# Patient Record
Sex: FEMALE | Race: WHITE | Employment: OTHER | URBAN - METROPOLITAN AREA
[De-identification: names, ages, dates, MRNs, and addresses within clinical notes are randomized per-mention and may not be internally consistent; named-entity substitution may affect disease eponyms.]

---

## 2020-08-02 ENCOUNTER — HOSPITAL ENCOUNTER (EMERGENCY)
Age: 65
Discharge: HOME | End: 2020-08-02
Attending: FAMILY MEDICINE
Payer: MEDICARE

## 2020-08-02 ENCOUNTER — APPOINTMENT (OUTPATIENT)
Dept: GENERAL RADIOLOGY | Age: 65
End: 2020-08-02
Payer: MEDICARE

## 2020-08-02 VITALS
RESPIRATION RATE: 16 BRPM | OXYGEN SATURATION: 95 % | BODY MASS INDEX: 30.32 KG/M2 | TEMPERATURE: 97.3 F | HEART RATE: 73 BPM | WEIGHT: 182 LBS | DIASTOLIC BLOOD PRESSURE: 92 MMHG | SYSTOLIC BLOOD PRESSURE: 138 MMHG | HEIGHT: 65 IN

## 2020-08-02 PROCEDURE — 6360000002 HC RX W HCPCS: Performed by: FAMILY MEDICINE

## 2020-08-02 PROCEDURE — 96372 THER/PROPH/DIAG INJ SC/IM: CPT

## 2020-08-02 PROCEDURE — 99283 EMERGENCY DEPT VISIT LOW MDM: CPT

## 2020-08-02 PROCEDURE — 73502 X-RAY EXAM HIP UNI 2-3 VIEWS: CPT

## 2020-08-02 PROCEDURE — 6370000000 HC RX 637 (ALT 250 FOR IP): Performed by: FAMILY MEDICINE

## 2020-08-02 RX ORDER — GABAPENTIN 400 MG/1
400 CAPSULE ORAL 3 TIMES DAILY
Status: ON HOLD | COMMUNITY
End: 2020-12-17 | Stop reason: HOSPADM

## 2020-08-02 RX ORDER — ESCITALOPRAM OXALATE 20 MG/1
20 TABLET ORAL DAILY
Status: ON HOLD | COMMUNITY
End: 2021-01-14 | Stop reason: SDUPTHER

## 2020-08-02 RX ORDER — KETOROLAC TROMETHAMINE 30 MG/ML
30 INJECTION, SOLUTION INTRAMUSCULAR; INTRAVENOUS ONCE
Status: COMPLETED | OUTPATIENT
Start: 2020-08-02 | End: 2020-08-02

## 2020-08-02 RX ORDER — ACETAMINOPHEN 500 MG
1000 TABLET ORAL ONCE
Status: COMPLETED | OUTPATIENT
Start: 2020-08-02 | End: 2020-08-02

## 2020-08-02 RX ORDER — ACETAMINOPHEN 500 MG
1000 TABLET ORAL EVERY 8 HOURS PRN
Qty: 50 TABLET | Refills: 0 | Status: SHIPPED | OUTPATIENT
Start: 2020-08-02 | End: 2020-11-21 | Stop reason: ALTCHOICE

## 2020-08-02 RX ORDER — QUETIAPINE 200 MG/1
200 TABLET, FILM COATED, EXTENDED RELEASE ORAL NIGHTLY
Status: ON HOLD | COMMUNITY
End: 2020-10-22 | Stop reason: SDUPTHER

## 2020-08-02 RX ORDER — IBUPROFEN 400 MG/1
400 TABLET ORAL EVERY 8 HOURS PRN
Qty: 12 TABLET | Refills: 0 | Status: ON HOLD | OUTPATIENT
Start: 2020-08-02 | End: 2020-08-29 | Stop reason: HOSPADM

## 2020-08-02 RX ADMIN — KETOROLAC TROMETHAMINE 30 MG: 30 INJECTION, SOLUTION INTRAMUSCULAR at 15:41

## 2020-08-02 RX ADMIN — ACETAMINOPHEN 1000 MG: 500 TABLET ORAL at 15:40

## 2020-08-02 ASSESSMENT — PAIN DESCRIPTION - LOCATION
LOCATION: HIP
LOCATION: HIP

## 2020-08-02 ASSESSMENT — PAIN DESCRIPTION - ORIENTATION
ORIENTATION: LEFT
ORIENTATION: LEFT

## 2020-08-02 ASSESSMENT — PAIN SCALES - GENERAL
PAINLEVEL_OUTOF10: 8
PAINLEVEL_OUTOF10: 5
PAINLEVEL_OUTOF10: 8
PAINLEVEL_OUTOF10: 8

## 2020-08-02 ASSESSMENT — PAIN DESCRIPTION - PAIN TYPE
TYPE: ACUTE PAIN
TYPE: ACUTE PAIN

## 2020-08-02 ASSESSMENT — PAIN DESCRIPTION - FREQUENCY: FREQUENCY: CONTINUOUS

## 2020-08-02 ASSESSMENT — PAIN DESCRIPTION - DESCRIPTORS: DESCRIPTORS: CONSTANT;THROBBING

## 2020-08-02 ASSESSMENT — PAIN DESCRIPTION - PROGRESSION: CLINICAL_PROGRESSION: GRADUALLY IMPROVING

## 2020-08-02 NOTE — ED PROVIDER NOTES
Department of Emergency Medicine   ED  Provider Note  Admit Date/RoomTime: 2020  3:13 PM  ED Room:    Chief Complaint   Fall (left side/hip pain- radiating down to left foot-  aggravated after fall (hs of degenerative bone disease))    History of Present Illness   Source of history provided by:  patient and relative(s). History/Exam Limitations: none. Damaso Vidal is a 77 y.o. old female who  has a past medical history of Degenerative disorder of bone, Hard of hearing, and Hypertension. , presents to the emergency department by private vehicle and ambulatory, for left hip pain which occured 2 day(s) prior to arrival.  Mechanism of injury/pain was result of a fall from standing. Since onset the symptoms have been moderate in degree. Her pain is aggraveated by movement, use and palpation, relieved by nothing and associated with needing assistance to walk. There has been no history of head injury, loss of consciousness, neck pain, chest pain, abdominal pain, numbness or weakness. ROS    Pertinent positives and negatives are stated within HPI, all other systems reviewed and are negative. Past Surgical History:   Procedure Laterality Date    BREAST LUMPECTOMY Left      SECTION      HEMORRHOID SURGERY      HYSTERECTOMY      JOINT REPLACEMENT      TONSILLECTOMY     Social History:  reports that she has been smoking. She started smoking about 47 years ago. She has never used smokeless tobacco. She reports previous alcohol use. She reports that she does not use drugs. Family History: family history is not on file.   Allergies: Tramadol    Physical Exam           ED Triage Vitals   BP Temp Temp Source Pulse Resp SpO2 Height Weight   20 1512 20 1512 20 1512 20 1512 20 1512 20 1512 20 1522 20 1522   (!) 138/92 97.3 °F (36.3 °C) Infrared 73 16 95 % 5' 5\" (1.651 m) 182 lb (82.6 kg)      Oxygen Saturation Interpretation: Normal.    Constitutional:  Alert. Development consistent with age. Head:  Atraumatic, without temporal or scalp tenderness. Eyes:  PERRL, EOMI. No periorbital ecchymoses. Conjunctiva: normal.  Ears:  External ears without lesions. Throat:  Pharynx without lesions. Airway patient. Neck:  Supple. Nontender. Chest:  Symmetrical without visible rash or tenderness. Respiratory:  Clear to auscultation and breath sounds equal.  CV:  Regular rate and rhythm, normal heart sounds, without pathological murmurs. GI:  Abdmen Soft, nontender. No firm or pulsatile mass. No abrasions, ecchymoses, or lacerations. Back:  No costovertebral, paravertebral, intervertebral, or vertebral tenderness or spasm. Musculoskeletal:  Pelvis:  Bilateral.        Tenderness: none. Stability:  stable to palpation. Hip(s):  Left: hip. Tenderness:  moderate. Swelling: None. Deformity: no.       ROM: diminished range with pain,      Skin: no erythema, rash or swelling noted. Neurovascular: Motor deficit: none. Sensory deficit: none. Pulse deficit: none. Capillary refill: normal.       Calf Tenderness:  No Bilateral.          Edema:  none Both lower extremity(s). Gait:  normal.  Integument:  No abrasions, ecchymoses, or lacerations unless noted elsewhere. Neurological:  Orientation age-appropriate. Motor functions intact. Lab / Imaging Results   (All laboratory and radiology results have been personally reviewed by myself)  Labs:  No results found for this visit on 08/02/20. Imaging: All Radiology results interpreted by Radiologist unless otherwise noted. XR HIP LEFT (2-3 VIEWS)   Final Result   No acute osseous abnormality seen.                  ED Course / Medical Decision Making     Medications   ketorolac (TORADOL) injection 30 mg (30 mg Intramuscular Given 8/2/20 1541)   acetaminophen (TYLENOL) tablet 1,000 mg (1,000 mg Oral Given 8/2/20 1540) Re-examination:  8/2/20       Time: 4:25   improved    Consult(s):   None    Procedure(s):   none    MDM:   X-rays negative no suspicion of fracture pelvis stable lumbar spine is unremarkable patient is able to ambulate. Counseling: The emergency provider has spoken with the patient and family member patient and son and discussed todays results, in addition to providing specific details for the plan of care and counseling regarding the diagnosis and prognosis. Questions are answered at this time and they are agreeable with the plan. Assessment      1. Sprain of left hip, initial encounter      Plan   Discharge to home  Patient condition is good    New Medications     New Prescriptions    ACETAMINOPHEN (TYLENOL) 500 MG TABLET    Take 2 tablets by mouth every 8 hours as needed for Pain    IBUPROFEN (IBU) 400 MG TABLET    Take 1 tablet by mouth every 8 hours as needed for Pain Take with full glass of water     Electronically signed by Tim Enriquez MD   DD: 8/2/20  **This report was transcribed using voice recognition software. Every effort was made to ensure accuracy; however, inadvertent computerized transcription errors may be present.   END OF ED PROVIDER NOTE        Tim Enriquez MD  08/02/20 [id number]

## 2020-08-22 ENCOUNTER — APPOINTMENT (OUTPATIENT)
Dept: GENERAL RADIOLOGY | Age: 65
DRG: 246 | End: 2020-08-22
Payer: MEDICARE

## 2020-08-22 ENCOUNTER — HOSPITAL ENCOUNTER (INPATIENT)
Age: 65
LOS: 3 days | Discharge: HOME | DRG: 246 | End: 2020-08-25
Attending: EMERGENCY MEDICINE | Admitting: GENERAL PRACTICE
Payer: MEDICARE

## 2020-08-22 PROBLEM — I21.3 STEMI (ST ELEVATION MYOCARDIAL INFARCTION) (HCC): Status: ACTIVE | Noted: 2020-08-22

## 2020-08-22 PROBLEM — R07.9 CHEST PAIN: Status: ACTIVE | Noted: 2020-08-22

## 2020-08-22 LAB
ANION GAP SERPL CALCULATED.3IONS-SCNC: 14 MMOL/L (ref 7–16)
APTT: 37.8 SEC (ref 24.5–35.1)
BASOPHILS ABSOLUTE: 0.04 E9/L (ref 0–0.2)
BASOPHILS RELATIVE PERCENT: 0.3 % (ref 0–2)
BUN BLDV-MCNC: 18 MG/DL (ref 8–23)
CALCIUM SERPL-MCNC: 9 MG/DL (ref 8.6–10.2)
CHLORIDE BLD-SCNC: 99 MMOL/L (ref 98–107)
CO2: 21 MMOL/L (ref 22–29)
CREAT SERPL-MCNC: 1.1 MG/DL (ref 0.5–1)
EOSINOPHILS ABSOLUTE: 0.43 E9/L (ref 0.05–0.5)
EOSINOPHILS RELATIVE PERCENT: 3.5 % (ref 0–6)
GFR AFRICAN AMERICAN: 60
GFR NON-AFRICAN AMERICAN: 49 ML/MIN/1.73
GLUCOSE BLD-MCNC: 119 MG/DL (ref 74–99)
HCT VFR BLD CALC: 38.9 % (ref 34–48)
HEMOGLOBIN: 13.2 G/DL (ref 11.5–15.5)
IMMATURE GRANULOCYTES #: 0.04 E9/L
IMMATURE GRANULOCYTES %: 0.3 % (ref 0–5)
INR BLD: 0.9
LYMPHOCYTES ABSOLUTE: 2.5 E9/L (ref 1.5–4)
LYMPHOCYTES RELATIVE PERCENT: 20.4 % (ref 20–42)
MCH RBC QN AUTO: 32.9 PG (ref 26–35)
MCHC RBC AUTO-ENTMCNC: 33.9 % (ref 32–34.5)
MCV RBC AUTO: 97 FL (ref 80–99.9)
MONOCYTES ABSOLUTE: 0.91 E9/L (ref 0.1–0.95)
MONOCYTES RELATIVE PERCENT: 7.4 % (ref 2–12)
NEUTROPHILS ABSOLUTE: 8.34 E9/L (ref 1.8–7.3)
NEUTROPHILS RELATIVE PERCENT: 68.1 % (ref 43–80)
PDW BLD-RTO: 13.2 FL (ref 11.5–15)
PLATELET # BLD: 281 E9/L (ref 130–450)
PMV BLD AUTO: 10.3 FL (ref 7–12)
POC ACT LR: 304 SECONDS
POTASSIUM SERPL-SCNC: 4.5 MMOL/L (ref 3.5–5)
PROTHROMBIN TIME: 10 SEC (ref 9.3–12.4)
RBC # BLD: 4.01 E12/L (ref 3.5–5.5)
SODIUM BLD-SCNC: 134 MMOL/L (ref 132–146)
TROPONIN: 0.03 NG/ML (ref 0–0.03)
WBC # BLD: 12.3 E9/L (ref 4.5–11.5)

## 2020-08-22 PROCEDURE — 71045 X-RAY EXAM CHEST 1 VIEW: CPT

## 2020-08-22 PROCEDURE — 6360000002 HC RX W HCPCS: Performed by: EMERGENCY MEDICINE

## 2020-08-22 PROCEDURE — 93458 L HRT ARTERY/VENTRICLE ANGIO: CPT | Performed by: INTERNAL MEDICINE

## 2020-08-22 PROCEDURE — 027034Z DILATION OF CORONARY ARTERY, ONE ARTERY WITH DRUG-ELUTING INTRALUMINAL DEVICE, PERCUTANEOUS APPROACH: ICD-10-PCS | Performed by: INTERNAL MEDICINE

## 2020-08-22 PROCEDURE — 36415 COLL VENOUS BLD VENIPUNCTURE: CPT

## 2020-08-22 PROCEDURE — 80048 BASIC METABOLIC PNL TOTAL CA: CPT

## 2020-08-22 PROCEDURE — 93005 ELECTROCARDIOGRAM TRACING: CPT | Performed by: EMERGENCY MEDICINE

## 2020-08-22 PROCEDURE — C1887 CATHETER, GUIDING: HCPCS

## 2020-08-22 PROCEDURE — 6370000000 HC RX 637 (ALT 250 FOR IP): Performed by: EMERGENCY MEDICINE

## 2020-08-22 PROCEDURE — C1874 STENT, COATED/COV W/DEL SYS: HCPCS

## 2020-08-22 PROCEDURE — 86901 BLOOD TYPING SEROLOGIC RH(D): CPT

## 2020-08-22 PROCEDURE — 84484 ASSAY OF TROPONIN QUANT: CPT

## 2020-08-22 PROCEDURE — C9606 PERC D-E COR REVASC W AMI S: HCPCS | Performed by: INTERNAL MEDICINE

## 2020-08-22 PROCEDURE — 6360000002 HC RX W HCPCS

## 2020-08-22 PROCEDURE — B2151ZZ FLUOROSCOPY OF LEFT HEART USING LOW OSMOLAR CONTRAST: ICD-10-PCS | Performed by: INTERNAL MEDICINE

## 2020-08-22 PROCEDURE — 85025 COMPLETE CBC W/AUTO DIFF WBC: CPT

## 2020-08-22 PROCEDURE — 2709999900 HC NON-CHARGEABLE SUPPLY

## 2020-08-22 PROCEDURE — 2500000003 HC RX 250 WO HCPCS

## 2020-08-22 PROCEDURE — C1769 GUIDE WIRE: HCPCS

## 2020-08-22 PROCEDURE — 86900 BLOOD TYPING SEROLOGIC ABO: CPT

## 2020-08-22 PROCEDURE — 99285 EMERGENCY DEPT VISIT HI MDM: CPT

## 2020-08-22 PROCEDURE — 99291 CRITICAL CARE FIRST HOUR: CPT | Performed by: INTERNAL MEDICINE

## 2020-08-22 PROCEDURE — 86850 RBC ANTIBODY SCREEN: CPT

## 2020-08-22 PROCEDURE — 92941 PRQ TRLML REVSC TOT OCCL AMI: CPT | Performed by: INTERNAL MEDICINE

## 2020-08-22 PROCEDURE — 85730 THROMBOPLASTIN TIME PARTIAL: CPT

## 2020-08-22 PROCEDURE — 96374 THER/PROPH/DIAG INJ IV PUSH: CPT

## 2020-08-22 PROCEDURE — 85347 COAGULATION TIME ACTIVATED: CPT

## 2020-08-22 PROCEDURE — 2140000000 HC CCU INTERMEDIATE R&B

## 2020-08-22 PROCEDURE — B2111ZZ FLUOROSCOPY OF MULTIPLE CORONARY ARTERIES USING LOW OSMOLAR CONTRAST: ICD-10-PCS | Performed by: INTERNAL MEDICINE

## 2020-08-22 PROCEDURE — 96376 TX/PRO/DX INJ SAME DRUG ADON: CPT

## 2020-08-22 PROCEDURE — 4A023N7 MEASUREMENT OF CARDIAC SAMPLING AND PRESSURE, LEFT HEART, PERCUTANEOUS APPROACH: ICD-10-PCS | Performed by: INTERNAL MEDICINE

## 2020-08-22 PROCEDURE — 85610 PROTHROMBIN TIME: CPT

## 2020-08-22 PROCEDURE — C1894 INTRO/SHEATH, NON-LASER: HCPCS

## 2020-08-22 PROCEDURE — 96375 TX/PRO/DX INJ NEW DRUG ADDON: CPT

## 2020-08-22 PROCEDURE — C1725 CATH, TRANSLUMIN NON-LASER: HCPCS

## 2020-08-22 RX ORDER — FENTANYL CITRATE 50 UG/ML
100 INJECTION, SOLUTION INTRAMUSCULAR; INTRAVENOUS ONCE
Status: COMPLETED | OUTPATIENT
Start: 2020-08-22 | End: 2020-08-22

## 2020-08-22 RX ORDER — HEPARIN SODIUM 1000 [USP'U]/ML
INJECTION, SOLUTION INTRAVENOUS; SUBCUTANEOUS
Status: COMPLETED
Start: 2020-08-22 | End: 2020-08-22

## 2020-08-22 RX ORDER — NITROGLYCERIN 20 MG/100ML
5 INJECTION INTRAVENOUS CONTINUOUS
Status: DISCONTINUED | OUTPATIENT
Start: 2020-08-22 | End: 2020-08-23

## 2020-08-22 RX ORDER — ASPIRIN 81 MG/1
324 TABLET, CHEWABLE ORAL ONCE
Status: COMPLETED | OUTPATIENT
Start: 2020-08-22 | End: 2020-08-22

## 2020-08-22 RX ORDER — HEPARIN SODIUM 10000 [USP'U]/100ML
11 INJECTION, SOLUTION INTRAVENOUS CONTINUOUS
Status: DISCONTINUED | OUTPATIENT
Start: 2020-08-22 | End: 2020-08-23

## 2020-08-22 RX ORDER — FENTANYL CITRATE 50 UG/ML
50 INJECTION, SOLUTION INTRAMUSCULAR; INTRAVENOUS ONCE
Status: COMPLETED | OUTPATIENT
Start: 2020-08-22 | End: 2020-08-22

## 2020-08-22 RX ORDER — HEPARIN SODIUM 10000 [USP'U]/100ML
INJECTION, SOLUTION INTRAVENOUS
Status: COMPLETED
Start: 2020-08-22 | End: 2020-08-22

## 2020-08-22 RX ORDER — ONDANSETRON 2 MG/ML
4 INJECTION INTRAMUSCULAR; INTRAVENOUS ONCE
Status: COMPLETED | OUTPATIENT
Start: 2020-08-22 | End: 2020-08-22

## 2020-08-22 RX ORDER — HEPARIN SODIUM 1000 [USP'U]/ML
5000 INJECTION, SOLUTION INTRAVENOUS; SUBCUTANEOUS ONCE
Status: COMPLETED | OUTPATIENT
Start: 2020-08-22 | End: 2020-08-22

## 2020-08-22 RX ADMIN — HEPARIN SODIUM 11 UNITS/KG/HR: 10000 INJECTION, SOLUTION INTRAVENOUS at 22:30

## 2020-08-22 RX ADMIN — ASPIRIN 324 MG: 81 TABLET, CHEWABLE ORAL at 21:16

## 2020-08-22 RX ADMIN — HEPARIN SODIUM 5000 UNITS: 1000 INJECTION, SOLUTION INTRAVENOUS; SUBCUTANEOUS at 22:25

## 2020-08-22 RX ADMIN — FENTANYL CITRATE 50 MCG: 50 INJECTION, SOLUTION INTRAMUSCULAR; INTRAVENOUS at 21:17

## 2020-08-22 RX ADMIN — TICAGRELOR 180 MG: 90 TABLET ORAL at 22:24

## 2020-08-22 RX ADMIN — FENTANYL CITRATE 100 MCG: 50 INJECTION, SOLUTION INTRAMUSCULAR; INTRAVENOUS at 21:57

## 2020-08-22 RX ADMIN — NITROGLYCERIN 0.5 INCH: 20 OINTMENT TOPICAL at 21:16

## 2020-08-22 RX ADMIN — ONDANSETRON 4 MG: 2 INJECTION INTRAMUSCULAR; INTRAVENOUS at 21:17

## 2020-08-22 ASSESSMENT — PAIN DESCRIPTION - LOCATION: LOCATION: CHEST

## 2020-08-22 ASSESSMENT — PAIN SCALES - GENERAL
PAINLEVEL_OUTOF10: 8
PAINLEVEL_OUTOF10: 6
PAINLEVEL_OUTOF10: 7
PAINLEVEL_OUTOF10: 7
PAINLEVEL_OUTOF10: 6

## 2020-08-22 ASSESSMENT — PAIN DESCRIPTION - PAIN TYPE: TYPE: ACUTE PAIN

## 2020-08-22 ASSESSMENT — PAIN DESCRIPTION - FREQUENCY: FREQUENCY: CONTINUOUS

## 2020-08-22 ASSESSMENT — PAIN DESCRIPTION - ONSET: ONSET: ON-GOING

## 2020-08-22 ASSESSMENT — PAIN DESCRIPTION - PROGRESSION: CLINICAL_PROGRESSION: GRADUALLY WORSENING

## 2020-08-22 ASSESSMENT — PAIN DESCRIPTION - DESCRIPTORS: DESCRIPTORS: PRESSURE

## 2020-08-22 ASSESSMENT — PAIN DESCRIPTION - ORIENTATION: ORIENTATION: MID

## 2020-08-23 LAB
ABO/RH: NORMAL
ALBUMIN SERPL-MCNC: 3.6 G/DL (ref 3.5–5.2)
ALP BLD-CCNC: 76 U/L (ref 35–104)
ALT SERPL-CCNC: 13 U/L (ref 0–32)
ANION GAP SERPL CALCULATED.3IONS-SCNC: 12 MMOL/L (ref 7–16)
ANTIBODY SCREEN: NORMAL
AST SERPL-CCNC: 17 U/L (ref 0–31)
BILIRUB SERPL-MCNC: <0.2 MG/DL (ref 0–1.2)
BUN BLDV-MCNC: 22 MG/DL (ref 8–23)
CALCIUM SERPL-MCNC: 8.7 MG/DL (ref 8.6–10.2)
CHLORIDE BLD-SCNC: 106 MMOL/L (ref 98–107)
CHOLESTEROL, TOTAL: 174 MG/DL (ref 0–199)
CO2: 21 MMOL/L (ref 22–29)
CREAT SERPL-MCNC: 1.3 MG/DL (ref 0.5–1)
EKG ATRIAL RATE: 58 BPM
EKG ATRIAL RATE: 61 BPM
EKG ATRIAL RATE: 65 BPM
EKG P AXIS: 35 DEGREES
EKG P AXIS: 48 DEGREES
EKG P AXIS: 73 DEGREES
EKG P-R INTERVAL: 170 MS
EKG P-R INTERVAL: 184 MS
EKG P-R INTERVAL: 204 MS
EKG Q-T INTERVAL: 420 MS
EKG Q-T INTERVAL: 458 MS
EKG Q-T INTERVAL: 462 MS
EKG QRS DURATION: 78 MS
EKG QRS DURATION: 82 MS
EKG QRS DURATION: 88 MS
EKG QTC CALCULATION (BAZETT): 436 MS
EKG QTC CALCULATION (BAZETT): 453 MS
EKG QTC CALCULATION (BAZETT): 461 MS
EKG R AXIS: -20 DEGREES
EKG R AXIS: 13 DEGREES
EKG R AXIS: 4 DEGREES
EKG T AXIS: 22 DEGREES
EKG T AXIS: 31 DEGREES
EKG T AXIS: 47 DEGREES
EKG VENTRICULAR RATE: 58 BPM
EKG VENTRICULAR RATE: 61 BPM
EKG VENTRICULAR RATE: 65 BPM
GFR AFRICAN AMERICAN: 49
GFR NON-AFRICAN AMERICAN: 41 ML/MIN/1.73
GLUCOSE BLD-MCNC: 104 MG/DL (ref 74–99)
HDLC SERPL-MCNC: 48 MG/DL
LDL CHOLESTEROL CALCULATED: 74 MG/DL (ref 0–99)
LV EF: 40 %
LVEF MODALITY: NORMAL
POTASSIUM SERPL-SCNC: 4.5 MMOL/L (ref 3.5–5)
SODIUM BLD-SCNC: 139 MMOL/L (ref 132–146)
TOTAL PROTEIN: 5.7 G/DL (ref 6.4–8.3)
TRIGL SERPL-MCNC: 262 MG/DL (ref 0–149)
VLDLC SERPL CALC-MCNC: 52 MG/DL

## 2020-08-23 PROCEDURE — 93306 TTE W/DOPPLER COMPLETE: CPT

## 2020-08-23 PROCEDURE — 6370000000 HC RX 637 (ALT 250 FOR IP): Performed by: INTERNAL MEDICINE

## 2020-08-23 PROCEDURE — 6370000000 HC RX 637 (ALT 250 FOR IP): Performed by: GENERAL PRACTICE

## 2020-08-23 PROCEDURE — 2580000003 HC RX 258: Performed by: INTERNAL MEDICINE

## 2020-08-23 PROCEDURE — 36415 COLL VENOUS BLD VENIPUNCTURE: CPT

## 2020-08-23 PROCEDURE — 93005 ELECTROCARDIOGRAM TRACING: CPT | Performed by: INTERNAL MEDICINE

## 2020-08-23 PROCEDURE — 99233 SBSQ HOSP IP/OBS HIGH 50: CPT | Performed by: INTERNAL MEDICINE

## 2020-08-23 PROCEDURE — 2140000000 HC CCU INTERMEDIATE R&B

## 2020-08-23 PROCEDURE — 80053 COMPREHEN METABOLIC PANEL: CPT

## 2020-08-23 PROCEDURE — 93010 ELECTROCARDIOGRAM REPORT: CPT | Performed by: INTERNAL MEDICINE

## 2020-08-23 PROCEDURE — 6360000002 HC RX W HCPCS: Performed by: INTERNAL MEDICINE

## 2020-08-23 PROCEDURE — 80061 LIPID PANEL: CPT

## 2020-08-23 RX ORDER — ACETAMINOPHEN 325 MG/1
650 TABLET ORAL EVERY 6 HOURS PRN
Status: DISCONTINUED | OUTPATIENT
Start: 2020-08-23 | End: 2020-08-25 | Stop reason: HOSPADM

## 2020-08-23 RX ORDER — DISULFIRAM 250 MG/1
250 TABLET ORAL DAILY
Status: ON HOLD | COMMUNITY
End: 2022-08-01 | Stop reason: ALTCHOICE

## 2020-08-23 RX ORDER — SODIUM CHLORIDE 0.9 % (FLUSH) 0.9 %
10 SYRINGE (ML) INJECTION PRN
Status: DISCONTINUED | OUTPATIENT
Start: 2020-08-23 | End: 2020-08-25 | Stop reason: HOSPADM

## 2020-08-23 RX ORDER — SODIUM CHLORIDE 0.9 % (FLUSH) 0.9 %
10 SYRINGE (ML) INJECTION EVERY 12 HOURS SCHEDULED
Status: DISCONTINUED | OUTPATIENT
Start: 2020-08-23 | End: 2020-08-25 | Stop reason: HOSPADM

## 2020-08-23 RX ORDER — NITROGLYCERIN 0.4 MG/1
0.4 TABLET SUBLINGUAL EVERY 5 MIN PRN
Status: DISCONTINUED | OUTPATIENT
Start: 2020-08-23 | End: 2020-08-25 | Stop reason: HOSPADM

## 2020-08-23 RX ORDER — METOPROLOL SUCCINATE 25 MG/1
25 TABLET, EXTENDED RELEASE ORAL DAILY
Status: DISCONTINUED | OUTPATIENT
Start: 2020-08-23 | End: 2020-08-24

## 2020-08-23 RX ORDER — DISULFIRAM 250 MG/1
250 TABLET ORAL DAILY
Status: DISCONTINUED | OUTPATIENT
Start: 2020-08-23 | End: 2020-08-25 | Stop reason: HOSPADM

## 2020-08-23 RX ORDER — SODIUM CHLORIDE 9 MG/ML
INJECTION, SOLUTION INTRAVENOUS CONTINUOUS
Status: DISCONTINUED | OUTPATIENT
Start: 2020-08-23 | End: 2020-08-25 | Stop reason: HOSPADM

## 2020-08-23 RX ORDER — ZOLPIDEM TARTRATE 5 MG/1
10 TABLET ORAL ONCE
Status: COMPLETED | OUTPATIENT
Start: 2020-08-23 | End: 2020-08-23

## 2020-08-23 RX ORDER — PREGABALIN 50 MG/1
50 CAPSULE ORAL DAILY
COMMUNITY
End: 2020-09-21 | Stop reason: ALTCHOICE

## 2020-08-23 RX ORDER — GABAPENTIN 400 MG/1
400 CAPSULE ORAL 3 TIMES DAILY
Status: DISCONTINUED | OUTPATIENT
Start: 2020-08-23 | End: 2020-08-25 | Stop reason: HOSPADM

## 2020-08-23 RX ORDER — ASPIRIN 81 MG/1
81 TABLET, CHEWABLE ORAL DAILY
Status: DISCONTINUED | OUTPATIENT
Start: 2020-08-23 | End: 2020-08-25 | Stop reason: HOSPADM

## 2020-08-23 RX ORDER — QUETIAPINE 200 MG/1
200 TABLET, FILM COATED, EXTENDED RELEASE ORAL NIGHTLY
Status: DISCONTINUED | OUTPATIENT
Start: 2020-08-23 | End: 2020-08-25 | Stop reason: HOSPADM

## 2020-08-23 RX ORDER — PREGABALIN 50 MG/1
50 CAPSULE ORAL DAILY
Status: DISCONTINUED | OUTPATIENT
Start: 2020-08-23 | End: 2020-08-25 | Stop reason: HOSPADM

## 2020-08-23 RX ORDER — ATORVASTATIN CALCIUM 80 MG/1
80 TABLET, FILM COATED ORAL NIGHTLY
Status: DISCONTINUED | OUTPATIENT
Start: 2020-08-23 | End: 2020-08-25 | Stop reason: HOSPADM

## 2020-08-23 RX ADMIN — ACETAMINOPHEN 650 MG: 325 TABLET, FILM COATED ORAL at 00:33

## 2020-08-23 RX ADMIN — ASPIRIN 81 MG CHEWABLE TABLET 81 MG: 81 TABLET CHEWABLE at 09:25

## 2020-08-23 RX ADMIN — NITROGLYCERIN 0.5 INCH: 20 OINTMENT TOPICAL at 23:05

## 2020-08-23 RX ADMIN — TICAGRELOR 90 MG: 90 TABLET ORAL at 23:05

## 2020-08-23 RX ADMIN — NITROGLYCERIN 0.5 INCH: 20 OINTMENT TOPICAL at 18:28

## 2020-08-23 RX ADMIN — METOPROLOL SUCCINATE 25 MG: 25 TABLET, EXTENDED RELEASE ORAL at 09:21

## 2020-08-23 RX ADMIN — NITROGLYCERIN 0.5 INCH: 20 OINTMENT TOPICAL at 13:03

## 2020-08-23 RX ADMIN — ACETAMINOPHEN 650 MG: 325 TABLET, FILM COATED ORAL at 16:33

## 2020-08-23 RX ADMIN — ZOLPIDEM TARTRATE 10 MG: 5 TABLET ORAL at 23:06

## 2020-08-23 RX ADMIN — TICAGRELOR 90 MG: 90 TABLET ORAL at 09:21

## 2020-08-23 RX ADMIN — PREGABALIN 50 MG: 50 CAPSULE ORAL at 23:05

## 2020-08-23 RX ADMIN — SODIUM CHLORIDE, PRESERVATIVE FREE 10 ML: 5 INJECTION INTRAVENOUS at 09:21

## 2020-08-23 RX ADMIN — ACETAMINOPHEN 650 MG: 325 TABLET, FILM COATED ORAL at 09:21

## 2020-08-23 RX ADMIN — GABAPENTIN 400 MG: 400 CAPSULE ORAL at 23:06

## 2020-08-23 RX ADMIN — SODIUM CHLORIDE: 9 INJECTION, SOLUTION INTRAVENOUS at 00:35

## 2020-08-23 RX ADMIN — ATORVASTATIN CALCIUM 80 MG: 80 TABLET, FILM COATED ORAL at 23:05

## 2020-08-23 RX ADMIN — ENOXAPARIN SODIUM 30 MG: 30 INJECTION SUBCUTANEOUS at 09:21

## 2020-08-23 ASSESSMENT — PAIN DESCRIPTION - LOCATION: LOCATION: CHEST

## 2020-08-23 ASSESSMENT — PAIN SCALES - GENERAL
PAINLEVEL_OUTOF10: 6
PAINLEVEL_OUTOF10: 4
PAINLEVEL_OUTOF10: 0
PAINLEVEL_OUTOF10: 4

## 2020-08-23 ASSESSMENT — PAIN DESCRIPTION - DESCRIPTORS: DESCRIPTORS: ACHING

## 2020-08-23 ASSESSMENT — PAIN DESCRIPTION - ONSET: ONSET: ON-GOING

## 2020-08-23 NOTE — PROCEDURES
narrowing before a small second  marginal branch and large third and fourth marginal branches and another  fifth small lateral branch. The AV groove branch and the posterior  descending artery branch and the posterolateral branch do not appear to  have any significant disease. RCA:  The right coronary artery is a moderate size codominant vessel,  which did not appear to have any significant angiographic disease. LEFT VENTRICULOGRAPHY:  The left ventricle is normal in size. There was  severe hypokinesis of the anterolateral wall. There was severe  hypokinesis to akinesis of the apical half to two-thirds of the  anterolateral wall, the apex and the apical half of the inferior wall. The global left ventricular systolic function also appeared moderately  reduced with an estimated ejection fraction of 35%-40%. There was post  PVC mitral regurgitation. INTERVENTIONAL PROCEDURE:    EQUIPMENT:  1.  6-Divehi JL-3.5 Skin Scan Launcher guide catheter with side holes. 2.  0.014/300 Sunbright scientific Choice Extra Support J-exchange wire. 3.  2.0 mm x 15 mm Stribe balloon. 4.  3.0 mm x 33 mm Abbott Xience Sara drug-eluting coronary stent. 5.  3.5 mm x 15 mm Sunbright Scientific Magnolia Monorail noncompliant balloon. TECHNIQUE:  The procedure was done through right radial approach. The  patient had received heparin at the [facility] emergency department  together with aspirin and Brilinta. An ACT after obtaining access in  the right radial artery was 232 for which she was given another 3000  units of intravenous heparin with repeat ACT during the procedure of  304. Intravenous Versed was given for sedation. The patient did  receive intraradial nitroglycerin and verapamil after obtaining access  in the right radial artery. The left coronary artery was selectively engaged with the 6-Divehi  JL-3.5 guide catheter with side holes.   The Extra Support exchange wire  was then successfully advanced estimated  ejection fraction of 35%-40%. 3.  Mildly elevated left end diastolic pressure. 4.  Successful balloon angioplasty with the deployment of a drug-eluting  coronary stent to the proximal LAD with post-stent deployment dilatation  with a larger noncompliant balloon to high pressure with very good  results.         Ana Ceballos MD    D: 08/23/2020 3:17:11       T: 08/23/2020 3:21:15     [email or web address]  Job#: [id number]     Doc#: [id number]    CC:

## 2020-08-23 NOTE — CONSULTS
[address]                  [address] [place],  [facility]                                  CONSULTATION    PATIENT NAME: Michael Kendall                   :        1955  MED REC NO:   [id number]                            ROOM:       7734  ACCOUNT NO:   [de-identified]                           ADMIT DATE: 2020  PROVIDER:     Jodie Mcelroy MD    CONSULT DATE:  2020    CRITICAL CARE CONSULTATION REPORT    REASON FOR CONSULTATION:  Acute ST-elevation anterior wall myocardial  infarction. HISTORY OF PRESENT ILLNESS:  The patient is a 69-year-old lady who had  an episode of chest pain yesterday that lasted for 3 hours. Today, at  around 01:00 p.m., she had recurrent chest pain that she rated at 9/10  in severity when it first occurred. It radiated to the jaw and both  arms. She presented to the [facility] Emergency Room 6 hours after the  onset of her chest pain. The initial EKG was within normal limits with  no acute ST-T wave changes. Her first troponin was negative. She was  in the process of being admitted to the [facility] when the nurse  noted that she had ST elevations on the monitor. The patient complained  of worsening chest pain. A repeat EKG showed ST elevation in the  anterior leads. I was called as I am covering Interventional Cardiology  because of the above findings. Upon my instructions, the patient was  given aspirin, 180 mg of Brilinta and was bolused with heparin and was  started on heparin drip. She was also given fentanyl. Her systolic  blood pressure in the ambulance was in the 60s to 70s and she was  bolused with fluids. On arrival to the cath lab, she reported that her  pain was about 5-6/10. REVIEW OF SYSTEMS:  CONSTITUTIONAL:  She denies fever, chills, night  sweats or fatigue. HEENT:  She denies any unusual headaches. She  denies recent changes in hearing or vision.   She denied dysphagia,  hoarseness, hemoptysis, hematemesis or epistaxis. ENDOCRINE:  She  denied polyphagia, polyuria or polydipsia. She denied heat or cold  intolerance. MUSCULOSKELETAL:  She denied any significant myalgias or  arthralgias. SKIN:  She denied rashes, ulcers or itching. HEME/LYMPH:   She denied any palpable lymph nodes. She denied bleeding or easy  bruisability. HEART:  As above. LUNGS:  She denied cough or sputum  production. GI:  She denied abdominal pain, nausea, vomiting, diarrhea,  constipation, rectal bleeding or tarry stools. :  She denied dysuria  or hematuria. PSYCH:  She has a history of depression. She denied  recent mood changes. NEUROLOGIC:  She denied memory loss, motor  weakness, numbness, tingling or tremors. PAST MEDICAL/SURGICAL HISTORY:  1. Hypertension. 2.  Depression. 3.  Degenerative disorder of bone with a history of joint replacement. 4.  Hard of hearing. 5.  History of . 6.  History of tonsillectomy. 7.  History of hysterectomy. 8.  History of hemorrhoid surgery. 9.  History of left breast lumpectomy. FAMILY HISTORY:  Father  at age 76 from heart disease. Mother   at age 72 from lung cancer. SOCIAL HISTORY:  The patient currently smokes half pack of cigarettes a  day. She started smoking at the age 23. She is a recovering alcoholic  and has not had any alcohol for 6 years and 2 months. She denied  illicit drug use. ALLERGIES:  TRAMADOL. MEDICATIONS:  Medications prior to admission included Lyrica,  gabapentin, amlodipine, Seroquel, Lexapro, ibuprofen and Tylenol. PHYSICAL EXAMINATION:  GENERAL APPEARANCE:  Well-developed, well-nourished lady in no acute  distress. VITAL SIGNS:  Systolic blood pressure in the upper 70s to low 80s, heart  rate 60, respiratory 16. HEENT:  Atraumatic, normocephalic head. Sclerae nonicteric. No  xanthelasmas. Mucous membranes moist.  NECK:  No jugular venous tension. No carotid bruits. Carotid upstrokes  normal bilaterally. No thyromegaly. CHEST:  Symmetrical and nontender. No deformities. LUNGS:  Clear bilaterally. HEART:  Normal S1, S2. No S3 or S4. No murmurs or rubs. ABDOMEN:  Soft, nontender without organomegaly or mass. Normal bowel  sounds. No bruits. EXTREMITIES:  No edema. Pulses palpable. SKIN:  Normal turgor. No rashes or ulcers noted. NEUROLOGIC:  Oriented x3. No motor or sensory deficit detected. REVIEW OF DIAGNOSTIC TESTS:  Initial EKG on presentation to the  [facility] ER showed sinus rhythm with leftward axis with poor R-wave  progression in V1-V2, but with no ST-segment elevations, ST-segment  depressions or T-wave inversions. Repeat EKG when she complained of  worsening chest pain showed sinus rhythm with ST elevations in V1-V5. White blood cell count 12.3, hemoglobin 13.2, hematic 38.9, platelet  count 148. Troponin 0.03, BUN 28, creatinine 1.1, GFR 49, potassium  4.5, sodium 134. ASSESSMENT:  1. Acute ST-elevation anterior wall myocardial infarction. 2.  Hypotension, secondary to anterior infarct/cardiogenic shock. 3.  History of hypertension. 4.  Renal insufficiency. 5.  Tobacco abuse. 6.  Depression. TREATMENT PLAN:  1. Emergent cardiac catheterization with possible percutaneous coronary  intervention. Risks of the procedure including but not limited to  death, stroke, vascular injury, bleeding, infection, allergic reaction,  renal failure, possible need for emergency bypass surgery, possible need  for vascular repair surgery, possible need for dialysis, possible need  for blood transfusion, etc., were all explained to the patient. Benefits and alternative therapies were discussed. She understood and  consented to proceed. 2.  Further recommendations to follow.         Blas Bird MD    D: 08/23/2020 0:19:19       T: 08/23/2020 0:23:50     [email or web address]  Job#: [id number]     Doc#: [id number]    CC:

## 2020-08-23 NOTE — OP NOTE
Operative Note      Patient: Edward Luciano  YOB: 1955  MRN: [id number]    Date of Procedure: 8/22/2020      Indication:    1. Acute anterior STEMI  2. Catheterization: AUC score 9 . Indication 2.  3. PCI: AUC score 9 Indication 1. Procedure: Left Heart Catheterization, coronary angiography, left ventriculography, percutaneous coronary intervention to LAD      Anesthesia: Versed  Time sedation was administered: 23:14. I was present in the room when sedation was administered. Procedure end time: 23:37  Time spent with face to face monitoring of moderate sedation: 56 min    Cardiac cath performed via right radial approach using 6F sheath. Findings:   Left main: 0% stenosis  LAD: 99 %   stenosis  Circumflex: 70%   stenosis. RCA: Co dominant. 0 %  stenosis. LV angio: 35%  ejection fraction      Hemodynamics: Ao: 62/41 ( 49 )  LV: 69  LVEDP: 14    Interventional procedure:   1. PCI vessel: LAD. Lesion type: C. SADI I flow pre PCI. Angioplasty performed with 2.0 balloon. Stenting performed with 3.0 mm GAURAV Post dilated with 3.5 mm non compliant balloon . Result: 0% residual stenosis and SADI III distal flow. Drugs: . Anticoagulation was maintained with IV Heparin . Brilinta 180 mg load given  in the outlying ED. Right radial sheath removed and TR band applied. There was good distal pulses in the RUE at the end of the procedure. Complication: None   Blood loss: 5 cc  Contrast used: 130 cc      Post op diagnosis:  Acute anterior STEMI  Successful PCI / GAURAV to LAD    Plan:  DAPT  Risk profile modification.    See orders          Electronically signed by Franklin Cabrera MD on 8/22/2020 at 11:47 PM

## 2020-08-23 NOTE — H&P
[address]                  [address] [facility],  [facility]                              HISTORY AND PHYSICAL    PATIENT NAME: Alex Rose                   :        1955  MED REC NO:   [id number]                            ROOM:       56  ACCOUNT NO:   [de-identified]                           ADMIT DATE: 2020  PROVIDER:     Danica Florentino DO    HISTORY OF PRESENT ILLNESS:  This is a 47-year-old white female  presented to the [facility] Emergency Department with a history and  chief complaint of chest pain. She states that the pain was radiating  up into her jaws and down into both upper arms and felt as though  somebody was pulling her arms. She was going to be admitted into the  chest pain unit initially over at [facility] and then I guess EKGs  demonstrated some significant ST changes, although troponin was  negative. She was brought [facility] for urgent catheterization,  Cardiology consult. PAST MEDICAL HISTORY:  Significant for hypertension, chronic myofascial  pain syndrome, chronic anxiety, depression. PAST SURGICAL HISTORY:  Significant for bilateral knee replacements,  hysterectomy, three  sections. She had been hospitalized in the  past for pneumonia and hepatitis. She had a breast lumpectomy. MEDICATIONS:  Prior to admission were; Tylenol, ibuprofen, Lexapro,  Seroquel, amlodipine, gabapentin. ALLERGIES:  She has allergies to TRAMADOL. SOCIAL HISTORY:  The patient is a smoker. She has been encouraged to  quit. Previous history of drug use and alcohol. FAMILY HISTORY:  Positive, that mom had lung cancer. Father had  coronary artery disease. REVIEW OF SYSTEMS:  HEENT:  Negative for cephalgia, vertigo, ringing in ears, hearing loss,  difficulty swallowing, hoarseness in her voice or bloody noses. CARDIOVASCULAR:  She has chest pain that relates to the history and  chief complaint.   PULMONARY:  There is no cough, wheeze, shortness of breath, hemoptysis. GASTROINTESTINAL:  No nausea, vomiting, diarrhea. No constipation or  blood in the stool. No recent change in weight. No urgency, frequency,  dysuria. ENDOCRINE:  Negative for diabetes or thyroid disorder. MUSCULOSKELETAL:  Positive for degenerative joint disease. PSYCHIATRIC:  Positive for anxiety and depression. NEUROLOGIC:  Negative for CVA, seizures, tremors, tics or TIAs. PHYSICAL EXAMINATION:  GENERAL:  Shows this to be a 56-year-old white female, in moderate  distress with the above complaints. HEAD, EYES, EARS, NOSE AND THROAT:  Examination shows the head to be  normocephalic and atraumatic. Pupils are equal and reactive to light  and accommodation. Extraocular eye muscles are intact. Tympanic  membranes are clear. Ear canals are patent. Oral mucosa pink and  moist.  NECK:  Veins are flat, nondistended. No carotid bruits. CHEST:  Symmetrical.  HEART:  Regular rate and rhythm without murmur, gallops, or friction  rub. LUNGS:  Clear to auscultation bilaterally without rhonchi, rales, or  wheezes. ABDOMEN:  Soft, nontender, nondistended. Bowel sounds are present in  all four quadrants. EXTERNAL GENITALIA:  Intact. EXTREMITIES:  Peripheral pulses intact. Legs without edema. SPINE:  Shows physiologic curve. INITIAL IMPRESSION:  At this time is ST-segment myocardial infarction. The patient will be urgently brought into the cath lab. Cardiology  consult will be asked for. Further orders will be written for as  clinical course dictates.         Nayeli Pineda DO    D: 08/23/2020 10:29:34       T: 08/23/2020 10:31:50     [email or web address]  Job#: [id number]     Doc#: [id number]    CC:

## 2020-08-23 NOTE — CONSULTS
Met with patient and discussed that their physician has ordered a referral to our outpatient Phase II Cardiac Rehabilitation program. Reviewed the benefits of cardiac rehabilitation based on their diagnosis and personal risk factors. Patient demonstrates strong interest in Cardiac Rehabilitation at this time. Cardiac Rehabilitation brochure provided to patient/family. The Cardiac Rehabilitation Program has been provided the patient's referral information and pertinent patient details and history. The patient may call [facility] at [phone] for additional information or questions. Contact information for [facility] and other choices close to the patient's residence have been provided in the discharge instructions so that the patient may call and schedule an appointment when cleared by their physician.  Thank you for the referral. [FreeTextEntry1] : 6 month old male with bronchiolitis. D/w mom to continue frequent nasal saline & suctioning, use saline nebulizer treatments 3-4 times per day.\par For acute gastro, In order to maintain hydration consume "oral rehydration solution," such as Pedialyte or low calorie sports drinks. If vomiting, try to give child a few teaspoons of fluid every few minutes. If tolerating solids, it’s best to consume lean meats, fruits, vegetables, and whole-grain breads and cereals.\par Return if symptoms worsen or persist\par RTO next week for 6 month old [facility], or sooner if patient develops fever or worsening symptoms\par All questions answered. Caretaker verbalizes understanding and agrees with plan of care.

## 2020-08-23 NOTE — PROGRESS NOTES
Spoke with Dr. Bela Banuelos on phone, aware of cath lab events and admission to [place] [address].

## 2020-08-23 NOTE — ED PROVIDER NOTES
HPI:  20,   Time: 9:10 PM EDT       Sigifredo Benoit is a 77 y.o. female presenting to the ED for cp, beginning 6 hrs ago. The complaint has been persistent, moderate in severity, and worsened by nothing. Pressure in chest, into left arm and jaw, hx same 3 days ago but not as severe. Nothing makes better. No hx cad. Pos sob, pos n w/o emesis. No diarrhea. No cough/congestin/sore throat    Review of Systems:   Pertinent positives and negatives are stated within HPI, all other systems reviewed and are negative.          --------------------------------------------- PAST HISTORY ---------------------------------------------  Past Medical History:  has a past medical history of Degenerative disorder of bone, Hard of hearing, Hypertension, and MI (myocardial infarction) ([facility] [address] Past Surgical History:  has a past surgical history that includes joint replacement;  section; Tonsillectomy; Hysterectomy; Hemorrhoid surgery; Breast lumpectomy (Left); and Coronary angioplasty with stent (2020). Social History:  reports that she has been smoking. She started smoking about 47 years ago. She has never used smokeless tobacco. She reports previous alcohol use. She reports that she does not use drugs. Family History: family history is not on file. The patients home medications have been reviewed.     Allergies: Tramadol        ---------------------------------------------------PHYSICAL EXAM--------------------------------------    Constitutional/General: Alert and oriented x3, well appearing, non toxic in NAD  Head: Normocephalic and atraumatic  Eyes: PERRL, EOMI, conjunctive normal, sclera non icteric  Mouth: Oropharynx clear, handling secretions, no trismus, no asymmetry of the posterior oropharynx or uvular edema  Neck: Supple, full ROM, non tender to palpation in the midline, no stridor, no crepitus, no meningeal signs  Respiratory: Lungs clear to auscultation bilaterally, no wheezes, rales, or rhonchi. Not in respiratory distress  Cardiovascular:  Regular rate. Regular rhythm. No murmurs, gallops, or rubs. 2+ distal pulses  Chest: No chest wall tenderness  GI:  Abdomen Soft, Non tender, Non distended. +BS. No organomegaly, no palpable masses,  No rebound, guarding, or rigidity. Musculoskeletal: Moves all extremities x 4. Warm and well perfused, no clubbing, cyanosis, or edema. Capillary refill <3 seconds  Integument: skin warm and dry. No rashes. Lymphatic: no lymphadenopathy noted  Neurologic: GCS 15, no focal deficits, symmetric strength 5/5 in the upper and lower extremities bilaterally  Psychiatric: Normal Affect    -------------------------------------------------- RESULTS -------------------------------------------------  I have personally reviewed all laboratory and imaging results for this patient. Results are listed below.      LABS:  Results for orders placed or performed during the hospital encounter of 08/22/20   CBC auto differential   Result Value Ref Range    WBC 12.3 (H) 4.5 - 11.5 E9/L    RBC 4.01 3.50 - 5.50 E12/L    Hemoglobin 13.2 11.5 - 15.5 g/dL    Hematocrit 38.9 34.0 - 48.0 %    MCV 97.0 80.0 - 99.9 fL    MCH 32.9 26.0 - 35.0 pg    MCHC 33.9 32.0 - 34.5 %    RDW 13.2 11.5 - 15.0 fL    Platelets 377 691 - 807 E9/L    MPV 10.3 7.0 - 12.0 fL    Neutrophils % 68.1 43.0 - 80.0 %    Immature Granulocytes % 0.3 0.0 - 5.0 %    Lymphocytes % 20.4 20.0 - 42.0 %    Monocytes % 7.4 2.0 - 12.0 %    Eosinophils % 3.5 0.0 - 6.0 %    Basophils % 0.3 0.0 - 2.0 %    Neutrophils Absolute 8.34 (H) 1.80 - 7.30 E9/L    Immature Granulocytes # 0.04 E9/L    Lymphocytes Absolute 2.50 1.50 - 4.00 E9/L    Monocytes Absolute 0.91 0.10 - 0.95 E9/L    Eosinophils Absolute 0.43 0.05 - 0.50 E9/L    Basophils Absolute 0.04 0.00 - 0.20 E6/O   Basic metabolic panel   Result Value Ref Range    Sodium 134 132 - 146 mmol/L    Potassium 4.5 3.5 - 5.0 mmol/L    Chloride 99 98 - 107 mmol/L    CO2 21 (L) 22 - 29 mmol/L    Anion Gap 14 7 - 16 mmol/L    Glucose 119 (H) 74 - 99 mg/dL    BUN 18 8 - 23 mg/dL    CREATININE 1.1 (H) 0.5 - 1.0 mg/dL    GFR Non-African American 49 >=60 mL/min/1.73    GFR African American 60     Calcium 9.0 8.6 - 10.2 mg/dL   Troponin   Result Value Ref Range    Troponin 0.03 0.00 - 0.03 ng/mL   Protime-INR   Result Value Ref Range    Protime 10.0 9.3 - 12.4 sec    INR 0.9    APTT   Result Value Ref Range    aPTT 37.8 (H) 24.5 - 35.1 sec   Lipid panel   Result Value Ref Range    Cholesterol, Total 174 0 - 199 mg/dL    Triglycerides 262 (H) 0 - 149 mg/dL    HDL 48 >40 mg/dL    LDL Calculated 74 0 - 99 mg/dL    VLDL Cholesterol Calculated 52 mg/dL   Comprehensive Metabolic Panel   Result Value Ref Range    Sodium 139 132 - 146 mmol/L    Potassium 4.5 3.5 - 5.0 mmol/L    Chloride 106 98 - 107 mmol/L    CO2 21 (L) 22 - 29 mmol/L    Anion Gap 12 7 - 16 mmol/L    Glucose 104 (H) 74 - 99 mg/dL    BUN 22 8 - 23 mg/dL    CREATININE 1.3 (H) 0.5 - 1.0 mg/dL    GFR Non-African American 41 >=60 mL/min/1.73    GFR African American 49     Calcium 8.7 8.6 - 10.2 mg/dL    Total Protein 5.7 (L) 6.4 - 8.3 g/dL    Alb 3.6 3.5 - 5.2 g/dL    Total Bilirubin <0.2 0.0 - 1.2 mg/dL    Alkaline Phosphatase 76 35 - 104 U/L    ALT 13 0 - 32 U/L    AST 17 0 - 31 U/L   POC ACT-Low Range   Result Value Ref Range    POC ACT  Not established seconds   EKG 12 Lead   Result Value Ref Range    Ventricular Rate 65 BPM    Atrial Rate 65 BPM    P-R Interval 170 ms    QRS Duration 82 ms    Q-T Interval 420 ms    QTc Calculation (Bazett) 436 ms    P Axis 35 degrees    R Axis -20 degrees    T Axis 31 degrees   EKG 12 lead   Result Value Ref Range    Ventricular Rate 58 BPM    Atrial Rate 58 BPM    P-R Interval 204 ms    QRS Duration 78 ms    Q-T Interval 462 ms    QTc Calculation (Bazett) 453 ms    P Axis 73 degrees    R Axis 13 degrees    T Axis 47 degrees   EKG 12 lead   Result Value Ref Range    Ventricular Rate 61 BPM    Atrial Rate 61 BPM    P-R Interval 184 ms    QRS Duration 88 ms    Q-T Interval 458 ms    QTc Calculation (Bazett) 461 ms    P Axis 48 degrees    R Axis 4 degrees    T Axis 22 degrees   TYPE AND SCREEN   Result Value Ref Range    ABO/Rh B NEG     Antibody Screen NEG        RADIOLOGY:  Interpreted by Radiologist.  XR CHEST PORTABLE   Final Result   No acute cardiopulmonary process. EKG: This EKG is signed and interpreted by the EP. Time: 2053  Rate: 80  Rhythm: Sinus  Interpretation: non-specific EKG  Comparison: None    Repeat ekg 2200  Sinus rhythm, rate 80  St elevation v2-v4  stemi    ------------------------- NURSING NOTES AND VITALS REVIEWED ---------------------------   The nursing notes within the ED encounter and vital signs as below have been reviewed by myself. /68   Pulse 61   Temp 97 °F (36.1 °C) (Temporal)   Resp 18   Ht 5' 5\" (1.651 m)   Wt 200 lb (90.7 kg)   SpO2 96%   BMI 33.28 kg/m²   Oxygen Saturation Interpretation: Normal    The patients available past medical records and past encounters were reviewed.         ------------------------------ ED COURSE/MEDICAL DECISION MAKING----------------------  Medications   acetaminophen (TYLENOL) tablet 650 mg (650 mg Oral Given 8/23/20 0921)   sodium chloride flush 0.9 % injection 10 mL (10 mLs Intravenous Given 8/23/20 0921)   sodium chloride flush 0.9 % injection 10 mL (has no administration in time range)   0.9 % sodium chloride infusion ( Intravenous New Bag 8/23/20 0035)   metoprolol succinate (TOPROL XL) extended release tablet 25 mg (25 mg Oral Given 8/23/20 0921)   atorvastatin (LIPITOR) tablet 80 mg ( Oral Canceled Entry 8/23/20 0100)   aspirin chewable tablet 81 mg (81 mg Oral Given 8/23/20 0925)   enoxaparin (LOVENOX) injection 30 mg (30 mg Subcutaneous Given 8/23/20 0921)   ticagrelor (BRILINTA) tablet 90 mg (90 mg Oral Given 8/23/20 0921)   nitroGLYCERIN (NITROSTAT) SL tablet 0.4 mg (has no administration in time range) perflutren lipid microspheres (DEFINITY) injection 1.65 mg (has no administration in time range)   nitroglycerin (NITRO-BID) 2 % ointment 0.5 inch (0.5 inches Topical Given 8/23/20 1303)   nitroglycerin (NITRO-BID) 2 % ointment 0.5 inch (0.5 inches Topical Given 8/22/20 2116)   aspirin chewable tablet 324 mg (324 mg Oral Given 8/22/20 2116)   fentaNYL (SUBLIMAZE) injection 50 mcg (50 mcg Intravenous Given 8/22/20 2117)   ondansetron (ZOFRAN) injection 4 mg (4 mg Intravenous Given 8/22/20 2117)   fentaNYL (SUBLIMAZE) injection 100 mcg (100 mcg Intravenous Given 8/22/20 2157)   heparin (porcine) injection 5,000 Units (5,000 Units Intravenous Given 8/22/20 2225)   ticagrelor (BRILINTA) tablet 180 mg (180 mg Oral Given 8/22/20 2224)         ED COURSE:       Medical Decision Making:    Pt cp with risk factor, relief with ntg paste, initial ekg not ischemic. Discussed pt risk and rec for obs, pt requested tracey, discussed with dr Rachael Martinez at 2140, accepted to [address] [place] [address]. At 2150, pain inc and monitor changes, repeat ekg met stemi criteria at approx 2200, discussed with dr Pascual Elias, agreed to take to cath lab at [facility], heparin and brilinta given, pt updated, agreealbe with poc. bp stable in ed throughout      This patient's ED course included: a personal history and physicial examination    This patient has remained hemodynamically stable during their ED course. Re-Evaluations:             Re-evaluation. Patients symptoms show no change    Re-examination  8/22/20   9:10 PM EDT          Vital Signs:   Vitals:    08/22/20 2232 08/23/20 0033 08/23/20 0815 08/23/20 1213   BP:  89/60 128/71 133/68   Pulse: 66 60 72 61   Resp:  16 18 18   Temp:  97 °F (36.1 °C) 98 °F (36.7 °C) 97 °F (36.1 °C)   TempSrc:   Temporal Temporal   SpO2:  96% 96% 96%   Weight:       Height:         Card/Pulm:  Rhythm: normal rate. Heart Sounds: no murmurs, gallops, or rubs. clear to auscultation, no wheezes or rales and unlabored breathing. Capillary Refill: normal.  Radial Pulse:  equal.  Skin:  Warm. Consultations:             Dr Samaria Robles: 40 min        Counseling: The emergency provider has spoken with the patient and family member patient and son and discussed todays results, in addition to providing specific details for the plan of care and counseling regarding the diagnosis and prognosis. Questions are answered at this time and they are agreeable with the plan.       --------------------------------- IMPRESSION AND DISPOSITION ---------------------------------    IMPRESSION  1. Chest pain, unspecified type    2. ST elevation myocardial infarction involving left anterior descending (LAD) coronary artery ([facility] [address]        DISPOSITION  Disposition: Transfer to [facility] to cath lab  Patient condition is serious    NOTE: This report was transcribed using voice recognition software.  Every effort was made to ensure accuracy; however, inadvertent computerized transcription errors may be present        Isabel Slater MD  08/23/20 [id number]

## 2020-08-23 NOTE — PROGRESS NOTES
Inpatient Cardiology Progress note     PATIENT IS BEING FOLLOWED FOR: Acute anterior STEMI    Natalio Donovan is a 77 y.o. female seen in initial consultation by me ( Dr. Denisse Champion ) 8/22/2020     SUBJECTIVE: continues to complain of mild anterior chest discomfort  OBJECTIVE: No apparent distress     ROS:  Consist: Denies fevers, chills or night sweats  Heart: Denies chest pain, palpitations, lightheadedness, dizziness or syncope  Lungs: Denies SOB, cough, wheezing, orthopnea or PND  GI: Denies abdominal pain, vomiting or diarrhea    PHYSICAL EXAM:   /71   Pulse 72   Temp 98 °F (36.7 °C) (Temporal)   Resp 18   Ht 5' 5\" (1.651 m)   Wt 200 lb (90.7 kg)   SpO2 96%   BMI 33.28 kg/m²    B/P Range last 24 hours: Systolic (25QWQ), KOF:080 , Min:80 , DOL:194    Diastolic (80MOC), EDU:79, Min:50, Max:82    CONST: Well developed, well nourished obese  female who appears of stated age. Awake, alert and cooperative. No apparent distress  HEENT:   Head- Normocephalic, atraumatic   Eyes- Conjunctivae pink, anicteric  Throat- Oral mucosa pink and moist  Neck-  No stridor, trachea midline, no jugular venous distention. No carotid bruit  CHEST: Chest symmetrical and non-tender to palpation. No accessory muscle use or intercostal retractions  RESPIRATORY:  Lung sounds - clear throughout fields   CARDIOVASCULAR:     Heart Inspection- shows no noted pulsations  Heart Palpation- no heaves or thrills; PMI is non-displaced   Heart Ausculation- Regular rate and rhythm, no murmur. No s3, s4 or rub   PV: No lower extremity edema. No varicosities. Pedal pulses palpable, no clubbing or cyanosis. Right wrist site of radial access without hematoma and with good pulse   ABDOMEN: Soft, non-tender to light palpation. Bowel sounds present. No palpable masses no organomegaly; no abdominal bruit  MS: Good muscle strength and tone. No atrophy or abnormal movements.    : Deferred  SKIN: Warm and dry no statis dermatitis or ulcers posterior descending artery branch. D.  RCA. Moderate size codominant vessel with no significant angiographic  disease. 2.  Normal left atrial size with a large area of anterolateral, apical  and apical inferior wall hypokinesis to akinesis with an estimated  ejection fraction of 35%-40%. 3. Low BP/cardiogenic shock  4. Mildly elevated left end diastolic pressure. 5.  Successful balloon angioplasty with the deployment of a drug-eluting  coronary stent to the proximal LAD with post-stent deployment dilatation  with a larger noncompliant balloon to high pressure with very good  Results. Telemetry: SR    12 lead EKG 8/23/2020: SR. Non specific T wave abnormality    Echo: pending    ASSESSMENT:   1.  CAD/ischemic cardiomyopathy s/p acute ST-elevation anterior wall myocardial infarction 8/22/2020 s/p LAD PCI with mild residual CP. No signs or symptoms of fluid overload. 2.  Hypotension, resolved. H/o HTN  3. Renal insufficiency Cr 1.1 ---> 1.3  5. Tobacco abuse. 6.  Depression. PLAN:  1. Add low dose topical nitrates  2. Rest of cardiac medications same  3. Would hold on ACE I therapy / spironolactone for now pending repeat BMP ( in am )  4. Will review Echo  5. Increase activity / ambulate  6. Tentative discharge tomorrow  7.  Will follow    Electronically signed by Jose L Cotto MD on 8/23/2020 at 11:31 AM

## 2020-08-23 NOTE — ED NOTES
Steve Persaud from [facility] called & is initiating Cath lab arrival. MICU called & en-route to  patient. Patient signed EMTALA & updated on plan of care.  Awaiting arrival.      Feliciano Oliver RN  08/22/20 8039

## 2020-08-24 LAB
ANION GAP SERPL CALCULATED.3IONS-SCNC: 15 MMOL/L (ref 7–16)
BUN BLDV-MCNC: 15 MG/DL (ref 8–23)
CALCIUM SERPL-MCNC: 9.7 MG/DL (ref 8.6–10.2)
CHLORIDE BLD-SCNC: 103 MMOL/L (ref 98–107)
CO2: 22 MMOL/L (ref 22–29)
CREAT SERPL-MCNC: 0.8 MG/DL (ref 0.5–1)
GFR AFRICAN AMERICAN: >60
GFR NON-AFRICAN AMERICAN: >60 ML/MIN/1.73
GLUCOSE BLD-MCNC: 129 MG/DL (ref 74–99)
POTASSIUM SERPL-SCNC: 4.2 MMOL/L (ref 3.5–5)
SODIUM BLD-SCNC: 140 MMOL/L (ref 132–146)

## 2020-08-24 PROCEDURE — 36415 COLL VENOUS BLD VENIPUNCTURE: CPT

## 2020-08-24 PROCEDURE — 99233 SBSQ HOSP IP/OBS HIGH 50: CPT | Performed by: INTERNAL MEDICINE

## 2020-08-24 PROCEDURE — 6360000002 HC RX W HCPCS: Performed by: INTERNAL MEDICINE

## 2020-08-24 PROCEDURE — 6370000000 HC RX 637 (ALT 250 FOR IP): Performed by: GENERAL PRACTICE

## 2020-08-24 PROCEDURE — 80048 BASIC METABOLIC PNL TOTAL CA: CPT

## 2020-08-24 PROCEDURE — 2140000000 HC CCU INTERMEDIATE R&B

## 2020-08-24 PROCEDURE — 6370000000 HC RX 637 (ALT 250 FOR IP): Performed by: INTERNAL MEDICINE

## 2020-08-24 PROCEDURE — 2580000003 HC RX 258: Performed by: INTERNAL MEDICINE

## 2020-08-24 PROCEDURE — APPSS30 APP SPLIT SHARED TIME 16-30 MINUTES: Performed by: NURSE PRACTITIONER

## 2020-08-24 RX ORDER — ISOSORBIDE MONONITRATE 30 MG/1
30 TABLET, EXTENDED RELEASE ORAL DAILY
Status: DISCONTINUED | OUTPATIENT
Start: 2020-08-25 | End: 2020-08-25

## 2020-08-24 RX ORDER — ONDANSETRON 4 MG/1
4 TABLET, ORALLY DISINTEGRATING ORAL ONCE
Status: COMPLETED | OUTPATIENT
Start: 2020-08-24 | End: 2020-08-24

## 2020-08-24 RX ORDER — METOPROLOL SUCCINATE 50 MG/1
50 TABLET, EXTENDED RELEASE ORAL DAILY
Status: DISCONTINUED | OUTPATIENT
Start: 2020-08-25 | End: 2020-08-25

## 2020-08-24 RX ORDER — LISINOPRIL 10 MG/1
10 TABLET ORAL DAILY
Status: DISCONTINUED | OUTPATIENT
Start: 2020-08-24 | End: 2020-08-25

## 2020-08-24 RX ADMIN — ACETAMINOPHEN 650 MG: 325 TABLET, FILM COATED ORAL at 21:01

## 2020-08-24 RX ADMIN — ACETAMINOPHEN 650 MG: 325 TABLET, FILM COATED ORAL at 07:32

## 2020-08-24 RX ADMIN — NITROGLYCERIN 0.5 INCH: 20 OINTMENT TOPICAL at 05:54

## 2020-08-24 RX ADMIN — PREGABALIN 50 MG: 50 CAPSULE ORAL at 21:03

## 2020-08-24 RX ADMIN — ASPIRIN 81 MG CHEWABLE TABLET 81 MG: 81 TABLET CHEWABLE at 09:35

## 2020-08-24 RX ADMIN — SODIUM CHLORIDE, PRESERVATIVE FREE 10 ML: 5 INJECTION INTRAVENOUS at 09:38

## 2020-08-24 RX ADMIN — GABAPENTIN 400 MG: 400 CAPSULE ORAL at 13:27

## 2020-08-24 RX ADMIN — TICAGRELOR 90 MG: 90 TABLET ORAL at 09:36

## 2020-08-24 RX ADMIN — METOPROLOL SUCCINATE 25 MG: 25 TABLET, EXTENDED RELEASE ORAL at 09:36

## 2020-08-24 RX ADMIN — QUETIAPINE FUMARATE 200 MG: 200 TABLET, EXTENDED RELEASE ORAL at 21:03

## 2020-08-24 RX ADMIN — ACETAMINOPHEN 650 MG: 325 TABLET, FILM COATED ORAL at 13:32

## 2020-08-24 RX ADMIN — SODIUM CHLORIDE, PRESERVATIVE FREE 10 ML: 5 INJECTION INTRAVENOUS at 21:03

## 2020-08-24 RX ADMIN — GABAPENTIN 400 MG: 400 CAPSULE ORAL at 09:36

## 2020-08-24 RX ADMIN — TICAGRELOR 90 MG: 90 TABLET ORAL at 21:02

## 2020-08-24 RX ADMIN — GABAPENTIN 400 MG: 400 CAPSULE ORAL at 21:02

## 2020-08-24 RX ADMIN — LISINOPRIL 10 MG: 10 TABLET ORAL at 21:02

## 2020-08-24 RX ADMIN — ONDANSETRON 4 MG: 4 TABLET, ORALLY DISINTEGRATING ORAL at 11:55

## 2020-08-24 RX ADMIN — ENOXAPARIN SODIUM 30 MG: 30 INJECTION SUBCUTANEOUS at 21:02

## 2020-08-24 RX ADMIN — ATORVASTATIN CALCIUM 80 MG: 80 TABLET, FILM COATED ORAL at 21:03

## 2020-08-24 RX ADMIN — ENOXAPARIN SODIUM 30 MG: 30 INJECTION SUBCUTANEOUS at 17:09

## 2020-08-24 ASSESSMENT — PAIN DESCRIPTION - LOCATION
LOCATION: HEAD
LOCATION: HEAD

## 2020-08-24 ASSESSMENT — PAIN SCALES - GENERAL
PAINLEVEL_OUTOF10: 0
PAINLEVEL_OUTOF10: 0
PAINLEVEL_OUTOF10: 7
PAINLEVEL_OUTOF10: 0
PAINLEVEL_OUTOF10: 6
PAINLEVEL_OUTOF10: 6

## 2020-08-24 ASSESSMENT — PAIN DESCRIPTION - PAIN TYPE
TYPE: CHRONIC PAIN
TYPE: CHRONIC PAIN

## 2020-08-24 NOTE — PROGRESS NOTES
Emani Mackenzie was ordered Antabuse which is a nonformulary medication. This medication is not stocked in pharmacy and must be brought from home. If the medication has not been administered by 1400 on the following day from the time the order was placed, a pharmacist will follow-up with the nurse of the patient to assess the capability of the patient to bring in the medication. If it is determined that the patient cannot supply the medication and it is not available to be dispensed from the pharmacy, a call will be placed to the ordering provider to discuss alternative options.       Moo Cheung RPh 8/23/2020 9:05 PM

## 2020-08-24 NOTE — CARE COORDINATION
Transition of care: GAURAV to LAD on 08/22/20. Met with pt in room. Pt lives with her son, Reza Kelley, in a 1 story home. Recently moved from [place] in May. Independent with ADLs and drives. DME- cane which pt does not use. Pt inquired about applying for medicaid. I told her she could do an online application at [place] [address] and [facility]. PCP is Dr. Timi Barron and pharmacy is [facility].  Sw/cm will follow

## 2020-08-24 NOTE — PROGRESS NOTES
CLINICAL PHARMACY NOTE: MEDS TO [address] Select Patient?: No  Total # of Prescriptions Filled: 1   The following medications were delivered to the patient:  · Brilinta 90  Total # of Interventions Completed: 2  Time Spent (min): 30    Additional Documentation:

## 2020-08-24 NOTE — PROGRESS NOTES
Inpatient Cardiology Progress note     PATIENT IS BEING FOLLOWED FOR: Acute anterior STEMI    Stas Gallegos is a 77 y.o. female seen in initial consultation by me (Dr. Mariana Singh). 8/22/2020. SUBJECTIVE: continues to complain of mild discomfort in the subcostal areas below both breasts. Denies SOB  OBJECTIVE: No apparent distress     ROS:  Consist: Denies fevers, chills or night sweats  Heart: Denies  palpitations, lightheadedness, dizziness or syncope  Lungs: Denies SOB, cough, wheezing, orthopnea or PND  GI: Denies abdominal pain, vomiting or diarrhea    PHYSICAL EXAM:   BP (!) 177/85   Pulse 64   Temp 97.6 °F (36.4 °C) (Temporal)   Resp 16   Ht 5' 5\" (1.651 m)   Wt 200 lb (90.7 kg)   SpO2 95%   BMI 33.28 kg/m²    BP Range since midnight: 144//85  CONST: Well developed, well nourished obese  female who appears of stated age. Awake, alert and cooperative. No apparent distress  HEENT:   Head- Normocephalic, atraumatic   Eyes- Conjunctivae pink, anicteric  Throat- Oral mucosa pink and moist  Neck-  No stridor, trachea midline, no jugular venous distention. No carotid bruit  CHEST: Chest symmetrical and non-tender to palpation. No accessory muscle use or intercostal retractions  RESPIRATORY:  Lung sounds - wheezing in the lower lung fields   CARDIOVASCULAR:     Heart Inspection- shows no noted pulsations  Heart Palpation- no heaves or thrills; PMI is non-displaced   Heart Ausculation- Regular rate and rhythm, no murmur. No s3, s4 or rub   PV: No lower extremity edema. No varicosities. Pedal pulses palpable, no clubbing or cyanosis. Right wrist site of radial access without hematoma and with good pulse   ABDOMEN: Soft, non-tender to light palpation. Bowel sounds present. No palpable masses no organomegaly; no abdominal bruit  MS: Good muscle strength and tone. No atrophy or abnormal movements.    : Deferred  SKIN: Warm and dry no statis dermatitis or ulcers   NEURO / PSYCH: Oriented to person, place and time. Speech clear and appropriate. Follows all commands. Pleasant affect     No intake or output data in the 24 hours ending 08/24/20 1217    Weight:   Wt Readings from Last 3 Encounters:   08/22/20 200 lb (90.7 kg)   08/02/20 182 lb (82.6 kg)     Current Inpatient Medications:   sodium chloride flush  10 mL Intravenous 2 times per day    metoprolol succinate  25 mg Oral Daily    atorvastatin  80 mg Oral Nightly    aspirin  81 mg Oral Daily    enoxaparin  30 mg Subcutaneous Q12H    ticagrelor  90 mg Oral BID    nitroglycerin  0.5 inch Topical 4 times per day    gabapentin  400 mg Oral TID    pregabalin  50 mg Oral Daily    QUEtiapine  200 mg Oral Nightly    disulfiram  250 mg Oral Daily       IV Infusions (if any):   sodium chloride 75 mL/hr at 08/23/20 0035       DIAGNOSTIC/ LABORATORY DATA:  Labs:   CBC:   Recent Labs     08/22/20 2106   WBC 12.3*   HGB 13.2   HCT 38.9        BMP:   Recent Labs     08/23/20  0509 08/24/20  0425    140   K 4.5 4.2   CO2 21* 22   BUN 22 15   CREATININE 1.3* 0.8   LABGLOM 41 >60   CALCIUM 8.7 9.7       PT/INR:   Recent Labs     08/22/20 2106   PROTIME 10.0   INR 0.9     APTT:  Recent Labs     08/22/20 2106   APTT 37.8*     CARDIAC ENZYMES:  Recent Labs     08/22/20 2106   TROPONINI 0.03     FASTING LIPID PANEL:  Lab Results   Component Value Date    CHOL 174 08/23/2020    HDL 48 08/23/2020    LDLCALC 74 08/23/2020    TRIG 262 08/23/2020     LIVER PROFILE:  Recent Labs     08/23/20  0509   AST 17   ALT 13   LABALBU 3.6       CXR 8/22/2020:   No acute cardiopulmonary process. Cath / PCI 08/22/2020 (Dr. Precious Wynn): CONCLUSIONS:  Coronary Artery disease. A.  Left main, no significant disease. B.  LAD, 95% thrombotic proximal vessel stenosis with SADI-1 to 2 flow  in the distal vessel.   C.  LCX, large codominant vessel with 70% ostial stenosis of the high  first marginal branch/intermediate ramus branch and 70% stenosis of the  mid left circumflex after the AV groove branch before any of the other  marginal branches. The AV groove branch provided a small posterolateral  branch and a small posterior descending artery branch. D.  RCA. Moderate size codominant vessel with no significant angiographic  disease. 2.  Normal left atrial size with a large area of anterolateral, apical  and apical inferior wall hypokinesis to akinesis with an estimated  ejection fraction of 35%-40%. 3. Low BP/cardiogenic shock  4. Mildly elevated left end diastolic pressure. 5.  Successful balloon angioplasty with the deployment of a drug-eluting  coronary stent to the proximal LAD with post-stent deployment dilatation  with a larger noncompliant balloon to high pressure with very good  Results. Echocardiogram 08/23/2020 (Dr. Karen Stratton):    Left ventricle is normal in size . Mild concentric left ventricular hypertrophy. There is severe hypokinesis to akinesis of the anteroseptal wall, the   apex, the distal septum and the distal inferior wall. Ejection fraction is visually estimated at 40+/-5%. There is doppler evidence of stage I diastolic dysfunction. Normal right ventricular size and function. Normal sized left atrium ( by volume index ). Mild mitral regurgitation. Mild to moderate tricuspid regurgitation. Telemetry: SR  12 lead EKG 8/23/2020: SR. Non specific T wave abnormality      ASSESSMENT:   1.  CAD/ischemic cardiomyopathy s/p acute ST-elevation anterior wall myocardial infarction 8/22/2020 s/p LAD PCI with mild residual CP. No signs or symptoms of fluid overload. 2.  HTN  3. Renal insufficiency, resolved:  Cr 1.1 ---> 1.3-->0.8  5. Tobacco abuse. 6.  Depression. PLAN:  1. Increase Toprol XL  2. Add Lisinopril  3. Changes Topical nitrates to Imdur  4. Rest of cardiac medications same  5. Increase Activity  6.  Tentative discharge from cardiology stand point in am      Electronically signed by Pete Bar MD on 8/24/2020 at 12:17 PM

## 2020-08-25 VITALS
DIASTOLIC BLOOD PRESSURE: 85 MMHG | HEIGHT: 65 IN | WEIGHT: 200 LBS | RESPIRATION RATE: 16 BRPM | BODY MASS INDEX: 33.32 KG/M2 | HEART RATE: 89 BPM | TEMPERATURE: 97.2 F | SYSTOLIC BLOOD PRESSURE: 162 MMHG | OXYGEN SATURATION: 96 %

## 2020-08-25 LAB
EKG ATRIAL RATE: 79 BPM
EKG P AXIS: 67 DEGREES
EKG P-R INTERVAL: 182 MS
EKG Q-T INTERVAL: 406 MS
EKG QRS DURATION: 78 MS
EKG QTC CALCULATION (BAZETT): 465 MS
EKG R AXIS: -25 DEGREES
EKG T AXIS: 43 DEGREES
EKG VENTRICULAR RATE: 79 BPM

## 2020-08-25 PROCEDURE — 99233 SBSQ HOSP IP/OBS HIGH 50: CPT | Performed by: INTERNAL MEDICINE

## 2020-08-25 PROCEDURE — 6370000000 HC RX 637 (ALT 250 FOR IP): Performed by: INTERNAL MEDICINE

## 2020-08-25 PROCEDURE — 6370000000 HC RX 637 (ALT 250 FOR IP): Performed by: GENERAL PRACTICE

## 2020-08-25 PROCEDURE — 93010 ELECTROCARDIOGRAM REPORT: CPT | Performed by: INTERNAL MEDICINE

## 2020-08-25 PROCEDURE — 2580000003 HC RX 258: Performed by: INTERNAL MEDICINE

## 2020-08-25 PROCEDURE — 6360000002 HC RX W HCPCS: Performed by: INTERNAL MEDICINE

## 2020-08-25 RX ORDER — ASPIRIN 81 MG/1
81 TABLET, CHEWABLE ORAL DAILY
Qty: 30 TABLET | Refills: 3 | Status: SHIPPED | OUTPATIENT
Start: 2020-08-26

## 2020-08-25 RX ORDER — METOPROLOL SUCCINATE 100 MG/1
100 TABLET, EXTENDED RELEASE ORAL DAILY
Qty: 30 TABLET | Refills: 3 | Status: ON HOLD | OUTPATIENT
Start: 2020-08-26 | End: 2020-10-22 | Stop reason: HOSPADM

## 2020-08-25 RX ORDER — NITROGLYCERIN 0.4 MG/1
TABLET SUBLINGUAL
Qty: 25 TABLET | Refills: 3 | Status: SHIPPED | OUTPATIENT
Start: 2020-08-25 | End: 2020-12-04

## 2020-08-25 RX ORDER — ATORVASTATIN CALCIUM 80 MG/1
80 TABLET, FILM COATED ORAL NIGHTLY
Qty: 30 TABLET | Refills: 3 | Status: SHIPPED | OUTPATIENT
Start: 2020-08-25 | End: 2021-05-22

## 2020-08-25 RX ORDER — LISINOPRIL 20 MG/1
20 TABLET ORAL DAILY
Status: DISCONTINUED | OUTPATIENT
Start: 2020-08-26 | End: 2020-08-25 | Stop reason: HOSPADM

## 2020-08-25 RX ORDER — LISINOPRIL 20 MG/1
20 TABLET ORAL DAILY
Qty: 30 TABLET | Refills: 3 | Status: ON HOLD | OUTPATIENT
Start: 2020-08-26 | End: 2020-08-29 | Stop reason: HOSPADM

## 2020-08-25 RX ORDER — METOPROLOL SUCCINATE 50 MG/1
100 TABLET, EXTENDED RELEASE ORAL DAILY
Status: DISCONTINUED | OUTPATIENT
Start: 2020-08-26 | End: 2020-08-25 | Stop reason: HOSPADM

## 2020-08-25 RX ORDER — AMLODIPINE BESYLATE 5 MG/1
5 TABLET ORAL DAILY
Status: DISCONTINUED | OUTPATIENT
Start: 2020-08-25 | End: 2020-08-25 | Stop reason: HOSPADM

## 2020-08-25 RX ORDER — ZOLPIDEM TARTRATE 5 MG/1
5 TABLET ORAL NIGHTLY PRN
Status: DISCONTINUED | OUTPATIENT
Start: 2020-08-25 | End: 2020-08-25 | Stop reason: HOSPADM

## 2020-08-25 RX ADMIN — ACETAMINOPHEN 650 MG: 325 TABLET, FILM COATED ORAL at 14:00

## 2020-08-25 RX ADMIN — GABAPENTIN 400 MG: 400 CAPSULE ORAL at 08:04

## 2020-08-25 RX ADMIN — SODIUM CHLORIDE, PRESERVATIVE FREE 10 ML: 5 INJECTION INTRAVENOUS at 08:05

## 2020-08-25 RX ADMIN — METOPROLOL SUCCINATE 50 MG: 50 TABLET, EXTENDED RELEASE ORAL at 08:04

## 2020-08-25 RX ADMIN — LISINOPRIL 10 MG: 10 TABLET ORAL at 08:04

## 2020-08-25 RX ADMIN — ASPIRIN 81 MG CHEWABLE TABLET 81 MG: 81 TABLET CHEWABLE at 08:03

## 2020-08-25 RX ADMIN — TICAGRELOR 90 MG: 90 TABLET ORAL at 08:03

## 2020-08-25 RX ADMIN — ACETAMINOPHEN 650 MG: 325 TABLET, FILM COATED ORAL at 08:03

## 2020-08-25 RX ADMIN — AMLODIPINE BESYLATE 5 MG: 5 TABLET ORAL at 14:04

## 2020-08-25 RX ADMIN — ENOXAPARIN SODIUM 30 MG: 30 INJECTION SUBCUTANEOUS at 08:05

## 2020-08-25 RX ADMIN — GABAPENTIN 400 MG: 400 CAPSULE ORAL at 14:04

## 2020-08-25 ASSESSMENT — PAIN DESCRIPTION - PROGRESSION
CLINICAL_PROGRESSION: GRADUALLY IMPROVING
CLINICAL_PROGRESSION: GRADUALLY IMPROVING

## 2020-08-25 ASSESSMENT — PAIN DESCRIPTION - PAIN TYPE
TYPE: ACUTE PAIN
TYPE: ACUTE PAIN

## 2020-08-25 ASSESSMENT — PAIN SCALES - GENERAL
PAINLEVEL_OUTOF10: 3
PAINLEVEL_OUTOF10: 0
PAINLEVEL_OUTOF10: 0
PAINLEVEL_OUTOF10: 7
PAINLEVEL_OUTOF10: 7
PAINLEVEL_OUTOF10: 4

## 2020-08-25 ASSESSMENT — PAIN DESCRIPTION - DESCRIPTORS
DESCRIPTORS: ACHING
DESCRIPTORS: ACHING

## 2020-08-25 ASSESSMENT — PAIN DESCRIPTION - LOCATION
LOCATION: HEAD
LOCATION: HEAD

## 2020-08-25 ASSESSMENT — PAIN DESCRIPTION - ORIENTATION
ORIENTATION: ANTERIOR
ORIENTATION: ANTERIOR

## 2020-08-25 ASSESSMENT — PAIN DESCRIPTION - ONSET
ONSET: GRADUAL
ONSET: GRADUAL

## 2020-08-25 ASSESSMENT — PAIN DESCRIPTION - FREQUENCY
FREQUENCY: CONTINUOUS
FREQUENCY: CONTINUOUS

## 2020-08-25 NOTE — PLAN OF CARE
Problem: Pain:  Goal: Pain level will decrease  Description: Pain level will decrease  Outcome: Not Met This Shift  Goal: Control of acute pain  Description: Control of acute pain  Outcome: Not Met This Shift  Goal: Control of chronic pain  Description: Control of chronic pain  Outcome: Not Met This Shift

## 2020-08-25 NOTE — PROGRESS NOTES
Inpatient Cardiology Progress note     PATIENT IS BEING FOLLOWED FOR: Acute anterior STEMI    Aliza Moore is a 77 y.o. female seen in initial consultation by me (Dr. Kelsie Deras). 8/22/2020. SUBJECTIVE: continues to complain of mild discomfort in the subcostal areas below both breasts and SOB. Also nitrates are giving her a headache  OBJECTIVE: No apparent distress     ROS:  Consist: Denies fevers, chills or night sweats  Heart: Denies  palpitations, lightheadedness, dizziness or syncope  Lungs: Denies SOB, cough, wheezing, orthopnea or PND  GI: Denies abdominal pain, vomiting or diarrhea    PHYSICAL EXAM:   BP (!) 162/85   Pulse 89   Temp 97.2 °F (36.2 °C) (Temporal)   Resp 16   Ht 5' 5\" (1.651 m)   Wt 200 lb (90.7 kg)   SpO2 96%   BMI 33.28 kg/m²    BP Range since midnight: 114//85  CONST: Well developed, well nourished obese  female who appears of stated age. Awake, alert and cooperative. No apparent distress  HEENT:   Head- Normocephalic, atraumatic   Eyes- Conjunctivae pink, anicteric  Throat- Oral mucosa pink and moist  Neck-  No stridor, trachea midline, no jugular venous distention. No carotid bruit  CHEST: Chest symmetrical and non-tender to palpation. No accessory muscle use or intercostal retractions  RESPIRATORY:  Lung sounds - Diminished both lung fields   CARDIOVASCULAR:     Heart Inspection- shows no noted pulsations  Heart Palpation- no heaves or thrills; PMI is non-displaced   Heart Ausculation- Regular rate and rhythm, no murmur. No s3, s4 or rub   PV: No lower extremity edema. No varicosities. Pedal pulses palpable, no clubbing or cyanosis. Right wrist site of radial access without hematoma and with good pulse   ABDOMEN: Soft, non-tender to light palpation. Bowel sounds present. No palpable masses no organomegaly; no abdominal bruit  MS: Good muscle strength and tone. No atrophy or abnormal movements.    : Deferred  SKIN: Warm and dry no statis dermatitis or ulcers amlodipine  5. Rest of cardiac medications same  6.  OK for discharge from cardiology stand point       Electronically signed by Haydee Forbes MD on 8/25/2020 at 10:58 AM

## 2020-08-26 NOTE — DISCHARGE SUMMARY
Her vitals have remained stable. She is pretty much chest pain free. She has been instructed not to  smoke and she agrees not to. She has been given some nitro just in case  she should have some chest pain as an outpatient. We are going to try  to get her into cardiac rehab. She is going to follow up with me and  Cardiology on an outpatient basis. At the time of discharge, her  condition was good. Her prognosis is guarded and again she will be  discharged to home.         Jenn Fang DO    D: 08/25/2020 14:48:06       T: 08/25/2020 14:53:13     [email or web address]  Job#: [id number]     Doc#: [id number]    CC:

## 2020-08-27 ENCOUNTER — HOSPITAL ENCOUNTER (INPATIENT)
Age: 65
LOS: 2 days | Discharge: HOME | DRG: 682 | End: 2020-08-29
Attending: FAMILY MEDICINE | Admitting: GENERAL PRACTICE
Payer: MEDICARE

## 2020-08-27 ENCOUNTER — APPOINTMENT (OUTPATIENT)
Dept: GENERAL RADIOLOGY | Age: 65
DRG: 682 | End: 2020-08-27
Payer: MEDICARE

## 2020-08-27 PROBLEM — N17.9 ACUTE KIDNEY INJURY (HCC): Status: ACTIVE | Noted: 2020-08-27

## 2020-08-27 LAB
ALBUMIN SERPL-MCNC: 3.9 G/DL (ref 3.5–5.2)
ALP BLD-CCNC: 98 U/L (ref 35–104)
ALT SERPL-CCNC: 14 U/L (ref 0–32)
ANION GAP SERPL CALCULATED.3IONS-SCNC: 14 MMOL/L (ref 7–16)
AST SERPL-CCNC: 14 U/L (ref 0–31)
BILIRUB SERPL-MCNC: 0.4 MG/DL (ref 0–1.2)
BUN BLDV-MCNC: 37 MG/DL (ref 8–23)
CALCIUM SERPL-MCNC: 9.3 MG/DL (ref 8.6–10.2)
CHLORIDE BLD-SCNC: 102 MMOL/L (ref 98–107)
CO2: 19 MMOL/L (ref 22–29)
CREAT SERPL-MCNC: 2.1 MG/DL (ref 0.5–1)
EKG ATRIAL RATE: 64 BPM
EKG P AXIS: 69 DEGREES
EKG P-R INTERVAL: 160 MS
EKG Q-T INTERVAL: 438 MS
EKG QRS DURATION: 88 MS
EKG QTC CALCULATION (BAZETT): 451 MS
EKG R AXIS: -17 DEGREES
EKG T AXIS: 42 DEGREES
EKG VENTRICULAR RATE: 64 BPM
GFR AFRICAN AMERICAN: 28
GFR NON-AFRICAN AMERICAN: 23 ML/MIN/1.73
GLUCOSE BLD-MCNC: 135 MG/DL (ref 74–99)
HCT VFR BLD CALC: 36.7 % (ref 34–48)
HEMOGLOBIN: 12.4 G/DL (ref 11.5–15.5)
MCH RBC QN AUTO: 32.3 PG (ref 26–35)
MCHC RBC AUTO-ENTMCNC: 33.8 % (ref 32–34.5)
MCV RBC AUTO: 95.6 FL (ref 80–99.9)
PDW BLD-RTO: 13.1 FL (ref 11.5–15)
PLATELET # BLD: 241 E9/L (ref 130–450)
PMV BLD AUTO: 11.1 FL (ref 7–12)
POTASSIUM SERPL-SCNC: 4.1 MMOL/L (ref 3.5–5)
PRO-BNP: 189 PG/ML (ref 0–125)
RBC # BLD: 3.84 E12/L (ref 3.5–5.5)
SODIUM BLD-SCNC: 135 MMOL/L (ref 132–146)
TOTAL PROTEIN: 6.5 G/DL (ref 6.4–8.3)
TROPONIN: 0.41 NG/ML (ref 0–0.03)
WBC # BLD: 12.5 E9/L (ref 4.5–11.5)

## 2020-08-27 PROCEDURE — 2580000003 HC RX 258: Performed by: FAMILY MEDICINE

## 2020-08-27 PROCEDURE — 85027 COMPLETE CBC AUTOMATED: CPT

## 2020-08-27 PROCEDURE — 83880 ASSAY OF NATRIURETIC PEPTIDE: CPT

## 2020-08-27 PROCEDURE — 93010 ELECTROCARDIOGRAM REPORT: CPT | Performed by: INTERNAL MEDICINE

## 2020-08-27 PROCEDURE — 36415 COLL VENOUS BLD VENIPUNCTURE: CPT

## 2020-08-27 PROCEDURE — 99285 EMERGENCY DEPT VISIT HI MDM: CPT

## 2020-08-27 PROCEDURE — 84484 ASSAY OF TROPONIN QUANT: CPT

## 2020-08-27 PROCEDURE — 93005 ELECTROCARDIOGRAM TRACING: CPT | Performed by: FAMILY MEDICINE

## 2020-08-27 PROCEDURE — 1200000000 HC SEMI PRIVATE

## 2020-08-27 PROCEDURE — 80053 COMPREHEN METABOLIC PANEL: CPT

## 2020-08-27 PROCEDURE — 99284 EMERGENCY DEPT VISIT MOD MDM: CPT

## 2020-08-27 PROCEDURE — 71045 X-RAY EXAM CHEST 1 VIEW: CPT

## 2020-08-27 PROCEDURE — 6370000000 HC RX 637 (ALT 250 FOR IP): Performed by: GENERAL PRACTICE

## 2020-08-27 RX ORDER — 0.9 % SODIUM CHLORIDE 0.9 %
1000 INTRAVENOUS SOLUTION INTRAVENOUS ONCE
Status: COMPLETED | OUTPATIENT
Start: 2020-08-27 | End: 2020-08-27

## 2020-08-27 RX ORDER — DISULFIRAM 250 MG/1
250 TABLET ORAL DAILY
Status: DISCONTINUED | OUTPATIENT
Start: 2020-08-28 | End: 2020-08-29 | Stop reason: HOSPADM

## 2020-08-27 RX ORDER — PREGABALIN 50 MG/1
50 CAPSULE ORAL DAILY
Status: DISCONTINUED | OUTPATIENT
Start: 2020-08-27 | End: 2020-08-29 | Stop reason: HOSPADM

## 2020-08-27 RX ORDER — ASPIRIN 81 MG/1
81 TABLET, CHEWABLE ORAL DAILY
Status: DISCONTINUED | OUTPATIENT
Start: 2020-08-28 | End: 2020-08-29 | Stop reason: HOSPADM

## 2020-08-27 RX ORDER — METOPROLOL SUCCINATE 100 MG/1
100 TABLET, EXTENDED RELEASE ORAL DAILY
Status: DISCONTINUED | OUTPATIENT
Start: 2020-08-28 | End: 2020-08-29 | Stop reason: HOSPADM

## 2020-08-27 RX ORDER — AMLODIPINE BESYLATE 10 MG/1
10 TABLET ORAL DAILY
Status: DISCONTINUED | OUTPATIENT
Start: 2020-08-27 | End: 2020-08-28

## 2020-08-27 RX ORDER — QUETIAPINE 200 MG/1
200 TABLET, FILM COATED, EXTENDED RELEASE ORAL NIGHTLY
Status: DISCONTINUED | OUTPATIENT
Start: 2020-08-27 | End: 2020-08-29 | Stop reason: HOSPADM

## 2020-08-27 RX ORDER — GABAPENTIN 400 MG/1
400 CAPSULE ORAL 3 TIMES DAILY
Status: DISCONTINUED | OUTPATIENT
Start: 2020-08-27 | End: 2020-08-29 | Stop reason: HOSPADM

## 2020-08-27 RX ORDER — ESCITALOPRAM OXALATE 10 MG/1
20 TABLET ORAL DAILY
Status: DISCONTINUED | OUTPATIENT
Start: 2020-08-28 | End: 2020-08-29 | Stop reason: HOSPADM

## 2020-08-27 RX ORDER — ATORVASTATIN CALCIUM 80 MG/1
80 TABLET, FILM COATED ORAL NIGHTLY
Status: DISCONTINUED | OUTPATIENT
Start: 2020-08-28 | End: 2020-08-29 | Stop reason: HOSPADM

## 2020-08-27 RX ORDER — SODIUM CHLORIDE 9 MG/ML
INJECTION, SOLUTION INTRAVENOUS CONTINUOUS
Status: DISCONTINUED | OUTPATIENT
Start: 2020-08-27 | End: 2020-08-27

## 2020-08-27 RX ORDER — SODIUM CHLORIDE 9 MG/ML
INJECTION, SOLUTION INTRAVENOUS CONTINUOUS
Status: DISCONTINUED | OUTPATIENT
Start: 2020-08-27 | End: 2020-08-29 | Stop reason: HOSPADM

## 2020-08-27 RX ORDER — ACETAMINOPHEN 500 MG
1000 TABLET ORAL EVERY 8 HOURS PRN
Status: DISCONTINUED | OUTPATIENT
Start: 2020-08-27 | End: 2020-08-29 | Stop reason: HOSPADM

## 2020-08-27 RX ADMIN — TICAGRELOR 90 MG: 90 TABLET ORAL at 20:00

## 2020-08-27 RX ADMIN — QUETIAPINE FUMARATE 200 MG: 200 TABLET, EXTENDED RELEASE ORAL at 20:00

## 2020-08-27 RX ADMIN — SODIUM CHLORIDE: 9 INJECTION, SOLUTION INTRAVENOUS at 14:15

## 2020-08-27 RX ADMIN — SODIUM CHLORIDE 1000 ML: 9 INJECTION, SOLUTION INTRAVENOUS at 12:48

## 2020-08-27 RX ADMIN — ACETAMINOPHEN 1000 MG: 500 TABLET ORAL at 20:08

## 2020-08-27 RX ADMIN — GABAPENTIN 400 MG: 400 CAPSULE ORAL at 19:59

## 2020-08-27 RX ADMIN — PREGABALIN 50 MG: 50 CAPSULE ORAL at 19:59

## 2020-08-27 ASSESSMENT — PAIN SCALES - GENERAL
PAINLEVEL_OUTOF10: 9
PAINLEVEL_OUTOF10: 8
PAINLEVEL_OUTOF10: 7

## 2020-08-27 ASSESSMENT — PAIN DESCRIPTION - FREQUENCY: FREQUENCY: CONTINUOUS

## 2020-08-27 ASSESSMENT — PAIN DESCRIPTION - LOCATION
LOCATION: HEAD
LOCATION: ARM;HEAD

## 2020-08-27 ASSESSMENT — PAIN DESCRIPTION - PAIN TYPE
TYPE: ACUTE PAIN
TYPE: ACUTE PAIN

## 2020-08-27 ASSESSMENT — PAIN DESCRIPTION - DESCRIPTORS: DESCRIPTORS: ACHING

## 2020-08-27 ASSESSMENT — PAIN DESCRIPTION - PROGRESSION: CLINICAL_PROGRESSION: GRADUALLY WORSENING

## 2020-08-27 ASSESSMENT — PAIN DESCRIPTION - ORIENTATION: ORIENTATION: ANTERIOR

## 2020-08-27 ASSESSMENT — PAIN DESCRIPTION - ONSET: ONSET: GRADUAL

## 2020-08-27 NOTE — ED PROVIDER NOTES
HPI:  20, Time: 12:46 PM EDT         Manuela Beavers is a 77 y.o. female presenting to the ED for left arm IV site redness and swelling , beginning a few days ago. The complaint has been persistent, moderate in severity patient was also complaining of dizziness no syncope or near syncope she was noted to be hypertensive here not tachycardic or tachypneic patient did have stent placement 5 days ago and was discharged home from hospital.  She denies any chest pain shortness of breath nausea vomiting headache blurred vision. ROS:   Pertinent positives and negatives are stated within HPI, all other systems reviewed and are negative.  --------------------------------------------- PAST HISTORY ---------------------------------------------  Past Medical History:  has a past medical history of Degenerative disorder of bone, Hard of hearing, Hypertension, and MI (myocardial infarction) ([facility] [address] Past Surgical History:  has a past surgical history that includes joint replacement;  section; Tonsillectomy; Hysterectomy; Hemorrhoid surgery; Breast lumpectomy (Left); and Coronary angioplasty with stent (2020). Social History:  reports that she has been smoking. She started smoking about 47 years ago. She has never used smokeless tobacco. She reports previous alcohol use. She reports that she does not use drugs. Family History: family history is not on file. The patients home medications have been reviewed.     Allergies: Tramadol    ---------------------------------------------------PHYSICAL EXAM--------------------------------------    Constitutional/General: Alert and oriented x3, well appearing, non toxic in NAD  Head: Normocephalic and atraumatic  Eyes: PERRL, EOMI  Mouth: Oropharynx clear, handling secretions, no trismus  Neck: Supple, full ROM, non tender to palpation in the midline, no stridor, no crepitus, no meningeal signs  Pulmonary: Lungs clear to auscultation bilaterally, no wheezes, rales, or rhonchi. Not in respiratory distress  Cardiovascular:  Regular rate. Regular rhythm. No murmurs, gallops, or rubs. 2+ distal pulses  Chest: no chest wall tenderness  Abdomen: Soft. Non tender. Non distended. +BS. No rebound, guarding, or rigidity. No pulsatile masses appreciated. Musculoskeletal: Moves all extremities x 4. Warm and well perfused, no clubbing, cyanosis, or edema. Capillary refill <3 seconds  Skin: warm and dry. No rashes. Neurologic: GCS 15, CN 2-12 grossly intact, no focal deficits, symmetric strength 5/5 in the upper and lower extremities bilaterally  Psych: Normal Affect    -------------------------------------------------- RESULTS -------------------------------------------------  I have personally reviewed all laboratory and imaging results for this patient. Results are listed below.      LABS:  Results for orders placed or performed during the hospital encounter of 08/27/20   Troponin   Result Value Ref Range    Troponin 0.41 (H) 0.00 - 0.03 ng/mL   Brain Natriuretic Peptide   Result Value Ref Range    Pro- (H) 0 - 125 pg/mL   CBC   Result Value Ref Range    WBC 12.5 (H) 4.5 - 11.5 E9/L    RBC 3.84 3.50 - 5.50 E12/L    Hemoglobin 12.4 11.5 - 15.5 g/dL    Hematocrit 36.7 34.0 - 48.0 %    MCV 95.6 80.0 - 99.9 fL    MCH 32.3 26.0 - 35.0 pg    MCHC 33.8 32.0 - 34.5 %    RDW 13.1 11.5 - 15.0 fL    Platelets 313 657 - 053 E9/L    MPV 11.1 7.0 - 12.0 fL   Comprehensive Metabolic Panel   Result Value Ref Range    Sodium 135 132 - 146 mmol/L    Potassium 4.1 3.5 - 5.0 mmol/L    Chloride 102 98 - 107 mmol/L    CO2 19 (L) 22 - 29 mmol/L    Anion Gap 14 7 - 16 mmol/L    Glucose 135 (H) 74 - 99 mg/dL    BUN 37 (H) 8 - 23 mg/dL    CREATININE 2.1 (H) 0.5 - 1.0 mg/dL    GFR Non-African American 23 >=60 mL/min/1.73    GFR African American 28     Calcium 9.3 8.6 - 10.2 mg/dL    Total Protein 6.5 6.4 - 8.3 g/dL    Alb 3.9 3.5 - 5.2 g/dL    Total Bilirubin 0.4 0.0 - 1.2 mg/dL    Alkaline

## 2020-08-28 LAB
ANION GAP SERPL CALCULATED.3IONS-SCNC: 15 MMOL/L (ref 7–16)
BACTERIA: ABNORMAL /HPF
BILIRUBIN URINE: NEGATIVE
BLOOD, URINE: NEGATIVE
BUN BLDV-MCNC: 21 MG/DL (ref 8–23)
CALCIUM SERPL-MCNC: 9.1 MG/DL (ref 8.6–10.2)
CHLORIDE BLD-SCNC: 111 MMOL/L (ref 98–107)
CLARITY: ABNORMAL
CO2: 18 MMOL/L (ref 22–29)
COLOR: YELLOW
CREAT SERPL-MCNC: 0.9 MG/DL (ref 0.5–1)
CREATININE URINE: 105 MG/DL (ref 29–226)
EPITHELIAL CELLS, UA: ABNORMAL /HPF
GFR AFRICAN AMERICAN: >60
GFR NON-AFRICAN AMERICAN: >60 ML/MIN/1.73
GLUCOSE BLD-MCNC: 149 MG/DL (ref 74–99)
GLUCOSE URINE: NEGATIVE MG/DL
KETONES, URINE: NEGATIVE MG/DL
LEUKOCYTE ESTERASE, URINE: ABNORMAL
NITRITE, URINE: NEGATIVE
PH UA: 6 (ref 5–9)
POTASSIUM SERPL-SCNC: 4.3 MMOL/L (ref 3.5–5)
PROTEIN UA: NEGATIVE MG/DL
RBC UA: ABNORMAL /HPF (ref 0–2)
REASON FOR REJECTION: NORMAL
REJECTED TEST: NORMAL
SODIUM BLD-SCNC: 144 MMOL/L (ref 132–146)
SODIUM URINE: 146 MMOL/L
SPECIFIC GRAVITY UA: 1.02 (ref 1–1.03)
UREA NITROGEN, UR: 914 MG/DL (ref 800–1666)
UROBILINOGEN, URINE: 4 E.U./DL
WBC UA: >20 /HPF (ref 0–5)

## 2020-08-28 PROCEDURE — 99223 1ST HOSP IP/OBS HIGH 75: CPT | Performed by: INTERNAL MEDICINE

## 2020-08-28 PROCEDURE — 80048 BASIC METABOLIC PNL TOTAL CA: CPT

## 2020-08-28 PROCEDURE — 81001 URINALYSIS AUTO W/SCOPE: CPT

## 2020-08-28 PROCEDURE — 36415 COLL VENOUS BLD VENIPUNCTURE: CPT

## 2020-08-28 PROCEDURE — 2580000003 HC RX 258: Performed by: GENERAL PRACTICE

## 2020-08-28 PROCEDURE — 87040 BLOOD CULTURE FOR BACTERIA: CPT

## 2020-08-28 PROCEDURE — 1200000000 HC SEMI PRIVATE

## 2020-08-28 PROCEDURE — 82570 ASSAY OF URINE CREATININE: CPT

## 2020-08-28 PROCEDURE — 6370000000 HC RX 637 (ALT 250 FOR IP): Performed by: GENERAL PRACTICE

## 2020-08-28 PROCEDURE — 84540 ASSAY OF URINE/UREA-N: CPT

## 2020-08-28 PROCEDURE — APPSS60 APP SPLIT SHARED TIME 46-60 MINUTES: Performed by: NURSE PRACTITIONER

## 2020-08-28 PROCEDURE — 84300 ASSAY OF URINE SODIUM: CPT

## 2020-08-28 PROCEDURE — 87205 SMEAR GRAM STAIN: CPT

## 2020-08-28 RX ORDER — DOXYCYCLINE HYCLATE 100 MG/1
100 CAPSULE ORAL EVERY 12 HOURS SCHEDULED
Status: DISCONTINUED | OUTPATIENT
Start: 2020-08-28 | End: 2020-08-29 | Stop reason: HOSPADM

## 2020-08-28 RX ADMIN — DOXYCYCLINE HYCLATE 100 MG: 100 CAPSULE ORAL at 20:11

## 2020-08-28 RX ADMIN — ASPIRIN 81 MG: 81 TABLET, CHEWABLE ORAL at 09:31

## 2020-08-28 RX ADMIN — METOPROLOL SUCCINATE 100 MG: 100 TABLET, EXTENDED RELEASE ORAL at 09:31

## 2020-08-28 RX ADMIN — DOXYCYCLINE HYCLATE 100 MG: 100 CAPSULE ORAL at 11:30

## 2020-08-28 RX ADMIN — ESCITALOPRAM 20 MG: 10 TABLET, FILM COATED ORAL at 09:32

## 2020-08-28 RX ADMIN — SODIUM CHLORIDE: 9 INJECTION, SOLUTION INTRAVENOUS at 10:05

## 2020-08-28 RX ADMIN — QUETIAPINE FUMARATE 200 MG: 200 TABLET, EXTENDED RELEASE ORAL at 20:11

## 2020-08-28 RX ADMIN — TICAGRELOR 90 MG: 90 TABLET ORAL at 20:12

## 2020-08-28 RX ADMIN — GABAPENTIN 400 MG: 400 CAPSULE ORAL at 13:57

## 2020-08-28 RX ADMIN — PREGABALIN 50 MG: 50 CAPSULE ORAL at 09:32

## 2020-08-28 RX ADMIN — AMLODIPINE BESYLATE 10 MG: 10 TABLET ORAL at 09:31

## 2020-08-28 RX ADMIN — ACETAMINOPHEN 1000 MG: 500 TABLET ORAL at 06:39

## 2020-08-28 RX ADMIN — ACETAMINOPHEN 1000 MG: 500 TABLET ORAL at 14:44

## 2020-08-28 RX ADMIN — TICAGRELOR 90 MG: 90 TABLET ORAL at 09:31

## 2020-08-28 RX ADMIN — GABAPENTIN 400 MG: 400 CAPSULE ORAL at 09:31

## 2020-08-28 RX ADMIN — GABAPENTIN 400 MG: 400 CAPSULE ORAL at 20:12

## 2020-08-28 RX ADMIN — ATORVASTATIN CALCIUM 80 MG: 80 TABLET, FILM COATED ORAL at 20:12

## 2020-08-28 ASSESSMENT — PAIN SCALES - GENERAL
PAINLEVEL_OUTOF10: 8
PAINLEVEL_OUTOF10: 5

## 2020-08-28 NOTE — CONSULTS
Patient seen and examined. Chart, labs, imaging studies, EKG and rhythm strips reviewed. Full consult to follow. We were asked to see the patient for known Hx of CAD. IMPRESSION:  1. Left arm (antecubital fossa area) cellulitis site of recent IV. 2. Hypotension. Hx of HTN  3. LUANNE likely secondary to hypotension + ACE I therapy  4. CAD/ischemic cardiomyopathy s/p acute ST-elevation anterior wall myocardial infarction 8/22/2020 s/p LAD PCI with mild residual CP. No signs or symptoms of fluid overload. ProBNP 189. Elevated Troponin secondary to recent MI   5. Hx of Tobacco abuse, has not smoked since her recent admission 08/22/2020.  6. Depression. PLAN:   1. Discontinue Lisinopril  2. Norvasc discontinued by Renal service  3. Monitor BP: Consider Hydralazine/Isordil combination + resuming Norvasc (? At lower dose) pending improvement in BP   4. Continue ASA, Brilinta, Toprol, and Lipitor  5. Agree with cautious hydration  6. Antibiotics as per Primary Service   7.  Will follow     Electronically signed by Luna Bailon MD on 8/28/2020 at 9:58 AM

## 2020-08-28 NOTE — CARE COORDINATION
I went in to see the patient and explained who I was and I talked to her about her transition of care. The patient just recently moved from [facility] to live with her son and was admitted to the Emergency due to having aacute MI and had a coronary stent placed. She now lives with her son in a 1 story home and 2 steps to get into the home. She has a cane and her pharmacy is [facility]. Her plan is to return home once she is medically stable and her son Norman Andrews will be the one to pick her up.  I will follow

## 2020-08-28 NOTE — PROGRESS NOTES
Department of Internal Medicine  Nephrology Attending Progress Note        SUBJECTIVE:  Mrs Lenore Barnes is a 69-year-old woman recently moved from [address] [place] [address] to live with her son, had recent admission for coronary disease requiring placement of a stent to a proximal LAD lesion. At the time of discharge her serum creatinine was 0.8, she presents to the emergency room because of pain in her left arm large erythematous area with low blood pressures, in addition she is complaining of lower back discomfort. She is not admitting to fever or chilling is not admitting to increase intake of ibuprofen which he takes on a chronic basis. PMH  Hypertension  Coronary disease status post stent to an LAD  Hyperlipidemia  Panic obstructive airway disease to stop smoking a week ago  Bilateral knee replacement  History of hearing disorder with bilateral hearing aid  History of diverticulosis   Total abdominal hysterectomy  Left breast lumpectomy  Patient is recovering alcoholic    Physical Exam:    Vitals:    08/28/20 0745   BP: 126/60   Pulse: 65   Resp: 18   Temp: 98.2 °F (36.8 °C)   SpO2: 99%       I/O last 24 hours:  Intake/Output none recorded  Weight: 185    General Appearance:  awake, alert, oriented, in mild  distress  Skin: Area of erythema noted on left elbow area  Neck:  neck- supple, no mass, non-tender and no bruits  Lungs: Decreased breath sounds no wheezing or rales  Heart: Regular rhythm no murmur rub     Abdominal: Abdomen soft, non-tender. BS normal. No masses,  No organomegaly  Extremities: Extremities warm to touch, pink, with no edema.   Peripheral Pulses:  +2    DATA:    CBC with Differential:    Lab Results   Component Value Date    WBC 12.5 08/27/2020    RBC 3.84 08/27/2020    HGB 12.4 08/27/2020    HCT 36.7 08/27/2020     08/27/2020    MCV 95.6 08/27/2020    MCH 32.3 08/27/2020    MCHC 33.8 08/27/2020    RDW 13.1 08/27/2020    LYMPHOPCT 20.4 08/22/2020    MONOPCT 7.4 08/22/2020    BASOPCT 0.3 08/22/2020    MONOSABS 0.91 08/22/2020    LYMPHSABS 2.50 08/22/2020    EOSABS 0.43 08/22/2020    BASOSABS 0.04 08/22/2020     CMP:    Lab Results   Component Value Date     08/27/2020    K 4.1 08/27/2020     08/27/2020    CO2 19 08/27/2020    BUN 37 08/27/2020    CREATININE 2.1 08/27/2020    GFRAA 28 08/27/2020    LABGLOM 23 08/27/2020    GLUCOSE 135 08/27/2020    PROT 6.5 08/27/2020    LABALBU 3.9 08/27/2020    CALCIUM 9.3 08/27/2020    BILITOT 0.4 08/27/2020    ALKPHOS 98 08/27/2020    AST 14 08/27/2020    ALT 14 08/27/2020          doxycycline hyclate  100 mg Oral 2 times per day    amLODIPine  10 mg Oral Daily    aspirin  81 mg Oral Daily    atorvastatin  80 mg Oral Nightly    disulfiram  250 mg Oral Daily    escitalopram  20 mg Oral Daily    gabapentin  400 mg Oral TID    metoprolol succinate  100 mg Oral Daily    pregabalin  50 mg Oral Daily    QUEtiapine  200 mg Oral Nightly    ticagrelor  90 mg Oral BID      sodium chloride 75 mL/hr at 08/28/20 1005     acetaminophen    IMPRESSION/RECOMMENDATIONS:      LUANNE related to hypotension? Early sepsis?   Agree with holding lisinopril, we will send off some urine chemistries as well as UA with microscopic  Coronary disease status post stent stent deployment to LAD  Hypertension but hypotensive on presentation would hold amlodipine at this time while continuing IV hydration  Cellulitis of left elbow we will send off some blood cultures already started on some antibiotic therapy    Thank you for allowing us to partake in her care    Dario Green MD  8/28/2020 10:59 AM

## 2020-08-28 NOTE — CONSULTS
Inpatient Cardiology Consultation      Reason for Consult:  Known Hx CAD    Consulting Physician: Dr. Bo Friedman    Requesting Physician:  Dr. Mushtaq Silver    Date of Consultation: 8/28/2020    HISTORY OF PRESENT ILLNESS:   Ms. Davon Amado is a 77year old female who was seen in initial consultation with Dr. Bo Friedman on 08/23/2020 in the setting of an anterior STEMI. At that time she underwent placement of GAURAV to the proximal LAD. Her medical history also includes HTN, obesity, recent tobacco cessation, depression, and DJD. Ms. Davon Amado presented to [facility] ED on 08/27/2020 with complaints of left arm pain. She states that prior to presentation, since 08/26/2020 she noticed erythema with increasing pain at left arm previous IV insertion site. She denies accompanying fever and chills. She states that she has also been having intermittent left sided chest pain \"underneath my left breast\" unchanged with exertion, lasting 45 minutes in duration \"since I left the hospital\". She states that she did not take SL NTG \"because it gave me terrible headaches in the hospital, and I just won't take it\". She states that she has been having DAVIS, orthopnea and PND with BLE edema over the past several days. She states that she has been drinking increased fluids without increased sodium. Upon arrival to the ED her VS were 98-70-18-82/40-98% on RA. EKG SR with T wave inversion in anterior leads. Troponin 0.41. ProBNP 189. WBC 12.5. H&H 12.4/36.7. BUN/SCr 37/2.1. CXR was unremarkable. She received one liter NS and was admitted to a telemetry monitored unit. Cardiology was consulted by Dr. Mushtaq Silver for management of known Hx CAD. Please note: past medical records were reviewed per electronic medical record (EMR) - see detailed reports under Past Medical/ Surgical History. Past Medical and Surgical History:   1. Echocardiogram 08/23/2020 (Dr. Bo Friedman): Left ventricle is normal in size. Mild concentric left ventricular hypertrophy.  There is severe hypokinesis to akinesis of the anteroseptal wall, the apex, the distal septum and the distal inferior wall. Ejection fraction is visually estimated at 40+/-5%. There is doppler evidence of stage I diastolic dysfunction. Normal right ventricular size and function. Normal sized left atrium ( by volume index ). Mild mitral regurgitation. Mild to moderate tricuspid regurgitation. 2. Cardiac catheterization (Dr. BEVERLY [facility]) 08/23/2020: Left main. The left main artery did not appear to have any significant angiographic disease. LAD. The left anterior descending artery had around 50% ostial narrowing. There was 90%-95% stenosis in the proximal LAD with a filling defect consistent with thrombus. There was SADI 1 to 2 flow in the distal vessel. Circ: The left circumflex is a large codominant vessel. It gives rise to a small to moderate size high first marginal branch (intermediate ramus branch) which itself had around 70% ostial narrowing. The left circumflex then gives rise after the AV groove branch, which provides a posterior descending artery branch and the posterolateral branch, the left circumflex had around 70% luminal narrowing before a small second marginal branch and large third and fourth marginal branches and another fifth small lateral branch. The AV groove branch and the posterior descending artery branch and the posterolateral branch do not appear to have any significant disease. RCA:  The right coronary artery is a moderate size codominant vessel, which did not appear to have any significant angiographic disease. LEFT VENTRICULOGRAPHY:  The left ventricle is normal in size. There was severe hypokinesis of the anterolateral wall. There was severe hypokinesis to akinesis of the apical half to two-thirds of the anterolateral wall, the apex and the apical half of the inferior wall. The global left ventricular systolic function also appeared moderately reduced with an estimated ejection fraction of 35%-40%. There was post PVC mitral regurgitation. Successful balloon angioplasty with the deployment of a drug-eluting coronary stent to the proximal LAD with post-stent deployment dilatation with a larger noncompliant balloon to high pressure with very good results. 3. Hypertension. 4. Obesity  5. Recent tobacco cessation (1.5 weeks ago)  6. Depression. 7. Degenerative disorder of bone with a history of joint replacement. 8. Hard of hearing. 9. History of . 10. History of tonsillectomy. 11. History of hysterectomy. 12. History of hemorrhoid surgery. 13. History of left breast lumpectomy. Medications Prior to admit:  Prior to Admission medications    Medication Sig Start Date End Date Taking? Authorizing Provider   aspirin 81 MG chewable tablet Take 1 tablet by mouth daily 20  Yes Casandra Jones DO   atorvastatin (LIPITOR) 80 MG tablet Take 1 tablet by mouth nightly 20  Yes Casandra Jones DO   lisinopril (PRINIVIL;ZESTRIL) 20 MG tablet Take 1 tablet by mouth daily 20  Yes Casandra Jones DO   metoprolol succinate (TOPROL XL) 100 MG extended release tablet Take 1 tablet by mouth daily 20  Yes Casandra Jones DO   ticagrelor (BRILINTA) 90 MG TABS tablet Take 1 tablet by mouth 2 times daily 20  Yes Juma Haider,    pregabalin (LYRICA) 50 MG capsule Take 50 mg by mouth daily. Yes Historical Provider, MD   disulfiram (ANTABUSE) 250 MG tablet Take 250 mg by mouth daily   Yes Historical Provider, MD   gabapentin (NEURONTIN) 400 MG capsule Take 400 mg by mouth 3 times daily.    Yes Historical Provider, MD   amLODIPine Besylate (NORVASC PO) Take 10 mg by mouth daily    Yes Historical Provider, MD   QUEtiapine (SEROQUEL XR) 200 MG extended release tablet Take 200 mg by mouth nightly   Yes Historical Provider, MD   escitalopram (LEXAPRO) 20 MG tablet Take 20 mg by mouth daily   Yes Historical Provider, MD   acetaminophen (TYLENOL) 500 MG tablet Take 2 tablets by mouth every 8 hours as needed for Pain 8/2/20  Yes Michelle Abrams MD   nitroGLYCERIN (NITROSTAT) 0.4 MG SL tablet up to max of 3 total doses. If no relief after 1 dose, call 911. 8/25/20   Kern Bitters, DO   ibuprofen (IBU) 400 MG tablet Take 1 tablet by mouth every 8 hours as needed for Pain Take with full glass of water 8/2/20   Michelle Abrams MD       Current Medications:    Current Facility-Administered Medications: doxycycline hyclate (VIBRAMYCIN) capsule 100 mg, 100 mg, Oral, 2 times per day  acetaminophen (TYLENOL) tablet 1,000 mg, 1,000 mg, Oral, Q8H PRN  amLODIPine (NORVASC) tablet 10 mg, 10 mg, Oral, Daily  aspirin chewable tablet 81 mg, 81 mg, Oral, Daily  atorvastatin (LIPITOR) tablet 80 mg, 80 mg, Oral, Nightly  disulfiram (ANTABUSE) tablet 250 mg, 250 mg, Oral, Daily  escitalopram (LEXAPRO) tablet 20 mg, 20 mg, Oral, Daily  gabapentin (NEURONTIN) capsule 400 mg, 400 mg, Oral, TID  metoprolol succinate (TOPROL XL) extended release tablet 100 mg, 100 mg, Oral, Daily  pregabalin (LYRICA) capsule 50 mg, 50 mg, Oral, Daily  QUEtiapine (SEROQUEL XR) extended release tablet 200 mg, 200 mg, Oral, Nightly  ticagrelor (BRILINTA) tablet 90 mg, 90 mg, Oral, BID  0.9 % sodium chloride infusion, , Intravenous, Continuous    Allergies:  Tramadol    Social History:    States that she quit smoking 1.5 weeks ago. Prior to that smoked <1ppd for 30 years. Denies alcohol and illicit drug use. Denies caffeine intake since MI. Family History:   Please note this information was not obtained at this time as it is limited in nature due to the patient's advanced age. REVIEW OF SYSTEMS:     · Constitutional: Denies fevers, chills, night sweats, and fatigue  · HEENT: Denies nose bleeds, and blurred vision,oral pain, abscess or lesion. Complains of headaches   · Musculoskeletal: Denies falls, pain to BLE with ambulation and complains of edema to BLE.   · Neurological: Complains of intermittent dizziness and lightheadedness without syncope or fall. Denies numbness and tingling  · Cardiovascular: Complains of chest pain. Denies palpitations, and feelings of heart racing. · Respiratory: Complains of DAVIS, orthopnea and PND  · Gastrointestinal: Denies heartburn, nausea/vomiting, and constipation, black/bloody, and tarry stools. Complains of diarrhea. · Genitourinary: Denies dysuria and hematuria  · Hematologic: Denies excessive bruising or bleeding  · Lymphatic: Denies lumps and bumps to neck, axilla, breast, and groin  · Endocrine: Denies excessive thirst. Denies intolerance to hot and cold  · GYN: Postmenopausal state; Denies vaginal bleeding. · Psychiatric: Complains of anxiety and depression. PHYSICAL EXAM:   /60   Pulse 65   Temp 98.2 °F (36.8 °C) (Temporal)   Resp 18   Ht 5' 5\" (1.651 m)   Wt 185 lb (83.9 kg)   SpO2 99%   BMI 30.79 kg/m²   CONST:  Well developed, obese, elderly female who appears of her stated age. Awake, alert, cooperative, no apparent distress  HEENT:   Head- Normocephalic, atraumatic   Eyes- Conjunctivae pink, anicteric  Throat- Oral mucosa pink and moist  Neck-  No stridor, trachea midline, no jugular venous distention. No adenopathy   CHEST: Chest symmetrical and non-tender to palpation. No accessory muscle use or intercostal retractions  RESPIRATORY: Lung sounds - diminished throughout fields   CARDIOVASCULAR:     No carotid bruit  Heart Inspection- shows no noted pulsations  Heart Palpation- no heaves or thrills; PMI is non-displaced   Heart Ausculation- Regular rate and rhythm, systolic murmur. No s3, s4 or rub   PV: No lower extremity edema. No varicosities. Pedal pulses palpable, no clubbing or cyanosis. Left antecub with erythema outlined with scab noted, no drainage. ABDOMEN: Soft, obese, non-tender to light palpation. Bowel sounds present. No palpable masses no organomegaly; no abdominal bruit  MS: Good muscle strength and tone. No atrophy or abnormal movements.    : Deferred  SKIN: Warm and dry no statis dermatitis or ulcers   NEURO / PSYCH: Oriented to person, place and time. Speech clear and appropriate. Follows all commands. Pleasant affect     DATA:    ECG: As above  Tele strips: Telemetry monitoring not on, ordered now. Diagnostic:  Labs:   CBC:   Recent Labs     08/27/20  1247   WBC 12.5*   HGB 12.4   HCT 36.7        BMP:   Recent Labs     08/27/20  1247      K 4.1   CO2 19*   BUN 37*   CREATININE 2.1*   LABGLOM 23   CALCIUM 9.3   CARDIAC ENZYMES:  Recent Labs     08/27/20  1247   TROPONINI 0.41*     FASTING LIPID PANEL:  Lab Results   Component Value Date    CHOL 174 08/23/2020    HDL 48 08/23/2020    LDLCALC 74 08/23/2020    TRIG 262 08/23/2020     LIVER PROFILE:  Recent Labs     08/27/20  1247   AST 14   ALT 14   LABALBU 3.9     08/27/2020 CXR:   No acute cardiopulmonary findings. IMPRESSION and PLAN to follow per Dr. Bobo Fitch    Electronically signed by YASIR Velez CNP on 8/28/2020 at 10:16 AM     I personally and independently saw and examined patient and reviewed all done pertinent laboratory data, imaging studies, ECGs and rhythm strips. I have reviewed and agree with the APN history and physical exam as documented in the above note. Electronically signed by Franklin Cabrera MD on 8/28/2020 at 4:44 PM     We were asked to see the patient for known Hx of CAD.      IMPRESSION:  1. Left arm (antecubital fossa area) cellulitis site of recent IV. 2. Hypotension. Hx of HTN  3. LUANNE likely secondary to hypotension + ACE I therapy  4. CAD/ischemic cardiomyopathy s/p acute ST-elevation anterior wall myocardial infarction 8/22/2020 s/p LAD PCI with mild residual CP. No signs or symptoms of fluid overload. ProBNP 189. Elevated Troponin secondary to recent MI   5. Hx of Tobacco abuse, has not smoked since her recent admission 08/22/2020.  6. Depression.                    PLAN:   1. Discontinue Lisinopril  2.  Norvasc discontinued by Renal service  3. Monitor BP: Consider Hydralazine/Isordil combination + resuming Norvasc (? At lower dose) pending improvement in BP   4. Continue ASA, Brilinta, Toprol, and Lipitor  5. Agree with cautious hydration  6. Antibiotics as per Primary Service   7.  Will follow      Electronically signed by Avril Ryan MD on 8/28/2020 at 9:58 AM

## 2020-08-28 NOTE — PLAN OF CARE
Problem: Falls - Risk of:  Goal: Will remain free from falls  Description: Will remain free from falls  8/28/2020 1501 by Evin Marroquin RN  Outcome: Met This Shift  8/28/2020 1025 by Jose Gipson RN  Outcome: Ongoing  Goal: Absence of physical injury  Description: Absence of physical injury  8/28/2020 1501 by Evin Marroquin RN  Outcome: Met This Shift  8/28/2020 1025 by Jose Gipson RN  Outcome: Ongoing     Problem: Pain:  Description: Pain management should include both nonpharmacologic and pharmacologic interventions.   Goal: Pain level will decrease  Description: Pain level will decrease  8/28/2020 1501 by Evin Marroquin RN  Outcome: Met This Shift  8/28/2020 1025 by Jose Gipson RN  Outcome: Ongoing  Goal: Control of acute pain  Description: Control of acute pain  8/28/2020 1501 by Evin Marroquin RN  Outcome: Met This Shift  8/28/2020 1025 by Jose Gipson RN  Outcome: Ongoing  Goal: Control of chronic pain  Description: Control of chronic pain  8/28/2020 1501 by Evin Marroquin RN  Outcome: Met This Shift  8/28/2020 1025 by Jose Gipson RN  Outcome: Ongoing

## 2020-08-29 VITALS
DIASTOLIC BLOOD PRESSURE: 67 MMHG | HEIGHT: 65 IN | WEIGHT: 185 LBS | SYSTOLIC BLOOD PRESSURE: 141 MMHG | HEART RATE: 72 BPM | OXYGEN SATURATION: 97 % | TEMPERATURE: 97.2 F | RESPIRATION RATE: 16 BRPM | BODY MASS INDEX: 30.82 KG/M2

## 2020-08-29 LAB
ANION GAP SERPL CALCULATED.3IONS-SCNC: 14 MMOL/L (ref 7–16)
BUN BLDV-MCNC: 13 MG/DL (ref 8–23)
CALCIUM SERPL-MCNC: 9.4 MG/DL (ref 8.6–10.2)
CHLORIDE BLD-SCNC: 110 MMOL/L (ref 98–107)
CO2: 18 MMOL/L (ref 22–29)
CREAT SERPL-MCNC: 0.7 MG/DL (ref 0.5–1)
EOSINOPHIL, URINE: 1 % (ref 0–1)
GFR AFRICAN AMERICAN: >60
GFR NON-AFRICAN AMERICAN: >60 ML/MIN/1.73
GLUCOSE BLD-MCNC: 100 MG/DL (ref 74–99)
POTASSIUM SERPL-SCNC: 4 MMOL/L (ref 3.5–5)
SODIUM BLD-SCNC: 142 MMOL/L (ref 132–146)

## 2020-08-29 PROCEDURE — 6370000000 HC RX 637 (ALT 250 FOR IP): Performed by: GENERAL PRACTICE

## 2020-08-29 PROCEDURE — 80048 BASIC METABOLIC PNL TOTAL CA: CPT

## 2020-08-29 PROCEDURE — 36415 COLL VENOUS BLD VENIPUNCTURE: CPT

## 2020-08-29 RX ORDER — DOXYCYCLINE HYCLATE 100 MG/1
100 CAPSULE ORAL EVERY 12 HOURS SCHEDULED
Qty: 20 CAPSULE | Refills: 0 | Status: SHIPPED | OUTPATIENT
Start: 2020-08-29 | End: 2020-09-08

## 2020-08-29 RX ORDER — CEPHALEXIN 250 MG/1
250 CAPSULE ORAL EVERY 6 HOURS SCHEDULED
Status: DISCONTINUED | OUTPATIENT
Start: 2020-08-29 | End: 2020-08-29 | Stop reason: HOSPADM

## 2020-08-29 RX ORDER — AMLODIPINE BESYLATE 2.5 MG/1
2.5 TABLET ORAL DAILY
Status: DISCONTINUED | OUTPATIENT
Start: 2020-08-29 | End: 2020-08-29 | Stop reason: HOSPADM

## 2020-08-29 RX ORDER — CEPHALEXIN 250 MG/1
250 CAPSULE ORAL 4 TIMES DAILY
Qty: 40 CAPSULE | Refills: 0 | Status: SHIPPED | OUTPATIENT
Start: 2020-08-29 | End: 2020-09-21 | Stop reason: ALTCHOICE

## 2020-08-29 RX ORDER — AMLODIPINE BESYLATE 2.5 MG/1
2.5 TABLET ORAL DAILY
Qty: 30 TABLET | Refills: 3 | Status: ON HOLD | OUTPATIENT
Start: 2020-08-29 | End: 2020-10-22 | Stop reason: HOSPADM

## 2020-08-29 RX ADMIN — GABAPENTIN 400 MG: 400 CAPSULE ORAL at 13:19

## 2020-08-29 RX ADMIN — GABAPENTIN 400 MG: 400 CAPSULE ORAL at 09:14

## 2020-08-29 RX ADMIN — ASPIRIN 81 MG: 81 TABLET, CHEWABLE ORAL at 09:14

## 2020-08-29 RX ADMIN — PREGABALIN 50 MG: 50 CAPSULE ORAL at 09:14

## 2020-08-29 RX ADMIN — DOXYCYCLINE HYCLATE 100 MG: 100 CAPSULE ORAL at 09:14

## 2020-08-29 RX ADMIN — ACETAMINOPHEN 1000 MG: 500 TABLET ORAL at 06:14

## 2020-08-29 RX ADMIN — ACETAMINOPHEN 1000 MG: 500 TABLET ORAL at 14:29

## 2020-08-29 RX ADMIN — TICAGRELOR 90 MG: 90 TABLET ORAL at 09:14

## 2020-08-29 RX ADMIN — ESCITALOPRAM 20 MG: 10 TABLET, FILM COATED ORAL at 09:14

## 2020-08-29 RX ADMIN — METOPROLOL SUCCINATE 100 MG: 100 TABLET, EXTENDED RELEASE ORAL at 09:15

## 2020-08-29 ASSESSMENT — PAIN SCALES - GENERAL
PAINLEVEL_OUTOF10: 0
PAINLEVEL_OUTOF10: 6
PAINLEVEL_OUTOF10: 6

## 2020-08-29 NOTE — PROGRESS NOTES
08/22/2020    MONOPCT 7.4 08/22/2020    BASOPCT 0.3 08/22/2020    MONOSABS 0.91 08/22/2020    LYMPHSABS 2.50 08/22/2020    EOSABS 0.43 08/22/2020    BASOSABS 0.04 08/22/2020     CMP:    Lab Results   Component Value Date     08/29/2020    K 4.0 08/29/2020     08/29/2020    CO2 18 08/29/2020    BUN 13 08/29/2020    CREATININE 0.7 08/29/2020    GFRAA >60 08/29/2020    LABGLOM >60 08/29/2020    GLUCOSE 100 08/29/2020    PROT 6.5 08/27/2020    LABALBU 3.9 08/27/2020    CALCIUM 9.4 08/29/2020    BILITOT 0.4 08/27/2020    ALKPHOS 98 08/27/2020    AST 14 08/27/2020    ALT 14 08/27/2020          cephALEXin  250 mg Oral 4 times per day    amLODIPine  2.5 mg Oral Daily    doxycycline hyclate  100 mg Oral 2 times per day    aspirin  81 mg Oral Daily    atorvastatin  80 mg Oral Nightly    disulfiram  250 mg Oral Daily    escitalopram  20 mg Oral Daily    gabapentin  400 mg Oral TID    metoprolol succinate  100 mg Oral Daily    pregabalin  50 mg Oral Daily    QUEtiapine  200 mg Oral Nightly    ticagrelor  90 mg Oral BID      sodium chloride 75 mL/hr at 08/28/20 1005     acetaminophen    IMPRESSION/RECOMMENDATIONS:      1. LUANNE related to hypotension? Early sepsis? Continue to hold lisinopril; LUANNE resolved  2. Coronary disease status post stent stent deployment to LAD  3. Hypertension but hypotensive on presentation; BP improved now  4.  Cellulitis of left elbow we will send off some blood cultures already started on some antibiotic therapy    OK for discharge from a Renal standpoint    D/w Dr Emma Lizama MD  8/29/2020 1:59 PM

## 2020-08-29 NOTE — H&P
[address]                  [address] [facility],  [facility]                              HISTORY AND PHYSICAL    PATIENT NAME: Michael Kendall                   :        1955  MED REC NO:   [id number]                            ROOM:       [id number]  ACCOUNT NO:   [de-identified]                           ADMIT DATE: 2020  PROVIDER:     Lulu Olivera DO    HISTORY OF PRESENT ILLNESS:  This is a 78-year-old white female,  presented to the hospital after recently being discharged for an  ST-segment myocardial infarction. She did well in her initial  hospitalization and was sent home on beta blockers, anticoagulation,  statins, ACE inhibitors, and antihypertensives; seemingly was doing  okay; however, became weak, nauseous, no appetite, lightheaded. Came  into the emergency department, was found to be rather hypotensive. Troponin was slightly elevated likely on the basis of her recent MI or  even acute kidney injury because her BUN and creatinine were  significantly elevated from her baseline. Initial impression was  hypotensive episode and acute kidney injury. We are going to bring her  in, start some gentle fluid replacements. We will have Cardiology and  Nephrology take a look at her for these new findings. RECENT CURRENT MEDICATIONS:  Included Nitrostat, Tylenol, Lexapro,  Seroquel, Neurontin, Antabuse, Lyrica, Brilinta, metoprolol,  atorvastatin, aspirin, lisinopril, and Norvasc. PAST MEDICAL HISTORY:  Significant for chest pain, ST-segment myocardial  infarction, chronic myofascial pain syndrome, previous history of drug  use, hypertension, tobacco abuse. PAST SURGICAL HISTORY:  Significant for tonsillectomy, joint  replacement, hysterectomy, hemorrhoid surgery,  section, breast  lumpectomy, coronary angioplasty with stent placement.     SOCIAL HISTORY:  The patient is a current everyday smoker and has been  given instructions to quit.  She denies use of alcohol or narcotic  beverages at this time. FAMILY MEDICAL HISTORY:  Noted and discussed. REVIEW OF SYSTEMS:  HEENT:  Negative for vertigo, cephalgia, ringing in the ears, hearing  loss, difficulty swallowing, hoarseness in her voice, bloody noses. CARDIOVASCULAR:  She currently has no chest pain, palpitations,  orthopnea, or paroxysmal nocturnal dyspnea although did suffer from  acute ST-segment myocardial infarction. PULMONARY:  She denies cough, wheeze, shortness of breath, hemoptysis,  or pleuritic pain. GI:  There is no nausea, vomiting, diarrhea, or constipation. No blood  in her stool. No recent change in weight. :  No urgency, frequency, dysuria, or hematuria. ENDOCRINE:  Negative for diabetes or thyroid disorder. MUSCULOSKELETAL:  Positive for myofascial pain syndrome and degenerative  joint disease. PSYCHIATRIC:  Negative for anxiety or depression. NEUROLOGIC:  Negative for CVA, seizures, tremors, tics, or TIAs. SKIN:  Shows the cellulitic changes of the left antecubital fossa from  recent IV site. PHYSICAL EXAMINATION:  GENERAL:  Shows this to be a 68-year-old white female in moderate  distress with above complaints. HEAD, EYES, EARS, NOSE, AND THROAT:  Examination shows the head to be  normocephalic and atraumatic. Pupils are equal and reactive to light  and accommodation. Extraocular eye muscles are intact. Tympanic  membranes are clear. Ear canals are patent. Oral mucosa:  Pink and  moist.  NECK:  Veins are flat, nondistended. No carotid bruits. CHEST:  Symmetrical.  HEART:  Regular rate and rhythm without murmur, gallops, or friction  rubs. LUNGS:  Clear to auscultation bilaterally without rhonchi, rales, or  wheezes. ABDOMEN:  Soft, nontender, nondistended. Bowel sounds are present in  all four quadrants. EXTERNAL GENITALIA:  Intact. EXTREMITIES:  Peripheral pulses intact. Legs without edema.   Left  antecubital fossa with lateral cellulitic changes, red, warm, indurated,  all superficial.    INITIAL IMPRESSION:  At this time is hypotension, most likely related to  new medications; acute kidney injury related to hypotensive event and  while on ACE inhibitors. PLAN:  ACE inhibitors will be stopped. Gentle fluid replacement. Alteration of her cardiac regimen will be carried out. We will have  Cardiology and Nephrology see her. Further orders will be written for  as the clinical course dictates. At the time of admission, her  condition is fair. Prognosis guarded.         Joby Johnson DO    D: 08/29/2020 13:16:13       T: 08/29/2020 13:27:10     [id number]  Job#: [id number]     Doc#: [id number]    CC:

## 2020-08-30 NOTE — DISCHARGE SUMMARY
[address]                  [address] [place],  [facility]                               DISCHARGE SUMMARY    PATIENT NAME: Paula Guerin                   :        1955  MED REC NO:   [id number]                            ROOM:       [id number]  ACCOUNT NO:   [de-identified]                           ADMIT DATE: 2020  PROVIDER:     Steve Ruiz DO                  DISCHARGE DATE:  2020    FINAL DIAGNOSES:  1. Recent ST-segment myocardial infarction. 2.  Hypotension. 3.  Acute kidney injury. 4.  Cellulitis of the left antecubital fossa secondary to IV  infiltration. HOSPITAL COURSE:  The patient is a 64-year-old white female who recently  presented with the history and chief complaint of chest pain, was found  to have an ST-segment elevation myocardial infarction. She underwent  urgent heart catheterization and had a stent placed in her distal LAD. There were other several areas of stenosis, which required medical  management. She was discharged home on Norvasc, beta blockers, statins,  ACE inhibitors. After being home for a day or so, she started to feel  nauseous, sick, weak, lightheaded, came into the emergency department,  was found to be hypotensive and also to have an elevated BUN and  creatinine from her baseline, consistent with some degree of acute  kidney injury. She also presented with painful swelling in the area of  the left antecubital fossa. The area was red, indurated, warm, very  localized. She was very hypotensive on admission. Her ACE inhibitors  were stopped and she was treated with gentle fluid replacement. We had  Cardiology take a look at her and Nephrology. It was felt that the  hypotensive episode may have been due to lowering of her blood pressure  and because of being hypotensive. She had poor perfusion to the kidneys  and while on ACE inhibitor, was exacerbated.   So, lisinopril was  stopped, fluid replacement was given. Blood pressure trended up,  systolics a little high. I am going to add Norvasc 2.5 instead of the  10 that she went home on. We will continue to hold the lisinopril. I  have given her doxycycline and Keflex for her antecubital cellulitis,  this seems to be improved as well. She is eating. Bowel and bladder  function are normal.  She has absolutely no chest pain. Vitals are good  with the exception of systolic blood pressure being slightly elevated. We will follow along as an outpatient. We are going to discharge her to  home. She is to see me in the office in 1 week for followup. At the  time of discharge, her condition is good. Her prognosis is guarded.         Eden Nixon,     D: 08/29/2020 13:11:14       T: 08/29/2020 13:22:25     [email or web address]  Job#: [id number]     Doc#: [id number]    CC:

## 2020-09-02 LAB — BLOOD CULTURE, ROUTINE: NORMAL

## 2020-09-21 ENCOUNTER — OFFICE VISIT (OUTPATIENT)
Dept: CARDIOLOGY CLINIC | Age: 65
End: 2020-09-21
Payer: MEDICARE

## 2020-09-21 ENCOUNTER — TELEPHONE (OUTPATIENT)
Dept: CARDIAC REHAB | Age: 65
End: 2020-09-21

## 2020-09-21 VITALS
BODY MASS INDEX: 32.29 KG/M2 | HEIGHT: 65 IN | RESPIRATION RATE: 20 BRPM | HEART RATE: 59 BPM | SYSTOLIC BLOOD PRESSURE: 102 MMHG | OXYGEN SATURATION: 99 % | WEIGHT: 193.8 LBS | DIASTOLIC BLOOD PRESSURE: 60 MMHG

## 2020-09-21 PROCEDURE — G8400 PT W/DXA NO RESULTS DOC: HCPCS | Performed by: INTERNAL MEDICINE

## 2020-09-21 PROCEDURE — 93000 ELECTROCARDIOGRAM COMPLETE: CPT | Performed by: INTERNAL MEDICINE

## 2020-09-21 PROCEDURE — G8427 DOCREV CUR MEDS BY ELIG CLIN: HCPCS | Performed by: INTERNAL MEDICINE

## 2020-09-21 PROCEDURE — 1090F PRES/ABSN URINE INCON ASSESS: CPT | Performed by: INTERNAL MEDICINE

## 2020-09-21 PROCEDURE — 4040F PNEUMOC VAC/ADMIN/RCVD: CPT | Performed by: INTERNAL MEDICINE

## 2020-09-21 PROCEDURE — 4004F PT TOBACCO SCREEN RCVD TLK: CPT | Performed by: INTERNAL MEDICINE

## 2020-09-21 PROCEDURE — 99214 OFFICE O/P EST MOD 30 MIN: CPT | Performed by: INTERNAL MEDICINE

## 2020-09-21 PROCEDURE — G8417 CALC BMI ABV UP PARAM F/U: HCPCS | Performed by: INTERNAL MEDICINE

## 2020-09-21 PROCEDURE — 3017F COLORECTAL CA SCREEN DOC REV: CPT | Performed by: INTERNAL MEDICINE

## 2020-09-21 PROCEDURE — 1111F DSCHRG MED/CURRENT MED MERGE: CPT | Performed by: INTERNAL MEDICINE

## 2020-09-21 PROCEDURE — 1123F ACP DISCUSS/DSCN MKR DOCD: CPT | Performed by: INTERNAL MEDICINE

## 2020-09-21 RX ORDER — LISINOPRIL 20 MG/1
20 TABLET ORAL DAILY
Status: ON HOLD | COMMUNITY
End: 2020-10-22 | Stop reason: HOSPADM

## 2020-09-21 RX ORDER — IBUPROFEN 600 MG/1
600 TABLET ORAL 2 TIMES DAILY
Status: ON HOLD | COMMUNITY
End: 2020-10-22 | Stop reason: HOSPADM

## 2020-09-21 RX ORDER — LOSARTAN POTASSIUM 100 MG/1
100 TABLET ORAL DAILY
Status: ON HOLD | COMMUNITY
Start: 2020-09-21 | End: 2020-10-22 | Stop reason: HOSPADM

## 2020-09-21 RX ORDER — LOSARTAN POTASSIUM 100 MG/1
100 TABLET ORAL DAILY
Qty: 30 TABLET | Status: SHIPPED | COMMUNITY
Start: 2020-09-21 | End: 2020-09-21 | Stop reason: DRUGHIGH

## 2020-09-21 RX ORDER — LOSARTAN POTASSIUM 100 MG/1
100 TABLET ORAL DAILY
COMMUNITY
End: 2020-09-21 | Stop reason: DRUGHIGH

## 2020-09-21 NOTE — PROGRESS NOTES
OFFICE VISIT        PRIMARY CARE PHYSICIAN:    Mckenzie Cedillo DO       ALLERGIES / SENSITIVITIES:    Allergies   Allergen Reactions    Nitroglycerin Other (See Comments)     Severe headache    Tramadol      seizures        REVIEWED MEDICATIONS:      Current Outpatient Medications:     lisinopril (PRINIVIL;ZESTRIL) 20 MG tablet, Take 20 mg by mouth daily, Disp: , Rfl:     ibuprofen (ADVIL;MOTRIN) 600 MG tablet, Take 600 mg by mouth 2 times daily, Disp: , Rfl:     losartan (COZAAR) 100 MG tablet, Take 1 tablet by mouth daily Stop taking, Disp: , Rfl:     amLODIPine (NORVASC) 2.5 MG tablet, Take 1 tablet by mouth daily, Disp: 30 tablet, Rfl: 3    aspirin 81 MG chewable tablet, Take 1 tablet by mouth daily, Disp: 30 tablet, Rfl: 3    atorvastatin (LIPITOR) 80 MG tablet, Take 1 tablet by mouth nightly, Disp: 30 tablet, Rfl: 3    metoprolol succinate (TOPROL XL) 100 MG extended release tablet, Take 1 tablet by mouth daily, Disp: 30 tablet, Rfl: 3    ticagrelor (BRILINTA) 90 MG TABS tablet, Take 1 tablet by mouth 2 times daily, Disp: 60 tablet, Rfl: 3    disulfiram (ANTABUSE) 250 MG tablet, Take 250 mg by mouth daily, Disp: , Rfl:     gabapentin (NEURONTIN) 400 MG capsule, Take 400 mg by mouth 3 times daily. , Disp: , Rfl:     QUEtiapine (SEROQUEL XR) 200 MG extended release tablet, Take 200 mg by mouth nightly, Disp: , Rfl:     escitalopram (LEXAPRO) 20 MG tablet, Take 20 mg by mouth daily, Disp: , Rfl:     acetaminophen (TYLENOL) 500 MG tablet, Take 2 tablets by mouth every 8 hours as needed for Pain, Disp: 50 tablet, Rfl: 0    nitroGLYCERIN (NITROSTAT) 0.4 MG SL tablet, up to max of 3 total doses. If no relief after 1 dose, call 911.  (Patient not taking: Reported on 9/21/2020), Disp: 25 tablet, Rfl: 3      S: REASON FOR VISIT:    Coronary artery disease/ischemic cardiomyopathy, S/P recent hospitalization for acute ST elevation, anterior wall myocardial infarction, S/P angioplasty with rehospitalization NEUROLOGIC:  Denies memory loss, motor weakness, numbness, tingling or tremors. PAST MEDICAL/SURGICAL HISTORY:    1. Hypertension. 2.  Obesity. 3.  Dyslipidemia. 4.  Depression. 5.  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 6.  Hard of hearing. 7.  History of C. Section. 8.  History of tonsillectomy. 9.  History of hemorrhoidectomy. 10. History of left breast lumpectomy. 11. Coronary artery disease/ischemic cardiomyopathy. 12.  History of acute ST elevation anterior wall myocardial infarction with cardiogenic shock, 8/22/2020. 13. Cardiac catheterization and angioplasty, 8/22/2020: Left main, no significant disease. LAD 95% thrombotic proximal vessel stenosis with SADI 1-2 flow in the distal vessel. LCX, large codominant vessel with 70% ostial stenosis of the high first marginal branch/intermediate ramus branch and 70% stenosis of the mid left circumflex after the AV groove branch before any of the other marginal branches. The AV groove branch provided a small posterolateral branch and a small posterior descending artery branch. RCA, moderate sized codominant vessel with no significant angiographic disease. Normal left ventricular size with large area of anterolateral, apical and apical-inferior wall hypokinesis to akinesis with an estimated ejection fraction of 35-40%. Mildly elevated left ventricular end diastolic pressure. Successful balloon angioplasty with the deployment of a 3.0 mm x 33 mm Xience Sara drug-eluting coronary stent to the proximal LAD with post stent deployment dilatation with a 3.5 mm x 15 mm noncompliant balloon inflated to 18 atmospheres with very good results.     14.  Echocardiogram done on 8/23/2020 showed normal left ventricular size with mild concentric left ventricular hypertrophy with severe hypokinesis to akinesis of the anteroseptal wall, the apex, the distal septum and the distal inferior wall with an ejection fraction of ASSESSMENT / DIAGNOSIS:   1. Coronary artery disease/ischemic cardiomyopathy, compensated with no angina or signs of congestive heart failure/fluid overload. 2.  Borderline low blood pressure: History of hypertension. 3.  Dyslipidemia/hypertriglyceridemia. 4.  Obesity. 5.  History of tobacco abuse with continued tobacco use. 6.  History of acute kidney injury. 7.  History of cellulitis. 8.  Depression. 9.  Degenerative disorder of the bone. 10. Hard of hearing. TREATMENT PLAN:  1. Reassure. 2.  Discontinue smoking. 3.  Stop Losartan. 4.  Rest of medications same. 5.  Check blood pressure in 1 week: Adjust blood pressure medication accordingly. 6.  Patient strongly advised aerobic exercise, watching diet and attempt to lose weight. 7.  Follow up with Cardiology in 6 months or on a prn basis. Regna Fry.  [place] [id number] [phone]

## 2020-10-01 ENCOUNTER — NURSE ONLY (OUTPATIENT)
Dept: CARDIOLOGY CLINIC | Age: 65
End: 2020-10-01

## 2020-10-01 VITALS
HEART RATE: 70 BPM | RESPIRATION RATE: 16 BRPM | WEIGHT: 193.8 LBS | DIASTOLIC BLOOD PRESSURE: 64 MMHG | SYSTOLIC BLOOD PRESSURE: 118 MMHG | HEIGHT: 65 IN | BODY MASS INDEX: 32.29 KG/M2

## 2020-10-01 NOTE — PROGRESS NOTES
On 9/21/20, BP was 102/60 and HR 59  Losartan 100 mg qd  was dc'd by [facility]. Pt here for BP recheck per [facility]  /64  And HR 70    C/o Headaches    Per Dr Littie Oppenheim further changes cont. same medications. Return in 6 months or sooner if needed.

## 2020-10-18 ENCOUNTER — APPOINTMENT (OUTPATIENT)
Dept: GENERAL RADIOLOGY | Age: 65
End: 2020-10-18
Payer: MEDICARE

## 2020-10-18 ENCOUNTER — HOSPITAL ENCOUNTER (OUTPATIENT)
Age: 65
Setting detail: OBSERVATION
Discharge: HOME | End: 2020-10-22
Attending: FAMILY MEDICINE | Admitting: GENERAL PRACTICE
Payer: MEDICARE

## 2020-10-18 LAB
ANION GAP SERPL CALCULATED.3IONS-SCNC: 11 MMOL/L (ref 7–16)
BUN BLDV-MCNC: 20 MG/DL (ref 8–23)
CALCIUM SERPL-MCNC: 9.3 MG/DL (ref 8.6–10.2)
CHLORIDE BLD-SCNC: 102 MMOL/L (ref 98–107)
CO2: 24 MMOL/L (ref 22–29)
CREAT SERPL-MCNC: 1.4 MG/DL (ref 0.5–1)
GFR AFRICAN AMERICAN: 45
GFR NON-AFRICAN AMERICAN: 37 ML/MIN/1.73
GLUCOSE BLD-MCNC: 139 MG/DL (ref 74–99)
HCT VFR BLD CALC: 34.5 % (ref 34–48)
HEMOGLOBIN: 11.6 G/DL (ref 11.5–15.5)
INR BLD: 1
LACTIC ACID: 1.2 MMOL/L (ref 0.5–2.2)
LIPASE: 16 U/L (ref 13–60)
MCH RBC QN AUTO: 32.7 PG (ref 26–35)
MCHC RBC AUTO-ENTMCNC: 33.6 % (ref 32–34.5)
MCV RBC AUTO: 97.2 FL (ref 80–99.9)
PDW BLD-RTO: 13.7 FL (ref 11.5–15)
PLATELET # BLD: 247 E9/L (ref 130–450)
PMV BLD AUTO: 10.5 FL (ref 7–12)
POTASSIUM SERPL-SCNC: 4.1 MMOL/L (ref 3.5–5)
PROTHROMBIN TIME: 11.1 SEC (ref 9.3–12.4)
RBC # BLD: 3.55 E12/L (ref 3.5–5.5)
SODIUM BLD-SCNC: 137 MMOL/L (ref 132–146)
TROPONIN: <0.01 NG/ML (ref 0–0.03)
WBC # BLD: 8.3 E9/L (ref 4.5–11.5)

## 2020-10-18 PROCEDURE — 85027 COMPLETE CBC AUTOMATED: CPT

## 2020-10-18 PROCEDURE — G0378 HOSPITAL OBSERVATION PER HR: HCPCS

## 2020-10-18 PROCEDURE — 2060000000 HC ICU INTERMEDIATE R&B

## 2020-10-18 PROCEDURE — 80048 BASIC METABOLIC PNL TOTAL CA: CPT

## 2020-10-18 PROCEDURE — 85610 PROTHROMBIN TIME: CPT

## 2020-10-18 PROCEDURE — 83605 ASSAY OF LACTIC ACID: CPT

## 2020-10-18 PROCEDURE — 6370000000 HC RX 637 (ALT 250 FOR IP): Performed by: GENERAL PRACTICE

## 2020-10-18 PROCEDURE — 93005 ELECTROCARDIOGRAM TRACING: CPT | Performed by: FAMILY MEDICINE

## 2020-10-18 PROCEDURE — 99285 EMERGENCY DEPT VISIT HI MDM: CPT

## 2020-10-18 PROCEDURE — 84484 ASSAY OF TROPONIN QUANT: CPT

## 2020-10-18 PROCEDURE — 36415 COLL VENOUS BLD VENIPUNCTURE: CPT

## 2020-10-18 PROCEDURE — 71045 X-RAY EXAM CHEST 1 VIEW: CPT

## 2020-10-18 PROCEDURE — 83690 ASSAY OF LIPASE: CPT

## 2020-10-18 PROCEDURE — 2580000003 HC RX 258: Performed by: FAMILY MEDICINE

## 2020-10-18 PROCEDURE — 96360 HYDRATION IV INFUSION INIT: CPT

## 2020-10-18 PROCEDURE — 6370000000 HC RX 637 (ALT 250 FOR IP): Performed by: FAMILY MEDICINE

## 2020-10-18 RX ORDER — NITROGLYCERIN 0.4 MG/1
0.4 TABLET SUBLINGUAL EVERY 5 MIN PRN
Status: DISCONTINUED | OUTPATIENT
Start: 2020-10-18 | End: 2020-10-22 | Stop reason: HOSPADM

## 2020-10-18 RX ORDER — 0.9 % SODIUM CHLORIDE 0.9 %
1000 INTRAVENOUS SOLUTION INTRAVENOUS ONCE
Status: COMPLETED | OUTPATIENT
Start: 2020-10-18 | End: 2020-10-18

## 2020-10-18 RX ORDER — ACETAMINOPHEN 500 MG
1000 TABLET ORAL EVERY 8 HOURS PRN
Status: DISCONTINUED | OUTPATIENT
Start: 2020-10-18 | End: 2020-10-22 | Stop reason: HOSPADM

## 2020-10-18 RX ORDER — QUETIAPINE FUMARATE 200 MG/1
200 TABLET, FILM COATED ORAL NIGHTLY
Status: DISCONTINUED | OUTPATIENT
Start: 2020-10-18 | End: 2020-10-22 | Stop reason: HOSPADM

## 2020-10-18 RX ORDER — LISINOPRIL 20 MG/1
20 TABLET ORAL DAILY
Status: DISCONTINUED | OUTPATIENT
Start: 2020-10-19 | End: 2020-10-19

## 2020-10-18 RX ORDER — METOPROLOL SUCCINATE 100 MG/1
100 TABLET, EXTENDED RELEASE ORAL DAILY
Status: DISCONTINUED | OUTPATIENT
Start: 2020-10-19 | End: 2020-10-19

## 2020-10-18 RX ORDER — ACETAMINOPHEN 500 MG
1000 TABLET ORAL ONCE
Status: COMPLETED | OUTPATIENT
Start: 2020-10-18 | End: 2020-10-18

## 2020-10-18 RX ORDER — GABAPENTIN 400 MG/1
400 CAPSULE ORAL 3 TIMES DAILY
Status: DISCONTINUED | OUTPATIENT
Start: 2020-10-18 | End: 2020-10-22 | Stop reason: HOSPADM

## 2020-10-18 RX ORDER — ATORVASTATIN CALCIUM 80 MG/1
80 TABLET, FILM COATED ORAL NIGHTLY
Status: DISCONTINUED | OUTPATIENT
Start: 2020-10-18 | End: 2020-10-22 | Stop reason: HOSPADM

## 2020-10-18 RX ORDER — ASPIRIN 81 MG/1
81 TABLET, CHEWABLE ORAL DAILY
Status: DISCONTINUED | OUTPATIENT
Start: 2020-10-19 | End: 2020-10-22 | Stop reason: HOSPADM

## 2020-10-18 RX ORDER — LOSARTAN POTASSIUM 50 MG/1
100 TABLET ORAL DAILY
Status: DISCONTINUED | OUTPATIENT
Start: 2020-10-19 | End: 2020-10-19

## 2020-10-18 RX ORDER — ASPIRIN 81 MG/1
324 TABLET, CHEWABLE ORAL ONCE
Status: COMPLETED | OUTPATIENT
Start: 2020-10-18 | End: 2020-10-18

## 2020-10-18 RX ORDER — ESCITALOPRAM OXALATE 10 MG/1
20 TABLET ORAL DAILY
Status: DISCONTINUED | OUTPATIENT
Start: 2020-10-19 | End: 2020-10-22 | Stop reason: HOSPADM

## 2020-10-18 RX ORDER — AMLODIPINE BESYLATE 2.5 MG/1
2.5 TABLET ORAL DAILY
Status: DISCONTINUED | OUTPATIENT
Start: 2020-10-19 | End: 2020-10-19

## 2020-10-18 RX ORDER — IBUPROFEN 200 MG
600 TABLET ORAL 2 TIMES DAILY
Status: DISCONTINUED | OUTPATIENT
Start: 2020-10-18 | End: 2020-10-19

## 2020-10-18 RX ADMIN — ASPIRIN 81 MG CHEWABLE TABLET 324 MG: 81 TABLET CHEWABLE at 19:32

## 2020-10-18 RX ADMIN — ACETAMINOPHEN 1000 MG: 500 TABLET ORAL at 19:32

## 2020-10-18 RX ADMIN — SODIUM CHLORIDE 1000 ML: 9 INJECTION, SOLUTION INTRAVENOUS at 19:32

## 2020-10-18 RX ADMIN — SODIUM CHLORIDE 1000 ML: 9 INJECTION, SOLUTION INTRAVENOUS at 18:35

## 2020-10-18 RX ADMIN — ATORVASTATIN CALCIUM 80 MG: 80 TABLET, FILM COATED ORAL at 23:57

## 2020-10-18 RX ADMIN — TICAGRELOR 90 MG: 90 TABLET ORAL at 23:57

## 2020-10-18 RX ADMIN — IBUPROFEN 600 MG: 200 TABLET, FILM COATED ORAL at 23:57

## 2020-10-18 RX ADMIN — GABAPENTIN 400 MG: 400 CAPSULE ORAL at 23:57

## 2020-10-18 RX ADMIN — QUETIAPINE FUMARATE 200 MG: 200 TABLET ORAL at 23:57

## 2020-10-18 ASSESSMENT — PAIN DESCRIPTION - PAIN TYPE
TYPE: ACUTE PAIN

## 2020-10-18 ASSESSMENT — PAIN SCALES - GENERAL
PAINLEVEL_OUTOF10: 5
PAINLEVEL_OUTOF10: 10
PAINLEVEL_OUTOF10: 8
PAINLEVEL_OUTOF10: 1
PAINLEVEL_OUTOF10: 0
PAINLEVEL_OUTOF10: 4

## 2020-10-18 ASSESSMENT — PAIN DESCRIPTION - FREQUENCY
FREQUENCY: INTERMITTENT
FREQUENCY: INTERMITTENT

## 2020-10-18 ASSESSMENT — PAIN DESCRIPTION - ORIENTATION
ORIENTATION: MID
ORIENTATION: MID

## 2020-10-18 ASSESSMENT — PAIN DESCRIPTION - ONSET
ONSET: ON-GOING
ONSET: ON-GOING

## 2020-10-18 ASSESSMENT — PAIN DESCRIPTION - PROGRESSION
CLINICAL_PROGRESSION: GRADUALLY IMPROVING
CLINICAL_PROGRESSION: GRADUALLY IMPROVING

## 2020-10-18 ASSESSMENT — PAIN DESCRIPTION - DESCRIPTORS
DESCRIPTORS: ACHING
DESCRIPTORS: ACHING
DESCRIPTORS: ACHING;SHARP

## 2020-10-18 ASSESSMENT — PAIN DESCRIPTION - LOCATION
LOCATION: CHEST

## 2020-10-18 NOTE — ED PROVIDER NOTES
Department of Emergency Medicine   ED  Provider Note  Admit Date/RoomTime: 10/18/2020  6:07 PM  ED Room: 15/15   Chief Complaint     Chest Pain (x a few days; hx of MI)    History of Present Illness   Source of history provided by:  patient and relative(s). History/Exam Limitations: none. Isidra Lomas is a 77 y.o. old female who has a past medical history of:   Past Medical History:   Diagnosis Date    CAD (coronary artery disease)     Degenerative disorder of bone     Hard of hearing     Hypertension     MI (myocardial infarction) ([facility] [address] 2020    presents to the emergency department by private vehicle and ambulatory, with complaints of gradual onset, still present substernal discomfort described as heaviness and pressure beginning a few day(s) prior to arrival.  The pain radiates to the left arm. Duration of symptoms: Started about 2 hours ago and to the present time. Symptom(s) at worst was 8 /10. Her symptoms are associated with dizziness especially when standing patient also has had nausea vomiting some diarrhea. The symptoms are worsened by walking and relieved by rest.  There has been No orthopnea, No edema, No palpitations and No syncope associated with complaint. Her cardiac risk factors are smoking  advanced age (older than 54 for men, 72 for women), dyslipidemia, sedentary lifestyle and Coronary artery disease and prior MI. 2020 Pt of Dr Jd Boland. Care prior to arrival consisted of none, with no relief. .  ROS    Pertinent positives and negatives are stated within HPI, all other systems reviewed and are negative.     Past Surgical History:   Procedure Laterality Date    BREAST LUMPECTOMY Left      SECTION      CORONARY ANGIOPLASTY WITH STENT PLACEMENT  2020    3.0 x 33mm Xience Sara to Prox LAD, EF35%, Dr. Jasmyn Burgess History:  reports that she quit smoking about 3 weeks ago. Her smoking use included cigarettes. She started smoking about 47 years ago. She has a 11.75 pack-year smoking history. She has never used smokeless tobacco. She reports previous alcohol use. She reports that she does not use drugs. Family History: family history includes Diabetes in her father; [place] in her mother. Allergies: Nitroglycerin and Tramadol    Physical Exam           ED Triage Vitals   BP Temp Temp src Pulse Resp SpO2 Height Weight   10/18/20 1823 10/18/20 1823 -- 10/18/20 1823 10/18/20 1823 10/18/20 1823 10/18/20 1805 10/18/20 1805   (!) 83/57 97.2 °F (36.2 °C)  61 20 95 % 5' 6\" (1.676 m) 180 lb (81.6 kg)      Oxygen Saturation Interpretation: Normal.    General Appearance/Constitutional:  Alert, development consistent with age, NAD. HEENT:  NC/NT. PERRLA. Airway patent. Neck:  Normal ROM. Supple. Respiratory:  Clear to auscultation and breath sounds equal.  CV:  Regular rate and rhythm, normal heart sounds, without pathological murmurs, ectopy, gallops, or rubs. Chest:  Symmetrical without visible rash or tenderness. GI:  Abdomen Soft, nontender, good bowel sounds. No firm or pulsatile mass. Back:  No costovertebral tenderness. Extremities: No tenderness or edema noted. Lymphatics: no lymphadenopathy noted  Integument:  Normal turgor. Warm, dry, without visible rash, unless noted elsewhere. Neurological:  Oriented. Motor functions intact.    Psychiatric:  Affect normal.    Lab / Imaging Results   (All laboratory and radiology results have been personally reviewed by myself)  Labs:  Results for orders placed or performed during the hospital encounter of 67/98/78   Basic metabolic panel   Result Value Ref Range    Sodium 137 132 - 146 mmol/L    Potassium 4.1 3.5 - 5.0 mmol/L    Chloride 102 98 - 107 mmol/L    CO2 24 22 - 29 mmol/L    Anion Gap 11 7 - 16 mmol/L    Glucose 139 (H) 74 - 99 mg/dL    BUN 20 8 - 23 mg/dL    CREATININE 1.4 (H) 0.5 - 1.0 mg/dL    GFR Non- 37 >=60 mL/min/1.73    GFR African American 45     Calcium 9.3 8.6 - 10.2 mg/dL   CBC   Result Value Ref Range    WBC 8.3 4.5 - 11.5 E9/L    RBC 3.55 3.50 - 5.50 E12/L    Hemoglobin 11.6 11.5 - 15.5 g/dL    Hematocrit 34.5 34.0 - 48.0 %    MCV 97.2 80.0 - 99.9 fL    MCH 32.7 26.0 - 35.0 pg    MCHC 33.6 32.0 - 34.5 %    RDW 13.7 11.5 - 15.0 fL    Platelets 552 319 - 360 E9/L    MPV 10.5 7.0 - 12.0 fL   Troponin   Result Value Ref Range    Troponin <0.01 0.00 - 0.03 ng/mL   Protime-INR   Result Value Ref Range    Protime 11.1 9.3 - 12.4 sec    INR 1.0    Lactic acid, plasma   Result Value Ref Range    Lactic Acid 1.2 0.5 - 2.2 mmol/L   Lipase   Result Value Ref Range    Lipase 16 13 - 60 U/L       Imaging: All Radiology results interpreted by Radiologist unless otherwise noted. XR CHEST PORTABLE   Final Result   No acute process. EKG #1:  Interpreted by emergency department physician unless otherwise noted. Time:  18:05    Rate: 62  Rhythm: Sinus. Interpretation: Normal sinus rhythm left atrial enlargement septal infarct age undetermined. Changes as compared to previous EKG    ED Course / Medical Decision Making     Medications   0.9 % sodium chloride bolus (0 mLs Intravenous Stopped 10/18/20 1959)   0.9 % sodium chloride bolus (0 mLs Intravenous Stopped 10/18/20 2048)   aspirin chewable tablet 324 mg (324 mg Oral Given 10/18/20 1932)   acetaminophen (TYLENOL) tablet 1,000 mg (1,000 mg Oral Given 10/18/20 1932)        Re-Evaluations:  10/18/20      Time: 7:18    Patients symptoms with pain about 7/10 however patient's heart rate is in the mid 50s to low 60s her blood pressure is 89/54 she does not have any dizziness will give her aspirin as well as Tylenol.     Consultations:             Juan Antonio Frisa    Procedures:   none    MDM: Patient did receive IV fluids improved her hypertension she is alert she is not tachypneic or tachycardic    Counseling:   I have spoken with the son and patient and discussed todays results, in addition to providing specific details for the plan of care and counseling regarding the diagnosis and prognosis and are agreeable with the plan. Assessment      1. Chest pain, unspecified type    2. Hypotension, unspecified hypotension type      This patient's ED course included: a personal history and physicial examination IV fluids cardiac monitoring multiple reevaluations  This patient has remained hemodynamically stable and improved during their ED course. Plan   Admit to telemetry. Patient condition is fair. New Medications     New Prescriptions    No medications on file     Electronically signed by Samara Marin MD   DD: 10/18/20  **This report was transcribed using voice recognition software. Every effort was made to ensure accuracy; however, inadvertent computerized transcription errors may be present. END OF PROVIDER NOTE        CRITICAL CARE:   30 MINUTES. Please note that the withdrawal or failure to initiate urgent interventions for this patient would likely result in a life threatening deterioration or permanent disability. Accordingly this patient received the above mentioned time, excluding separately billable procedures.        Samara Marin MD  10/18/20 4556       Samara Marin MD  10/18/20 9397

## 2020-10-18 NOTE — ED NOTES
Patient transport form signed at this time by patient, and placed into the chart.      Chidi West RN  10/18/20 2749

## 2020-10-19 LAB
ANION GAP SERPL CALCULATED.3IONS-SCNC: 14 MMOL/L (ref 7–16)
BUN BLDV-MCNC: 17 MG/DL (ref 8–23)
CALCIUM SERPL-MCNC: 8.9 MG/DL (ref 8.6–10.2)
CHLORIDE BLD-SCNC: 105 MMOL/L (ref 98–107)
CK MB: 1.2 NG/ML (ref 0–4.3)
CO2: 18 MMOL/L (ref 22–29)
CREAT SERPL-MCNC: 1.1 MG/DL (ref 0.5–1)
EKG ATRIAL RATE: 62 BPM
EKG P AXIS: 75 DEGREES
EKG P-R INTERVAL: 198 MS
EKG Q-T INTERVAL: 466 MS
EKG QRS DURATION: 88 MS
EKG QTC CALCULATION (BAZETT): 472 MS
EKG R AXIS: 7 DEGREES
EKG T AXIS: 34 DEGREES
EKG VENTRICULAR RATE: 62 BPM
GFR AFRICAN AMERICAN: 60
GFR NON-AFRICAN AMERICAN: 49 ML/MIN/1.73
GLUCOSE BLD-MCNC: 109 MG/DL (ref 74–99)
POTASSIUM REFLEX MAGNESIUM: 4 MMOL/L (ref 3.5–5)
SODIUM BLD-SCNC: 137 MMOL/L (ref 132–146)
TOTAL CK: 69 U/L (ref 20–180)
TROPONIN: <0.01 NG/ML (ref 0–0.03)

## 2020-10-19 PROCEDURE — 6370000000 HC RX 637 (ALT 250 FOR IP): Performed by: GENERAL PRACTICE

## 2020-10-19 PROCEDURE — 82553 CREATINE MB FRACTION: CPT

## 2020-10-19 PROCEDURE — 99222 1ST HOSP IP/OBS MODERATE 55: CPT | Performed by: INTERNAL MEDICINE

## 2020-10-19 PROCEDURE — 84484 ASSAY OF TROPONIN QUANT: CPT

## 2020-10-19 PROCEDURE — 36415 COLL VENOUS BLD VENIPUNCTURE: CPT

## 2020-10-19 PROCEDURE — 93010 ELECTROCARDIOGRAM REPORT: CPT | Performed by: INTERNAL MEDICINE

## 2020-10-19 PROCEDURE — 82550 ASSAY OF CK (CPK): CPT

## 2020-10-19 PROCEDURE — G0378 HOSPITAL OBSERVATION PER HR: HCPCS

## 2020-10-19 PROCEDURE — 80048 BASIC METABOLIC PNL TOTAL CA: CPT

## 2020-10-19 PROCEDURE — 2060000000 HC ICU INTERMEDIATE R&B

## 2020-10-19 PROCEDURE — APPSS180 APP SPLIT SHARED TIME > 60 MINUTES: Performed by: CLINICAL NURSE SPECIALIST

## 2020-10-19 PROCEDURE — 6370000000 HC RX 637 (ALT 250 FOR IP): Performed by: CLINICAL NURSE SPECIALIST

## 2020-10-19 RX ORDER — PANTOPRAZOLE SODIUM 40 MG/1
40 TABLET, DELAYED RELEASE ORAL
Status: DISCONTINUED | OUTPATIENT
Start: 2020-10-19 | End: 2020-10-22 | Stop reason: HOSPADM

## 2020-10-19 RX ORDER — METOPROLOL SUCCINATE 50 MG/1
50 TABLET, EXTENDED RELEASE ORAL DAILY
Status: DISCONTINUED | OUTPATIENT
Start: 2020-10-19 | End: 2020-10-22 | Stop reason: HOSPADM

## 2020-10-19 RX ADMIN — TICAGRELOR 90 MG: 90 TABLET ORAL at 19:56

## 2020-10-19 RX ADMIN — ATORVASTATIN CALCIUM 80 MG: 80 TABLET, FILM COATED ORAL at 19:56

## 2020-10-19 RX ADMIN — GABAPENTIN 400 MG: 400 CAPSULE ORAL at 09:21

## 2020-10-19 RX ADMIN — GABAPENTIN 400 MG: 400 CAPSULE ORAL at 19:56

## 2020-10-19 RX ADMIN — GABAPENTIN 400 MG: 400 CAPSULE ORAL at 13:34

## 2020-10-19 RX ADMIN — PANTOPRAZOLE SODIUM 40 MG: 40 TABLET, DELAYED RELEASE ORAL at 09:21

## 2020-10-19 RX ADMIN — METOPROLOL SUCCINATE 50 MG: 50 TABLET, EXTENDED RELEASE ORAL at 09:21

## 2020-10-19 RX ADMIN — ESCITALOPRAM 20 MG: 10 TABLET, FILM COATED ORAL at 09:21

## 2020-10-19 RX ADMIN — QUETIAPINE FUMARATE 200 MG: 200 TABLET ORAL at 19:56

## 2020-10-19 RX ADMIN — ASPIRIN 81 MG CHEWABLE TABLET 81 MG: 81 TABLET CHEWABLE at 09:21

## 2020-10-19 RX ADMIN — TICAGRELOR 90 MG: 90 TABLET ORAL at 09:21

## 2020-10-19 ASSESSMENT — PAIN DESCRIPTION - PAIN TYPE: TYPE: ACUTE PAIN

## 2020-10-19 ASSESSMENT — PAIN SCALES - GENERAL
PAINLEVEL_OUTOF10: 0
PAINLEVEL_OUTOF10: 5

## 2020-10-19 ASSESSMENT — PAIN DESCRIPTION - ORIENTATION: ORIENTATION: MID

## 2020-10-19 ASSESSMENT — PAIN DESCRIPTION - LOCATION: LOCATION: CHEST

## 2020-10-19 NOTE — CONSULTS
Inpatient Cardiology Consultation      Reason for Consult:  Chest pain     Consulting Physician: Dr. Adrianne Arana    Requesting Physician:  Dr. Franci Lobo    Date of Consultation: 10/19/2020    History of Present Illness:    Ms. Darshan Anderson is a 77year old lady with ischemic cardiomyopathy s/p recent acute anterior STEMI with stenting to the proximal LAD on August 22, 2020. She was readmitted the following week due to hypotension, LUANNE, and left arm cellulitis. She is followed by Dr. Guanako Gandhi and was seen in hospital follow up in our [facility] Cardiology office on September 21, 2020. Blood pressure at that visit was 102/60, and losartan was stopped; she returned October 1 to have her blood pressure rechecked, and it was 118/64 with a heart rate of 70. She reports mild exertional chest discomfort over a few days last week, however is vague in describing this. She became ill with vomiting and diarrhea late last week, but was able to tolerate fluids and denies missing any of her medications. Yesterday morning she started to have pain in the center of her chest, which she describes as feeling \"like heartburn\"; this pain was not radiating and different than what she had with her STEMI. She denies associated dyspnea and had nausea and dizziness but no further vomiting. She went to the ER last evening due to this, and on arrival was hypotensive (89/54) and bradycardic at times. She was given 324 mg of Aspirin and ~ two liters of normal saline after which her blood pressure improved. Presenting EKG showed SR and initial troponin was negative. Notably, her BUN was 20, creatinine was 1.4 (this was 0.7 on August 29) and GFR 37. She was transferred to [facility] and admitted to telemetry. This morning, she reports ongoing mild chest discomfort, although it has improved from yesterday; she estimates the pain was constant for about eight hours at its worse, and she can name no aggravating or relieving factors.  A second troponin level this morning has returned negative. Currently, she is resting in bed and in no apparent distress. She was initially sleeping at the time of assessment, and was snoring loudly and had difficulty waking. History is somewhat difficult to obtain as she is very hard of hearing and does not have her hearing aids with her. Past Medical History:    1. Hypertension. 2.  Obesity. 3.  Dyslipidemia. 4.  Depression. 5.  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 6.  Hard of hearing. 7.  History of C. Section. 8.  History of tonsillectomy. 9.  History of hemorrhoidectomy. 10. History of left breast lumpectomy. 11. Coronary artery disease/ischemic cardiomyopathy. 12.  History of acute ST elevation anterior wall myocardial infarction with cardiogenic shock, 8/22/2020. 13. Cardiac catheterization and angioplasty, 8/22/2020: Left main, no significant disease. LAD 95% thrombotic proximal vessel stenosis with SADI 1-2 flow in the distal vessel. LCX, large codominant vessel with 70% ostial stenosis of the high first marginal branch/intermediate ramus branch and 70% stenosis of the mid left circumflex after the AV groove branch before any of the other marginal branches. The AV groove branch provided a small posterolateral branch and a small posterior descending artery branch. RCA, moderate sized codominant vessel with no significant angiographic disease. Normal left ventricular size with large area of anterolateral, apical and apical-inferior wall hypokinesis to akinesis with an estimated ejection fraction of 35-40%. Mildly elevated left ventricular end diastolic pressure. Successful balloon angioplasty with the deployment of a 3.0 mm x 33 mm Xience Sara drug-eluting coronary stent to the proximal LAD with post stent deployment dilatation with a 3.5 mm x 15 mm noncompliant balloon inflated to 18 atmospheres with very good results.     14.  Echocardiogram done on 8/23/2020 showed normal left ventricular size with mild concentric left ventricular hypertrophy with severe hypokinesis to akinesis of the anteroseptal wall, the apex, the distal septum and the distal inferior wall with an ejection fraction of 40 +/- 5% with stage 1 left ventricular diastolic dysfunction. Normal right ventricular size and function. Normal size left atrium by volume index. Mild mitral regurgitation. Mild to moderate tricuspid regurgitation. 13.  Hospitalized 8/27/2020 shortly after her hospitalization for her acute anterior wall myocardial infarction with left arm antecubital fossa area cellulitis site of recent IV with hypotension and acute kidney injury (BUN, 37, Cr 2.1 on admission).       Family History:  Significant for lung cancer in her mother. Father has diabetes.      Social History:  Patient smoked 1/4 pack of cigarettes a day for 47 years and currently is smoking a couple of cigarettes a day. She denies alcohol or illicit drug use. Medications Prior to Admit:  Prior to Admission medications    Medication Sig Start Date End Date Taking?  Authorizing Provider   lisinopril (PRINIVIL;ZESTRIL) 20 MG tablet Take 20 mg by mouth daily   Yes Historical Provider, MD   losartan (COZAAR) 100 MG tablet Take 1 tablet by mouth daily Stop taking 9/21/20  Yes Daniel Rand MD   amLODIPine (NORVASC) 2.5 MG tablet Take 1 tablet by mouth daily 8/29/20  Yes Earl Shah DO   aspirin 81 MG chewable tablet Take 1 tablet by mouth daily 8/26/20  Yes Earl Shah DO   atorvastatin (LIPITOR) 80 MG tablet Take 1 tablet by mouth nightly 8/25/20  Yes Earl Shah DO   metoprolol succinate (TOPROL XL) 100 MG extended release tablet Take 1 tablet by mouth daily 8/26/20  Yes Earl Shah DO   ticagrelor (BRILINTA) 90 MG TABS tablet Take 1 tablet by mouth 2 times daily 8/25/20  Yes Juma Goldman, DO   disulfiram (ANTABUSE) 250 MG tablet Take 250 mg by mouth daily   Yes Historical Provider, MD   gabapentin (NEURONTIN) 400 MG capsule Take 400 mg by mouth 3 times daily. Yes Historical Provider, MD   escitalopram (LEXAPRO) 20 MG tablet Take 20 mg by mouth daily   Yes Historical Provider, MD   ibuprofen (ADVIL;MOTRIN) 600 MG tablet Take 600 mg by mouth 2 times daily    Historical Provider, MD   nitroGLYCERIN (NITROSTAT) 0.4 MG SL tablet up to max of 3 total doses. If no relief after 1 dose, call 911. 8/25/20   Juan C Munson,    QUEtiapine (SEROQUEL XR) 200 MG extended release tablet Take 200 mg by mouth nightly    Historical Provider, MD   acetaminophen (TYLENOL) 500 MG tablet Take 2 tablets by mouth every 8 hours as needed for Pain 8/2/20   Celso Olea MD       Current Medications:    Current Facility-Administered Medications: acetaminophen (TYLENOL) tablet 1,000 mg, 1,000 mg, Oral, Q8H PRN  amLODIPine (NORVASC) tablet 2.5 mg, 2.5 mg, Oral, Daily  aspirin chewable tablet 81 mg, 81 mg, Oral, Daily  atorvastatin (LIPITOR) tablet 80 mg, 80 mg, Oral, Nightly  escitalopram (LEXAPRO) tablet 20 mg, 20 mg, Oral, Daily  gabapentin (NEURONTIN) capsule 400 mg, 400 mg, Oral, TID  ibuprofen (ADVIL;MOTRIN) tablet 600 mg, 600 mg, Oral, BID  lisinopril (PRINIVIL;ZESTRIL) tablet 20 mg, 20 mg, Oral, Daily  losartan (COZAAR) tablet 100 mg, 100 mg, Oral, Daily  metoprolol succinate (TOPROL XL) extended release tablet 100 mg, 100 mg, Oral, Daily  QUEtiapine (SEROQUEL) tablet 200 mg, 200 mg, Oral, Nightly  ticagrelor (BRILINTA) tablet 90 mg, 90 mg, Oral, BID  nitroGLYCERIN (NITROSTAT) SL tablet 0.4 mg, 0.4 mg, Sublingual, Q5 Min PRN    Allergies:  Nitroglycerin and Tramadol    Review of Systems:     · Constitutional: Denies fevers, chills or night sweats. Positive for fatigue. · ENT: Denies headaches, bleeding gums, or epistaxis. Positive for hearing loss. · Cardiovascular: Denies palpitations or lower extremity swelling. Chest discomfort as above. · Respiratory: Denies  cough, orthopnea or PND.  She often becomes short of breath when walking up stairs. She has never been tested for sleep apnea. · Gastrointestinal: Nausea, vomiting, and diarrhea as above. Denies dark or bloody stools. · Genitourinary: Denies urgency, dysuria or hematuria. Denies changes in urinary output. · Musculoskeletal: Denies gait disturbance, myalgias or arthralgias. · Integumentary: Denies rash or ulcerations. · Neurological: Denies syncope, numbness or tingling. Positive for dizziness at times. · Psychiatric: Positive for depression. · Endocrine: Denies temperature intolerance or recent weight change. · Hematologic/Lymphatic: Denies abnormal bruising or bleeding. Physical Exam:   /62   Pulse 63   Temp 97.1 °F (36.2 °C) (Temporal)   Resp 16   Ht 5' 6\" (1.676 m)   Wt 180 lb (81.6 kg)   SpO2 98%   BMI 29.05 kg/m²   CONST:  Well developed, obese lady who appears of stated age. Awake, alert and cooperative. No apparent distress. HEENT:   Head- Normocephalic, atraumatic   Throat- Oral mucosa pink and moist  Neck-  No stridor, jugular venous distention or carotid bruit. CHEST: Chest symmetrical and non-tender to palpation. Respirations unlabored. RESPIRATORY:  Breath sounds clear throughout   CARDIOVASCULAR:     Heart Ausculation- Regular rate and rhythm, s3, s4 or rub   PV: No lower extremity edema. No varicosities. Pedal pulses palpable  ABDOMEN: Soft, non-tender to light palpation. Bowel sounds present. No palpable masses   MS: Good muscle strength and tone. No atrophy or abnormal movements. : Deferred  SKIN: Warm and dry  NEURO / PSYCH: Oriented to person, place and time. Speech clear and appropriate. Follows all commands.  Pleasant affect     Telemetry: SB/SR, 50s to 60s  ECG: Normal sinus rhythm, left atrial enlargement, septal MI age undetermined    No intake or output data in the 24 hours ending 10/19/20 0737    Labs:   CBC:   Recent Labs     10/18/20  1822   WBC 8.3   HGB 11.6   HCT 34.5        BMP:   Recent Labs     10/18/20  1822      K 4.1   CO2 24   BUN 20   CREATININE 1.4*   LABGLOM 37   CALCIUM 9.3     Mag: No results for input(s): MG in the last 72 hours. Phos: No results for input(s): PHOS in the last 72 hours. TSH: No results for input(s): TSH in the last 72 hours. HgA1c: No results found for: LABA1C  No results found for: EAG  proBNP: No results for input(s): PROBNP in the last 72 hours. PT/INR:   Recent Labs     10/18/20  1822   PROTIME 11.1   INR 1.0     APTT:No results for input(s): APTT in the last 72 hours. CARDIAC ENZYMES:  Recent Labs     10/18/20  1822 10/19/20  0032   CKTOTAL  --  69   CKMB  --  1.2   TROPONINI <0.01 <0.01     FASTING LIPID PANEL:  Lab Results   Component Value Date    CHOL 174 08/23/2020    HDL 48 08/23/2020    LDLCALC 74 08/23/2020    TRIG 262 08/23/2020     LIVER PROFILE:No results for input(s): AST, ALT, LABALBU in the last 72 hours. CXR 10/18/2020: Impression:  No acute process. Assessment and Recommendations to follow by Dr. Sherlyn Ugalde. Electronically signed by YASIR Sheppard on 10/19/2020 at 7:37 AM     I independently interviewed and examined the patient. I have reviewed the above documentation completed by the CARLIE. Please see my additional contributions to the HPI, physical exam, and assessment / medical decision making.     Reason for consult: Chest pain     She was previously known to Dr. Lori Wallace service at [facility]) cardiology.     Mrs. Uriel Cruz is a 49-year-old  female with history of recently diagnosed anterior wall STEMI on 8/22/2020, status post PCI/GAURAV to proximal LAD, 70% stenosis of the OM1 involving the ostium and also 70% lesion in the mid circumflex, hypertension, hyperlipidemia, depression, recent history of LUANNE, possible obstructive sleep apnea presented to the hospital with a 3-day history of nausea vomiting diarrhea. Patient is also complained of loss of appetite but she was able to keep her medications.   She also noticed exertional chest heaviness and tightness for the past 1 week without any rest pain. Yesterday, she also noticed burning midsternal sensation without any radiation. She presented to the emergency room on 6 PM and was noted to have a blood pressure of 80 she was given 2 L of IV fluids with subsequent improvement in her blood pressure. Currently, her blood pressure is in the 130 range. EKG showed normal sinus rhythm with septal MI age undetermined without any ST-T changes. Troponins x2 are negative her creatinine was 1.4. Currently, she is resting comfortably without any chest pain, dyspnea, nausea vomiting or abdominal pain. She denies any further episode of nausea vomiting or diarrhea.     Review of Systems:  Cardiac: As per HPI  General: No fever, chills  Pulmonary: As per HPI  GI: No nausea, vomiting  Musculoskeletal: HERRING x 4, no focal motor deficits  Skin: Intact, no rashes  Neuro/Psych: No headache or seizures     Physical Exam:  /62   Pulse 63   Temp 97.1 °F (36.2 °C) (Temporal)   Resp 16   Ht 5' 6\" (1.676 m)   Wt 180 lb (81.6 kg)   SpO2 98%   BMI 29.05 kg/m²   Appearance: Awake, alert, no acute respiratory distress  Skin: Intact, no rash  Head: Normocephalic, atraumatic  ENMT: MMM, no rhinorrhea  Neck: Supple, no carotid bruits  Lungs: Clear to auscultation bilaterally. No wheezes, rales, or rhonchi.   Cardiac: Regular rate and rhythm, +S1S2, no murmurs apparent  Abdomen: Soft, +bowel sounds  Extremities: Moves all extremities x 4, no lower extremity edema  Neurologic: No focal motor deficits apparent, normal mood and affect     Telemetry findings reviewed: SR at rate 50s and 60s, no new tachy/bradyarrhythmias overnight     EKG: Normal sinus rhythm, left atrial enlargement, septal MI age undetermined.     Vitals and labs were reviewed.     TTE-8/22/2020:  Summary   Left ventricle is normal in size .   Mild concentric left ventricular hypertrophy.  Lashell Bee is severe hypokinesis to akinesis of the anteroseptal wall, the   apex, the distal septum and the distal inferior wall.   Ejection fraction is visually estimated at 40+/-5%.   There is doppler evidence of stage I diastolic dysfunction.   Normal right ventricular size and function.   Normal sized left atrium ( by volume index ).  Mild mitral regurgitation.   Mild to moderate tricuspid regurgitation.     Cardiac catheterization-8/22/2020  CONCLUSIONS:  1.  Coronary artery disease. A.  Left main, no significant disease. B.  LAD, 95% thrombotic proximal vessel stenosis with SADI-1 to 2 flow  in the distal vessel. Ephriam Carls, large codominant vessel with 70% ostial stenosis of the high  first marginal branch/intermediate ramus branch and 70% stenosis of the  mid left circumflex after the AV groove branch before any of the other  marginal branches.  The AV groove branch provided a small posterolateral  branch and a small posterior descending artery branch.    D.  RCA. Moderate size codominant vessel with no significant angiographic  disease. 2.  Normal left atrial size with a large area of anterolateral, apical  and apical inferior wall hypokinesis to akinesis with an estimated  ejection fraction of 35%-40%. 3.  Mildly elevated left end diastolic pressure. 4.  Successful balloon angioplasty with the deployment of a drug-eluting  coronary stent to the proximal LAD with post-stent deployment dilatation  with a larger noncompliant balloon to high pressure with very good  results.        Assessment:  · Hypotension secondary nausea vomiting diarrhea, improved  · CAD, status post recent anterior wall STEMI-8/22/2020, status post PCI/GAURAV x1 to proximal LAD  · Ischemic cardiomyopathy with an EF of 40±5%  · Typical chest pain, will rule out ischemia after optimizing her medical therapy.   · Hypertension-stable  · Hyperlipidemia on high-dose statin  · Depression  · Mild obesity with possible obstructive sleep apnea  · History of recent cellulitis and LUANNE resolved creatinine 1.4>> 1.1 (baseline creatinine 0.7)           Plan:  · Hypotension: Secondary to nausea vomiting diarrhea improved. We will start her back on Toprol XL but will decrease the dose to 50 mg due to recent hypotension  · Will discontinue Norvasc for now. If blood pressure remains stable then we will add losartan 25 mg p.o. daily and also add Imdur. Will try Imdur first before adding Losartan. · Continue dual antiplatelet therapy with aspirin and Brilinta. · Will obtain limited echocardiogram to assess LV function and wall motion. · She had a 70% lesion seen in the circumflex and also OM 1 this may be contributing to her symptoms of exertional chest pain. If she continues to have a chest pain after optimizing medical therapy then she needs ischemic evaluation. · Management of diarrhea in the nausea vomiting as per primary service  · Continue high-dose statin with Lipitor. · Further recommendation will be based on echo results. · We will also add Protonix 40 mg p.o. daily for heartburn  · Avoid NSAIDS due to recent LUANNE, LV dysfunction and DAPT        Thank you for consulting us and will be following with you for any further recommendations.     Emma Croft MD, Omer Hui.   [facility]) Cardiology

## 2020-10-19 NOTE — CONSULTS
I independently interviewed and examined the patient. I have reviewed the above documentation completed by the CARLIE. Please see my additional contributions to the HPI, physical exam, and assessment / medical decision making. Reason for consult: Chest pain    She was previously known to Dr. Amanda Evangelista service at [facility]) cardiology. Mrs. Remigio Rod is a 69-year-old  female with history of recently diagnosed anterior wall STEMI on 8/22/2020, status post PCI/GAURAV to proximal LAD, 70% stenosis of the OM1 involving the ostium and also 70% lesion in the mid circumflex, hypertension, hyperlipidemia, depression, recent history of LUANNE, possible obstructive sleep apnea presented to the hospital with a 3-day history of nausea vomiting diarrhea. Patient is also complained of loss of appetite but she was able to keep her medications. She also noticed exertional chest heaviness and tightness for the past 1 week without any rest pain. Yesterday, she also noticed burning midsternal sensation without any radiation. She presented to the emergency room on 6 PM and was noted to have a blood pressure of 80 she was given 2 L of IV fluids with subsequent improvement in her blood pressure. Currently, her blood pressure is in the 130 range. EKG showed normal sinus rhythm with septal MI age undetermined without any ST-T changes. Troponins x2 are negative her creatinine was 1.4. Currently, she is resting comfortably without any chest pain, dyspnea, nausea vomiting or abdominal pain. She denies any further episode of nausea vomiting or diarrhea.     Review of Systems:  Cardiac: As per HPI  General: No fever, chills  Pulmonary: As per HPI  GI: No nausea, vomiting  Musculoskeletal: HERRING x 4, no focal motor deficits  Skin: Intact, no rashes  Neuro/Psych: No headache or seizures    Physical Exam:  /62   Pulse 63   Temp 97.1 °F (36.2 °C) (Temporal)   Resp 16   Ht 5' 6\" (1.676 m)   Wt 180 lb (81.6 kg)   SpO2 98%   BMI 29.05 kg/m²   Appearance: Awake, alert, no acute respiratory distress  Skin: Intact, no rash  Head: Normocephalic, atraumatic  ENMT: MMM, no rhinorrhea  Neck: Supple, no carotid bruits  Lungs: Clear to auscultation bilaterally. No wheezes, rales, or rhonchi. Cardiac: Regular rate and rhythm, +S1S2, no murmurs apparent  Abdomen: Soft, +bowel sounds  Extremities: Moves all extremities x 4, no lower extremity edema  Neurologic: No focal motor deficits apparent, normal mood and affect    Telemetry findings reviewed: SR at rate 50s and 60s, no new tachy/bradyarrhythmias overnight    EKG: Normal sinus rhythm, left atrial enlargement, septal MI age undetermined. Vitals and labs were reviewed. TTE-8/22/2020:  Summary   Left ventricle is normal in size . Mild concentric left ventricular hypertrophy. There is severe hypokinesis to akinesis of the anteroseptal wall, the   apex, the distal septum and the distal inferior wall. Ejection fraction is visually estimated at 40+/-5%. There is doppler evidence of stage I diastolic dysfunction. Normal right ventricular size and function. Normal sized left atrium ( by volume index ). Mild mitral regurgitation. Mild to moderate tricuspid regurgitation. Cardiac catheterization-8/22/2020  CONCLUSIONS:  1. Coronary artery disease. A.  Left main, no significant disease. B.  LAD, 95% thrombotic proximal vessel stenosis with SADI-1 to 2 flow  in the distal vessel. C.  LCX, large codominant vessel with 70% ostial stenosis of the high  first marginal branch/intermediate ramus branch and 70% stenosis of the  mid left circumflex after the AV groove branch before any of the other  marginal branches. The AV groove branch provided a small posterolateral  branch and a small posterior descending artery branch. D.  RCA. Moderate size codominant vessel with no significant angiographic  disease.   2.  Normal left atrial size with a large area of anterolateral, apical  and apical inferior wall hypokinesis to akinesis with an estimated  ejection fraction of 35%-40%. 3.  Mildly elevated left end diastolic pressure. 4.  Successful balloon angioplasty with the deployment of a drug-eluting  coronary stent to the proximal LAD with post-stent deployment dilatation  with a larger noncompliant balloon to high pressure with very good  results.       Assessment:  · Hypotension secondary nausea vomiting diarrhea, improved  · CAD, status post recent anterior wall STEMI-8/22/2020, status post PCI/GAURAV x1 to proximal LAD  · Ischemic cardiomyopathy with an EF of 40±5%  · Typical chest pain, will rule out ischemia after optimizing her medical therapy. · Hypertension-stable  · Hyperlipidemia on high-dose statin  · Depression  · Mild obesity with possible obstructive sleep apnea  · History of recent cellulitis and LUANNE resolved creatinine 1.4>> 1.1 (baseline creatinine 0.7)        Plan:  · Hypotension: Secondary to nausea vomiting diarrhea improved. We will start her back on Toprol XL but will decrease the dose to 50 mg due to recent hypotension  · Will discontinue Norvasc for now. If blood pressure remains stable then we will add losartan 25 mg p.o. daily and also add Imdur. Will try Imdur first before adding Losartan. · Continue dual antiplatelet therapy with aspirin and Brilinta. · Will obtain limited echocardiogram to assess LV function and wall motion. · She had a 70% lesion seen in the circumflex and also OM 1 this may be contributing to her symptoms of exertional chest pain. If she continues to have a chest pain after optimizing medical therapy then she needs ischemic evaluation. · Management of diarrhea in the nausea vomiting as per primary service  · Continue high-dose statin with Lipitor. · Further recommendation will be based on echo results.   · We will also add Protonix 40 mg p.o. daily for heartburn  · Avoid NSAIDS due to recent LUANNE, LV dysfunction and DAPT      Thank you for consulting us and will be following with you for any further recommendations. Paola Kaplan MD, Jaz Serrano.   [facility]) Cardiology

## 2020-10-19 NOTE — CARE COORDINATION
Met with pt and son at bedside to discuss discharge / transition of care plan. Pt reports from home, bilateral hearing aids, difficulty hearing even with them in, no DME or needs, independent of all ADL, verified pcp and pharmacy, son is her ride home at discharge. Discharge plan is home with no needs when medically stable.

## 2020-10-19 NOTE — PLAN OF CARE
Problem: Pain:  Goal: Control of acute pain  Description: Control of acute pain  Outcome: Met This Shift     Problem: Falls - Risk of:  Goal: Absence of physical injury  Description: Absence of physical injury  Outcome: Met This Shift

## 2020-10-19 NOTE — H&P
[address]                  [address] [facility],  [facility]                              HISTORY AND PHYSICAL    PATIENT NAME: Masha Downing                   :        1955  MED REC NO:   [id number]                            ROOM:       7412  ACCOUNT NO:   [de-identified]                           ADMIT DATE: 10/18/2020  PROVIDER:     Marlon Tate DO    HISTORY OF PRESENT ILLNESS:  This is a 67-year-old female, presented  over to the [facility] ER with a history and chief  complaint of left-sided chest pain, also some nausea and some vomiting  and loss of appetite and over the last several days, seems to be getting  worse. Recently, she was discharged from the hospital after suffering  ST-segment myocardial infarction. She states that she thinks that this  pain might be another heart attack. Cardiac enzymes were negative. EKG  did not show any significant acute changes, but we had transferred her  over to the transfer center in [facility] for cardiology consultation and  possibility of stress testing. Recent current medications included Tylenol, Seroquel, Nitrostat,  ibuprofen p.r.n., Lexapro 20 daily, Neurontin 400 three times a day,  Antabuse 250 daily, Brilinta 90 b.i.d., metoprolol , Lipitor 80  daily, aspirin 81 daily, amlodipine 2.5 daily, losartan 100 daily, and  lisinopril 20 daily. ALLERGIES:  She has allergies to NITROGLYCERIN and TRAMADOL. SURGICAL HISTORY:  Significant for coronary angioplasty with stent,  breast lumpectomy,  section, hemorrhoid surgery, hysterectomy,  joint replacement surgery, and tonsillectomy. SOCIAL HISTORY:  The patient is a previous smoker, recently quit. Denies use of alcohol at this point and she takes Antabuse for same and  denies use of narcotic drugs. FAMILY MEDICAL HISTORY:  Noted and discussed.     REVIEW OF SYSTEMS:  Negative for vertigo, cephalgia, ringing in ears,  hearing loss, difficulty swallowing, hoarseness in her voice, bloody  noses. Currently, she has some chest discomfort and pressure. Previous  history of ST-segment myocardial infarction. There is no palpitations,  orthopnea, paroxysmal nocturnal dyspnea, or arrhythmias. She has no  cough, wheeze, shortness of breath, hemoptysis or pleuritic pain. She  has nausea and vomiting. No diarrhea or constipation. No recent change  in weight. No blood in her stool or change in appetite. No urgency,  frequency, dysuria, hematuria. ENDOCRINE SYSTEM:  Negative for diabetes  or thyroid disorder. MUSCULOSKELETAL SYSTEM:  Positive for degenerative  joint, degenerative disc disease. SKIN:  Without rash, eruptions,  urticaria, pruritus. NEUROLOGIC SYSTEM:  Positive for depression. Negative for anxiety. Negative for CVA, seizures, tremors, tics, TIAs. PSYCHIATRIC SYSTEM:  As mentioned. PHYSICAL EXAMINATION:  GENERAL:  Shows this to be a 78-year-old white female, in moderate  distress with the above complaints. HEAD, EYES, EARS, NOSE and THROAT:  Examination shows the head is  normocephalic and atraumatic. Pupils are equal and reactive to light  and accommodation. Extraocular eye muscles are intact. Tympanic  membranes are clear. Ear canals are patent. Oral mucosa pink and  moist.  NECK:  Veins are flat, nondistended. No carotid bruits. CHEST:  Symmetrical.  HEART:  Regular rate and rhythm without murmur, gallops, or friction  rub. LUNGS:  Clear to auscultation bilaterally without rhonchi, rales, or  wheezes. ABDOMEN:  Soft, nontender, nondistended. Bowel sounds are present in  all four quadrants. GENITOURINARY:  External genitalia intact. EXTREMITIES:  Peripheral pulses intact. Legs without edema. SPINE:  Shows physiologic curve. INITIAL IMPRESSION:  At this time is chest pain and the patient with  known coronary artery disease.   We will admit the patient, cycle some  enzymes, get Cardiology involved, anticipate stress testing. Further  orders will be written for as the clinical course dictates.         Rimma Villatoro DO    D: 10/19/2020 12:53:04       T: 10/19/2020 12:57:29     [email or web address]  Job#: [id number]     Doc#: [id number]    CC:

## 2020-10-20 PROCEDURE — 99232 SBSQ HOSP IP/OBS MODERATE 35: CPT | Performed by: INTERNAL MEDICINE

## 2020-10-20 PROCEDURE — 93308 TTE F-UP OR LMTD: CPT

## 2020-10-20 PROCEDURE — 6370000000 HC RX 637 (ALT 250 FOR IP): Performed by: GENERAL PRACTICE

## 2020-10-20 PROCEDURE — G0378 HOSPITAL OBSERVATION PER HR: HCPCS

## 2020-10-20 PROCEDURE — 6370000000 HC RX 637 (ALT 250 FOR IP): Performed by: INTERNAL MEDICINE

## 2020-10-20 PROCEDURE — 6370000000 HC RX 637 (ALT 250 FOR IP): Performed by: CLINICAL NURSE SPECIALIST

## 2020-10-20 PROCEDURE — 2060000000 HC ICU INTERMEDIATE R&B

## 2020-10-20 RX ORDER — LOSARTAN POTASSIUM 25 MG/1
25 TABLET ORAL DAILY
Status: DISCONTINUED | OUTPATIENT
Start: 2020-10-20 | End: 2020-10-22 | Stop reason: HOSPADM

## 2020-10-20 RX ORDER — IBUPROFEN 200 MG
400 TABLET ORAL ONCE
Status: COMPLETED | OUTPATIENT
Start: 2020-10-20 | End: 2020-10-20

## 2020-10-20 RX ADMIN — GABAPENTIN 400 MG: 400 CAPSULE ORAL at 20:19

## 2020-10-20 RX ADMIN — TICAGRELOR 90 MG: 90 TABLET ORAL at 20:19

## 2020-10-20 RX ADMIN — GABAPENTIN 400 MG: 400 CAPSULE ORAL at 09:08

## 2020-10-20 RX ADMIN — TICAGRELOR 90 MG: 90 TABLET ORAL at 09:07

## 2020-10-20 RX ADMIN — ATORVASTATIN CALCIUM 80 MG: 80 TABLET, FILM COATED ORAL at 20:19

## 2020-10-20 RX ADMIN — GABAPENTIN 400 MG: 400 CAPSULE ORAL at 13:51

## 2020-10-20 RX ADMIN — LOSARTAN POTASSIUM 25 MG: 25 TABLET, FILM COATED ORAL at 12:00

## 2020-10-20 RX ADMIN — ASPIRIN 81 MG CHEWABLE TABLET 81 MG: 81 TABLET CHEWABLE at 09:07

## 2020-10-20 RX ADMIN — ESCITALOPRAM 20 MG: 10 TABLET, FILM COATED ORAL at 09:08

## 2020-10-20 RX ADMIN — QUETIAPINE FUMARATE 200 MG: 200 TABLET ORAL at 20:19

## 2020-10-20 RX ADMIN — METOPROLOL SUCCINATE 50 MG: 50 TABLET, EXTENDED RELEASE ORAL at 09:08

## 2020-10-20 RX ADMIN — ACETAMINOPHEN 1000 MG: 500 TABLET ORAL at 18:53

## 2020-10-20 RX ADMIN — PANTOPRAZOLE SODIUM 40 MG: 40 TABLET, DELAYED RELEASE ORAL at 05:54

## 2020-10-20 RX ADMIN — IBUPROFEN 400 MG: 200 TABLET, FILM COATED ORAL at 21:57

## 2020-10-20 ASSESSMENT — PAIN SCALES - GENERAL
PAINLEVEL_OUTOF10: 5
PAINLEVEL_OUTOF10: 6
PAINLEVEL_OUTOF10: 0
PAINLEVEL_OUTOF10: 0

## 2020-10-20 NOTE — PLAN OF CARE
Problem: Falls - Risk of:  Goal: Will remain free from falls  Description: Will remain free from falls  Outcome: Met This Shift     Problem: Pain:  Goal: Control of acute pain  Description: Control of acute pain  10/20/2020 0317 by Chio Gomez RN  Outcome: Met This Shift

## 2020-10-20 NOTE — CARE COORDINATION
Reviewed chart, cardio on board. Pt for a stress test today. Plan remains home and son to transport. Lazarus Mater LSW

## 2020-10-20 NOTE — PLAN OF CARE
Problem: Falls - Risk of:  Goal: Will remain free from falls  Description: Will remain free from falls  10/20/2020 0317 by Julieta Luther RN  Outcome: Met This Shift     Problem: Pain:  Goal: Control of acute pain  Description: Control of acute pain  10/20/2020 1623 by Syed Carrera RN  Outcome: Met This Shift

## 2020-10-20 NOTE — PROGRESS NOTES
10/20/20 0908    gabapentin (NEURONTIN) capsule 400 mg  400 mg Oral TID Iven Rubins, DO   400 mg at 10/20/20 0908    QUEtiapine (SEROQUEL) tablet 200 mg  200 mg Oral Nightly Iven Rubins, DO   200 mg at 10/19/20 1956    ticagrelor (BRILINTA) tablet 90 mg  90 mg Oral BID Iven Rubins, DO   90 mg at 10/20/20 9747    nitroGLYCERIN (NITROSTAT) SL tablet 0.4 mg  0.4 mg Sublingual Q5 Min PRN Iven Rubins, DO             Physical Exam:  /63   Pulse 65   Temp 97 °F (36.1 °C) (Temporal)   Resp 18   Ht 5' 6\" (1.676 m)   Wt 180 lb (81.6 kg)   SpO2 98%   BMI 29.05 kg/m²   Wt Readings from Last 3 Encounters:   10/18/20 180 lb (81.6 kg)   10/01/20 193 lb 12.8 oz (87.9 kg)   09/21/20 193 lb 12.8 oz (87.9 kg)     Appearance: Awake, alert, no acute respiratory distress  Skin: Intact, no rash  Head: Normocephalic, atraumatic  Eyes: EOMI, no conjunctival erythema  ENMT: No pharyngeal erythema, MMM, no rhinorrhea  Neck: Supple, no elevated JVP, no carotid bruits  Lungs: Clear to auscultation bilaterally. No wheezes, rales, or rhonchi. Cardiac: Regular rate and rhythm, +S1S2, no murmurs apparent  Abdomen: Soft, nontender, +bowel sounds  Extremities: Moves all extremities x 4, no lower extremity edema  Neurologic: No focal motor deficits apparent, normal mood and affect  Peripheral Pulses: Intact posterior tibial pulses bilaterally    Intake/Output:    Intake/Output Summary (Last 24 hours) at 10/20/2020 1035  Last data filed at 10/20/2020 1015  Gross per 24 hour   Intake 600 ml   Output --   Net 600 ml     I/O this shift:  In: 120 [P.O.:120]  Out: -     Laboratory Tests:  Recent Labs     10/18/20  1822 10/19/20  0032    137   K 4.1 4.0    105   CO2 24 18*   BUN 20 17   CREATININE 1.4* 1.1*   GLUCOSE 139* 109*   CALCIUM 9.3 8.9     No results found for: MG  No results for input(s): ALKPHOS, ALT, AST, PROT, BILITOT, BILIDIR, LABALBU in the last 72 hours.   Recent Labs     10/18/20  1822   WBC 8.3   RBC 3.55   HGB 11.6   HCT 34.5   MCV 97.2   MCH 32.7   MCHC 33.6   RDW 13.7      MPV 10.5     Lab Results   Component Value Date    CKTOTAL 69 10/19/2020    CKMB 1.2 10/19/2020    TROPONINI <0.01 10/19/2020    TROPONINI <0.01 10/18/2020    TROPONINI 0.41 (H) 08/27/2020     Lab Results   Component Value Date    INR 1.0 10/18/2020    INR 0.9 08/22/2020    PROTIME 11.1 10/18/2020    PROTIME 10.0 08/22/2020     No results found for: TSHFT4, TSH  No results found for: LABA1C  No results found for: EAG  Lab Results   Component Value Date    CHOL 174 08/23/2020     Lab Results   Component Value Date    TRIG 262 (H) 08/23/2020     Lab Results   Component Value Date    HDL 48 08/23/2020     Lab Results   Component Value Date    LDLCALC 74 08/23/2020     Lab Results   Component Value Date    LABVLDL 52 08/23/2020     No results found for: CHOLHDLRATIO    Cardiac Tests:  Telemetry findings reviewed: SR at rate 50s and 60s, no new tachy/bradyarrhythmias overnight     EKG: Normal sinus rhythm, left atrial enlargement, septal MI age undetermined.     Vitals and labs were reviewed.     TTE-8/22/2020:  Summary   Left ventricle is normal in size .   Mild concentric left ventricular hypertrophy.   There is severe hypokinesis to akinesis of the anteroseptal wall, the   apex, the distal septum and the distal inferior wall.   Ejection fraction is visually estimated at 40+/-5%.   There is doppler evidence of stage I diastolic dysfunction.   Normal right ventricular size and function.   Normal sized left atrium ( by volume index ).  Mild mitral regurgitation.   Mild to moderate tricuspid regurgitation.     Cardiac catheterization-8/22/2020  CONCLUSIONS:  1.  Coronary artery disease. A.  Left main, no significant disease. B.  LAD, 95% thrombotic proximal vessel stenosis with SADI-1 to 2 flow  in the distal vessel.   Azalee Lightning, large codominant vessel with 70% ostial stenosis of the high  first marginal branch/intermediate ramus branch and 70% stenosis of the  mid left circumflex after the AV groove branch before any of the other  marginal branches.  The AV groove branch provided a small posterolateral  branch and a small posterior descending artery branch.    D.  RCA. Moderate size codominant vessel with no significant angiographic  disease. 2.  Normal left atrial size with a large area of anterolateral, apical  and apical inferior wall hypokinesis to akinesis with an estimated  ejection fraction of 35%-40%. 3.  Mildly elevated left end diastolic pressure. 4.  Successful balloon angioplasty with the deployment of a drug-eluting  coronary stent to the proximal LAD with post-stent deployment dilatation  with a larger noncompliant balloon to high pressure with very good  results.        Assessment:  · Hypotension secondary nausea vomiting diarrhea, improved  · CAD, status post recent anterior wall STEMI-8/22/2020, status post PCI/GAURAV x1 to proximal LAD  · Ischemic cardiomyopathy with an EF of 40±5%  · Typical chest pain, will rule out ischemia after optimizing her medical therapy. · Hypertension-stable  · Hyperlipidemia on high-dose statin  · Depression  · Mild obesity with possible obstructive sleep apnea  · History of recent cellulitis and LUANNE resolved creatinine 1.4>> 1.1 (baseline creatinine 0.7)           Plan:  · Hypotension: Secondary to nausea vomiting diarrhea improved. Continue Toprol XL  50 mg due to recent hypotension  · Will add losartan 25 mg p.o. daily and also add Imdur. · Continue dual antiplatelet therapy with aspirin and Brilinta. · Will obtain limited echocardiogram to assess LV function and wall motion>> pending  · She had a 70% lesion seen in the circumflex and also OM 1 this may be contributing to her symptoms of exertional chest pain. If she continues to have a chest pain after optimizing medical therapy then she needs ischemic evaluation.   · Management of diarrhea in the nausea vomiting as per primary service  · Continue high-dose statin with Lipitor. · We will also add Protonix 40 mg p.o. daily for heartburn  · Avoid NSAIDS due to recent LUANNE, LV dysfunction and DAPT  · She already had coffee this morning and schedule for Shanda stress in AM. D/w Dr. Yumiko Angulo about hiscath due to prior 70% lesion in the Cx. Will go with stress if any ischemia of the lateral wall then cath. Cindy Parra MD., [facility]ia Campo.   [facility]) Cardiology

## 2020-10-21 ENCOUNTER — APPOINTMENT (OUTPATIENT)
Dept: NUCLEAR MEDICINE | Age: 65
End: 2020-10-21
Payer: MEDICARE

## 2020-10-21 LAB
ANION GAP SERPL CALCULATED.3IONS-SCNC: 16 MMOL/L (ref 7–16)
BUN BLDV-MCNC: 16 MG/DL (ref 8–23)
CALCIUM SERPL-MCNC: 9.7 MG/DL (ref 8.6–10.2)
CHLORIDE BLD-SCNC: 107 MMOL/L (ref 98–107)
CO2: 18 MMOL/L (ref 22–29)
CREAT SERPL-MCNC: 0.7 MG/DL (ref 0.5–1)
GFR AFRICAN AMERICAN: >60
GFR NON-AFRICAN AMERICAN: >60 ML/MIN/1.73
GLUCOSE BLD-MCNC: 104 MG/DL (ref 74–99)
LV EF: 68 %
LVEF MODALITY: NORMAL
POTASSIUM SERPL-SCNC: 3.9 MMOL/L (ref 3.5–5)
SODIUM BLD-SCNC: 141 MMOL/L (ref 132–146)
TROPONIN: <0.01 NG/ML (ref 0–0.03)

## 2020-10-21 PROCEDURE — 6370000000 HC RX 637 (ALT 250 FOR IP): Performed by: CLINICAL NURSE SPECIALIST

## 2020-10-21 PROCEDURE — 93018 CV STRESS TEST I&R ONLY: CPT | Performed by: INTERNAL MEDICINE

## 2020-10-21 PROCEDURE — 93017 CV STRESS TEST TRACING ONLY: CPT

## 2020-10-21 PROCEDURE — 6370000000 HC RX 637 (ALT 250 FOR IP): Performed by: INTERNAL MEDICINE

## 2020-10-21 PROCEDURE — 84484 ASSAY OF TROPONIN QUANT: CPT

## 2020-10-21 PROCEDURE — 93016 CV STRESS TEST SUPVJ ONLY: CPT | Performed by: INTERNAL MEDICINE

## 2020-10-21 PROCEDURE — 80048 BASIC METABOLIC PNL TOTAL CA: CPT

## 2020-10-21 PROCEDURE — 78452 HT MUSCLE IMAGE SPECT MULT: CPT

## 2020-10-21 PROCEDURE — G0378 HOSPITAL OBSERVATION PER HR: HCPCS

## 2020-10-21 PROCEDURE — 6370000000 HC RX 637 (ALT 250 FOR IP): Performed by: GENERAL PRACTICE

## 2020-10-21 PROCEDURE — A9500 TC99M SESTAMIBI: HCPCS | Performed by: RADIOLOGY

## 2020-10-21 PROCEDURE — 99232 SBSQ HOSP IP/OBS MODERATE 35: CPT | Performed by: INTERNAL MEDICINE

## 2020-10-21 PROCEDURE — 3430000000 HC RX DIAGNOSTIC RADIOPHARMACEUTICAL: Performed by: RADIOLOGY

## 2020-10-21 PROCEDURE — 36415 COLL VENOUS BLD VENIPUNCTURE: CPT

## 2020-10-21 PROCEDURE — 6360000002 HC RX W HCPCS: Performed by: INTERNAL MEDICINE

## 2020-10-21 RX ADMIN — METOPROLOL SUCCINATE 50 MG: 50 TABLET, EXTENDED RELEASE ORAL at 11:26

## 2020-10-21 RX ADMIN — GABAPENTIN 400 MG: 400 CAPSULE ORAL at 11:22

## 2020-10-21 RX ADMIN — PANTOPRAZOLE SODIUM 40 MG: 40 TABLET, DELAYED RELEASE ORAL at 06:16

## 2020-10-21 RX ADMIN — LOSARTAN POTASSIUM 25 MG: 25 TABLET, FILM COATED ORAL at 11:21

## 2020-10-21 RX ADMIN — REGADENOSON 0.4 MG: 0.08 INJECTION, SOLUTION INTRAVENOUS at 09:49

## 2020-10-21 RX ADMIN — Medication 35 MILLICURIE: at 11:01

## 2020-10-21 RX ADMIN — ESCITALOPRAM 20 MG: 10 TABLET, FILM COATED ORAL at 11:21

## 2020-10-21 RX ADMIN — QUETIAPINE FUMARATE 200 MG: 200 TABLET ORAL at 20:20

## 2020-10-21 RX ADMIN — ACETAMINOPHEN 1000 MG: 500 TABLET ORAL at 20:20

## 2020-10-21 RX ADMIN — ATORVASTATIN CALCIUM 80 MG: 80 TABLET, FILM COATED ORAL at 20:21

## 2020-10-21 RX ADMIN — TICAGRELOR 90 MG: 90 TABLET ORAL at 11:20

## 2020-10-21 RX ADMIN — GABAPENTIN 400 MG: 400 CAPSULE ORAL at 20:21

## 2020-10-21 RX ADMIN — GABAPENTIN 400 MG: 400 CAPSULE ORAL at 14:39

## 2020-10-21 RX ADMIN — ACETAMINOPHEN 1000 MG: 500 TABLET ORAL at 11:42

## 2020-10-21 RX ADMIN — ASPIRIN 81 MG CHEWABLE TABLET 81 MG: 81 TABLET CHEWABLE at 11:22

## 2020-10-21 RX ADMIN — TICAGRELOR 90 MG: 90 TABLET ORAL at 20:21

## 2020-10-21 RX ADMIN — Medication 11.7 MILLICURIE: at 08:46

## 2020-10-21 ASSESSMENT — PAIN SCALES - GENERAL
PAINLEVEL_OUTOF10: 0
PAINLEVEL_OUTOF10: 3
PAINLEVEL_OUTOF10: 6
PAINLEVEL_OUTOF10: 6
PAINLEVEL_OUTOF10: 3
PAINLEVEL_OUTOF10: 0

## 2020-10-21 NOTE — PROGRESS NOTES
INPATIENT CARDIOLOGY FOLLOW-UP    Name: Emanuel Bernstein    Age: 77 y.o. Date of Admission: 10/18/2020  6:07 PM    Date of Service: 10/21/2020    Chief Complaint: Follow-up for Chest pain    Interim History:  She is complaining of DAVIS and no further episodes of chest pain. Currently with no complaints of CP, palpitations, dizziness, or lightheadedness. SR on telemetry. Review of Systems:   Cardiac: As per HPI  General: No fever, chills  Pulmonary: As per HPI  HEENT: No visual disturbances, difficult swallowing  GI: No nausea, vomiting  Endocrine: No thyroid disease or DM  Musculoskeletal: HERRING x 4, no focal motor deficits  Skin: Intact, no rashes  Neuro/Psych: No headache or seizures    Problem List:  Patient Active Problem List   Diagnosis    Chest pain    [facility]) [facility] [address]    Acute kidney injury ([facility] [address]    Hypotension       Allergies:   Allergies   Allergen Reactions    Nitroglycerin Other (See Comments)     Severe headache    Tramadol      seizures       Current Medications:  Current Facility-Administered Medications   Medication Dose Route Frequency Provider Last Rate Last Dose    regadenoson (LEXISCAN) injection 0.4 mg  0.4 mg Intravenous ONCE PRN Katherine Morales MD        losartan (COZAAR) tablet 25 mg  25 mg Oral Daily Katherine Morales MD   25 mg at 10/20/20 1200    metoprolol succinate (TOPROL XL) extended release tablet 50 mg  50 mg Oral Daily Sharlyne Door, APRN - CNS   50 mg at 10/20/20 0908    pantoprazole (PROTONIX) tablet 40 mg  40 mg Oral QAM AC Sharlyne Door, APRN - CNS   40 mg at 10/21/20 4583    perflutren lipid microspheres (DEFINITY) injection 1.65 mg  1.5 mL Intravenous ONCE PRN Sharlyne Door, APRN - CNS        acetaminophen (TYLENOL) tablet 1,000 mg  1,000 mg Oral Q8H PRN Saba Petitagensen, DO   1,000 mg at 10/20/20 1853    aspirin chewable tablet 81 mg  81 mg Oral Daily Saba Petitagensen, DO   81 mg at 10/20/20 0907    atorvastatin (LIPITOR) tablet 80 mg  80 mg Oral Nightly Brandy Dice, DO   80 mg at 10/20/20 2019    escitalopram (LEXAPRO) tablet 20 mg  20 mg Oral Daily Brandy Dice, DO   20 mg at 10/20/20 1652    gabapentin (NEURONTIN) capsule 400 mg  400 mg Oral TID Brandy Dice, DO   400 mg at 10/20/20 2019    QUEtiapine (SEROQUEL) tablet 200 mg  200 mg Oral Nightly Brandy Dice, DO   200 mg at 10/20/20 2019    ticagrelor (BRILINTA) tablet 90 mg  90 mg Oral BID Brandy Dice, DO   90 mg at 10/20/20 2019    nitroGLYCERIN (NITROSTAT) SL tablet 0.4 mg  0.4 mg Sublingual Q5 Min PRN Brandy Dice, DO             Physical Exam:  BP (!) 145/80   Pulse 60   Temp 97.4 °F (36.3 °C) (Temporal)   Resp 16   Ht 5' 6\" (1.676 m)   Wt 180 lb (81.6 kg)   SpO2 96%   BMI 29.05 kg/m²   Wt Readings from Last 3 Encounters:   10/18/20 180 lb (81.6 kg)   10/01/20 193 lb 12.8 oz (87.9 kg)   09/21/20 193 lb 12.8 oz (87.9 kg)     Appearance: Awake, alert, no acute respiratory distress  Skin: Intact, no rash  Head: Normocephalic, atraumatic  Eyes: EOMI, no conjunctival erythema  ENMT: No pharyngeal erythema, MMM, no rhinorrhea  Neck: Supple, no elevated JVP, no carotid bruits  Lungs: Clear to auscultation bilaterally. No wheezes, rales, or rhonchi. Cardiac: Regular rate and rhythm, +S1S2, no murmurs apparent  Abdomen: Soft, nontender, +bowel sounds  Extremities: Moves all extremities x 4, no lower extremity edema  Neurologic: No focal motor deficits apparent, normal mood and affect  Peripheral Pulses: Intact posterior tibial pulses bilaterally    Intake/Output:    Intake/Output Summary (Last 24 hours) at 10/21/2020 0826  Last data filed at 10/20/2020 1441  Gross per 24 hour   Intake 360 ml   Output --   Net 360 ml     No intake/output data recorded.     Laboratory Tests:  Recent Labs     10/18/20  1822 10/19/20  0032    137   K 4.1 4.0    105   CO2 24 18*   BUN 20 17   CREATININE 1.4* 1.1*   GLUCOSE 139* 109*   CALCIUM 9.3 8.9     No results found for: MG  No results for input(s): ALKPHOS, ALT, AST, PROT, BILITOT, BILIDIR, LABALBU in the last 72 hours. Recent Labs     10/18/20  1822   WBC 8.3   RBC 3.55   HGB 11.6   HCT 34.5   MCV 97.2   MCH 32.7   MCHC 33.6   RDW 13.7      MPV 10.5     Lab Results   Component Value Date    CKTOTAL 69 10/19/2020    CKMB 1.2 10/19/2020    TROPONINI <0.01 10/19/2020    TROPONINI <0.01 10/18/2020    TROPONINI 0.41 (H) 08/27/2020     Lab Results   Component Value Date    INR 1.0 10/18/2020    INR 0.9 08/22/2020    PROTIME 11.1 10/18/2020    PROTIME 10.0 08/22/2020     No results found for: TSHFT4, TSH  No results found for: LABA1C  No results found for: EAG  Lab Results   Component Value Date    CHOL 174 08/23/2020     Lab Results   Component Value Date    TRIG 262 (H) 08/23/2020     Lab Results   Component Value Date    HDL 48 08/23/2020     Lab Results   Component Value Date    LDLCALC 74 08/23/2020     Lab Results   Component Value Date    LABVLDL 52 08/23/2020     No results found for: CHOLHDLRATIO    Cardiac Tests:  Telemetry findings reviewed: SR at rate 50s and 60s, no new tachy/bradyarrhythmias overnight     EKG: Normal sinus rhythm, left atrial enlargement, septal MI age undetermined.     Vitals and labs were reviewed. 145/80 with heart rate of 60, no labs for today     TTE-8/22/2020:  Summary   Left ventricle is normal in size .   Mild concentric left ventricular hypertrophy.  Juris Lay is severe hypokinesis to akinesis of the anteroseptal wall, the   apex, the distal septum and the distal inferior wall.   Ejection fraction is visually estimated at 40+/-5%.   There is doppler evidence of stage I diastolic dysfunction.   Normal right ventricular size and function.   Normal sized left atrium ( by volume index ).  Mild mitral regurgitation.   Mild to moderate tricuspid regurgitation. TTE limited-10/20/2020:  Summary   Normal left ventricle size and systolic function.    Ejection fraction is visually estimated at 60-65%. No regional wall motion abnormalities seen. Mild concentric left ventricular hypertrophy. Indeterminate diastolic function. The left atrium is mildly dilated. Normal right ventricular size and function. Physiologic and/or trace mitral regurgitation is present. No hemodynamically significant aortic stenosis is present. Physiologic and/or trace tricuspid regurgitation. RVSP is 29 mmHg. Normal estimated PA systolic pressure. No evidence for hemodynamically significant pericardial effusion. Compared to prior echo of 8/23/2020, changes noted. LVEF has improved from   40-45% to 60-65%.     Cardiac catheterization-8/22/2020  CONCLUSIONS:  1.  Coronary artery disease. A.  Left main, no significant disease. B.  LAD, 95% thrombotic proximal vessel stenosis with SADI-1 to 2 flow  in the distal vessel. Nevelyn See, large codominant vessel with 70% ostial stenosis of the high  first marginal branch/intermediate ramus branch and 70% stenosis of the  mid left circumflex after the AV groove branch before any of the other  marginal branches.  The AV groove branch provided a small posterolateral  branch and a small posterior descending artery branch.    D.  RCA. Moderate size codominant vessel with no significant angiographic  disease. 2.  Normal left atrial size with a large area of anterolateral, apical  and apical inferior wall hypokinesis to akinesis with an estimated  ejection fraction of 35%-40%. 3.  Mildly elevated left end diastolic pressure.   4.  Successful balloon angioplasty with the deployment of a drug-eluting  coronary stent to the proximal LAD with post-stent deployment dilatation  with a larger noncompliant balloon to high pressure with very good  results.        Assessment:  · Hypotension secondary nausea vomiting diarrhea, improved  · CAD, status post recent anterior wall STEMI-8/22/2020, status post PCI/GAURAV x1 to proximal LAD  · Ischemic cardiomyopathy with an EF of 40±5%>> 60 to

## 2020-10-21 NOTE — PLAN OF CARE
Problem: Falls - Risk of:  Goal: Will remain free from falls  Description: Will remain free from falls  Outcome: Met This Shift     Problem: Falls - Risk of:  Goal: Absence of physical injury  Description: Absence of physical injury  10/21/2020 0333 by Michael Roman RN  Outcome: Met This Shift     Problem: Pain:  Goal: Control of acute pain  Description: Control of acute pain  10/21/2020 0333 by Jer Guo RN  Outcome: Met This Shift

## 2020-10-21 NOTE — PROCEDURES
Kayden Bedolla Nuclear Stress Test:    Cardiologist: Dr. Peter Olivas: NSR, normal EKG    Indications for study: Chest pain    1. No chest pain  2. No new arrhythmias  3. No EKG changes suggestive of stress induced ischemia  4. Nuclear images pending    Emma Croft MD., [facility].    [facility]) Cardiology

## 2020-10-22 VITALS
WEIGHT: 180 LBS | SYSTOLIC BLOOD PRESSURE: 137 MMHG | HEIGHT: 66 IN | OXYGEN SATURATION: 95 % | RESPIRATION RATE: 16 BRPM | BODY MASS INDEX: 28.93 KG/M2 | DIASTOLIC BLOOD PRESSURE: 79 MMHG | HEART RATE: 73 BPM | TEMPERATURE: 97.2 F

## 2020-10-22 PROCEDURE — 6370000000 HC RX 637 (ALT 250 FOR IP): Performed by: INTERNAL MEDICINE

## 2020-10-22 PROCEDURE — 6370000000 HC RX 637 (ALT 250 FOR IP): Performed by: CLINICAL NURSE SPECIALIST

## 2020-10-22 PROCEDURE — 6370000000 HC RX 637 (ALT 250 FOR IP): Performed by: GENERAL PRACTICE

## 2020-10-22 PROCEDURE — 99232 SBSQ HOSP IP/OBS MODERATE 35: CPT | Performed by: INTERNAL MEDICINE

## 2020-10-22 PROCEDURE — G0378 HOSPITAL OBSERVATION PER HR: HCPCS

## 2020-10-22 RX ORDER — PANTOPRAZOLE SODIUM 40 MG/1
40 TABLET, DELAYED RELEASE ORAL
Qty: 30 TABLET | Refills: 3 | Status: SHIPPED | OUTPATIENT
Start: 2020-10-23

## 2020-10-22 RX ORDER — LOSARTAN POTASSIUM 25 MG/1
25 TABLET ORAL DAILY
Qty: 30 TABLET | Refills: 3 | Status: SHIPPED | OUTPATIENT
Start: 2020-10-23 | End: 2020-12-04

## 2020-10-22 RX ORDER — QUETIAPINE FUMARATE 200 MG/1
200 TABLET, FILM COATED ORAL NIGHTLY
Qty: 60 TABLET | Refills: 3 | Status: SHIPPED | OUTPATIENT
Start: 2020-10-22 | End: 2020-10-22 | Stop reason: HOSPADM

## 2020-10-22 RX ORDER — METOPROLOL SUCCINATE 50 MG/1
50 TABLET, EXTENDED RELEASE ORAL DAILY
Qty: 30 TABLET | Refills: 3 | Status: SHIPPED | OUTPATIENT
Start: 2020-10-22 | End: 2020-12-04

## 2020-10-22 RX ORDER — QUETIAPINE 200 MG/1
200 TABLET, FILM COATED, EXTENDED RELEASE ORAL NIGHTLY
Qty: 30 TABLET | Refills: 3 | Status: ON HOLD | OUTPATIENT
Start: 2020-10-22 | End: 2021-01-14 | Stop reason: HOSPADM

## 2020-10-22 RX ADMIN — LOSARTAN POTASSIUM 25 MG: 25 TABLET, FILM COATED ORAL at 09:03

## 2020-10-22 RX ADMIN — ESCITALOPRAM 20 MG: 10 TABLET, FILM COATED ORAL at 09:03

## 2020-10-22 RX ADMIN — ASPIRIN 81 MG CHEWABLE TABLET 81 MG: 81 TABLET CHEWABLE at 09:03

## 2020-10-22 RX ADMIN — PANTOPRAZOLE SODIUM 40 MG: 40 TABLET, DELAYED RELEASE ORAL at 05:20

## 2020-10-22 RX ADMIN — GABAPENTIN 400 MG: 400 CAPSULE ORAL at 09:02

## 2020-10-22 RX ADMIN — TICAGRELOR 90 MG: 90 TABLET ORAL at 09:02

## 2020-10-22 RX ADMIN — METOPROLOL SUCCINATE 50 MG: 50 TABLET, EXTENDED RELEASE ORAL at 09:03

## 2020-10-22 ASSESSMENT — PAIN SCALES - GENERAL
PAINLEVEL_OUTOF10: 0
PAINLEVEL_OUTOF10: 0

## 2020-10-22 NOTE — DISCHARGE INSTR - COC
Continuity of Care Form    Patient Name: Tien Lyon   :  1955  MRN:  [id number]    Admit date:  10/18/2020  Discharge date:  ***    Code Status Order: Prior   Advance Directives:   [facility] Documentation     Date/Time Healthcare Directive Type of Healthcare Directive Copy in [facility] [address] Agent's Name Healthcare Agent's Phone Number    10/18/20 2248  No, patient does not have an advance directive for healthcare treatment -- -- -- -- --          Admitting Physician:  Timoteo Lara DO  PCP: Timoteo Lara DO    Discharging Nurse: [facility] Unit/Room#: 9308/3331-J  Discharging Unit Phone Number: ***    Emergency Contact:   Extended Emergency Contact Information  Primary Emergency Contact: merari briscoe  Home Phone: [phone]  Mobile Phone: [phone]  Relation: Child   needed? No    Past Surgical History:  Past Surgical History:   Procedure Laterality Date    BREAST LUMPECTOMY Left      SECTION      CORONARY ANGIOPLASTY WITH STENT PLACEMENT  2020    3.0 x 33mm Xience Sara to Prox LAD, EF35%, Dr. Kiley Jimenez         Immunization History: There is no immunization history on file for this patient.     Active Problems:  Patient Active Problem List   Diagnosis Code    Chest pain R07.9    STEMI (ST elevation myocardial infarction) ([facility] [address] I21.3    Acute kidney injury ([facility] [address] N17.9    Hypotension I95.9       Isolation/Infection:   Isolation          No Isolation        Patient Infection Status     None to display          Nurse Assessment:  Last Vital Signs: /79   Pulse 73   Temp 97.2 °F (36.2 °C) (Temporal)   Resp 16   Ht 5' 6\" (1.676 m)   Wt 180 lb (81.6 kg)   SpO2 95%   BMI 29.05 kg/m²     Last documented pain score (0-10 scale): Pain Level: 0  Last Weight:   Wt Readings from Last 1 Encounters:   10/18/20 180 lb (81.6 kg) Mental Status:  {IP PT MENTAL STATUS:}    IV Access:  { JESSIKA IV ACCESS:721556770}    Nursing Mobility/ADLs:  Walking   {CHP DME XRAJ:987817137}  Transfer  {CHP DME GACE:170137499}  Bathing  {CHP DME PALM:531905082}  Dressing  {CHP DME KXWS:131603008}  Toileting  {CHP DME NWLT:592921616}  Feeding  {[facility] DME JZGN:171140604}  Med Admin  {P DME IODD:767429696}  Med Delivery   { JESSIKA MED Delivery:181474829}    Wound Care Documentation and Therapy:        Elimination:  Continence:   · Bowel: {YES / WO:44331}  · Bladder: {YES / FR:43187}  Urinary Catheter: {Urinary Catheter:709335891}   Colostomy/Ileostomy/Ileal Conduit: {YES / HX:99967}       Date of Last BM: ***    Intake/Output Summary (Last 24 hours) at 10/22/2020 0959  Last data filed at 10/21/2020 1911  Gross per 24 hour   Intake 480 ml   Output --   Net 480 ml     I/O last 3 completed shifts:   In: 18 [P.O.:480]  Out: -     Safety Concerns:     [address] [employer] Safety Concerns:824942868}    Impairments/Disabilities:      [employer] Impairments/Disabilities:476815289}    Nutrition Therapy:  Current Nutrition Therapy:   [employer] Diet List:736955373}    Routes of Feeding: {[facility] DME Other Feedings:281883231}  Liquids: {Slp liquid thickness:20429}  Daily Fluid Restriction: {CHP DME Yes amt example:505491003}  Last Modified Barium Swallow with Video (Video Swallowing Test): {Done Not Done STWJ:369451531}    Treatments at the Time of Hospital Discharge:   Respiratory Treatments: ***  Oxygen Therapy:  {Therapy; copd oxygen:29406}  Ventilator:    { CC Vent EXQP:253413698}    Rehab Therapies: {THERAPEUTIC INTERVENTION:2239194868}  Weight Bearing Status/Restrictions: [address] [employer] Weight Bearin}  Other Medical Equipment (for information only, NOT a DME order):  {EQUIPMENT:209853901}  Other Treatments: ***    Patient's personal belongings (please select all that are sent with patient):  {MARQUIS DME Belongings:883704949}    RN SIGNATURE:  {Esignature:444945026}    CASE MANAGEMENT/SOCIAL WORK SECTION    Inpatient Status Date: ***    Readmission Risk Assessment Score:  Readmission Risk              Risk of Unplanned Readmission:        16           Discharging to Facility/ Agency   · Name:   · Address:  · Phone:  · Fax:    Dialysis Facility (if applicable)   · Name:  · Address:  · Dialysis Schedule:  · Phone:  · Fax:    / signature: {Esignature:302338514}    PHYSICIAN SECTION    Prognosis: {Prognosis:7848011894}    Condition at Discharge: [facility] Patient Condition:358411079}    Rehab Potential (if transferring to Rehab): {Prognosis:0680586238}    Recommended Labs or Other Treatments After Discharge: ***    Physician Certification: I certify the above information and transfer of [facility]  is necessary for the continuing treatment of the diagnosis listed and that she requires {Admit to Appropriate Level of Care:46514} for {GREATER/LESS:515776006} 30 days.      Update Admission H&P: {CHP DME Changes in UZIUA:481220678}    PHYSICIAN SIGNATURE:  {Esignature:505491432}

## 2020-10-22 NOTE — CARE COORDINATION
Pt discharging home today, met with pt who states no issues with prescriptions, no concerns/needs addressed for discharge. Son to transport home. Gt RODRIGUEZ

## 2020-10-22 NOTE — PROGRESS NOTES
INPATIENT CARDIOLOGY FOLLOW-UP    Name: Abiel Julian    Age: 77 y.o. Date of Admission: 10/18/2020  6:07 PM    Date of Service: 10/22/2020    Chief Complaint: Follow-up for Chest pain    Interim History:  She is complaining of DAVIS and no further episodes of chest pain. Currently with no complaints of CP, palpitations, dizziness, or lightheadedness. SR on telemetry. Review of Systems:   Cardiac: As per HPI  General: No fever, chills  Pulmonary: As per HPI  HEENT: No visual disturbances, difficult swallowing  GI: No nausea, vomiting  Endocrine: No thyroid disease or DM  Musculoskeletal: HERRING x 4, no focal motor deficits  Skin: Intact, no rashes  Neuro/Psych: No headache or seizures    Problem List:  Patient Active Problem List   Diagnosis    Chest pain    [facility]) [facility]    Acute kidney injury ([facility]    Hypotension       Allergies:   Allergies   Allergen Reactions    Nitroglycerin Other (See Comments)     Severe headache    Tramadol      seizures       Current Medications:  Current Facility-Administered Medications   Medication Dose Route Frequency Provider Last Rate Last Dose    losartan (COZAAR) tablet 25 mg  25 mg Oral Daily Juan Manuel Medel MD   25 mg at 10/21/20 1121    metoprolol succinate (TOPROL XL) extended release tablet 50 mg  50 mg Oral Daily YASIR Lakhani - CNS   50 mg at 10/21/20 1126    pantoprazole (PROTONIX) tablet 40 mg  40 mg Oral QAM AC Aleena Maloney APRN - CNS   40 mg at 10/22/20 1174    perflutren lipid microspheres (DEFINITY) injection 1.65 mg  1.5 mL Intravenous ONCE PRN YASIR Lakhani - CNS        acetaminophen (TYLENOL) tablet 1,000 mg  1,000 mg Oral Q8H PRN Amber Zavaleta, DO   1,000 mg at 10/21/20 2020    aspirin chewable tablet 81 mg  81 mg Oral Daily Amber Zavaleta DO   81 mg at 10/21/20 1122    atorvastatin (LIPITOR) tablet 80 mg  80 mg Oral Nightly Amber Zavaleta DO   80 mg at 10/21/20 2021    escitalopram (LEXAPRO) tablet 20 mg  20 mg Oral Daily Wannetta Ping, DO   20 mg at 10/21/20 1121    gabapentin (NEURONTIN) capsule 400 mg  400 mg Oral TID Wannetta Ping, DO   400 mg at 10/21/20 2021    QUEtiapine (SEROQUEL) tablet 200 mg  200 mg Oral Nightly Wannetta Ping, DO   200 mg at 10/21/20 2020    ticagrelor (BRILINTA) tablet 90 mg  90 mg Oral BID Wannetta Ping, DO   90 mg at 10/21/20 2021    nitroGLYCERIN (NITROSTAT) SL tablet 0.4 mg  0.4 mg Sublingual Q5 Min PRN Wannetta Ping, DO             Physical Exam:  BP (!) 140/70 Comment: manual  Pulse 55   Temp 97 °F (36.1 °C) (Temporal)   Resp 16   Ht 5' 6\" (1.676 m)   Wt 180 lb (81.6 kg)   SpO2 96%   BMI 29.05 kg/m²   Wt Readings from Last 3 Encounters:   10/18/20 180 lb (81.6 kg)   10/01/20 193 lb 12.8 oz (87.9 kg)   09/21/20 193 lb 12.8 oz (87.9 kg)     Appearance: Awake, alert, no acute respiratory distress  Skin: Intact, no rash  Head: Normocephalic, atraumatic  Eyes: EOMI, no conjunctival erythema  ENMT: No pharyngeal erythema, MMM, no rhinorrhea  Neck: Supple, no elevated JVP, no carotid bruits  Lungs: Clear to auscultation bilaterally. No wheezes, rales, or rhonchi. Cardiac: Regular rate and rhythm, +S1S2, no murmurs apparent  Abdomen: Soft, nontender, +bowel sounds  Extremities: Moves all extremities x 4, no lower extremity edema  Neurologic: No focal motor deficits apparent, normal mood and affect  Peripheral Pulses: Intact posterior tibial pulses bilaterally    Intake/Output:    Intake/Output Summary (Last 24 hours) at 10/22/2020 0754  Last data filed at 10/21/2020 1911  Gross per 24 hour   Intake 480 ml   Output --   Net 480 ml     No intake/output data recorded. Laboratory Tests:  Recent Labs     10/21/20  1131      K 3.9      CO2 18*   BUN 16   CREATININE 0.7   GLUCOSE 104*   CALCIUM 9.7     No results found for: MG  No results for input(s): ALKPHOS, ALT, AST, PROT, BILITOT, BILIDIR, LABALBU in the last 72 hours.   No results for input(s): WBC, RBC, HGB, HCT, MCV, MCH, MCHC, RDW, PLT, MPV in the last 72 hours. Lab Results   Component Value Date    CKTOTAL 69 10/19/2020    CKMB 1.2 10/19/2020    TROPONINI <0.01 10/21/2020    TROPONINI <0.01 10/19/2020    TROPONINI <0.01 10/18/2020     Lab Results   Component Value Date    INR 1.0 10/18/2020    INR 0.9 08/22/2020    PROTIME 11.1 10/18/2020    PROTIME 10.0 08/22/2020     No results found for: TSHFT4, TSH  No results found for: LABA1C  No results found for: EAG  Lab Results   Component Value Date    CHOL 174 08/23/2020     Lab Results   Component Value Date    TRIG 262 (H) 08/23/2020     Lab Results   Component Value Date    HDL 48 08/23/2020     Lab Results   Component Value Date    LDLCALC 74 08/23/2020     Lab Results   Component Value Date    LABVLDL 52 08/23/2020     No results found for: CHOLHDLRATIO    Cardiac Tests:  Telemetry findings reviewed: SR at rate  60s no new tachy/bradyarrhythmias overnight     EKG: Normal sinus rhythm, left atrial enlargement, septal MI age undetermined.     Vitals and labs were reviewed. 140/70 with heart rate of 60, no labs for today    Labs reviewed and stable     TTE-8/22/2020:  Summary   Left ventricle is normal in size .   Mild concentric left ventricular hypertrophy.  Abdoul Fail is severe hypokinesis to akinesis of the anteroseptal wall, the   apex, the distal septum and the distal inferior wall.   Ejection fraction is visually estimated at 40+/-5%.   There is doppler evidence of stage I diastolic dysfunction.   Normal right ventricular size and function.   Normal sized left atrium ( by volume index ).  Mild mitral regurgitation.   Mild to moderate tricuspid regurgitation. TTE limited-10/20/2020:  Summary   Normal left ventricle size and systolic function. Ejection fraction is visually estimated at 60-65%. No regional wall motion abnormalities seen. Mild concentric left ventricular hypertrophy. Indeterminate diastolic function.    The left atrium is mildly dilated. Normal right ventricular size and function. Physiologic and/or trace mitral regurgitation is present. No hemodynamically significant aortic stenosis is present. Physiologic and/or trace tricuspid regurgitation. RVSP is 29 mmHg. Normal estimated PA systolic pressure. No evidence for hemodynamically significant pericardial effusion. Compared to prior echo of 8/23/2020, changes noted. LVEF has improved from   40-45% to 60-65%. Michaela Chao nuclear stress test-10/21/2020: Normal myocardial perfusion without ischemia or infarct, normal LV systolic function with an EF of 68% with normal wall motion. Low risk oncological stress test.     Cardiac catheterization-8/22/2020  CONCLUSIONS:  1.  Coronary artery disease. A.  Left main, no significant disease. B.  LAD, 95% thrombotic proximal vessel stenosis with SADI-1 to 2 flow  in the distal vessel. Caprice Peralta, large codominant vessel with 70% ostial stenosis of the high  first marginal branch/intermediate ramus branch and 70% stenosis of the  mid left circumflex after the AV groove branch before any of the other  marginal branches.  The AV groove branch provided a small posterolateral  branch and a small posterior descending artery branch.    D.  RCA. Moderate size codominant vessel with no significant angiographic  disease. 2.  Normal left atrial size with a large area of anterolateral, apical  and apical inferior wall hypokinesis to akinesis with an estimated  ejection fraction of 35%-40%. 3.  Mildly elevated left end diastolic pressure.   4.  Successful balloon angioplasty with the deployment of a drug-eluting  coronary stent to the proximal LAD with post-stent deployment dilatation  with a larger noncompliant balloon to high pressure with very good  results.        Assessment:  · Hypotension secondary nausea vomiting diarrhea, improved  · CAD, status post recent anterior wall STEMI-8/22/2020, status post PCI/GAURAV x1 to proximal

## 2020-10-22 NOTE — PROGRESS NOTES
[facility]   Department of Pharmacy   Pharmacist Transition of Care Services         Patient Demographics  Name:  Mallorie Howell   Medical Record Number:  [id number]  Gender:  female   Age:  77 y.o. YOB: 1955    Primary Care Physician: Juan C Munson DO  Primary Care Physician phone number:  [phone]  Readmission Risk (% from [facility] Patient List): 14%       Pharmacist Review and Summary of Medications     Date of last reviewed/update: 10/22/20     Category Comments   New Medication Started   1. Change in Outpatient Medication  (Dosage Form, Route,   Dose, or Frequency) 1. Cozaar 100 mg to 25 mg daily  2. toprol 100 mg to 50 mg daily  3. Discontinued Outpatient Medication   (or on Hold During Admission) 1. norvasc  2. prinivil  3. Other              Pharmacist Patient Education:    Date  Person Educated Content of Education                 Documentation of Pharmacist Interventions and Follow-up Plan:     The following Pharmacist Transition of Care Services were completed:   [x]  Reviewed and summarized medication changes  []  Entire home medication list was reviewed for accuracy (sources: **)  []  Home medication list was updated or corrected     [x]  Reviewed discharge medication reconciliation  [x]  Discharge medication list was updated or corrected    []  Added information to AVS   []  Patient education was provided on new medications  []  Patient education was provided on medication changes  []  Reviewed the After Visit Summary (AVS) with patient    Additional Interventions:  [x]  Inpatient prescriber was contacted and the following pharmacy recommendations        were accepted: dc duplicate seroquel order on discharge, losartan change from 100 mg to 25 mg daily    [] Other interventions: **        Pharmacist: Yoav Delgado PharmD, [facility]  Date:  10/22/2020 9:35 AM

## 2020-11-10 NOTE — DISCHARGE SUMMARY
[address]                  [address] [facility]  [facility]                               DISCHARGE SUMMARY    PATIENT NAME: Moo Oro                   :        1955  MED REC NO:   [id number]                            ROOM:       7863  ACCOUNT NO:   [de-identified]                           ADMIT DATE: 10/18/2020  PROVIDER:     Chuy Black DO                  DISCHARGE DATE:  10/22/2020      FINAL DIAGNOSES:  Chest pain in a patient with known coronary artery  disease, status post recent anterior wall STEMI with drug-eluting stent  placement, ischemic cardiomyopathy, hypertension, hyperlipidemia,  depression, mild obesity, history of acute kidney injury, resolved. HISTORY:  The patient is a 53-year-old female, recently had drug-eluting  stent placed for coronary artery disease. She had an LAD of 95%,  circumflex of 70. Ejection fraction estimated between 35 and 40. Balloon angioplasty was successfully carried out with the proximal LAD  and she was discharged. Over the course of the several days prior to  admission, she had some nausea, vomiting, diarrhea. She started to  develop some chest pain, came into the emergency department, was found  to be hypotensive. Cardiology had seen her. Just because of her  history, she had to have stress testing performed. IV fluids were  started because of hypotension. Her beta-blockers were decreased. She  was taken off amlodipine, placed on Cozaar. Kidney function was  followed and it was quite good. Ejection fraction improved. Stress  testing failed to demonstrate any ischemic changes. So most likely her  hypotension was secondary to nausea and vomiting. This could have been  causing some atypical chest pain as the stress test was negative. Creatinine is down at 1.1, and she seems to be doing well. Overall, she  is much improved without any cardiovascular symptoms.     DISCHARGE MEDICATIONS:  Little different. Tylenol she will take. Nitroglycerin tablet one every 5 minutes x3. Lexapro 20 daily,  gabapentin 400 t.i.d., disulfiram 250 daily, Brilinta 90 b.i.d.,  atorvastatin 80 daily, aspirin 81 daily, Seroquel 200 at h.s., losartan  25. Toprol-XL 50 daily, Protonix 40 daily. She seems to understand her symptomatology. She feels much better. She  has been trying to enter the cardiac rehab as an outpatient. Overall,  her condition at the time of discharge was good, and her prognosis is  good too. She will go home. I will see her in the office in a week or so.         Damian Darling DO    D: 11/09/2020 14:31:26       T: 11/09/2020 14:35:15     [email or web address]  Job#: [id number]     Doc#: [id number]

## 2020-11-21 ENCOUNTER — APPOINTMENT (OUTPATIENT)
Dept: GENERAL RADIOLOGY | Age: 65
End: 2020-11-21
Payer: MEDICARE

## 2020-11-21 ENCOUNTER — HOSPITAL ENCOUNTER (EMERGENCY)
Age: 65
Discharge: HOME | End: 2020-11-21
Payer: MEDICARE

## 2020-11-21 VITALS
TEMPERATURE: 97.5 F | BODY MASS INDEX: 32.14 KG/M2 | SYSTOLIC BLOOD PRESSURE: 152 MMHG | DIASTOLIC BLOOD PRESSURE: 82 MMHG | HEIGHT: 66 IN | HEART RATE: 80 BPM | OXYGEN SATURATION: 97 % | WEIGHT: 200 LBS | RESPIRATION RATE: 16 BRPM

## 2020-11-21 PROCEDURE — 6370000000 HC RX 637 (ALT 250 FOR IP): Performed by: PHYSICIAN ASSISTANT

## 2020-11-21 PROCEDURE — 72100 X-RAY EXAM L-S SPINE 2/3 VWS: CPT

## 2020-11-21 PROCEDURE — 99284 EMERGENCY DEPT VISIT MOD MDM: CPT

## 2020-11-21 PROCEDURE — 72220 X-RAY EXAM SACRUM TAILBONE: CPT

## 2020-11-21 PROCEDURE — 6360000002 HC RX W HCPCS: Performed by: PHYSICIAN ASSISTANT

## 2020-11-21 RX ORDER — PREDNISONE 20 MG/1
TABLET ORAL
Qty: 18 TABLET | Refills: 0 | Status: SHIPPED | OUTPATIENT
Start: 2020-11-21 | End: 2020-12-01

## 2020-11-21 RX ORDER — ACETAMINOPHEN 500 MG
1000 TABLET ORAL 3 TIMES DAILY
Qty: 42 TABLET | Refills: 0 | Status: SHIPPED | OUTPATIENT
Start: 2020-11-21 | End: 2020-12-04

## 2020-11-21 RX ORDER — DEXAMETHASONE SODIUM PHOSPHATE 10 MG/ML
10 INJECTION, SOLUTION INTRAMUSCULAR; INTRAVENOUS ONCE
Status: COMPLETED | OUTPATIENT
Start: 2020-11-21 | End: 2020-11-21

## 2020-11-21 RX ORDER — CYCLOBENZAPRINE HCL 10 MG
10 TABLET ORAL 3 TIMES DAILY PRN
Qty: 21 TABLET | Refills: 0 | Status: SHIPPED | OUTPATIENT
Start: 2020-11-21 | End: 2020-12-01

## 2020-11-21 RX ORDER — HYDROCODONE BITARTRATE AND ACETAMINOPHEN 5; 325 MG/1; MG/1
1 TABLET ORAL ONCE
Status: COMPLETED | OUTPATIENT
Start: 2020-11-21 | End: 2020-11-21

## 2020-11-21 RX ORDER — IBUPROFEN 600 MG/1
600 TABLET ORAL ONCE
Status: COMPLETED | OUTPATIENT
Start: 2020-11-21 | End: 2020-11-21

## 2020-11-21 RX ORDER — IBUPROFEN 600 MG/1
600 TABLET ORAL 3 TIMES DAILY PRN
Qty: 21 TABLET | Refills: 0 | Status: SHIPPED | OUTPATIENT
Start: 2020-11-21 | End: 2020-12-04

## 2020-11-21 RX ADMIN — IBUPROFEN 600 MG: 600 TABLET, FILM COATED ORAL at 17:00

## 2020-11-21 RX ADMIN — DEXAMETHASONE SODIUM PHOSPHATE 10 MG: 10 INJECTION, SOLUTION INTRAMUSCULAR; INTRAVENOUS at 17:00

## 2020-11-21 RX ADMIN — HYDROCODONE BITARTRATE AND ACETAMINOPHEN 1 TABLET: 5; 325 TABLET ORAL at 17:00

## 2020-11-21 ASSESSMENT — PAIN SCALES - GENERAL
PAINLEVEL_OUTOF10: 8
PAINLEVEL_OUTOF10: 5
PAINLEVEL_OUTOF10: 8

## 2020-11-21 ASSESSMENT — PAIN DESCRIPTION - ONSET: ONSET: SUDDEN

## 2020-11-21 ASSESSMENT — PAIN DESCRIPTION - ORIENTATION: ORIENTATION: LOWER

## 2020-11-21 ASSESSMENT — PAIN DESCRIPTION - DESCRIPTORS: DESCRIPTORS: PATIENT UNABLE TO DESCRIBE

## 2020-11-21 ASSESSMENT — PAIN DESCRIPTION - PAIN TYPE: TYPE: ACUTE PAIN

## 2020-11-21 ASSESSMENT — PAIN DESCRIPTION - LOCATION: LOCATION: BACK

## 2020-11-21 ASSESSMENT — PAIN DESCRIPTION - FREQUENCY: FREQUENCY: CONTINUOUS

## 2020-11-21 ASSESSMENT — PAIN - FUNCTIONAL ASSESSMENT: PAIN_FUNCTIONAL_ASSESSMENT: PREVENTS OR INTERFERES SOME ACTIVE ACTIVITIES AND ADLS

## 2020-11-21 NOTE — ED PROVIDER NOTES
[facility]     Department of Emergency Medicine   ED  Provider Note  Admit Date/RoomTime: 2020  4:08 PM  ED Room:   Chief Complaint:   Fall (mechanical, - head injury, + thinners brilinta, - loc) and Back Pain (lower back)    History of Present Illness   Source of history provided by:  patient. History/Exam Limitations: none. Alisa Kim is a 77 y.o. old female who has a past medical history of:   Past Medical History:   Diagnosis Date    CAD (coronary artery disease)     Degenerative disorder of bone     Hard of hearing     Hypertension     MI (myocardial infarction) ([facility] 2020      presents to the emergency department by private vehicle, for acute, aching and throbbing midline  \"tailbone\" pain without radiation, for 1 day(s) prior to arrival.  The pain was caused by tripping over her \"big slippers\". She has a history of recurrent self limited episodes of low back pain in the past.   Since onset the symptoms have been constant and gradually worsening and moderate to severe in severity. There has been no bowel or bladder incontinence associated with the pain. There have been associated symptoms of nothing additional and denies any weakness, numbness, incontinence, dysuria, hematuria, abdominal pain, fever, abdominal swelling, chest pain, pelvic pain or perianal numbness. The the pain is aggraveated by any movement and relieved by nothing. ROS   Pertinent positives and negatives are stated within HPI, all other systems reviewed and are negative. Past Surgical History:   Procedure Laterality Date    BREAST LUMPECTOMY Left      SECTION      CORONARY ANGIOPLASTY WITH STENT PLACEMENT  2020    3.0 x 33mm Xience Sara to Prox LAD, EF35%, Dr. Ottoniel Simon History:  reports that she quit smoking about 2 months ago. Her smoking use included cigarettes.  She started good    New Medications     Discharge Medication List as of 11/21/2020  5:30 PM      START taking these medications    Details   cyclobenzaprine (FLEXERIL) 10 MG tablet Take 1 tablet by mouth 3 times daily as needed for Muscle spasms, Disp-21 tablet,R-0Print      acetaminophen (TYLENOL) 500 MG tablet Take 2 tablets by mouth 3 times daily for 7 days, Disp-42 tablet,R-0Print      ibuprofen (ADVIL;MOTRIN) 600 MG tablet Take 1 tablet by mouth 3 times daily as needed for Pain or Fever, Disp-21 tablet,R-0Print      predniSONE (DELTASONE) 20 MG tablet Sig.: Take 60mg  Po qd x 3 days, then 40mg po qd x3 days, then 20mg po qd x 3 days. QS x 9 days, Disp-18 tablet,R-0Print           Electronically signed by Linda Caballero PA-C   DD: 11/21/20  **This report was transcribed using voice recognition software. Every effort was made to ensure accuracy; however, inadvertent computerized transcription errors may be present.   END OF ED PROVIDER NOTE        Linda Caballero PA-C  11/21/20 0565

## 2020-12-03 ENCOUNTER — APPOINTMENT (OUTPATIENT)
Dept: CT IMAGING | Age: 65
DRG: 207 | End: 2020-12-03
Payer: MEDICARE

## 2020-12-03 ENCOUNTER — APPOINTMENT (OUTPATIENT)
Dept: GENERAL RADIOLOGY | Age: 65
DRG: 207 | End: 2020-12-03
Payer: MEDICARE

## 2020-12-03 ENCOUNTER — HOSPITAL ENCOUNTER (INPATIENT)
Age: 65
LOS: 14 days | Discharge: HOME | DRG: 207 | End: 2020-12-17
Attending: FAMILY MEDICINE | Admitting: GENERAL PRACTICE
Payer: MEDICARE

## 2020-12-03 PROBLEM — R41.0 ACUTE DELIRIUM: Status: ACTIVE | Noted: 2020-12-03

## 2020-12-03 LAB
AADO2: 169.9 MMHG
AADO2: 83 MMHG
ACETAMINOPHEN LEVEL: 5.1 MCG/ML (ref 10–30)
ADENOVIRUS BY PCR: NOT DETECTED
ALBUMIN SERPL-MCNC: 4.6 G/DL (ref 3.5–5.2)
ALP BLD-CCNC: 94 U/L (ref 35–104)
ALT SERPL-CCNC: 15 U/L (ref 0–32)
AMMONIA: 12 UMOL/L (ref 11–51)
AMPHETAMINE SCREEN, URINE: NOT DETECTED
AMPHETAMINE SCREEN, URINE: NOT DETECTED
ANION GAP SERPL CALCULATED.3IONS-SCNC: 13 MMOL/L (ref 7–16)
APTT: 32.2 SEC (ref 24.5–35.1)
AST SERPL-CCNC: 9 U/L (ref 0–31)
B.E.: -4 MMOL/L (ref -3–3)
B.E.: -5.3 MMOL/L (ref -3–3)
BARBITURATE SCREEN URINE: NOT DETECTED
BARBITURATE SCREEN URINE: NOT DETECTED
BASOPHILS ABSOLUTE: 0.05 E9/L (ref 0–0.2)
BASOPHILS RELATIVE PERCENT: 0.4 % (ref 0–2)
BENZODIAZEPINE SCREEN, URINE: POSITIVE
BENZODIAZEPINE SCREEN, URINE: POSITIVE
BILIRUB SERPL-MCNC: 0.5 MG/DL (ref 0–1.2)
BORDETELLA PARAPERTUSSIS BY PCR: NOT DETECTED
BORDETELLA PERTUSSIS BY PCR: NOT DETECTED
BUN BLDV-MCNC: 17 MG/DL (ref 8–23)
CALCIUM SERPL-MCNC: 10.1 MG/DL (ref 8.6–10.2)
CANNABINOID SCREEN URINE: NOT DETECTED
CANNABINOID SCREEN URINE: NOT DETECTED
CHLAMYDOPHILIA PNEUMONIAE BY PCR: NOT DETECTED
CHLORIDE BLD-SCNC: 104 MMOL/L (ref 98–107)
CO2: 23 MMOL/L (ref 22–29)
COCAINE METABOLITE SCREEN URINE: NOT DETECTED
COCAINE METABOLITE SCREEN URINE: NOT DETECTED
COHB: 1.3 % (ref 0–1.5)
COHB: 1.5 % (ref 0–1.5)
CORONAVIRUS 229E BY PCR: NOT DETECTED
CORONAVIRUS HKU1 BY PCR: NOT DETECTED
CORONAVIRUS NL63 BY PCR: NOT DETECTED
CORONAVIRUS OC43 BY PCR: NOT DETECTED
CREAT SERPL-MCNC: 0.9 MG/DL (ref 0.5–1)
CRITICAL: ABNORMAL
CRITICAL: ABNORMAL
DATE ANALYZED: ABNORMAL
DATE ANALYZED: ABNORMAL
DATE OF COLLECTION: ABNORMAL
DATE OF COLLECTION: ABNORMAL
EOSINOPHILS ABSOLUTE: 0.19 E9/L (ref 0.05–0.5)
EOSINOPHILS RELATIVE PERCENT: 1.4 % (ref 0–6)
ETHANOL: <10 MG/DL (ref 0–0.08)
FENTANYL SCREEN, URINE: NOT DETECTED
FENTANYL SCREEN, URINE: POSITIVE
FIO2: 100 %
FIO2: 40 %
GFR AFRICAN AMERICAN: >60
GFR NON-AFRICAN AMERICAN: >60 ML/MIN/1.73
GLUCOSE BLD-MCNC: 129 MG/DL (ref 74–99)
HCO3: 20.1 MMOL/L (ref 22–26)
HCO3: 22 MMOL/L (ref 22–26)
HCT VFR BLD CALC: 38.1 % (ref 34–48)
HEMOGLOBIN: 12.5 G/DL (ref 11.5–15.5)
HHB: 0.7 % (ref 0–5)
HHB: 1.8 % (ref 0–5)
HUMAN METAPNEUMOVIRUS BY PCR: NOT DETECTED
HUMAN RHINOVIRUS/ENTEROVIRUS BY PCR: NOT DETECTED
IMMATURE GRANULOCYTES #: 0.09 E9/L
IMMATURE GRANULOCYTES %: 0.7 % (ref 0–5)
INFLUENZA A BY PCR: NOT DETECTED
INFLUENZA B BY PCR: NOT DETECTED
INR BLD: 0.9
LAB: ABNORMAL
LAB: ABNORMAL
LACTIC ACID: 1.3 MMOL/L (ref 0.5–2.2)
LYMPHOCYTES ABSOLUTE: 2.25 E9/L (ref 1.5–4)
LYMPHOCYTES RELATIVE PERCENT: 16.9 % (ref 20–42)
Lab: ABNORMAL
MAGNESIUM: 1.9 MG/DL (ref 1.6–2.6)
MCH RBC QN AUTO: 32.2 PG (ref 26–35)
MCHC RBC AUTO-ENTMCNC: 32.8 % (ref 32–34.5)
MCV RBC AUTO: 98.2 FL (ref 80–99.9)
METER GLUCOSE: 128 MG/DL (ref 74–99)
METHADONE SCREEN, URINE: NOT DETECTED
METHADONE SCREEN, URINE: NOT DETECTED
METHB: 0 % (ref 0–1.5)
METHB: 0.1 % (ref 0–1.5)
MODE: AC
MODE: AC
MONOCYTES ABSOLUTE: 0.89 E9/L (ref 0.1–0.95)
MONOCYTES RELATIVE PERCENT: 6.7 % (ref 2–12)
MYCOPLASMA PNEUMONIAE BY PCR: NOT DETECTED
NEUTROPHILS ABSOLUTE: 9.86 E9/L (ref 1.8–7.3)
NEUTROPHILS RELATIVE PERCENT: 73.9 % (ref 43–80)
O2 CONTENT: 17.3 ML/DL
O2 CONTENT: 18.8 ML/DL
O2 SATURATION: 98.2 % (ref 92–98.5)
O2 SATURATION: 99.3 % (ref 92–98.5)
O2HB: 96.9 % (ref 94–97)
O2HB: 97.7 % (ref 94–97)
OPERATOR ID: 7278
OPERATOR ID: ABNORMAL
OPIATE SCREEN URINE: NOT DETECTED
OPIATE SCREEN URINE: NOT DETECTED
OXYCODONE URINE: NOT DETECTED
OXYCODONE URINE: NOT DETECTED
PARAINFLUENZA VIRUS 1 BY PCR: NOT DETECTED
PARAINFLUENZA VIRUS 2 BY PCR: NOT DETECTED
PARAINFLUENZA VIRUS 3 BY PCR: NOT DETECTED
PARAINFLUENZA VIRUS 4 BY PCR: NOT DETECTED
PATIENT TEMP: 37 C
PATIENT TEMP: 37 C
PCO2: 38.5 MMHG (ref 35–45)
PCO2: 43.4 MMHG (ref 35–45)
PDW BLD-RTO: 13.9 FL (ref 11.5–15)
PEEP/CPAP: 5 CMH2O
PEEP/CPAP: 5 CMH2O
PFO2: 3.56 MMHG/%
PFO2: 4.8 MMHG/%
PH BLOOD GAS: 7.32 (ref 7.35–7.45)
PH BLOOD GAS: 7.33 (ref 7.35–7.45)
PHENCYCLIDINE SCREEN URINE: NOT DETECTED
PHENCYCLIDINE SCREEN URINE: NOT DETECTED
PLATELET # BLD: 305 E9/L (ref 130–450)
PMV BLD AUTO: 9.9 FL (ref 7–12)
PO2: 142.3 MMHG (ref 75–100)
PO2: 479.6 MMHG (ref 75–100)
POTASSIUM SERPL-SCNC: 3.49 MMOL/L (ref 3.5–5)
POTASSIUM SERPL-SCNC: 4.3 MMOL/L (ref 3.5–5)
PROTHROMBIN TIME: 10.1 SEC (ref 9.3–12.4)
RBC # BLD: 3.88 E12/L (ref 3.5–5.5)
RESPIRATORY SYNCYTIAL VIRUS BY PCR: NOT DETECTED
RI(T): 35 %
RI(T): 58 %
RR MECHANICAL: 16 B/MIN
RR MECHANICAL: 16 B/MIN
SALICYLATE, SERUM: <0.3 MG/DL (ref 0–30)
SARS-COV-2, PCR: NOT DETECTED
SODIUM BLD-SCNC: 140 MMOL/L (ref 132–146)
SOURCE, BLOOD GAS: ABNORMAL
SOURCE, BLOOD GAS: ABNORMAL
THB: 12.5 G/DL (ref 11.5–16.5)
THB: 12.7 G/DL (ref 11.5–16.5)
TIME ANALYZED: 1354
TIME ANALYZED: 1840
TOTAL PROTEIN: 7.4 G/DL (ref 6.4–8.3)
TRICYCLIC ANTIDEPRESSANTS SCREEN SERUM: NEGATIVE NG/ML
TROPONIN: <0.01 NG/ML (ref 0–0.03)
TSH SERPL DL<=0.05 MIU/L-ACNC: 1.32 UIU/ML (ref 0.27–4.2)
VT MECHANICAL: 450 ML
VT MECHANICAL: 450 ML
WBC # BLD: 13.3 E9/L (ref 4.5–11.5)

## 2020-12-03 PROCEDURE — 84132 ASSAY OF SERUM POTASSIUM: CPT

## 2020-12-03 PROCEDURE — 99284 EMERGENCY DEPT VISIT MOD MDM: CPT

## 2020-12-03 PROCEDURE — 80307 DRUG TEST PRSMV CHEM ANLYZR: CPT

## 2020-12-03 PROCEDURE — 6360000002 HC RX W HCPCS: Performed by: FAMILY MEDICINE

## 2020-12-03 PROCEDURE — G0480 DRUG TEST DEF 1-7 CLASSES: HCPCS

## 2020-12-03 PROCEDURE — 74018 RADEX ABDOMEN 1 VIEW: CPT

## 2020-12-03 PROCEDURE — 85610 PROTHROMBIN TIME: CPT

## 2020-12-03 PROCEDURE — 83605 ASSAY OF LACTIC ACID: CPT

## 2020-12-03 PROCEDURE — 0202U NFCT DS 22 TRGT SARS-COV-2: CPT

## 2020-12-03 PROCEDURE — 96375 TX/PRO/DX INJ NEW DRUG ADDON: CPT

## 2020-12-03 PROCEDURE — 71045 X-RAY EXAM CHEST 1 VIEW: CPT

## 2020-12-03 PROCEDURE — 83735 ASSAY OF MAGNESIUM: CPT

## 2020-12-03 PROCEDURE — 2500000003 HC RX 250 WO HCPCS: Performed by: STUDENT IN AN ORGANIZED HEALTH CARE EDUCATION/TRAINING PROGRAM

## 2020-12-03 PROCEDURE — 71275 CT ANGIOGRAPHY CHEST: CPT

## 2020-12-03 PROCEDURE — 85025 COMPLETE CBC W/AUTO DIFF WBC: CPT

## 2020-12-03 PROCEDURE — 82962 GLUCOSE BLOOD TEST: CPT

## 2020-12-03 PROCEDURE — 36415 COLL VENOUS BLD VENIPUNCTURE: CPT

## 2020-12-03 PROCEDURE — 82140 ASSAY OF AMMONIA: CPT

## 2020-12-03 PROCEDURE — 80053 COMPREHEN METABOLIC PANEL: CPT

## 2020-12-03 PROCEDURE — 74177 CT ABD & PELVIS W/CONTRAST: CPT

## 2020-12-03 PROCEDURE — 2000000000 HC ICU R&B

## 2020-12-03 PROCEDURE — 5A1955Z RESPIRATORY VENTILATION, GREATER THAN 96 CONSECUTIVE HOURS: ICD-10-PCS | Performed by: INTERNAL MEDICINE

## 2020-12-03 PROCEDURE — 0BH18EZ INSERTION OF ENDOTRACHEAL AIRWAY INTO TRACHEA, VIA NATURAL OR ARTIFICIAL OPENING ENDOSCOPIC: ICD-10-PCS | Performed by: FAMILY MEDICINE

## 2020-12-03 PROCEDURE — 93005 ELECTROCARDIOGRAM TRACING: CPT | Performed by: FAMILY MEDICINE

## 2020-12-03 PROCEDURE — 6360000002 HC RX W HCPCS

## 2020-12-03 PROCEDURE — 82805 BLOOD GASES W/O2 SATURATION: CPT

## 2020-12-03 PROCEDURE — 85730 THROMBOPLASTIN TIME PARTIAL: CPT

## 2020-12-03 PROCEDURE — 84443 ASSAY THYROID STIM HORMONE: CPT

## 2020-12-03 PROCEDURE — 70450 CT HEAD/BRAIN W/O DYE: CPT

## 2020-12-03 PROCEDURE — 96374 THER/PROPH/DIAG INJ IV PUSH: CPT

## 2020-12-03 PROCEDURE — 84484 ASSAY OF TROPONIN QUANT: CPT

## 2020-12-03 PROCEDURE — 6360000004 HC RX CONTRAST MEDICATION: Performed by: RADIOLOGY

## 2020-12-03 RX ORDER — ONDANSETRON 2 MG/ML
INJECTION INTRAMUSCULAR; INTRAVENOUS
Status: COMPLETED
Start: 2020-12-03 | End: 2020-12-03

## 2020-12-03 RX ORDER — MIDAZOLAM HYDROCHLORIDE 2 MG/2ML
4 INJECTION, SOLUTION INTRAMUSCULAR; INTRAVENOUS ONCE
Status: COMPLETED | OUTPATIENT
Start: 2020-12-03 | End: 2020-12-03

## 2020-12-03 RX ORDER — MIDAZOLAM HYDROCHLORIDE 2 MG/2ML
2 INJECTION, SOLUTION INTRAMUSCULAR; INTRAVENOUS ONCE
Status: COMPLETED | OUTPATIENT
Start: 2020-12-03 | End: 2020-12-03

## 2020-12-03 RX ORDER — MIDAZOLAM HYDROCHLORIDE 1 MG/ML
INJECTION INTRAMUSCULAR; INTRAVENOUS
Status: COMPLETED
Start: 2020-12-03 | End: 2020-12-03

## 2020-12-03 RX ORDER — PROCHLORPERAZINE EDISYLATE 5 MG/ML
10 INJECTION INTRAMUSCULAR; INTRAVENOUS ONCE
Status: COMPLETED | OUTPATIENT
Start: 2020-12-03 | End: 2020-12-03

## 2020-12-03 RX ORDER — MIDAZOLAM HYDROCHLORIDE 2 MG/2ML
2 INJECTION, SOLUTION INTRAMUSCULAR; INTRAVENOUS ONCE
Status: COMPLETED | OUTPATIENT
Start: 2020-12-03 | End: 2020-12-04

## 2020-12-03 RX ORDER — PROCHLORPERAZINE EDISYLATE 5 MG/ML
INJECTION INTRAMUSCULAR; INTRAVENOUS
Status: COMPLETED
Start: 2020-12-03 | End: 2020-12-03

## 2020-12-03 RX ORDER — FENTANYL CITRATE 50 UG/ML
INJECTION, SOLUTION INTRAMUSCULAR; INTRAVENOUS
Status: COMPLETED
Start: 2020-12-03 | End: 2020-12-03

## 2020-12-03 RX ORDER — PROPOFOL 10 MG/ML
INJECTION, EMULSION INTRAVENOUS
Status: COMPLETED
Start: 2020-12-03 | End: 2020-12-03

## 2020-12-03 RX ORDER — FENTANYL CITRATE 50 UG/ML
100 INJECTION, SOLUTION INTRAMUSCULAR; INTRAVENOUS ONCE
Status: COMPLETED | OUTPATIENT
Start: 2020-12-03 | End: 2020-12-03

## 2020-12-03 RX ORDER — PROPOFOL 10 MG/ML
10 INJECTION, EMULSION INTRAVENOUS ONCE
Status: DISCONTINUED | OUTPATIENT
Start: 2020-12-03 | End: 2020-12-03

## 2020-12-03 RX ORDER — LORAZEPAM 2 MG/ML
1 INJECTION INTRAMUSCULAR ONCE
Status: DISCONTINUED | OUTPATIENT
Start: 2020-12-03 | End: 2020-12-03

## 2020-12-03 RX ORDER — SODIUM CHLORIDE 0.9 % (FLUSH) 0.9 %
10 SYRINGE (ML) INJECTION PRN
Status: DISCONTINUED | OUTPATIENT
Start: 2020-12-03 | End: 2020-12-04

## 2020-12-03 RX ORDER — HYDRALAZINE HYDROCHLORIDE 20 MG/ML
20 INJECTION INTRAMUSCULAR; INTRAVENOUS ONCE
Status: COMPLETED | OUTPATIENT
Start: 2020-12-03 | End: 2020-12-03

## 2020-12-03 RX ORDER — ONDANSETRON 2 MG/ML
4 INJECTION INTRAMUSCULAR; INTRAVENOUS ONCE
Status: COMPLETED | OUTPATIENT
Start: 2020-12-03 | End: 2020-12-03

## 2020-12-03 RX ORDER — LORAZEPAM 2 MG/ML
INJECTION INTRAMUSCULAR
Status: DISPENSED
Start: 2020-12-03 | End: 2020-12-03

## 2020-12-03 RX ORDER — PROPOFOL 10 MG/ML
10 INJECTION, EMULSION INTRAVENOUS CONTINUOUS
Status: DISCONTINUED | OUTPATIENT
Start: 2020-12-03 | End: 2020-12-04

## 2020-12-03 RX ORDER — SUCCINYLCHOLINE CHLORIDE 20 MG/ML
INJECTION INTRAMUSCULAR; INTRAVENOUS
Status: COMPLETED
Start: 2020-12-03 | End: 2020-12-03

## 2020-12-03 RX ORDER — SUCCINYLCHOLINE CHLORIDE 20 MG/ML
100 INJECTION INTRAMUSCULAR; INTRAVENOUS ONCE
Status: COMPLETED | OUTPATIENT
Start: 2020-12-03 | End: 2020-12-03

## 2020-12-03 RX ORDER — LORAZEPAM 2 MG/ML
2 INJECTION INTRAMUSCULAR ONCE
Status: COMPLETED | OUTPATIENT
Start: 2020-12-03 | End: 2020-12-03

## 2020-12-03 RX ADMIN — FENTANYL CITRATE 100 MCG: 50 INJECTION, SOLUTION INTRAMUSCULAR; INTRAVENOUS at 12:40

## 2020-12-03 RX ADMIN — PROCHLORPERAZINE EDISYLATE 10 MG: 5 INJECTION INTRAMUSCULAR; INTRAVENOUS at 12:08

## 2020-12-03 RX ADMIN — PROPOFOL INJECTABLE EMULSION 50 MCG/KG/MIN: 10 INJECTION, EMULSION INTRAVENOUS at 21:31

## 2020-12-03 RX ADMIN — MIDAZOLAM 2 MG: 1 INJECTION INTRAMUSCULAR; INTRAVENOUS at 12:26

## 2020-12-03 RX ADMIN — PROPOFOL INJECTABLE EMULSION 10 MCG/KG/MIN: 10 INJECTION, EMULSION INTRAVENOUS at 13:46

## 2020-12-03 RX ADMIN — MIDAZOLAM 2 MG: 1 INJECTION INTRAMUSCULAR; INTRAVENOUS at 12:00

## 2020-12-03 RX ADMIN — MIDAZOLAM 2 MG: 1 INJECTION INTRAMUSCULAR; INTRAVENOUS at 12:37

## 2020-12-03 RX ADMIN — ONDANSETRON 4 MG: 2 INJECTION INTRAMUSCULAR; INTRAVENOUS at 12:05

## 2020-12-03 RX ADMIN — Medication 25 MCG/HR: at 21:40

## 2020-12-03 RX ADMIN — FENTANYL CITRATE 100 MCG: 50 INJECTION, SOLUTION INTRAMUSCULAR; INTRAVENOUS at 16:44

## 2020-12-03 RX ADMIN — LORAZEPAM 2 MG: 2 INJECTION INTRAMUSCULAR; INTRAVENOUS at 12:44

## 2020-12-03 RX ADMIN — MIDAZOLAM 4 MG: 1 INJECTION INTRAMUSCULAR; INTRAVENOUS at 12:58

## 2020-12-03 RX ADMIN — MIDAZOLAM 2 MG: 1 INJECTION INTRAMUSCULAR; INTRAVENOUS at 12:28

## 2020-12-03 RX ADMIN — MIDAZOLAM 2 MG: 1 INJECTION INTRAMUSCULAR; INTRAVENOUS at 12:07

## 2020-12-03 RX ADMIN — SUCCINYLCHOLINE CHLORIDE 100 MG: 20 INJECTION, SOLUTION INTRAMUSCULAR; INTRAVENOUS at 12:20

## 2020-12-03 RX ADMIN — SUCCINYLCHOLINE CHLORIDE 100 MG: 20 INJECTION, SOLUTION INTRAMUSCULAR; INTRAVENOUS at 12:18

## 2020-12-03 RX ADMIN — PROPOFOL 50 MCG/KG/MIN: 10 INJECTION, EMULSION INTRAVENOUS at 21:31

## 2020-12-03 RX ADMIN — PROPOFOL 10 MCG/KG/MIN: 10 INJECTION, EMULSION INTRAVENOUS at 13:46

## 2020-12-03 RX ADMIN — SUCCINYLCHOLINE CHLORIDE 100 MG: 20 INJECTION INTRAMUSCULAR; INTRAVENOUS at 12:18

## 2020-12-03 RX ADMIN — IOPAMIDOL 90 ML: 755 INJECTION, SOLUTION INTRAVENOUS at 18:19

## 2020-12-03 RX ADMIN — HYDRALAZINE HYDROCHLORIDE 20 MG: 20 INJECTION, SOLUTION INTRAMUSCULAR; INTRAVENOUS at 12:32

## 2020-12-03 RX ADMIN — MIDAZOLAM 2 MG: 1 INJECTION INTRAMUSCULAR; INTRAVENOUS at 12:17

## 2020-12-03 ASSESSMENT — PULMONARY FUNCTION TESTS
PIF_VALUE: 22
PIF_VALUE: 13
PIF_VALUE: 1

## 2020-12-03 ASSESSMENT — PAIN SCALES - GENERAL
PAINLEVEL_OUTOF10: 0
PAINLEVEL_OUTOF10: 7

## 2020-12-03 NOTE — ED NOTES
Unable to obtain CT scan, patient writhing on table, then vomiting. Returned to room. Dr. Lukasz Kelley notified.       Avinash Arroyo RN  12/03/20 6326

## 2020-12-03 NOTE — ED NOTES
Patient continues to vomit. Dr. Jorge Beams at bedside. Received order for Sang.      Ishan Noriega RN  12/03/20 1796

## 2020-12-03 NOTE — ED PROVIDER NOTES
[address] [facility]  Department of Emergency Medicine   ED  Encounter Note  Admit Date/RoomTime: 12/3/2020 11:27 AM  ED Room: 10/10    NAME: Mamta Brunson  : 1955  MRN: [id number]     Chief Complaint:  Altered Mental Status (pt not acting herself. Was normal when she went to bed last night around 11pm.  Son said she was screaming in her sleep around midnight and he woke her but she was fine. Woke up confused at 9am with hard time breathing, leg pain. She did not know where she was or who her son was. )    History of Present Illness     LAST KNOWN WELL TIME & DATE: 9:00 PM 2020 yesterday. Mamta Brunson is a 77 y.o. old female who presents to the emergency department by private vehicle for evaluation of unknown onset  confusion which began last night time prior to arrival.   Since recognized her symptoms have been gradually worsening. She has no history of similar episodes in the past .  The episode occurred at home, and there have been associated symptoms of vomiting. There has been NO history of fever . There has been no history of recent trauma. Code Status on file: Prior. ROS   Pertinent positives and negatives are stated within HPI, all other systems reviewed and are negative. Past Medical History:  has a past medical history of CAD (coronary artery disease), Degenerative disorder of bone, Hard of hearing, Hypertension, and MI (myocardial infarction) ([address] Surgical History:  has a past surgical history that includes joint replacement;  section; Tonsillectomy; Hysterectomy; Hemorrhoid surgery; Breast lumpectomy (Left); and Coronary angioplasty with stent (2020). Social History:  reports that she quit smoking about 2 months ago. Her smoking use included cigarettes. She started smoking about 47 years ago. She has a 11.75 pack-year smoking history. She has never used smokeless tobacco. She reports previous alcohol use.  She reports that she does not use drugs. Family History: family history includes Diabetes in her father; Diego Members in her mother. Allergies: Nitroglycerin and Tramadol    Physical Exam   Oxygen Saturation Interpretation: Normal.        ED Triage Vitals   BP Temp Temp Source Pulse Resp SpO2 Height Weight   -- 12/03/20 1127 12/03/20 1127 12/03/20 1127 12/03/20 1127 12/03/20 1127 12/03/20 1139 12/03/20 1139    98.3 °F (36.8 °C) Infrared 100 20 99 % 5' 5.5\" (1.664 m) 200 lb (90.7 kg)         Constitutional:   Level of Consciousness: awake thrashing about. ETOH: No.         Distress: moderate. Cooperativeness: combative. Eyes:  PERRL, EOMI, no discharge or conjunctival injection. Ears:  External ears without lesions. Throat:  Pharynx without injection, exudate, or tonsillar hypertrophy. Airway patient. Neck:  Normal ROM. Supple. Respiratory:  Clear to auscultation and breath sounds equal.  CV:  Regular rate and rhythm, normal heart sounds, without pathological murmurs, ectopy, gallops, or rubs. GI:  Abdomen Soft, nontender, good bowel sounds. No firm or pulsatile mass. Back:  No costovertebral tenderness. Integument:  Normal turgor. Warm, dry, without visible rash, unless noted elsewhere. Lymphatics: No lymphangitis or adenopathy noted. Neurological:       Orientation: could not be assessed at the current time. Memory:             short term impairment: Unable to assess . Long term impairment: Unable to assess . CN II-XII: III: Pupils:  equal, round, reactive to light. Motor function:            Arm(s): Bilateral moves both extremities. Leg(s): Bilateral moves both. Cerebellar function:            Tremor: Unable to assess . Past-pointing: Unable to assess . Limb Ataxia: Unable to assess . Reflexes: Bilateral knee,ankle,biceps hyporeflexic. Sensory function:            Unable to assess       Gait:  Unable to assess  .     Lab / Imaging Results   (All laboratory and radiology results have been personally reviewed by myself)  Labs:  Results for orders placed or performed during the hospital encounter of 12/03/20   Respiratory Panel, Molecular, with COVID-19 (Restricted: peds pts or suitable admitted adults)    Specimen: Nasopharyngeal   Result Value Ref Range    Adenovirus by PCR Not Detected Not Detected    Bordetella parapertussis by PCR Not Detected Not Detected    Bordetella pertussis by PCR Not Detected Not Detected    Chlamydophilia pneumoniae by PCR Not Detected Not Detected    Coronavirus 229E by PCR Not Detected Not Detected    Coronavirus HKU1 by PCR Not Detected Not Detected    Coronavirus NL63 by PCR Not Detected Not Detected    Coronavirus OC43 by PCR Not Detected Not Detected    SARS-CoV-2, PCR Not Detected Not Detected    Human Metapneumovirus by PCR Not Detected Not Detected    Human Rhinovirus/Enterovirus by PCR Not Detected Not Detected    Influenza A by PCR Not Detected Not Detected    Influenza B by PCR Not Detected Not Detected    Mycoplasma pneumoniae by PCR Not Detected Not Detected    Parainfluenza Virus 1 by PCR Not Detected Not Detected    Parainfluenza Virus 2 by PCR Not Detected Not Detected    Parainfluenza Virus 3 by PCR Not Detected Not Detected    Parainfluenza Virus 4 by PCR Not Detected Not Detected    Respiratory Syncytial Virus by PCR Not Detected Not Detected   CBC Auto Differential   Result Value Ref Range    WBC 13.3 (H) 4.5 - 11.5 E9/L    RBC 3.88 3.50 - 5.50 E12/L    Hemoglobin 12.5 11.5 - 15.5 g/dL    Hematocrit 38.1 34.0 - 48.0 %    MCV 98.2 80.0 - 99.9 fL    MCH 32.2 26.0 - 35.0 pg    MCHC 32.8 32.0 - 34.5 %    RDW 13.9 11.5 - 15.0 fL    Platelets 227 155 - 421 E9/L    MPV 9.9 7.0 - 12.0 fL    Neutrophils % 73.9 43.0 - 80.0 %    Immature Granulocytes % 0.7 0.0 - 5.0 %    Lymphocytes % 16.9 (L) 20.0 - 42.0 %    Monocytes % 6.7 2.0 - 12.0 %    Eosinophils % 1.4 0.0 - 6.0 %    Basophils % 0.4 0.0 - 2.0 %    Neutrophils Absolute 9.86 (H) 1.80 - 7.30 E9/L    Immature Granulocytes # 0.09 E9/L    Lymphocytes Absolute 2.25 1.50 - 4.00 E9/L    Monocytes Absolute 0.89 0.10 - 0.95 E9/L    Eosinophils Absolute 0.19 0.05 - 0.50 E9/L    Basophils Absolute 0.05 0.00 - 0.20 E9/L   Comprehensive Metabolic Panel   Result Value Ref Range    Sodium 140 132 - 146 mmol/L    Potassium 4.3 3.5 - 5.0 mmol/L    Chloride 104 98 - 107 mmol/L    CO2 23 22 - 29 mmol/L    Anion Gap 13 7 - 16 mmol/L    Glucose 129 (H) 74 - 99 mg/dL    BUN 17 8 - 23 mg/dL    CREATININE 0.9 0.5 - 1.0 mg/dL    GFR Non-African American >60 >=60 mL/min/1.73    GFR African American >60     Calcium 10.1 8.6 - 10.2 mg/dL    Total Protein 7.4 6.4 - 8.3 g/dL    Alb 4.6 3.5 - 5.2 g/dL    Total Bilirubin 0.5 0.0 - 1.2 mg/dL    Alkaline Phosphatase 94 35 - 104 U/L    ALT 15 0 - 32 U/L    AST 9 0 - 31 U/L   Troponin   Result Value Ref Range    Troponin <0.01 0.00 - 0.03 ng/mL   Protime-INR   Result Value Ref Range    Protime 10.1 9.3 - 12.4 sec    INR 0.9    APTT   Result Value Ref Range    aPTT 32.2 24.5 - 35.1 sec   Lactic Acid, Plasma   Result Value Ref Range    Lactic Acid 1.3 0.5 - 2.2 mmol/L   Serum Drug Screen   Result Value Ref Range    Ethanol Lvl <10 mg/dL    Acetaminophen Level 5.1 (L) 10.0 - 26.7 mcg/mL    Salicylate, Serum <7.4 0.0 - 30.0 mg/dL    TCA Scrn NEGATIVE Cutoff:300 ng/mL   Urine Drug Screen   Result Value Ref Range    Drug Screen Comment: see below    Ammonia   Result Value Ref Range    Ammonia 12.0 11.0 - 51.0 umol/L   Blood Gas, Arterial   Result Value Ref Range    Date Analyzed 20201203     Time Analyzed 7133     Source: Blood Arterial     pH, Blood Gas 7.335 (L) 7.350 - 7.450    PCO2 38.5 35.0 - 45.0 mmHg    PO2 479.6 (H) 75.0 - 100.0 mmHg    HCO3 20.1 (L) 22.0 - 26.0 mmol/L    B.E. -5.3 (L) -3.0 - 3.0 mmol/L    O2 Sat 99.3 (H) 92.0 - 98.5 %    PO2/FIO2 4.80 mmHg/%    AaDO2 169.9 mmHg    RI(T) 35 %    O2Hb 97.7 (H) 94.0 - 97.0 %    COHb 1.5 0.0 - 1.5 %    MetHb 0.1 0.0 - 1.5 %    O2 Content 18.8 mL/dL    HHb 0.7 0.0 - 5.0 %    tHb (est) 12.7 11.5 - 16.5 g/dL    Potassium 3.49 (L) 3.50 - 5.00 mmol/L    Mode AC     FIO2 100.0 %    Rr Mechanical 16.0 b/min    Vt Mechanical 450.0 mL    Peep/Cpap 5.0 cmH2O    Date Of Collection      Time Collected      Pt Temp 37.0 C     ID [id number]     Lab [id number]     Critical(s) Notified Called to    POCT Glucose   Result Value Ref Range    Meter Glucose 128 (H) 74 - 99 mg/dL   EKG 12 Lead   Result Value Ref Range    Ventricular Rate 84 BPM    Atrial Rate 84 BPM    P-R Interval 168 ms    QRS Duration 94 ms    Q-T Interval 432 ms    QTc Calculation (Bazett) 510 ms    P Axis 84 degrees    R Axis 44 degrees    T Axis 69 degrees     Imaging: All Radiology results interpreted by Radiologist unless otherwise noted. CT HEAD WO CONTRAST   Final Result   1. There is no acute intracranial abnormality. Specifically, there is no   intracranial hemorrhage. 2. Atrophy and periventricular leukomalacia,      XR ABDOMEN FOR NG/OG/NE TUBE PLACEMENT   Final Result   Satisfactory position of nasogastric tube. XR CHEST PORTABLE   Final Result   The tip of the endotracheal tube is seen within the right mainstem bronchus   and should be pulled back by approximately 3 cm.             ED Course / Medical Decision Making     Medications   propofol injection (15 mcg/kg/min × 90.7 kg Intravenous Rate/Dose Change 12/3/20 1640)   midazolam PF (VERSED) injection 4 mg (2 mg Intravenous Given 12/3/20 1207)   hydrALAZINE (APRESOLINE) injection 20 mg (20 mg Intravenous Given 12/3/20 1232)   fentaNYL (SUBLIMAZE) injection 100 mcg (100 mcg Intravenous Given 12/3/20 1240)   midazolam PF (VERSED) injection 2 mg (2 mg Intravenous Given 12/3/20 1237)   midazolam PF (VERSED) injection 2 mg (2 mg Intravenous Given 12/3/20 1228)   midazolam PF (VERSED) injection 2 mg (2 mg Intravenous Given 12/3/20 1217)   midazolam PF (VERSED) injection 4 mg (4 mg Intravenous Given 12/3/20 1258)   succinylcholine (ANECTINE) injection 100 mg (100 mg Intravenous Given 12/3/20 1218)   succinylcholine (ANECTINE) injection 100 mg (100 mg Intravenous Given 12/3/20 1220)   LORazepam (ATIVAN) injection 2 mg (2 mg Intravenous Given 12/3/20 1244)   prochlorperazine (COMPAZINE) injection 10 mg (10 mg Intravenous Given 12/3/20 1208)   ondansetron (ZOFRAN) injection 4 mg (4 mg Intravenous Given 12/3/20 1205)   fentaNYL (SUBLIMAZE) injection 100 mcg (100 mcg Intravenous Given 12/3/20 1644)     ED Course as of Dec 03 2120   Thu Dec 03, 2020   1357 Consulted with the patient's son who states that the patient's last known well was last night at 10 PM.  He states that today she was confused and not acting like herself. He states that he took her for a ride and she complained that she was having trouble breathing in the car. [KG]   56 Consulted with Dr. Fracnisco Quinonez who accepted the patient for admission. [KG]   5 Consulted with Dr. Dyan Child, ICU, who accepted the patient for admission. Discussed the possibility of possible lumbar puncture as we do not have an etiology of her altered mental status from earlier today. However, intensive the states that the patient can just be admitted to the ICU and they will do further testing and evaluation upon her arrival.    [KG]   1844 CT did not show evidence of infection. Endotracheal tube can be retracted approximately 3 cm. Respiratory was called to move the endotracheal tube back. [KG]      ED Course User Index  [KG] Catina Encarnacion DO     Re-Evaluations:  12/3/20      Time: 12:50    Patient intubated and sedated responding to treatment BP improved. Consultations:             Discussion with Dr. Linden MUSE' suni  Procedures:     PROCEDURE  12/3/20       Time:     INTUBATION  Risks, benefits and alternatives if able (for applicable procedures below) described.    Performed By: Reyes Awad MD.    Indication:  impending airway compromise and uncontrolled patient in need of sedation. Informed consent:  Unable to be obtained due to patient's condition. .  Procedure: Following Preoxygenation the patient was pretreated with midazolam and fentanyl followed by succinylcholine. Intubation was performed after single attempt(s) by direct laryngoscopy using a laryngoscope and 7.0mm cuffed endotracheal tube was inserted . Initial post procedure placement:  confirmed by bilateral breath sounds, absence of sounds over the stomach, tube fogging, adequate chest rise, adequate pulse oximetry reading and improved skin color. Tube Secured @ initially 26 then recorrected at 23 cm at the Lip. Post procedure chest x-ray: reposition as above. Procedural Complications: None. Anesthesia Consult:  No.           MDM: Patient may have had cerebral bleed however unable to obtain CTA here discussion with Dr. Muniz at [facility] ED    Counseling:   I have spoken with the son and discussed todays results, in addition to providing specific details for the plan of care and counseling regarding the diagnosis and prognosis and are agreeable with the plan. Assessment      1. Acute delirium    2. Leukocytosis, unspecified type      This patient's ED course included: a personal history and physicial examination, re-evaluation prior to disposition, multiple bedside re-evaluations, IV medications, cardiac monitoring, continuous pulse oximetry and complex medical decision making and emergency management  This patient has improved and been closely monitored during their ED course. Plan   Transfer to [facility] ED. Patient condition is critical.    New Medications     New Prescriptions    No medications on file     CRITICAL CARE:   60 MINUTES. Please note that the withdrawal or failure to initiate urgent interventions for this patient would likely result in a life threatening deterioration or permanent disability. Accordingly this patient received the above mentioned time, excluding separately billable procedures. Electronically signed by Clementine Islas MD   DD: 12/3/20  **This report was transcribed using voice recognition software. Every effort was made to ensure accuracy; however, inadvertent computerized transcription errors may be present.   END OF PROVIDER NOTE      Clementine Islas MD  12/03/20 Gaurang Peace MD  12/03/20 [id number]       Clementine Islas MD  12/03/20 7903

## 2020-12-03 NOTE — ED NOTES
Oral suctioning completed. ETT pulled back to 23 lip line per Dr. Loan Cheney verbal order.       Natalee Knox RN  12/03/20 6325

## 2020-12-03 NOTE — ED NOTES
Patient placed in soft restraint to upper extremities to protect from pulling out lines and tubes     Alexandra Simons RN  12/03/20 9558

## 2020-12-03 NOTE — ED NOTES
Versed given for transport sedation. MICU to Main ED at this time.      David Bridges RN  12/03/20 3008

## 2020-12-03 NOTE — ED NOTES
ETT placed. #7,  26 at lip line. Breath sounds heard in all lung hawley.      Adán Knox RN  12/03/20 3585

## 2020-12-03 NOTE — ED NOTES
Portable xray at bedside. Patient becoming restless. Dr. Leda Zhu notified.       Alicia Min RN  12/03/20 7408

## 2020-12-03 NOTE — ED PROVIDER NOTES
The patient is a 40-year-old female with history of CAD, alcohol abuse, hypertension who presents to the emergency department via EMS from [facility] intubated for altered mental status. The patient was brought to [place] because she was confused and not acting like herself this morning. History is provided by the patient's son who I called to obtain the history. The patient was intubated on arrival.  The patient son states the patient's last known well was yesterday evening at 10 PM.  He states that she was acting herself and normal when she went to bed. However, he states that when she woke up this morning she was confused and not acting right around 9 AM.  He states that she kept telling him she was fine but she just was not acting like herself. Patient has a history of alcohol abuse, but she has not had a drink in over 18 months per her son. He states there is no alcohol in the house. He states that they went for a drive this morning just before he took her to [place], and she was complaining of some shortness of breath and grabbing her legs complaining of pain in her bilateral legs. Patient does have a history of chronic back pain and is on gabapentin. Apparently, at [facility] she was nauseous and vomiting and was thrashing around. They are unable to obtain a CT scan, and the patient was intubated for airway protection. The history is provided by the patient. Review of Systems   Unable to perform ROS: Intubated        Physical Exam  Vitals signs and nursing note reviewed. Constitutional:       General: She is not in acute distress. Appearance: She is well-developed. She is ill-appearing. She is not toxic-appearing or diaphoretic. Interventions: She is intubated. HENT:      Head: Normocephalic and atraumatic. Nose: Nose normal.      Mouth/Throat:      Lips: Pink. No lesions.       Mouth: Mucous membranes are moist.   Eyes:      General: Lids are normal.      Pupils: Pupils are equal, round, and reactive to light. Neck:      Musculoskeletal: Normal range of motion and neck supple. Cardiovascular:      Rate and Rhythm: Normal rate and regular rhythm. Pulses:           Radial pulses are 2+ on the right side and 2+ on the left side. Heart sounds: Normal heart sounds, S1 normal and S2 normal. No murmur. Pulmonary:      Effort: Pulmonary effort is normal. No respiratory distress. She is intubated. Breath sounds: Normal breath sounds. No decreased breath sounds, wheezing, rhonchi or rales. Abdominal:      General: Bowel sounds are normal.      Palpations: Abdomen is soft. Tenderness: There is no abdominal tenderness. There is no guarding or rebound. Skin:     General: Skin is warm and dry. Neurological:      Mental Status: She is lethargic. GCS: GCS eye subscore is 2. GCS verbal subscore is 1. GCS motor subscore is 4. Motor: No tremor, abnormal muscle tone or seizure activity. Procedures     MDM  Number of Diagnoses or Management Options  Acute delirium:   Leukocytosis, unspecified type:   Prolonged Q-T interval on ECG:   Diagnosis management comments: The patient is a 40-year-old female who presents to the emergency department for altered mental status. She was intubated prior to arrival.  She is opening her eyes to speech and withdraws from pain. She is hemodynamically stable. She was given 4 mg of Versed by [facility] prior to arrival.  Did start propofol for sedation upon her arrival.  OG was also placed on arrival.  CT head pending. ABG obtained and vent settings were adjusted accordingly, currently on assist control mode with a rate of 16, tidal volume of 450, O2 40%, and PEEP of 6. Chest x-ray was performed and [facility] which did show right mainstem intubation and that the ET tube to be pulled back approximately 3 cm.   This was withdrawn 3 cm prior to transport she was confused and not acting like herself. He states that he took her for a ride and she complained that she was having trouble breathing in the car. [KG]      ED Course User Index  [KG] Catina Encarnacion DO          EKG: This EKG is signed and interpreted by me. Rate: 84  Rhythm: Sinus  Interpretation: Normal sinus rhythm, normal axis, no acute ST elevation or depressions, prolonged QT, QTC is 510  Comparison: stable as compared to patient's most recent EKG     ED Course as of Dec 03 2333   Thu Dec 03, 2020   1357 Consulted with the patient's son who states that the patient's last known well was last night at 10 PM.  He states that today she was confused and not acting like herself. He states that he took her for a ride and she complained that she was having trouble breathing in the car. [KG]   56 Consulted with Dr. Francisco Quinonez who accepted the patient for admission. [KG]   5 Consulted with Dr. Dyan Child, ICU, who accepted the patient for admission. Discussed the possibility of possible lumbar puncture as we do not have an etiology of her altered mental status from earlier today. However, intensive the states that the patient can just be admitted to the ICU and they will do further testing and evaluation upon her arrival.    [KG]   184 CT did not show evidence of infection. Endotracheal tube can be retracted approximately 3 cm. Respiratory was called to move the endotracheal tube back. [KG]      ED Course User Index  [KG] Catina Encarnacion DO       --------------------------------------------- PAST HISTORY ---------------------------------------------  Past Medical History:  has a past medical history of CAD (coronary artery disease), Degenerative disorder of bone, Hard of hearing, Hypertension, and [facility] Past Surgical History:  has a past surgical history that includes joint replacement;  section; Tonsillectomy; Hysterectomy;  Hemorrhoid surgery; Breast lumpectomy (Left); and Coronary angioplasty with stent (08/22/2020). Social History:  reports that she quit smoking about 2 months ago. Her smoking use included cigarettes. She started smoking about 47 years ago. She has a 11.75 pack-year smoking history. She has never used smokeless tobacco. She reports previous alcohol use. She reports that she does not use drugs. Family History: family history includes Diabetes in her father; Narciso Joseph in her mother. The patients home medications have been reviewed.     Allergies: Nitroglycerin and Tramadol    -------------------------------------------------- RESULTS -------------------------------------------------    LABS:  Results for orders placed or performed during the hospital encounter of 12/03/20   Respiratory Panel, Molecular, with COVID-19 (Restricted: peds pts or suitable admitted adults)    Specimen: Nasopharyngeal   Result Value Ref Range    Adenovirus by PCR Not Detected Not Detected    Bordetella parapertussis by PCR Not Detected Not Detected    Bordetella pertussis by PCR Not Detected Not Detected    Chlamydophilia pneumoniae by PCR Not Detected Not Detected    Coronavirus 229E by PCR Not Detected Not Detected    Coronavirus HKU1 by PCR Not Detected Not Detected    Coronavirus NL63 by PCR Not Detected Not Detected    Coronavirus OC43 by PCR Not Detected Not Detected    SARS-CoV-2, PCR Not Detected Not Detected    Human Metapneumovirus by PCR Not Detected Not Detected    Human Rhinovirus/Enterovirus by PCR Not Detected Not Detected    Influenza A by PCR Not Detected Not Detected    Influenza B by PCR Not Detected Not Detected    Mycoplasma pneumoniae by PCR Not Detected Not Detected    Parainfluenza Virus 1 by PCR Not Detected Not Detected    Parainfluenza Virus 2 by PCR Not Detected Not Detected    Parainfluenza Virus 3 by PCR Not Detected Not Detected    Parainfluenza Virus 4 by PCR Not Detected Not Detected    Respiratory Syncytial mcg/mL    Salicylate, Serum <1.6 0.0 - 30.0 mg/dL    TCA Scrn NEGATIVE Cutoff:300 ng/mL   Urine Drug Screen   Result Value Ref Range    Amphetamine Screen, Urine NOT DETECTED Negative <1000 ng/mL    Barbiturate Screen, Ur NOT DETECTED Negative < 200 ng/mL    Benzodiazepine Screen, Urine POSITIVE (A) Negative < 200 ng/mL    Cannabinoid Scrn, Ur NOT DETECTED Negative < 50ng/mL    Cocaine Metabolite Screen, Urine NOT DETECTED Negative < 300 ng/mL    Opiate Scrn, Ur NOT DETECTED Negative < 300ng/mL    PCP Screen, Urine NOT DETECTED Negative < 25 ng/mL    Methadone Screen, Urine NOT DETECTED Negative <300 ng/mL    Oxycodone Urine NOT DETECTED Negative <100 ng/mL    FENTANYL SCREEN, URINE NOT DETECTED Negative <1 ng/mL    Drug Screen Comment: see below    Ammonia   Result Value Ref Range    Ammonia 12.0 11.0 - 51.0 umol/L   Blood Gas, Arterial   Result Value Ref Range    Date Analyzed [id number]     Time Analyzed 1354     Source: Blood Arterial     pH, Blood Gas 7.335 (L) 7.350 - 7.450    PCO2 38.5 35.0 - 45.0 mmHg    PO2 479.6 (H) 75.0 - 100.0 mmHg    HCO3 20.1 (L) 22.0 - 26.0 mmol/L    B.E. -5.3 (L) -3.0 - 3.0 mmol/L    O2 Sat 99.3 (H) 92.0 - 98.5 %    PO2/FIO2 4.80 mmHg/%    AaDO2 169.9 mmHg    RI(T) 35 %    O2Hb 97.7 (H) 94.0 - 97.0 %    COHb 1.5 0.0 - 1.5 %    MetHb 0.1 0.0 - 1.5 %    O2 Content 18.8 mL/dL    HHb 0.7 0.0 - 5.0 %    tHb (est) 12.7 11.5 - 16.5 g/dL    Potassium 3.49 (L) 3.50 - 5.00 mmol/L    Mode AC     FIO2 100.0 %    Rr Mechanical 16.0 b/min    Vt Mechanical 450.0 mL    Peep/Cpap 5.0 cmH2O    Date Of Collection      Time Collected      Pt Temp 37.0 C     ID [id number]     Lab [id number]     Critical(s) Notified Called to    URINE DRUG SCREEN   Result Value Ref Range    Amphetamine Screen, Urine NOT DETECTED Negative <1000 ng/mL    Barbiturate Screen, Ur NOT DETECTED Negative < 200 ng/mL    Benzodiazepine Screen, Urine POSITIVE (A) Negative < 200 ng/mL    Cannabinoid Scrn, Ur NOT DETECTED Negative < (!) 171/95 -- -- 80 20 99 % -- --   12/03/20 1945 (!) 170/100 -- -- 79 18 98 % -- --   12/03/20 1930 (!) 170/100 -- -- 82 21 98 % -- --   12/03/20 1920 -- -- -- 87 20 98 % -- --   12/03/20 1915 (!) 183/131 -- -- 88 15 97 % -- --   12/03/20 1900 (!) 173/100 -- -- 81 13 99 % -- --   12/03/20 1845 (!) 176/101 -- -- 84 16 99 % -- --   12/03/20 1830 (!) 181/100 -- -- 88 16 98 % -- --   12/03/20 1810 -- -- -- 90 17 97 % -- --   12/03/20 1800 (!) 186/102 -- -- 95 15 97 % -- --   12/03/20 1745 (!) 164/86 -- -- 94 15 98 % -- --   12/03/20 1730 (!) 166/86 -- -- 96 18 98 % -- --   12/03/20 1715 (!) 165/76 -- -- 98 17 99 % -- --   12/03/20 1700 (!) 144/75 -- -- 100 14 98 % -- --   12/03/20 1645 (!) 149/65 -- -- 98 15 98 % -- --   12/03/20 1630 (!) 181/102 -- -- 85 24 100 % -- --   12/03/20 1615 (!) 164/92 -- -- 86 19 100 % -- --   12/03/20 1600 (!) 168/93 -- -- 84 26 100 % -- --   12/03/20 1530 -- -- -- 82 18 98 % -- --   12/03/20 1515 (!) 174/86 -- -- 79 16 98 % -- --   12/03/20 1500 (!) 156/82 -- -- 82 25 98 % -- --   12/03/20 1445 (!) 145/81 -- -- 79 19 98 % -- --   12/03/20 1430 131/73 -- -- 77 21 99 % -- --   12/03/20 1415 (!) 159/79 -- -- 82 16 98 % -- --   12/03/20 1400 (!) 141/77 -- -- 93 18 100 % -- --   12/03/20 1345 (!) 144/85 -- -- 84 17 100 % -- --   12/03/20 1332 (!) 117/91 97.3 °F (36.3 °C) Oral 87 18 100 % -- --   12/03/20 1330 -- -- -- 91 16 100 % -- --   12/03/20 1300 138/84 -- -- -- -- -- -- --   12/03/20 1257 (!) 133/59 -- -- 88 12 100 % -- --   12/03/20 1245 (!) 165/87 -- -- -- -- 99 % -- --   12/03/20 1244 138/84 -- -- 101 12 99 % -- --   12/03/20 1240 (!) 165/87 -- -- 98 14 99 % -- --   12/03/20 1236 -- -- -- 90 12 94 % -- --   12/03/20 1232 (!) 201/104 -- -- -- -- -- -- --   12/03/20 1230 (!) 201/104 -- -- -- -- 99 % -- --   12/03/20 1226 (!) 201/104 -- -- 82 16 94 % -- --   12/03/20 1220 (!) 254/116 -- -- 78 16 94 % -- --   12/03/20 1218 (!) 260/112 -- -- 84 -- -- -- --   12/03/20 1215 (!) 180/96 -- --

## 2020-12-03 NOTE — ED NOTES
Dr Lorraine Bryant attempting intubation. Oral suctioning of small amount red foamy sputum. Versed given.      Babs Anand, RN  12/03/20 1165

## 2020-12-04 LAB
AADO2: 145.5 MMHG
B.E.: 0.4 MMOL/L (ref -3–3)
COHB: 1.1 % (ref 0–1.5)
CRITICAL: ABNORMAL
DATE ANALYZED: ABNORMAL
DATE OF COLLECTION: ABNORMAL
EKG ATRIAL RATE: 84 BPM
EKG P AXIS: 84 DEGREES
EKG P-R INTERVAL: 168 MS
EKG Q-T INTERVAL: 432 MS
EKG QRS DURATION: 94 MS
EKG QTC CALCULATION (BAZETT): 510 MS
EKG R AXIS: 44 DEGREES
EKG T AXIS: 69 DEGREES
EKG VENTRICULAR RATE: 84 BPM
FIO2: 40 %
HCO3: 23 MMOL/L (ref 22–26)
HHB: 2.8 % (ref 0–5)
LAB: ABNORMAL
LACTIC ACID: 2.5 MMOL/L (ref 0.5–2.2)
Lab: ABNORMAL
METHB: 0.1 % (ref 0–1.5)
MODE: AC
O2 CONTENT: 15.9 ML/DL
O2 SATURATION: 97.2 % (ref 92–98.5)
O2HB: 96 % (ref 94–97)
OPERATOR ID: 359
PATIENT TEMP: 37 C
PCO2: 30.6 MMHG (ref 35–45)
PEEP/CPAP: 6 CMH2O
PFO2: 2.36 MMHG/%
PH BLOOD GAS: 7.49 (ref 7.35–7.45)
PO2: 94.5 MMHG (ref 75–100)
RI(T): 1.54
RR MECHANICAL: 16 B/MIN
SOURCE, BLOOD GAS: ABNORMAL
THB: 11.7 G/DL (ref 11.5–16.5)
TIME ANALYZED: 2247
VT MECHANICAL: 450 ML

## 2020-12-04 PROCEDURE — 2000000000 HC ICU R&B

## 2020-12-04 PROCEDURE — 2580000003 HC RX 258: Performed by: EMERGENCY MEDICINE

## 2020-12-04 PROCEDURE — 6360000002 HC RX W HCPCS

## 2020-12-04 PROCEDURE — 6360000002 HC RX W HCPCS: Performed by: EMERGENCY MEDICINE

## 2020-12-04 PROCEDURE — 94002 VENT MGMT INPAT INIT DAY: CPT

## 2020-12-04 PROCEDURE — 6370000000 HC RX 637 (ALT 250 FOR IP): Performed by: INTERNAL MEDICINE

## 2020-12-04 PROCEDURE — 2500000003 HC RX 250 WO HCPCS: Performed by: STUDENT IN AN ORGANIZED HEALTH CARE EDUCATION/TRAINING PROGRAM

## 2020-12-04 PROCEDURE — 83605 ASSAY OF LACTIC ACID: CPT

## 2020-12-04 PROCEDURE — 2580000003 HC RX 258: Performed by: INTERNAL MEDICINE

## 2020-12-04 PROCEDURE — 80053 COMPREHEN METABOLIC PANEL: CPT

## 2020-12-04 PROCEDURE — 2500000003 HC RX 250 WO HCPCS: Performed by: INTERNAL MEDICINE

## 2020-12-04 PROCEDURE — 83880 ASSAY OF NATRIURETIC PEPTIDE: CPT

## 2020-12-04 PROCEDURE — 93010 ELECTROCARDIOGRAM REPORT: CPT | Performed by: INTERNAL MEDICINE

## 2020-12-04 PROCEDURE — 6360000002 HC RX W HCPCS: Performed by: STUDENT IN AN ORGANIZED HEALTH CARE EDUCATION/TRAINING PROGRAM

## 2020-12-04 PROCEDURE — 82805 BLOOD GASES W/O2 SATURATION: CPT

## 2020-12-04 RX ORDER — OLANZAPINE 10 MG/1
10 INJECTION, POWDER, LYOPHILIZED, FOR SOLUTION INTRAMUSCULAR 2 TIMES DAILY PRN
Status: DISCONTINUED | OUTPATIENT
Start: 2020-12-04 | End: 2020-12-07

## 2020-12-04 RX ORDER — METOPROLOL SUCCINATE 100 MG/1
100 TABLET, EXTENDED RELEASE ORAL DAILY
Status: ON HOLD | COMMUNITY
End: 2021-05-25 | Stop reason: HOSPADM

## 2020-12-04 RX ORDER — LABETALOL HYDROCHLORIDE 5 MG/ML
10 INJECTION, SOLUTION INTRAVENOUS EVERY 6 HOURS PRN
Status: DISCONTINUED | OUTPATIENT
Start: 2020-12-04 | End: 2020-12-15

## 2020-12-04 RX ORDER — MIDAZOLAM HYDROCHLORIDE 1 MG/ML
INJECTION INTRAMUSCULAR; INTRAVENOUS
Status: COMPLETED
Start: 2020-12-04 | End: 2020-12-04

## 2020-12-04 RX ORDER — NITROGLYCERIN 0.4 MG/1
0.4 TABLET SUBLINGUAL EVERY 5 MIN PRN
COMMUNITY
End: 2022-05-18

## 2020-12-04 RX ORDER — PROPOFOL 10 MG/ML
INJECTION, EMULSION INTRAVENOUS
Status: DISCONTINUED
Start: 2020-12-04 | End: 2020-12-04

## 2020-12-04 RX ORDER — PROPOFOL 10 MG/ML
10 INJECTION, EMULSION INTRAVENOUS
Status: DISCONTINUED | OUTPATIENT
Start: 2020-12-04 | End: 2020-12-09

## 2020-12-04 RX ORDER — PROPOFOL 10 MG/ML
INJECTION, EMULSION INTRAVENOUS
Status: COMPLETED
Start: 2020-12-04 | End: 2020-12-04

## 2020-12-04 RX ORDER — METOPROLOL SUCCINATE 50 MG/1
50 TABLET, EXTENDED RELEASE ORAL DAILY
Status: DISCONTINUED | OUTPATIENT
Start: 2020-12-04 | End: 2020-12-14

## 2020-12-04 RX ORDER — PROMETHAZINE HYDROCHLORIDE 25 MG/1
12.5 TABLET ORAL EVERY 6 HOURS PRN
Status: DISCONTINUED | OUTPATIENT
Start: 2020-12-04 | End: 2020-12-05

## 2020-12-04 RX ORDER — ASPIRIN 81 MG/1
81 TABLET, CHEWABLE ORAL DAILY
Status: DISCONTINUED | OUTPATIENT
Start: 2020-12-05 | End: 2020-12-17 | Stop reason: HOSPADM

## 2020-12-04 RX ORDER — FENTANYL CITRATE 50 UG/ML
INJECTION, SOLUTION INTRAMUSCULAR; INTRAVENOUS
Status: COMPLETED
Start: 2020-12-04 | End: 2020-12-04

## 2020-12-04 RX ORDER — FENTANYL CITRATE 50 UG/ML
INJECTION, SOLUTION INTRAMUSCULAR; INTRAVENOUS
Status: DISCONTINUED
Start: 2020-12-04 | End: 2020-12-04

## 2020-12-04 RX ORDER — ACETAMINOPHEN 325 MG/1
650 TABLET ORAL EVERY 6 HOURS PRN
Status: DISCONTINUED | OUTPATIENT
Start: 2020-12-04 | End: 2020-12-17 | Stop reason: HOSPADM

## 2020-12-04 RX ORDER — MIDAZOLAM HYDROCHLORIDE 1 MG/ML
INJECTION INTRAMUSCULAR; INTRAVENOUS
Status: DISCONTINUED
Start: 2020-12-04 | End: 2020-12-04

## 2020-12-04 RX ORDER — ACETAMINOPHEN 650 MG/1
650 SUPPOSITORY RECTAL EVERY 6 HOURS PRN
Status: DISCONTINUED | OUTPATIENT
Start: 2020-12-04 | End: 2020-12-17 | Stop reason: HOSPADM

## 2020-12-04 RX ORDER — LISINOPRIL 20 MG/1
20 TABLET ORAL DAILY
Status: ON HOLD | COMMUNITY
End: 2021-05-25 | Stop reason: HOSPADM

## 2020-12-04 RX ORDER — SODIUM CHLORIDE 0.9 % (FLUSH) 0.9 %
10 SYRINGE (ML) INJECTION PRN
Status: DISCONTINUED | OUTPATIENT
Start: 2020-12-04 | End: 2020-12-17 | Stop reason: HOSPADM

## 2020-12-04 RX ORDER — FENTANYL CITRATE 50 UG/ML
50 INJECTION, SOLUTION INTRAMUSCULAR; INTRAVENOUS ONCE
Status: COMPLETED | OUTPATIENT
Start: 2020-12-04 | End: 2020-12-04

## 2020-12-04 RX ORDER — HYDRALAZINE HYDROCHLORIDE 20 MG/ML
10 INJECTION INTRAMUSCULAR; INTRAVENOUS
Status: DISCONTINUED | OUTPATIENT
Start: 2020-12-04 | End: 2020-12-17 | Stop reason: HOSPADM

## 2020-12-04 RX ORDER — FOLIC ACID 5 MG/ML
1 INJECTION, SOLUTION INTRAMUSCULAR; INTRAVENOUS; SUBCUTANEOUS DAILY
Status: DISCONTINUED | OUTPATIENT
Start: 2020-12-05 | End: 2020-12-15

## 2020-12-04 RX ORDER — SODIUM CHLORIDE 9 MG/ML
INJECTION, SOLUTION INTRAVENOUS CONTINUOUS
Status: ACTIVE | OUTPATIENT
Start: 2020-12-05 | End: 2020-12-05

## 2020-12-04 RX ORDER — THIAMINE HYDROCHLORIDE 100 MG/ML
100 INJECTION, SOLUTION INTRAMUSCULAR; INTRAVENOUS DAILY
Status: DISCONTINUED | OUTPATIENT
Start: 2020-12-05 | End: 2020-12-04 | Stop reason: SDUPTHER

## 2020-12-04 RX ORDER — ONDANSETRON 2 MG/ML
4 INJECTION INTRAMUSCULAR; INTRAVENOUS EVERY 6 HOURS PRN
Status: DISCONTINUED | OUTPATIENT
Start: 2020-12-04 | End: 2020-12-05

## 2020-12-04 RX ORDER — LOSARTAN POTASSIUM 100 MG/1
100 TABLET ORAL DAILY
Status: ON HOLD | COMMUNITY
End: 2020-12-17 | Stop reason: HOSPADM

## 2020-12-04 RX ORDER — BACLOFEN 10 MG/1
5 TABLET ORAL 2 TIMES DAILY
Status: ON HOLD | COMMUNITY
End: 2020-12-17 | Stop reason: HOSPADM

## 2020-12-04 RX ORDER — FENTANYL CITRATE 50 UG/ML
100 INJECTION, SOLUTION INTRAMUSCULAR; INTRAVENOUS ONCE
Status: DISCONTINUED | OUTPATIENT
Start: 2020-12-04 | End: 2020-12-04

## 2020-12-04 RX ORDER — MIDAZOLAM HYDROCHLORIDE 2 MG/2ML
2 INJECTION, SOLUTION INTRAMUSCULAR; INTRAVENOUS ONCE
Status: COMPLETED | OUTPATIENT
Start: 2020-12-04 | End: 2020-12-04

## 2020-12-04 RX ORDER — SODIUM CHLORIDE 0.9 % (FLUSH) 0.9 %
10 SYRINGE (ML) INJECTION EVERY 12 HOURS SCHEDULED
Status: DISCONTINUED | OUTPATIENT
Start: 2020-12-04 | End: 2020-12-17 | Stop reason: HOSPADM

## 2020-12-04 RX ORDER — ATORVASTATIN CALCIUM 80 MG/1
80 TABLET, FILM COATED ORAL NIGHTLY
Status: DISCONTINUED | OUTPATIENT
Start: 2020-12-04 | End: 2020-12-17 | Stop reason: HOSPADM

## 2020-12-04 RX ORDER — POLYETHYLENE GLYCOL 3350 17 G/17G
17 POWDER, FOR SOLUTION ORAL DAILY PRN
Status: DISCONTINUED | OUTPATIENT
Start: 2020-12-04 | End: 2020-12-17 | Stop reason: HOSPADM

## 2020-12-04 RX ORDER — LOSARTAN POTASSIUM 25 MG/1
25 TABLET ORAL DAILY
Status: ON HOLD | COMMUNITY
End: 2020-12-17 | Stop reason: HOSPADM

## 2020-12-04 RX ORDER — CYCLOBENZAPRINE HCL 10 MG
10 TABLET ORAL 3 TIMES DAILY PRN
Status: ON HOLD | COMMUNITY
End: 2020-12-17 | Stop reason: HOSPADM

## 2020-12-04 RX ORDER — PANTOPRAZOLE SODIUM 40 MG/10ML
40 INJECTION, POWDER, LYOPHILIZED, FOR SOLUTION INTRAVENOUS DAILY
Status: DISCONTINUED | OUTPATIENT
Start: 2020-12-05 | End: 2020-12-15

## 2020-12-04 RX ADMIN — PROPOFOL INJECTABLE EMULSION 50 MCG/KG/MIN: 10 INJECTION, EMULSION INTRAVENOUS at 08:00

## 2020-12-04 RX ADMIN — MIDAZOLAM: 1 INJECTION INTRAMUSCULAR; INTRAVENOUS at 13:58

## 2020-12-04 RX ADMIN — ATORVASTATIN CALCIUM 80 MG: 80 TABLET, FILM COATED ORAL at 22:38

## 2020-12-04 RX ADMIN — MIDAZOLAM 2 MG: 1 INJECTION INTRAMUSCULAR; INTRAVENOUS at 08:25

## 2020-12-04 RX ADMIN — PROPOFOL 50 MCG/KG/MIN: 10 INJECTION, EMULSION INTRAVENOUS at 22:52

## 2020-12-04 RX ADMIN — Medication 10 ML: at 22:38

## 2020-12-04 RX ADMIN — FENTANYL CITRATE 50 MCG: 50 INJECTION, SOLUTION INTRAMUSCULAR; INTRAVENOUS at 09:55

## 2020-12-04 RX ADMIN — MIDAZOLAM HYDROCHLORIDE 2 MG: 2 INJECTION, SOLUTION INTRAMUSCULAR; INTRAVENOUS at 08:25

## 2020-12-04 RX ADMIN — SODIUM CHLORIDE: 9 INJECTION, SOLUTION INTRAVENOUS at 23:40

## 2020-12-04 RX ADMIN — TICAGRELOR 90 MG: 90 TABLET ORAL at 23:39

## 2020-12-04 RX ADMIN — Medication 150 MCG/HR: at 10:36

## 2020-12-04 RX ADMIN — PROPOFOL INJECTABLE EMULSION 50 MCG/KG/MIN: 10 INJECTION, EMULSION INTRAVENOUS at 04:12

## 2020-12-04 RX ADMIN — MIDAZOLAM 2 MG: 1 INJECTION INTRAMUSCULAR; INTRAVENOUS at 16:43

## 2020-12-04 RX ADMIN — FENTANYL CITRATE 50 MCG: 50 INJECTION, SOLUTION INTRAMUSCULAR; INTRAVENOUS at 08:25

## 2020-12-04 RX ADMIN — Medication 200 MCG/HR: at 17:54

## 2020-12-04 RX ADMIN — PROPOFOL INJECTABLE EMULSION 50 MCG/KG/MIN: 10 INJECTION, EMULSION INTRAVENOUS at 16:37

## 2020-12-04 RX ADMIN — MIDAZOLAM HYDROCHLORIDE 1 MG/HR: 5 INJECTION, SOLUTION INTRAMUSCULAR; INTRAVENOUS at 17:33

## 2020-12-04 RX ADMIN — MIDAZOLAM HYDROCHLORIDE: 1 INJECTION, SOLUTION INTRAMUSCULAR; INTRAVENOUS at 19:08

## 2020-12-04 RX ADMIN — MIDAZOLAM HYDROCHLORIDE: 1 INJECTION, SOLUTION INTRAMUSCULAR; INTRAVENOUS at 13:58

## 2020-12-04 RX ADMIN — SODIUM CHLORIDE 0.2 MCG/KG/HR: 9 INJECTION, SOLUTION INTRAVENOUS at 22:38

## 2020-12-04 RX ADMIN — FENTANYL CITRATE 50 MCG: 50 INJECTION, SOLUTION INTRAMUSCULAR; INTRAVENOUS at 17:45

## 2020-12-04 ASSESSMENT — PULMONARY FUNCTION TESTS
PIF_VALUE: 21
PIF_VALUE: 24
PIF_VALUE: 19
PIF_VALUE: 21
PIF_VALUE: 23
PIF_VALUE: 25
PIF_VALUE: 26

## 2020-12-04 ASSESSMENT — PAIN SCALES - GENERAL
PAINLEVEL_OUTOF10: 0
PAINLEVEL_OUTOF10: 10
PAINLEVEL_OUTOF10: 0

## 2020-12-04 NOTE — PROGRESS NOTES
Pt sxd orally and oet . As ordered oet retracted 3 cm from 24 top lpline to 21 top lipline.  bialteral equal breath sounds spo2 97

## 2020-12-04 NOTE — ED NOTES
Pt came out of sedation and was thrashing around in danger of self extubating, 100 mcg of fentanyl and 2 mg of versed given  Per verbal order of physician      Yamilet Sotelo RN  12/04/20 1200

## 2020-12-04 NOTE — ED PROVIDER NOTES
I was called into the patient's room, she was hypoxic with a pulse ox in the 30s and blue. I took her off the vent and began bagging her. She quickly recovered to a pulse oxygenation of 100%. Her coloring improved. Respiratory therapist came in and noticed that the vent tubing was hooked up inappropriately. The ventilator was fixed and patient was given a bolus of propofol for sedation. She had equal breath sounds bilaterally with no sounds in the stomach on auscultation. Will continue to monitor.      Syl Danielson DO  12/04/20 3266

## 2020-12-05 ENCOUNTER — APPOINTMENT (OUTPATIENT)
Dept: GENERAL RADIOLOGY | Age: 65
DRG: 207 | End: 2020-12-05
Payer: MEDICARE

## 2020-12-05 LAB
AADO2: 160.3 MMHG
ALBUMIN SERPL-MCNC: 3.5 G/DL (ref 3.5–5.2)
ALBUMIN SERPL-MCNC: 3.7 G/DL (ref 3.5–5.2)
ALP BLD-CCNC: 69 U/L (ref 35–104)
ALP BLD-CCNC: 81 U/L (ref 35–104)
ALT SERPL-CCNC: 10 U/L (ref 0–32)
ALT SERPL-CCNC: 10 U/L (ref 0–32)
ANION GAP SERPL CALCULATED.3IONS-SCNC: 12 MMOL/L (ref 7–16)
ANION GAP SERPL CALCULATED.3IONS-SCNC: 4 MMOL/L (ref 7–16)
AST SERPL-CCNC: 12 U/L (ref 0–31)
AST SERPL-CCNC: 13 U/L (ref 0–31)
B.E.: 0.6 MMOL/L (ref -3–3)
BACTERIA: ABNORMAL /HPF
BASOPHILS ABSOLUTE: 0.02 E9/L (ref 0–0.2)
BASOPHILS RELATIVE PERCENT: 0.1 % (ref 0–2)
BILIRUB SERPL-MCNC: 0.3 MG/DL (ref 0–1.2)
BILIRUB SERPL-MCNC: 0.4 MG/DL (ref 0–1.2)
BILIRUBIN URINE: NEGATIVE
BLOOD, URINE: ABNORMAL
BUN BLDV-MCNC: 12 MG/DL (ref 8–23)
BUN BLDV-MCNC: 13 MG/DL (ref 8–23)
C-REACTIVE PROTEIN: 1.9 MG/DL (ref 0–0.4)
CALCIUM SERPL-MCNC: 8.3 MG/DL (ref 8.6–10.2)
CALCIUM SERPL-MCNC: 8.7 MG/DL (ref 8.6–10.2)
CHLORIDE BLD-SCNC: 106 MMOL/L (ref 98–107)
CHLORIDE BLD-SCNC: 106 MMOL/L (ref 98–107)
CLARITY: CLEAR
CO2: 23 MMOL/L (ref 22–29)
CO2: 28 MMOL/L (ref 22–29)
COHB: 0.4 % (ref 0–1.5)
COLOR: YELLOW
CREAT SERPL-MCNC: 0.6 MG/DL (ref 0.5–1)
CREAT SERPL-MCNC: 0.6 MG/DL (ref 0.5–1)
CRITICAL: ABNORMAL
DATE ANALYZED: ABNORMAL
DATE OF COLLECTION: ABNORMAL
EOSINOPHILS ABSOLUTE: 0.03 E9/L (ref 0.05–0.5)
EOSINOPHILS RELATIVE PERCENT: 0.2 % (ref 0–6)
FIO2: 40 %
GFR AFRICAN AMERICAN: >60
GFR AFRICAN AMERICAN: >60
GFR NON-AFRICAN AMERICAN: >60 ML/MIN/1.73
GFR NON-AFRICAN AMERICAN: >60 ML/MIN/1.73
GLUCOSE BLD-MCNC: 110 MG/DL (ref 74–99)
GLUCOSE BLD-MCNC: 137 MG/DL (ref 74–99)
GLUCOSE URINE: NEGATIVE MG/DL
HCO3: 23.2 MMOL/L (ref 22–26)
HCT VFR BLD CALC: 29.7 % (ref 34–48)
HCT VFR BLD CALC: 31.7 % (ref 34–48)
HEMOGLOBIN: 9.7 G/DL (ref 11.5–15.5)
HEMOGLOBIN: 9.8 G/DL (ref 11.5–15.5)
HHB: 4.1 % (ref 0–5)
IMMATURE GRANULOCYTES #: 0.06 E9/L
IMMATURE GRANULOCYTES %: 0.4 % (ref 0–5)
KETONES, URINE: NEGATIVE MG/DL
LAB: ABNORMAL
LACTIC ACID: 0.9 MMOL/L (ref 0.5–2.2)
LACTIC ACID: 1.7 MMOL/L (ref 0.5–2.2)
LACTIC ACID: 3.7 MMOL/L (ref 0.5–2.2)
LEUKOCYTE ESTERASE, URINE: ABNORMAL
LYMPHOCYTES ABSOLUTE: 2.13 E9/L (ref 1.5–4)
LYMPHOCYTES RELATIVE PERCENT: 15.9 % (ref 20–42)
Lab: ABNORMAL
MAGNESIUM: 2 MG/DL (ref 1.6–2.6)
MCH RBC QN AUTO: 31.8 PG (ref 26–35)
MCHC RBC AUTO-ENTMCNC: 32.7 % (ref 32–34.5)
MCV RBC AUTO: 97.4 FL (ref 80–99.9)
METHB: 0.3 % (ref 0–1.5)
MODE: ABNORMAL
MONOCYTES ABSOLUTE: 0.99 E9/L (ref 0.1–0.95)
MONOCYTES RELATIVE PERCENT: 7.4 % (ref 2–12)
NEUTROPHILS ABSOLUTE: 10.2 E9/L (ref 1.8–7.3)
NEUTROPHILS RELATIVE PERCENT: 76 % (ref 43–80)
NITRITE, URINE: POSITIVE
O2 CONTENT: 14.4 ML/DL
O2 SATURATION: 95.9 % (ref 92–98.5)
O2HB: 95.2 % (ref 94–97)
OPERATOR ID: 459
PATIENT TEMP: 37 C
PCO2: 30.3 MMHG (ref 35–45)
PDW BLD-RTO: 14.3 FL (ref 11.5–15)
PEEP/CPAP: 6 CMH2O
PFO2: 2 MMHG/%
PH BLOOD GAS: 7.5 (ref 7.35–7.45)
PH UA: 7 (ref 5–9)
PHOSPHORUS: 1.8 MG/DL (ref 2.5–4.5)
PLATELET # BLD: 200 E9/L (ref 130–450)
PMV BLD AUTO: 10.4 FL (ref 7–12)
PO2: 80.1 MMHG (ref 75–100)
POTASSIUM SERPL-SCNC: 3.4 MMOL/L (ref 3.5–5)
POTASSIUM SERPL-SCNC: 3.8 MMOL/L (ref 3.5–5)
PRO-BNP: 448 PG/ML (ref 0–125)
PROCALCITONIN: 0.07 NG/ML (ref 0–0.08)
PROTEIN UA: NEGATIVE MG/DL
RBC # BLD: 3.05 E12/L (ref 3.5–5.5)
RBC UA: ABNORMAL /HPF (ref 0–2)
REASON FOR REJECTION: NORMAL
REASON FOR REJECTION: NORMAL
REJECTED TEST: NORMAL
REJECTED TEST: NORMAL
RI(T): 2
RR MECHANICAL: 16 B/MIN
SEDIMENTATION RATE, ERYTHROCYTE: 6 MM/HR (ref 0–20)
SODIUM BLD-SCNC: 138 MMOL/L (ref 132–146)
SODIUM BLD-SCNC: 141 MMOL/L (ref 132–146)
SOURCE, BLOOD GAS: ABNORMAL
SPECIFIC GRAVITY UA: 1.02 (ref 1–1.03)
THB: 10.7 G/DL (ref 11.5–16.5)
TIME ANALYZED: 513
TOTAL CK: 32 U/L (ref 20–180)
TOTAL PROTEIN: 5.5 G/DL (ref 6.4–8.3)
TOTAL PROTEIN: 5.9 G/DL (ref 6.4–8.3)
UROBILINOGEN, URINE: 0.2 E.U./DL
VT MECHANICAL: 450 ML
WBC # BLD: 13.4 E9/L (ref 4.5–11.5)
WBC UA: ABNORMAL /HPF (ref 0–5)

## 2020-12-05 PROCEDURE — 2500000003 HC RX 250 WO HCPCS: Performed by: INTERNAL MEDICINE

## 2020-12-05 PROCEDURE — 82607 VITAMIN B-12: CPT

## 2020-12-05 PROCEDURE — 2580000003 HC RX 258: Performed by: INTERNAL MEDICINE

## 2020-12-05 PROCEDURE — C9113 INJ PANTOPRAZOLE SODIUM, VIA: HCPCS | Performed by: INTERNAL MEDICINE

## 2020-12-05 PROCEDURE — 85025 COMPLETE CBC W/AUTO DIFF WBC: CPT

## 2020-12-05 PROCEDURE — 2000000000 HC ICU R&B

## 2020-12-05 PROCEDURE — 6360000002 HC RX W HCPCS: Performed by: INTERNAL MEDICINE

## 2020-12-05 PROCEDURE — 85014 HEMATOCRIT: CPT

## 2020-12-05 PROCEDURE — 71045 X-RAY EXAM CHEST 1 VIEW: CPT

## 2020-12-05 PROCEDURE — 83605 ASSAY OF LACTIC ACID: CPT

## 2020-12-05 PROCEDURE — 2500000003 HC RX 250 WO HCPCS

## 2020-12-05 PROCEDURE — 83735 ASSAY OF MAGNESIUM: CPT

## 2020-12-05 PROCEDURE — 84100 ASSAY OF PHOSPHORUS: CPT

## 2020-12-05 PROCEDURE — 82805 BLOOD GASES W/O2 SATURATION: CPT

## 2020-12-05 PROCEDURE — 87070 CULTURE OTHR SPECIMN AEROBIC: CPT

## 2020-12-05 PROCEDURE — 87206 SMEAR FLUORESCENT/ACID STAI: CPT

## 2020-12-05 PROCEDURE — 82550 ASSAY OF CK (CPK): CPT

## 2020-12-05 PROCEDURE — 6370000000 HC RX 637 (ALT 250 FOR IP): Performed by: INTERNAL MEDICINE

## 2020-12-05 PROCEDURE — 87186 SC STD MICRODIL/AGAR DIL: CPT

## 2020-12-05 PROCEDURE — 85018 HEMOGLOBIN: CPT

## 2020-12-05 PROCEDURE — 81001 URINALYSIS AUTO W/SCOPE: CPT

## 2020-12-05 PROCEDURE — 80053 COMPREHEN METABOLIC PANEL: CPT

## 2020-12-05 PROCEDURE — 02HV33Z INSERTION OF INFUSION DEVICE INTO SUPERIOR VENA CAVA, PERCUTANEOUS APPROACH: ICD-10-PCS | Performed by: INTERNAL MEDICINE

## 2020-12-05 PROCEDURE — 99221 1ST HOSP IP/OBS SF/LOW 40: CPT | Performed by: PSYCHIATRY & NEUROLOGY

## 2020-12-05 PROCEDURE — 36556 INSERT NON-TUNNEL CV CATH: CPT

## 2020-12-05 PROCEDURE — 85651 RBC SED RATE NONAUTOMATED: CPT

## 2020-12-05 PROCEDURE — 94664 DEMO&/EVAL PT USE INHALER: CPT

## 2020-12-05 PROCEDURE — 94640 AIRWAY INHALATION TREATMENT: CPT

## 2020-12-05 PROCEDURE — 86140 C-REACTIVE PROTEIN: CPT

## 2020-12-05 PROCEDURE — 94003 VENT MGMT INPAT SUBQ DAY: CPT

## 2020-12-05 PROCEDURE — 87040 BLOOD CULTURE FOR BACTERIA: CPT

## 2020-12-05 PROCEDURE — 84145 PROCALCITONIN (PCT): CPT

## 2020-12-05 PROCEDURE — 87088 URINE BACTERIA CULTURE: CPT

## 2020-12-05 PROCEDURE — 36415 COLL VENOUS BLD VENIPUNCTURE: CPT

## 2020-12-05 PROCEDURE — 87077 CULTURE AEROBIC IDENTIFY: CPT

## 2020-12-05 PROCEDURE — 99221 1ST HOSP IP/OBS SF/LOW 40: CPT | Performed by: STUDENT IN AN ORGANIZED HEALTH CARE EDUCATION/TRAINING PROGRAM

## 2020-12-05 PROCEDURE — 82746 ASSAY OF FOLIC ACID SERUM: CPT

## 2020-12-05 RX ORDER — ARFORMOTEROL TARTRATE 15 UG/2ML
15 SOLUTION RESPIRATORY (INHALATION) 2 TIMES DAILY
Status: DISCONTINUED | OUTPATIENT
Start: 2020-12-05 | End: 2020-12-17 | Stop reason: HOSPADM

## 2020-12-05 RX ORDER — IPRATROPIUM BROMIDE AND ALBUTEROL SULFATE 2.5; .5 MG/3ML; MG/3ML
1 SOLUTION RESPIRATORY (INHALATION)
Status: DISCONTINUED | OUTPATIENT
Start: 2020-12-05 | End: 2020-12-15

## 2020-12-05 RX ORDER — POTASSIUM CHLORIDE 7.45 MG/ML
10 INJECTION INTRAVENOUS
Status: DISCONTINUED | OUTPATIENT
Start: 2020-12-05 | End: 2020-12-05

## 2020-12-05 RX ORDER — POTASSIUM CHLORIDE 20 MEQ/1
40 TABLET, EXTENDED RELEASE ORAL ONCE
Status: DISCONTINUED | OUTPATIENT
Start: 2020-12-05 | End: 2020-12-05

## 2020-12-05 RX ORDER — LIDOCAINE HYDROCHLORIDE 10 MG/ML
INJECTION, SOLUTION INFILTRATION; PERINEURAL
Status: COMPLETED
Start: 2020-12-05 | End: 2020-12-05

## 2020-12-05 RX ORDER — SODIUM CHLORIDE 9 MG/ML
INJECTION, SOLUTION INTRAVENOUS CONTINUOUS
Status: DISCONTINUED | OUTPATIENT
Start: 2020-12-05 | End: 2020-12-09

## 2020-12-05 RX ADMIN — METOPROLOL SUCCINATE 50 MG: 50 TABLET, EXTENDED RELEASE ORAL at 08:24

## 2020-12-05 RX ADMIN — ENOXAPARIN SODIUM 40 MG: 40 INJECTION SUBCUTANEOUS at 08:30

## 2020-12-05 RX ADMIN — ATORVASTATIN CALCIUM 80 MG: 80 TABLET, FILM COATED ORAL at 20:13

## 2020-12-05 RX ADMIN — PROPOFOL 30 MCG/KG/MIN: 10 INJECTION, EMULSION INTRAVENOUS at 21:34

## 2020-12-05 RX ADMIN — SODIUM CHLORIDE 3 G: 900 INJECTION INTRAVENOUS at 10:56

## 2020-12-05 RX ADMIN — Medication 200 MCG/HR: at 21:40

## 2020-12-05 RX ADMIN — ARFORMOTEROL TARTRATE 15 MCG: 15 SOLUTION RESPIRATORY (INHALATION) at 21:36

## 2020-12-05 RX ADMIN — MIDAZOLAM HYDROCHLORIDE 5 MG/HR: 5 INJECTION, SOLUTION INTRAMUSCULAR; INTRAVENOUS at 05:03

## 2020-12-05 RX ADMIN — TICAGRELOR 90 MG: 90 TABLET ORAL at 08:25

## 2020-12-05 RX ADMIN — PROPOFOL 50 MCG/KG/MIN: 10 INJECTION, EMULSION INTRAVENOUS at 17:17

## 2020-12-05 RX ADMIN — Medication 10 ML: at 08:24

## 2020-12-05 RX ADMIN — POTASSIUM BICARBONATE 40 MEQ: 782 TABLET, EFFERVESCENT ORAL at 08:25

## 2020-12-05 RX ADMIN — SODIUM CHLORIDE 3 G: 900 INJECTION INTRAVENOUS at 16:19

## 2020-12-05 RX ADMIN — Medication 200 MCG/HR: at 14:37

## 2020-12-05 RX ADMIN — PROPOFOL 50 MCG/KG/MIN: 10 INJECTION, EMULSION INTRAVENOUS at 06:30

## 2020-12-05 RX ADMIN — SODIUM CHLORIDE: 9 INJECTION, SOLUTION INTRAVENOUS at 23:16

## 2020-12-05 RX ADMIN — PROPOFOL 50 MCG/KG/MIN: 10 INJECTION, EMULSION INTRAVENOUS at 02:16

## 2020-12-05 RX ADMIN — SODIUM CHLORIDE 0.2 MCG/KG/HR: 9 INJECTION, SOLUTION INTRAVENOUS at 14:35

## 2020-12-05 RX ADMIN — SODIUM CHLORIDE 0.4 MCG/HR: 9 INJECTION, SOLUTION INTRAVENOUS at 23:12

## 2020-12-05 RX ADMIN — PROPOFOL 50 MCG/KG/MIN: 10 INJECTION, EMULSION INTRAVENOUS at 13:55

## 2020-12-05 RX ADMIN — IPRATROPIUM BROMIDE AND ALBUTEROL SULFATE 1 AMPULE: 2.5; .5 SOLUTION RESPIRATORY (INHALATION) at 21:35

## 2020-12-05 RX ADMIN — IPRATROPIUM BROMIDE AND ALBUTEROL SULFATE 1 AMPULE: 2.5; .5 SOLUTION RESPIRATORY (INHALATION) at 13:36

## 2020-12-05 RX ADMIN — TICAGRELOR 90 MG: 90 TABLET ORAL at 20:13

## 2020-12-05 RX ADMIN — PANTOPRAZOLE SODIUM 40 MG: 40 INJECTION, POWDER, FOR SOLUTION INTRAVENOUS at 08:24

## 2020-12-05 RX ADMIN — LIDOCAINE HYDROCHLORIDE 200 MG: 10 INJECTION, SOLUTION INFILTRATION; PERINEURAL at 23:16

## 2020-12-05 RX ADMIN — SODIUM CHLORIDE 3 G: 900 INJECTION INTRAVENOUS at 21:50

## 2020-12-05 RX ADMIN — FOLIC ACID 1 MG: 5 INJECTION, SOLUTION INTRAMUSCULAR; INTRAVENOUS; SUBCUTANEOUS at 08:39

## 2020-12-05 RX ADMIN — Medication 200 MCG/HR: at 00:34

## 2020-12-05 RX ADMIN — THIAMINE HYDROCHLORIDE 100 MG: 100 INJECTION, SOLUTION INTRAMUSCULAR; INTRAVENOUS at 08:40

## 2020-12-05 RX ADMIN — MIDAZOLAM HYDROCHLORIDE 4 MG/HR: 5 INJECTION, SOLUTION INTRAMUSCULAR; INTRAVENOUS at 21:40

## 2020-12-05 RX ADMIN — PROPOFOL 50 MCG/KG/MIN: 10 INJECTION, EMULSION INTRAVENOUS at 10:16

## 2020-12-05 RX ADMIN — IPRATROPIUM BROMIDE AND ALBUTEROL SULFATE 1 AMPULE: 2.5; .5 SOLUTION RESPIRATORY (INHALATION) at 17:01

## 2020-12-05 RX ADMIN — POTASSIUM PHOSPHATE, MONOBASIC AND POTASSIUM PHOSPHATE, DIBASIC 20 MMOL: 224; 236 INJECTION, SOLUTION, CONCENTRATE INTRAVENOUS at 08:24

## 2020-12-05 RX ADMIN — ASPIRIN 81 MG CHEWABLE TABLET 81 MG: 81 TABLET CHEWABLE at 08:25

## 2020-12-05 RX ADMIN — Medication 200 MCG/HR: at 07:03

## 2020-12-05 RX ADMIN — ARFORMOTEROL TARTRATE 15 MCG: 15 SOLUTION RESPIRATORY (INHALATION) at 13:36

## 2020-12-05 RX ADMIN — Medication 10 ML: at 20:13

## 2020-12-05 ASSESSMENT — PULMONARY FUNCTION TESTS
PIF_VALUE: 25
PIF_VALUE: 24
PIF_VALUE: 28
PIF_VALUE: 22
PIF_VALUE: 22
PIF_VALUE: 23
PIF_VALUE: 23
PIF_VALUE: 21
PIF_VALUE: 26
PIF_VALUE: 26
PIF_VALUE: 23
PIF_VALUE: 28
PIF_VALUE: 23
PIF_VALUE: 25
PIF_VALUE: 23
PIF_VALUE: 21
PIF_VALUE: 25
PIF_VALUE: 20
PIF_VALUE: 22
PIF_VALUE: 23
PIF_VALUE: 24
PIF_VALUE: 32
PIF_VALUE: 22
PIF_VALUE: 27
PIF_VALUE: 27
PIF_VALUE: 22

## 2020-12-05 ASSESSMENT — PAIN SCALES - GENERAL
PAINLEVEL_OUTOF10: 0

## 2020-12-05 NOTE — ED NOTES
Pt became extremely agitated and aroused tried pulling at IVs removing one in the right wrist. New IV placed, soft restraints placed for patient safety      Ingris Monteiro RN  12/04/20 1941

## 2020-12-05 NOTE — PLAN OF CARE
Problem: Restraint Use - Nonviolent/Non-Self-Destructive Behavior:  Goal: Absence of restraint indications  12/5/2020 0711 by Samuel Nguyen RN  Outcome: Not Met This Shift     Problem: Restraint Use - Nonviolent/Non-Self-Destructive Behavior:  Goal: Absence of restraint-related injury  12/5/2020 7021 by Samuel Nguyen, ROBBY  Outcome: Met This Shift

## 2020-12-05 NOTE — CONSULTS
[facility]  Internal Medicine Residency Program  History and Physical  MICU    Patient:  Mallorie Howell 77 y.o. female MRN: [id number]     Date of Service: 12/5/2020    Hospital Day: 3      Chief complaint: Altered Mental Status   History of Present Illness   The patient is a 77 y.o. female with a past medical history of CAD, MI (8/22 2020), hypertension, degenerative bone disease who was brought into the ED for altered mental status. History was taken per her son and EMR due to patient being sedated upon arrival to the unit. She is coming in from home where she lives with her son and daughter-in-law. Her last well-known time was at 10 PM last night. Son noted that she knew awoke very confused and disoriented. Prior to this she was alert awake and oriented x3 and had no memory lapses and was independent with her ADLs. She was only complaining of back pain to her son but otherwise had no fever, headache, cough, chest pain, dysuria that she was complaining of. Per her son she is a recovering alcoholic her last drink was more than a year ago. She also had a history of illicit drug use however that was more than 10 years ago. He had checked her medications and all of her bottles have been intact. He brought her to [place] urgent care where she had some nausea, vomiting and was noted to be scratching. She was given Versed and propofol and was intubated for airway protection. She was subsequently transferred to [facility] for further management. Her work-up at the ED showed normal electrolytes, creatinine normal.  Her CBC showed leukocytosis of 13.3. Lactic acid was 1.3. Hepatic function test was normal, ammonia was 12. TSH was also normal.  Viral panel was negative. Her serum drug screen was negative. Her urine drug screen was positive for benzos and fentanyl (was given both at the ED). Blood gases showed 7.3 pH/43.4/142.3/22.0.   CT of the head did not show any acute processes or stroke but did show a atrophy, periventricular leukomalacia. CTA of the chest was negative. EKG showed QTC prolongation 510. Patient was then transferred to the MICU for further workup and management    Past Medical History:      Diagnosis Date    CAD (coronary artery disease)     Degenerative disorder of bone     Hard of hearing     Hypertension     MI (myocardial infarction) ([facility] [address] 2020       Past Surgical History:        Procedure Laterality Date    BREAST LUMPECTOMY Left      SECTION      CORONARY ANGIOPLASTY WITH STENT PLACEMENT  2020    3.0 x 33mm Domenico Ruiz to Prox LAD, EF35%, Dr. Collette Burt         Medications Prior to Admission:    Prior to Admission medications    Medication Sig Start Date End Date Taking?  Authorizing Provider   nitroGLYCERIN (NITROSTAT) 0.4 MG SL tablet Place 0.4 mg under the tongue every 5 minutes as needed for Chest pain   Yes Historical Provider, MD   baclofen (LIORESAL) 10 MG tablet Take 5 mg by mouth 2 times daily   Yes Historical Provider, MD   lisinopril (PRINIVIL;ZESTRIL) 20 MG tablet Take 20 mg by mouth daily   Yes Historical Provider, MD   metoprolol succinate (TOPROL XL) 100 MG extended release tablet Take 100 mg by mouth daily   Yes Historical Provider, MD   cyclobenzaprine (FLEXERIL) 10 MG tablet Take 10 mg by mouth 3 times daily as needed for Muscle spasms   Yes Historical Provider, MD   losartan (COZAAR) 100 MG tablet Take 100 mg by mouth daily   Yes Historical Provider, MD   losartan (COZAAR) 25 MG tablet Take 25 mg by mouth daily   Yes Historical Provider, MD   pantoprazole (PROTONIX) 40 MG tablet Take 1 tablet by mouth every morning (before breakfast) 10/23/20  Yes Evy Ruiz DO   QUEtiapine (SEROQUEL XR) 200 MG extended release tablet Take 1 tablet by mouth nightly 10/22/20  Yes Juma Haider,    atorvastatin (LIPITOR) 80 MG tablet Take 1 tablet by mouth nightly 8/25/20  Yes Hermilo Cobian DO   ticagrelor (BRILINTA) 90 MG TABS tablet Take 1 tablet by mouth 2 times daily 8/25/20  Yes Juma Franks DO   disulfiram (ANTABUSE) 250 MG tablet Take 250 mg by mouth daily   Yes Historical Provider, MD   gabapentin (NEURONTIN) 400 MG capsule Take 400 mg by mouth 3 times daily. Yes Historical Provider, MD   escitalopram (LEXAPRO) 20 MG tablet Take 20 mg by mouth daily   Yes Historical Provider, MD   aspirin 81 MG chewable tablet Take 1 tablet by mouth daily 8/26/20   Hermilo Cobian DO       Allergies:  Nitroglycerin and Tramadol    Social History:   TOBACCO:   reports that she quit smoking about 2 months ago. Her smoking use included cigarettes. She started smoking about 47 years ago. She has a 11.75 pack-year smoking history. She has never used smokeless tobacco.  ETOH:   reports previous alcohol use. Family History:       Problem Relation Age of Onset    Lung Cancer Mother     Diabetes Father        REVIEW OF SYSTEMS: (Taken from son as patient was altered and sedated upon arrival to the unit)     · Constitutional: No fever, no chills, no change in weight; good appetite  · HEENT: No blurred vision, no ear problems, no sore throat, no rhinorrhea. · Respiratory: No cough, no sputum production, no pleuritic chest pain, no shortness of breath  · Cardiology: No angina, no dyspnea on exertion, no paroxysmal nocturnal dyspnea, no orthopnea, no palpitation, no leg swelling. · Gastroenterology: No dysphagia, no reflux; no abdominal pain, no nausea or vomiting; no constipation or diarrhea.  No hematochezia   · Genitourinary: No dysuria, no frequency, hesitancy; no hematuria  · Musculoskeletal: (+) back pain   · Neurology: no focal weakness in extremities, no slurred speech, no double vision, no tingling or numbness sensation  · Endocrinology: no temperature intolerance, no polyphagia, polydipsia or polyuria  · Hematology: no increased bleeding, no bruising, no lymphadenopathy  · Skin: no skin changes noticed by patient  · Psychology: no depressed mood, no suicidal ideation    Physical Exam   · Vitals: BP (!) 157/84   Pulse 87   Temp 99.9 °F (37.7 °C) (Esophageal)   Resp (!) 34   Ht 5' 5\" (1.651 m)   Wt 200 lb (90.7 kg)   SpO2 99%   BMI 33.28 kg/m²   ·    ·      Physical Exam  Constitutional:       Interventions: She is sedated, intubated and restrained. HENT:      Head: Normocephalic and atraumatic. Cardiovascular:      Rate and Rhythm: Regular rhythm. Tachycardia present. Pulmonary:      Effort: She is intubated. Abdominal:      General: Abdomen is flat. Bowel sounds are normal.      Palpations: Abdomen is soft. Musculoskeletal:         General: No swelling. Skin:     General: Skin is warm and dry. Lines     site day    Art line   None    TLC None    PICC None    Hemoaccess None      Mechanical Ventilation:   Mode: A/C    TV:450 ml RR: 16         PEEP 6 cmH2O FiO2 40%     ABG:     Lab Results   Component Value Date    PH 7.494 12/04/2020    PCO2 30.6 12/04/2020    PO2 94.5 12/04/2020    HCO3 23.0 12/04/2020    BE 0.4 12/04/2020    THB 11.7 12/04/2020    O2SAT 97.2 12/04/2020     Labs and Imaging Studies   Basic Labs  CBC:   Lab Results   Component Value Date    WBC 13.3 12/03/2020    RBC 3.88 12/03/2020    HGB 12.5 12/03/2020    HCT 38.1 12/03/2020    MCV 98.2 12/03/2020    RDW 13.9 12/03/2020     12/03/2020     CMP:  Lab Results   Component Value Date     12/04/2020    K 3.8 12/04/2020    K 4.0 10/19/2020     12/04/2020    CO2 23 12/04/2020    BUN 13 12/04/2020    PROT 5.9 12/04/2020       Imaging Studies:     Xr Lumbar Spine (2-3 Views)    Result Date: 11/21/2020  EXAMINATION: THREE XRAY VIEWS OF THE LUMBAR SPINE 11/21/2020 5:16 pm COMPARISON: None.  HISTORY: ORDERING SYSTEM PROVIDED HISTORY: fall, pain TECHNOLOGIST PROVIDED HISTORY: Reason for exam:->fall, pain FINDINGS: There are no acute compression deformities of the vertebral bodies. Severe degenerative changes with disc space narrowing, grade 2 anterior listhesis and subchondral sclerosis noted at L4-L5. Facet arthrosis noted in the lower lumbar spine. 1.  No acute compression deformities. 2.  Severe degenerative changes with disc space narrowing and grade 2 anterolisthesis at L4-L5. Xr Sacrum Coccyx (min 2 Views)    Result Date: 11/21/2020  EXAMINATION: THREE XRAY VIEWS OF THE SACRUM/COCCYX 11/21/2020 5:16 pm COMPARISON: None. HISTORY: ORDERING SYSTEM PROVIDED HISTORY: trauma TECHNOLOGIST PROVIDED HISTORY: Reason for exam:->trauma FINDINGS: No fracture is seen in the sacrum or coccyx. No bony destructive lesion is seen. The sacroiliac joints are grossly unremarkable. Prominent degenerative changes are seen at L4-5, with severe loss of joint space, endplate sclerosis and grade 2 anterolisthesis by approximately 13 mm. 1.  No fracture or joint dislocation is seen. 2. Prominent degenerative changes at L4-5. Ct Head Wo Contrast    Result Date: 12/3/2020  EXAMINATION: CT OF THE HEAD WITHOUT CONTRAST  12/3/2020 3:37 pm TECHNIQUE: CT of the head was performed without the administration of intravenous contrast. Dose modulation, iterative reconstruction, and/or weight based adjustment of the mA/kV was utilized to reduce the radiation dose to as low as reasonably achievable. COMPARISON: None. HISTORY: ORDERING SYSTEM PROVIDED HISTORY: altered TECHNOLOGIST PROVIDED HISTORY: Has a \"code stroke\" or \"stroke alert\" been called? ->No Reason for exam:->altered What reading provider will be dictating this exam?->CRC FINDINGS: BRAIN/VENTRICLES: There is no acute intracranial hemorrhage, mass effect or midline shift. No abnormal extra-axial fluid collection. The gray-white differentiation is maintained without evidence of an acute infarct. There is no evidence of hydrocephalus. The ventricles, cisterns and sulci are prominent consistent with atrophy.   There is decreased attenuation within the periventricular white matter consistent with periventricular leukomalacia. ORBITS: The visualized portion of the orbits demonstrate no acute abnormality. SINUSES: The visualized paranasal sinuses and mastoid air cells demonstrate no acute abnormality. SOFT TISSUES/SKULL:  No acute abnormality of the visualized skull or soft tissues. 1. There is no acute intracranial abnormality. Specifically, there is no intracranial hemorrhage. 2. Atrophy and periventricular leukomalacia,    Ct Abdomen Pelvis W Iv Contrast Additional Contrast? None    Result Date: 12/3/2020  EXAMINATION: CT OF THE ABDOMEN AND PELVIS WITH CONTRAST 12/3/2020 6:28 pm TECHNIQUE: CT of the abdomen and pelvis was performed with the administration of intravenous contrast. Multiplanar reformatted images are provided for review. Dose modulation, iterative reconstruction, and/or weight based adjustment of the mA/kV was utilized to reduce the radiation dose to as low as reasonably achievable. COMPARISON: None. HISTORY: ORDERING SYSTEM PROVIDED HISTORY: vomiting earlier today, altered TECHNOLOGIST PROVIDED HISTORY: Prior hysterectomy. Additional Contrast?->None Reason for exam:->vomiting earlier today, altered What reading provider will be dictating this exam?->CRC FINDINGS: Minimal pericardial effusion without gross cardiomegaly. Suggestion of coronary artery calcification. Gastric tube distal tip in proximal stomach. Slight atelectasis in the posterior costophrenic recess bilaterally may be physiologic. No lung base infiltrate. Degenerative change in the regional skeleton. No acute fracture or vertebral compression. Grade 1 anterolisthesis at L4-5 without evidence of spondylolysis. Severe degenerative endplate changes at L4 and L5 adjacent to the severely narrowed L4-5 disc. Normal-appearing liver, gallbladder, adrenals, pancreas, and spleen with adjacent small splenule.  Intact normal caliber abdominal aorta with moderate calcified atherosclerotic plaque. Normal caliber IVC. Normal-appearing kidneys and ureters. A non-specific, smoothly marginated, low density, simple appearing left renal lesion is statistically most likely a cyst. Intact anterior abdominal wall without hernia. No mesenteric mass or adenopathy. Normal-appearing stomach and duodenum. No small bowel distention in the abdomen and pelvis to suggest intestinal obstruction. No pelvic cul-de-sac free fluid. Normal-appearing urinary bladder containing Martin catheter. Status post hysterectomy. No adnexal abnormality. Normal-appearing rectum. Diverticulosis is slight in the descending colon and moderate in the sigmoid colon without definite CT evidence of diverticulitis. Slight diverticulosis distal transverse colon. No gross ascites, free air, or colonic distention. Normal-appearing cecum, terminal ileum, and retrocecal appendix. Diverticulosis without definite CT evidence of diverticulitis Minimal pericardial effusion without gross cardiomegaly. Suggestion of coronary artery calcification Bilateral lung base slight atelectasis may be physiologic Grade 1 anterolisthesis and degenerative disc disease at L4-5 without evidence of spondylolysis    Xr Chest Portable    Result Date: 12/3/2020  EXAMINATION: ONE XRAY VIEW OF THE CHEST 12/3/2020 6:04 pm COMPARISON: None. HISTORY: ORDERING SYSTEM PROVIDED HISTORY: ett placement TECHNOLOGIST PROVIDED HISTORY: Reason for exam:->ett placement What reading provider will be dictating this exam?->CRC FINDINGS: Endotracheal tube is approximately 8 mm above the sofy. This tube could be retracted approximately 2 or 3 centimetres. There is prominence of pulmonary vasculature. No focal airspace opacity or pleural effusion. The heart is normal size. No pneumothorax. Satisfactory placement of NG tube. 1.  Endotracheal tube is just above level of the sofy. This tube could be retracted approximately 3 centimetres.  2. Stable mild pulmonary vascular congestion. No focal airspace opacity or pleural effusion. Xr Chest Portable    Result Date: 12/3/2020  EXAMINATION: ONE XRAY VIEW OF THE CHEST 12/3/2020 12:27 pm COMPARISON: 10/18/2020 HISTORY: ORDERING SYSTEM PROVIDED HISTORY: Possible Stroke TECHNOLOGIST PROVIDED HISTORY: Reason for exam:->Possible Stroke FINDINGS: The tibia endotracheal tube is position within the right mainstem bronchus. Please pull the endotracheal tube back by approximately 3 cm. The left lung is underinflated due to positioning of the endotracheal tube. Gross infiltrates are not appreciated. The heart is at upper limits of normal in size. The tip of the endotracheal tube is seen within the right mainstem bronchus and should be pulled back by approximately 3 cm. Cta Chest W Contrast    Result Date: 12/3/2020  EXAMINATION: CTA OF THE CHEST 12/3/2020 6:28 pm TECHNIQUE: CTA of the chest was performed after the administration of intravenous contrast.  Multiplanar reformatted images are provided for review. MIP images are provided for review. Dose modulation, iterative reconstruction, and/or weight based adjustment of the mA/kV was utilized to reduce the radiation dose to as low as reasonably achievable. COMPARISON: None. HISTORY: ORDERING SYSTEM PROVIDED HISTORY: altered, sob TECHNOLOGIST PROVIDED HISTORY: Reason for exam:->altered, sob What reading provider will be dictating this exam?->CRC FINDINGS: No filling defect in pulmonary arteries to suggest pulmonary arterial embolism. The heart is normal in size. There is trace pericardial effusion. Atherosclerotic calcifications are associated with the coronary arteries. Endotracheal tube is present with distal tip just above level of the sofy by approximately 5 mm. There is prominence of pulmonary vasculature. No airspace opacity or pleural effusion. No pneumothorax. Chronic compression fracture of T6, T7 and T10.    1. No pulmonary embolism.  2.  No

## 2020-12-05 NOTE — ED NOTES
Pt again awoke moving in an erratic manner. Multiple RNs needed to re-situate and calm patient.       Farheen Escobar RN  12/04/20 1942

## 2020-12-05 NOTE — CONSULTS
abnormalities. She is being taken care of in the ICU. Spoke to him about advanced directives and CODE STATUS, at this time he wants to keep her mother a full code. He would like resuscitation and intubation, as this happened so suddenly. His goals are to bring her back home, and continue care for her. Prognosis: Guarded    OBJECTIVE:     /69   Pulse 75   Temp 98.8 °F (37.1 °C) (Esophageal)   Resp 16   Ht 5' 5\" (1.651 m)   Wt 200 lb (90.7 kg)   SpO2 98%   BMI 33.28 kg/m²     Physical Examination:  Gen:  intubated  HEENT: normocephalic, atraumatic,  Lungs: respirations as per vent   Heart: regular rate   Abdomen:  soft  Skin: warm  Neuro: Intubated    Objective data reviewed: labs, images, records, medication use, vitals and chart    Time/Communication  Greater than 50% of time spent, total 35 minutes in counseling and coordination of care at the bedside regarding goals of care and symptom management. Thank you for allowing Palliative Medicine to participate in the care of [facility]. Note: This report was completed using [employer] voiced recognition software. Every effort has been made to ensure accuracy; however, inadvertent computerized transcription errors may be present.

## 2020-12-05 NOTE — PLAN OF CARE
Problem: Restraint Use - Nonviolent/Non-Self-Destructive Behavior:  Goal: Absence of restraint-related injury  Description: Absence of restraint-related injury  12/5/2020 0052 by Magdaline Cogan, RN  Outcome: Met This Shift     Problem: Restraint Use - Nonviolent/Non-Self-Destructive Behavior:  Goal: Absence of restraint indications  Description: Absence of restraint indications  12/5/2020 0052 by Magdaline Cogan, RN  Outcome: Not Met This Shift

## 2020-12-05 NOTE — ED NOTES
Soft wrist restraints checked. Cap refill in both hands less than 3 seconds. Bilateral +2 radial pulses. Good circulation and color.       Yuki Gonzalez RN  12/04/20 1939 No

## 2020-12-05 NOTE — CONSULTS
Pee Morales is a 77 y.o. female       Chief Complaint   Patient presents with    Altered Mental Status     pt not acting herself. Was normal when she went to bed last night around 11pm.  Son said she was screaming in her sleep around midnight and he woke her but she was fine. Woke up confused at 9am with hard time breathing, leg pain. She did not know where she was or who her son was. HPI:  55-year-old woman with history of alcoholism with over 1 year of sobriety presenting with acute mental status change. History obtained from chart review and from discussing with patient's son and daughter-in-law with whom she lives with. Morning of the event she was normal and picked up the [employer] from the front door. About 2 hours later she started to become increasingly confused with some repetitive speech and then walked outside as if she was going to just walk into the road. At that time her son suggested they go for a drive and it was during the drive that day he noticed her becoming more confused. In the ER she was noted to be markedly confused and was not cooperating with any testing and ultimately required sedation and intubation. CT scan of the head has been negative. Does have evidence of a UTI and a mild leukocytosis with no fever. She was transferred to our hospital for further management. She is requiring some fair amount of sedation to stay safe while intubated and attempts to lower her met with moving all extremities trying to get out of bed and self extubated. Prior to Visit Medications    Medication Sig Taking?  Authorizing Provider   nitroGLYCERIN (NITROSTAT) 0.4 MG SL tablet Place 0.4 mg under the tongue every 5 minutes as needed for Chest pain Yes Historical Provider, MD   baclofen (LIORESAL) 10 MG tablet Take 5 mg by mouth 2 times daily Yes Historical Provider, MD   lisinopril (PRINIVIL;ZESTRIL) 20 MG tablet Take 20 mg by mouth daily Yes Historical Provider, MD metoprolol succinate (TOPROL XL) 100 MG extended release tablet Take 100 mg by mouth daily Yes Historical Provider, MD   cyclobenzaprine (FLEXERIL) 10 MG tablet Take 10 mg by mouth 3 times daily as needed for Muscle spasms Yes Historical Provider, MD   losartan (COZAAR) 100 MG tablet Take 100 mg by mouth daily Yes Historical Provider, MD   losartan (COZAAR) 25 MG tablet Take 25 mg by mouth daily Yes Historical Provider, MD   pantoprazole (PROTONIX) 40 MG tablet Take 1 tablet by mouth every morning (before breakfast) Yes Jaqueline Medeiros DO   QUEtiapine (SEROQUEL XR) 200 MG extended release tablet Take 1 tablet by mouth nightly Yes Jaqueline Medeiros DO   atorvastatin (LIPITOR) 80 MG tablet Take 1 tablet by mouth nightly Yes Jaqueline Medeiros DO   ticagrelor (BRILINTA) 90 MG TABS tablet Take 1 tablet by mouth 2 times daily Yes Jaqueline Medeiros DO   disulfiram (ANTABUSE) 250 MG tablet Take 250 mg by mouth daily Yes Historical Provider, MD   gabapentin (NEURONTIN) 400 MG capsule Take 400 mg by mouth 3 times daily.  Yes Historical Provider, MD   escitalopram (LEXAPRO) 20 MG tablet Take 20 mg by mouth daily Yes Historical Provider, MD   aspirin 81 MG chewable tablet Take 1 tablet by mouth daily  Jaqueline Medeiros DO     Social History     Tobacco Use    Smoking status: Former Smoker     Packs/day: 0.25     Years: 47.00     Pack years: 11.75     Types: Cigarettes     Start date: 1973     Last attempt to quit: 2020     Years since quittin.2    Smokeless tobacco: Never Used   Substance Use Topics    Alcohol use: Not Currently    Drug use: Never     Family History   Problem Relation Age of Onset    Lung Cancer Mother     Diabetes Father      Past Surgical History:   Procedure Laterality Date    BREAST LUMPECTOMY Left      SECTION      CORONARY ANGIOPLASTY WITH STENT PLACEMENT  2020    3.0 x 33mm Xience Sara to Prox LAD, EF35%, Dr. Rupal Melchor  TONSILLECTOMY       Past Medical History:   Diagnosis Date    CAD (coronary artery disease)     Degenerative disorder of bone     Hard of hearing     Hypertension     MI (myocardial infarction) ([facility] [address] 08/22/2020     Review of Systems    Unable to obtain secondary to intubation and altered mental status        Objective:   /62   Pulse 74   Temp 99 °F (37.2 °C) (Esophageal)   Resp (!) 37   Ht 5' 5\" (1.651 m)   Wt 200 lb (90.7 kg)   SpO2 97%   BMI 33.28 kg/m²     Physical Exam  Constitutional:       Appearance: She is not ill-appearing, toxic-appearing or diaphoretic. HENT:      Head: Normocephalic and atraumatic. Eyes:      Conjunctiva/sclera: Conjunctivae normal.   Neck:      Musculoskeletal: Neck supple. Cardiovascular:      Rate and Rhythm: Normal rate and regular rhythm. Musculoskeletal: Normal range of motion. Right lower leg: No edema. Left lower leg: No edema. Neurological Exam  Mental Status    Opens eyes spontaneously. Conjugate gaze Blink to threat bilaterally. Not following any commands verbal or gestured. Motor    Minimal spontaneous movement. Sensory  Reaction to stem bilaterally. Reflexes                                           Right                      Left  Biceps                                 Tr                         Tr  Triceps                                Tr                         Tr  Patellar                                Tr                         Tr  Achilles                                Tr                         Tr  Plantar                           Mute                Mute  Face grossly symmetric.   Pupils equal and reactive to light not following    Laboratory/Radiology:     CBC with Differential:    Lab Results   Component Value Date    WBC 13.4 12/05/2020    RBC 3.05 12/05/2020    HGB 9.8 12/05/2020    HCT 31.7 12/05/2020     12/05/2020    MCV 97.4 12/05/2020    MCH 31.8 12/05/2020    MCHC 32.7 12/05/2020    RDW 14.3 12/05/2020    LYMPHOPCT 15.9 12/05/2020    MONOPCT 7.4 12/05/2020    BASOPCT 0.1 12/05/2020    MONOSABS 0.99 12/05/2020    LYMPHSABS 2.13 12/05/2020    EOSABS 0.03 12/05/2020    BASOSABS 0.02 12/05/2020     CMP:    Lab Results   Component Value Date     12/05/2020    K 3.4 12/05/2020    K 4.0 10/19/2020     12/05/2020    CO2 28 12/05/2020    BUN 12 12/05/2020    CREATININE 0.6 12/05/2020    GFRAA >60 12/05/2020    LABGLOM >60 12/05/2020    GLUCOSE 110 12/05/2020    PROT 5.5 12/05/2020    LABALBU 3.5 12/05/2020    CALCIUM 8.3 12/05/2020    BILITOT 0.4 12/05/2020    ALKPHOS 69 12/05/2020    AST 13 12/05/2020    ALT 10 12/05/2020     HgBA1c:  No results found for: LABA1C    CT head:  negative  I independently reviewed the labs and imaging studies at today's appointment. Assessment:       Acute confusional state with speech disturbances. Differential includes acute stroke with aphasia versus postictal state from unwitnessed seizure. Medication intoxication or primary at primary psychiatric event are relatively less likely. Plan:     Follow-up MRI of the brain. If unable to be done in relatively short order would recommend repeat head CT to evaluate for any interval change. If MRI is negative would recommend EEG when sedation can be more safely lowered.   We will follow    Yuli Padilla  5:32 PM  12/5/2020

## 2020-12-05 NOTE — FLOWSHEET NOTE
Patient thrashing in bed unable to redirect, has pulled two iv sites out and attempting to pull out ETT, kicking legs around and could hurt someone or herself. Four point soft restraints applied and resident at bedside to see patient.

## 2020-12-06 ENCOUNTER — APPOINTMENT (OUTPATIENT)
Dept: GENERAL RADIOLOGY | Age: 65
DRG: 207 | End: 2020-12-06
Payer: MEDICARE

## 2020-12-06 LAB
AADO2: 159.1 MMHG
ALBUMIN SERPL-MCNC: 3 G/DL (ref 3.5–5.2)
ALP BLD-CCNC: 69 U/L (ref 35–104)
ALT SERPL-CCNC: 8 U/L (ref 0–32)
ANION GAP SERPL CALCULATED.3IONS-SCNC: 8 MMOL/L (ref 7–16)
AST SERPL-CCNC: 9 U/L (ref 0–31)
B.E.: -2.2 MMOL/L (ref -3–3)
BASOPHILS ABSOLUTE: 0.02 E9/L (ref 0–0.2)
BASOPHILS RELATIVE PERCENT: 0.2 % (ref 0–2)
BILIRUB SERPL-MCNC: 0.6 MG/DL (ref 0–1.2)
BUN BLDV-MCNC: 9 MG/DL (ref 8–23)
CALCIUM SERPL-MCNC: 8 MG/DL (ref 8.6–10.2)
CHLORIDE BLD-SCNC: 106 MMOL/L (ref 98–107)
CHOLESTEROL, TOTAL: 126 MG/DL (ref 0–199)
CO2: 24 MMOL/L (ref 22–29)
COHB: 0.7 % (ref 0–1.5)
CREAT SERPL-MCNC: 0.7 MG/DL (ref 0.5–1)
CRITICAL: ABNORMAL
DATE ANALYZED: ABNORMAL
DATE OF COLLECTION: ABNORMAL
EOSINOPHILS ABSOLUTE: 0.14 E9/L (ref 0.05–0.5)
EOSINOPHILS RELATIVE PERCENT: 1.4 % (ref 0–6)
FIO2: 40 %
GFR AFRICAN AMERICAN: >60
GFR NON-AFRICAN AMERICAN: >60 ML/MIN/1.73
GLUCOSE BLD-MCNC: 123 MG/DL (ref 74–99)
HBA1C MFR BLD: 5.7 % (ref 4–5.6)
HCO3: 20.9 MMOL/L (ref 22–26)
HCT VFR BLD CALC: 25.6 % (ref 34–48)
HCT VFR BLD CALC: 25.7 % (ref 34–48)
HDLC SERPL-MCNC: 45 MG/DL
HEMOGLOBIN: 8.3 G/DL (ref 11.5–15.5)
HEMOGLOBIN: 8.4 G/DL (ref 11.5–15.5)
HHB: 4.4 % (ref 0–5)
IMMATURE GRANULOCYTES #: 0.05 E9/L
IMMATURE GRANULOCYTES %: 0.5 % (ref 0–5)
LAB: ABNORMAL
LDL CHOLESTEROL CALCULATED: 44 MG/DL (ref 0–99)
LYMPHOCYTES ABSOLUTE: 1.29 E9/L (ref 1.5–4)
LYMPHOCYTES RELATIVE PERCENT: 12.5 % (ref 20–42)
Lab: ABNORMAL
MAGNESIUM: 1.9 MG/DL (ref 1.6–2.6)
MCH RBC QN AUTO: 31.9 PG (ref 26–35)
MCHC RBC AUTO-ENTMCNC: 32.7 % (ref 32–34.5)
MCV RBC AUTO: 97.7 FL (ref 80–99.9)
METHB: 0.1 % (ref 0–1.5)
MODE: AC
MONOCYTES ABSOLUTE: 0.87 E9/L (ref 0.1–0.95)
MONOCYTES RELATIVE PERCENT: 8.5 % (ref 2–12)
NEUTROPHILS ABSOLUTE: 7.91 E9/L (ref 1.8–7.3)
NEUTROPHILS RELATIVE PERCENT: 76.9 % (ref 43–80)
O2 CONTENT: 12.5 ML/DL
O2 SATURATION: 95.6 % (ref 92–98.5)
O2HB: 94.8 % (ref 94–97)
OPERATOR ID: ABNORMAL
PATIENT TEMP: 37 C
PCO2: 29.9 MMHG (ref 35–45)
PDW BLD-RTO: 13.7 FL (ref 11.5–15)
PEEP/CPAP: 6 CMH2O
PFO2: 2.04 MMHG/%
PH BLOOD GAS: 7.46 (ref 7.35–7.45)
PHOSPHORUS: 3.7 MG/DL (ref 2.5–4.5)
PLATELET # BLD: 176 E9/L (ref 130–450)
PMV BLD AUTO: 10.4 FL (ref 7–12)
PO2: 81.7 MMHG (ref 75–100)
POTASSIUM SERPL-SCNC: 3.3 MMOL/L (ref 3.5–5)
RBC # BLD: 2.63 E12/L (ref 3.5–5.5)
RI(T): 1.95
RR MECHANICAL: 16 B/MIN
SODIUM BLD-SCNC: 138 MMOL/L (ref 132–146)
SOURCE, BLOOD GAS: ABNORMAL
THB: 9.3 G/DL (ref 11.5–16.5)
TIME ANALYZED: 411
TOTAL PROTEIN: 5 G/DL (ref 6.4–8.3)
TRIGL SERPL-MCNC: 185 MG/DL (ref 0–149)
VLDLC SERPL CALC-MCNC: 37 MG/DL
VT MECHANICAL: 450 ML
WBC # BLD: 10.3 E9/L (ref 4.5–11.5)

## 2020-12-06 PROCEDURE — 6360000002 HC RX W HCPCS: Performed by: INTERNAL MEDICINE

## 2020-12-06 PROCEDURE — 2580000003 HC RX 258: Performed by: INTERNAL MEDICINE

## 2020-12-06 PROCEDURE — 82805 BLOOD GASES W/O2 SATURATION: CPT

## 2020-12-06 PROCEDURE — 2000000000 HC ICU R&B

## 2020-12-06 PROCEDURE — C9113 INJ PANTOPRAZOLE SODIUM, VIA: HCPCS | Performed by: INTERNAL MEDICINE

## 2020-12-06 PROCEDURE — 71045 X-RAY EXAM CHEST 1 VIEW: CPT

## 2020-12-06 PROCEDURE — 94003 VENT MGMT INPAT SUBQ DAY: CPT

## 2020-12-06 PROCEDURE — 99231 SBSQ HOSP IP/OBS SF/LOW 25: CPT | Performed by: PSYCHIATRY & NEUROLOGY

## 2020-12-06 PROCEDURE — 85025 COMPLETE CBC W/AUTO DIFF WBC: CPT

## 2020-12-06 PROCEDURE — 84100 ASSAY OF PHOSPHORUS: CPT

## 2020-12-06 PROCEDURE — 2500000003 HC RX 250 WO HCPCS: Performed by: INTERNAL MEDICINE

## 2020-12-06 PROCEDURE — 6370000000 HC RX 637 (ALT 250 FOR IP): Performed by: INTERNAL MEDICINE

## 2020-12-06 PROCEDURE — 80053 COMPREHEN METABOLIC PANEL: CPT

## 2020-12-06 PROCEDURE — 83036 HEMOGLOBIN GLYCOSYLATED A1C: CPT

## 2020-12-06 PROCEDURE — 85014 HEMATOCRIT: CPT

## 2020-12-06 PROCEDURE — 94640 AIRWAY INHALATION TREATMENT: CPT

## 2020-12-06 PROCEDURE — 80061 LIPID PANEL: CPT

## 2020-12-06 PROCEDURE — 83735 ASSAY OF MAGNESIUM: CPT

## 2020-12-06 PROCEDURE — 36415 COLL VENOUS BLD VENIPUNCTURE: CPT

## 2020-12-06 PROCEDURE — 85018 HEMOGLOBIN: CPT

## 2020-12-06 RX ORDER — POTASSIUM CHLORIDE 29.8 MG/ML
40 INJECTION INTRAVENOUS
Status: COMPLETED | OUTPATIENT
Start: 2020-12-06 | End: 2020-12-06

## 2020-12-06 RX ORDER — HEPARIN SODIUM (PORCINE) LOCK FLUSH IV SOLN 100 UNIT/ML 100 UNIT/ML
3 SOLUTION INTRAVENOUS EVERY 12 HOURS SCHEDULED
Status: DISCONTINUED | OUTPATIENT
Start: 2020-12-06 | End: 2020-12-17 | Stop reason: HOSPADM

## 2020-12-06 RX ORDER — SODIUM CHLORIDE 0.9 % (FLUSH) 0.9 %
10 SYRINGE (ML) INJECTION PRN
Status: DISCONTINUED | OUTPATIENT
Start: 2020-12-06 | End: 2020-12-17 | Stop reason: HOSPADM

## 2020-12-06 RX ORDER — HEPARIN SODIUM (PORCINE) LOCK FLUSH IV SOLN 100 UNIT/ML 100 UNIT/ML
3 SOLUTION INTRAVENOUS PRN
Status: DISCONTINUED | OUTPATIENT
Start: 2020-12-06 | End: 2020-12-17 | Stop reason: HOSPADM

## 2020-12-06 RX ORDER — LIDOCAINE HYDROCHLORIDE 10 MG/ML
5 INJECTION, SOLUTION EPIDURAL; INFILTRATION; INTRACAUDAL; PERINEURAL ONCE
Status: DISCONTINUED | OUTPATIENT
Start: 2020-12-06 | End: 2020-12-15

## 2020-12-06 RX ADMIN — TICAGRELOR 90 MG: 90 TABLET ORAL at 08:47

## 2020-12-06 RX ADMIN — SODIUM CHLORIDE: 9 INJECTION, SOLUTION INTRAVENOUS at 20:10

## 2020-12-06 RX ADMIN — MIDAZOLAM HYDROCHLORIDE 5 MG/HR: 5 INJECTION, SOLUTION INTRAMUSCULAR; INTRAVENOUS at 09:58

## 2020-12-06 RX ADMIN — MIDAZOLAM HYDROCHLORIDE 9 MG/HR: 5 INJECTION, SOLUTION INTRAMUSCULAR; INTRAVENOUS at 21:55

## 2020-12-06 RX ADMIN — ARFORMOTEROL TARTRATE 15 MCG: 15 SOLUTION RESPIRATORY (INHALATION) at 09:20

## 2020-12-06 RX ADMIN — Medication 200 MCG/HR: at 16:37

## 2020-12-06 RX ADMIN — PROPOFOL 5 MCG/KG/MIN: 10 INJECTION, EMULSION INTRAVENOUS at 13:22

## 2020-12-06 RX ADMIN — ENOXAPARIN SODIUM 40 MG: 40 INJECTION SUBCUTANEOUS at 08:47

## 2020-12-06 RX ADMIN — SODIUM CHLORIDE 3 G: 900 INJECTION INTRAVENOUS at 03:58

## 2020-12-06 RX ADMIN — SODIUM CHLORIDE 1 MCG/KG/HR: 9 INJECTION, SOLUTION INTRAVENOUS at 08:46

## 2020-12-06 RX ADMIN — POTASSIUM CHLORIDE 40 MEQ: 400 INJECTION, SOLUTION INTRAVENOUS at 10:12

## 2020-12-06 RX ADMIN — SODIUM CHLORIDE 1 MCG/KG/HR: 9 INJECTION, SOLUTION INTRAVENOUS at 03:59

## 2020-12-06 RX ADMIN — SODIUM CHLORIDE 3 G: 900 INJECTION INTRAVENOUS at 10:06

## 2020-12-06 RX ADMIN — SODIUM CHLORIDE 3 G: 900 INJECTION INTRAVENOUS at 16:28

## 2020-12-06 RX ADMIN — THIAMINE HYDROCHLORIDE 100 MG: 100 INJECTION, SOLUTION INTRAMUSCULAR; INTRAVENOUS at 08:46

## 2020-12-06 RX ADMIN — SODIUM CHLORIDE 3 G: 900 INJECTION INTRAVENOUS at 21:32

## 2020-12-06 RX ADMIN — IPRATROPIUM BROMIDE AND ALBUTEROL SULFATE 1 AMPULE: 2.5; .5 SOLUTION RESPIRATORY (INHALATION) at 16:49

## 2020-12-06 RX ADMIN — IPRATROPIUM BROMIDE AND ALBUTEROL SULFATE 1 AMPULE: 2.5; .5 SOLUTION RESPIRATORY (INHALATION) at 20:35

## 2020-12-06 RX ADMIN — TICAGRELOR 90 MG: 90 TABLET ORAL at 20:04

## 2020-12-06 RX ADMIN — ARFORMOTEROL TARTRATE 15 MCG: 15 SOLUTION RESPIRATORY (INHALATION) at 20:40

## 2020-12-06 RX ADMIN — FOLIC ACID 1 MG: 5 INJECTION, SOLUTION INTRAMUSCULAR; INTRAVENOUS; SUBCUTANEOUS at 09:10

## 2020-12-06 RX ADMIN — POTASSIUM CHLORIDE 40 MEQ: 400 INJECTION, SOLUTION INTRAVENOUS at 06:33

## 2020-12-06 RX ADMIN — METOPROLOL SUCCINATE 50 MG: 50 TABLET, EXTENDED RELEASE ORAL at 08:47

## 2020-12-06 RX ADMIN — IPRATROPIUM BROMIDE AND ALBUTEROL SULFATE 1 AMPULE: 2.5; .5 SOLUTION RESPIRATORY (INHALATION) at 09:20

## 2020-12-06 RX ADMIN — PANTOPRAZOLE SODIUM 40 MG: 40 INJECTION, POWDER, FOR SOLUTION INTRAVENOUS at 08:47

## 2020-12-06 RX ADMIN — Medication 200 MCG/HR: at 03:27

## 2020-12-06 RX ADMIN — Medication 10 ML: at 08:47

## 2020-12-06 RX ADMIN — SODIUM CHLORIDE 1.2 MCG/KG/HR: 9 INJECTION, SOLUTION INTRAVENOUS at 20:09

## 2020-12-06 RX ADMIN — IPRATROPIUM BROMIDE AND ALBUTEROL SULFATE 1 AMPULE: 2.5; .5 SOLUTION RESPIRATORY (INHALATION) at 14:19

## 2020-12-06 RX ADMIN — ATORVASTATIN CALCIUM 80 MG: 80 TABLET, FILM COATED ORAL at 20:04

## 2020-12-06 RX ADMIN — Medication 200 MCG/HR: at 09:58

## 2020-12-06 RX ADMIN — ASPIRIN 81 MG CHEWABLE TABLET 81 MG: 81 TABLET CHEWABLE at 08:47

## 2020-12-06 ASSESSMENT — PULMONARY FUNCTION TESTS
PIF_VALUE: 27
PIF_VALUE: 32
PIF_VALUE: 26
PIF_VALUE: 25
PIF_VALUE: 33
PIF_VALUE: 17
PIF_VALUE: 25
PIF_VALUE: 27
PIF_VALUE: 26
PIF_VALUE: 38
PIF_VALUE: 32
PIF_VALUE: 32
PIF_VALUE: 26
PIF_VALUE: 32
PIF_VALUE: 30
PIF_VALUE: 29
PIF_VALUE: 30
PIF_VALUE: 42
PIF_VALUE: 25
PIF_VALUE: 33
PIF_VALUE: 32
PIF_VALUE: 24
PIF_VALUE: 31
PIF_VALUE: 25
PIF_VALUE: 38
PIF_VALUE: 25
PIF_VALUE: 49

## 2020-12-06 ASSESSMENT — PAIN SCALES - GENERAL
PAINLEVEL_OUTOF10: 0

## 2020-12-06 NOTE — PROGRESS NOTES
[address] [facility]  Department of Internal Medicine   Internal Medicine Residency   MICU Progress Note    Patient:  Emanuel Martinez 77 y.o. female  MRN: [id number]     Date of Service: 12/6/2020    Allergy: Nitroglycerin and Tramadol    Subjective     Patient was seen and examined this morning. She is intubated. Blood pressure is improved today. She is sedated on Precedex, Versed and Fentanyl. Weaned off she is still very agitated. Workup for infectious etiology of her acute delirium negative at present time. For MRI of head today. Objective     VS: /67   Pulse 59   Temp 99.3 °F (37.4 °C) (Esophageal)   Resp 16   Ht 5' 5\" (1.651 m)   Wt 197 lb 6.4 oz (89.5 kg)   SpO2 98%   BMI 32.85 kg/m²           I & O - 24hr:     Intake/Output Summary (Last 24 hours) at 12/6/2020 0615  Last data filed at 12/6/2020 0547  Gross per 24 hour   Intake 3921 ml   Output 1045 ml   Net 2876 ml       Physical Exam:  Physical Exam  Constitutional:       Interventions: She is sedated, intubated and restrained. HENT:      Head: Normocephalic and atraumatic. Cardiovascular:      Rate and Rhythm: Regular rhythm. Tachycardia present. Pulmonary:      Effort: She is intubated. Abdominal:      General: Abdomen is flat. Bowel sounds are normal.      Palpations: Abdomen is soft. Musculoskeletal:         General: No swelling. Skin:     General: Skin is warm and dry.      Lines     site day    Art line   None    TLC R IJ <1   PICC None    Hemoaccess None        Mechanical Ventilation:   Mode: AC/VC   TV: 16  ml RR: 16 PEEP 6 cmH2O FiO2 40     ABG:     Lab Results   Component Value Date    PH 7.463 12/06/2020    PCO2 29.9 12/06/2020    PO2 81.7 12/06/2020    HCO3 20.9 12/06/2020    BE -2.2 12/06/2020    THB 9.3 12/06/2020    O2SAT 95.6 12/06/2020        Medications     Infusions: (Fluid, Sedation, Vasopressors)  IVF:    None   Vasopressors   None   Sedation   Fentanyl, Propofol, Midazolam     Nutrition:   NPO (8/22/2020)  -Maintained on aspirin, Brilinta, lisinopril, losartan, nitroglycerin  -Troponin less than 0.01, EKG stable  ? Continue Toprol 50 mg, aspirin, Brilinta     Hypertension, improving   - Maintained on lisinopril, losartan, metoprolol  - BP still uncontrolled and 1 68-1 70.  ? Toprol 50 mg, hold other antihypertensives for now and slowly reintroduce  ? Labetalol 10 mg q6 as needed      Nephrology   Lactic acidosis, Resolved   -Lactic acid initially 1.3, now 0.9   ? Stop Trending  lactic acid  ? S/p Normal saline 75 cc an hour for 12 hours     ID  Leukocytosis, resolved   -WBC 13.3  Now 10.3   -Blood cultures pending  ? Follow procalcitonin, urinalysis, urine culture  ? Follow CBC daily        PT/OT evaluation: Not warranted at present time   DVT prophylaxis/ GI prophylaxis: Lovenox/Protonix  Disposition: Admit to ICU     Nahid Myles MD, PGY-1   Attending physician: Dr. Emelia Coats     I personally saw, examined and provided care for the patient. Radiographs, labs and medication list were reviewed by me independently. I spoke with bedside nursing, therapists and consultants. Critical care services and times documented are independent of procedures and multidisciplinary rounds with Residents. Additionally comprehensive, multidisciplinary rounds were conducted with the MICU team. The case was discussed in detail and plans for care were established. Review of Residents documentation was conducted and revisions were made as appropriate. I agree with the above documented exam, problem list and plan of care.   Emelia Coats D.O.  CCT 37 min

## 2020-12-06 NOTE — PLAN OF CARE
Problem: Restraint Use - Nonviolent/Non-Self-Destructive Behavior:  Goal: Absence of restraint indications  12/6/2020 1624 by Mehnaz Nguyen RN  Outcome: Not Met This Shift     Problem: Restraint Use - Nonviolent/Non-Self-Destructive Behavior:  Goal: Absence of restraint-related injury  12/6/2020 1624 by Genevieve Wood RN  Outcome: Met This Shift     Problem: Falls - Risk of:  Goal: Will remain free from falls  Outcome: Met This Shift     Problem: Falls - Risk of:  Goal: Absence of physical injury  Outcome: Met This Shift     Problem: Injury - Risk of, Physical Injury:  Goal: Will remain free from falls  Outcome: Met This Shift     Problem: Injury - Risk of, Physical Injury:  Goal: Absence of physical injury  Outcome: Met This Shift

## 2020-12-06 NOTE — H&P
[address]                  [address] [facility],  [facility]                              HISTORY AND PHYSICAL    PATIENT NAME: Mango Castro                   :        1955  MED REC NO:   [id number]                            ROOM:       56  ACCOUNT NO:   [de-identified]                           ADMIT DATE: 2020  PROVIDER:     Shaista Burroughs DO    HISTORY OF PRESENT ILLNESS:  The patient is a 27-year-old female with  past medical history of coronary artery disease, hypertension, previous  MI; came over to the emergency department with history and chief  complaint of acute aching, throbbing in her tailbone with no radiation. She thought that the pain was caused by tripping over slippers at home,  pretty much oriented and alert. Also gives further history of having  previous back pain. Her vitals were good at that time; temperature  97.5, heart rate 81, respiratory rate 16, O2 saturation 96%. She did  have some restrictive range of motion and had some tenderness in her  back. X-rays of the lumbar spine were obtained, severe degenerative  changes. X-ray of coccyx, old fracture but no acute fracture or  dislocations. She was given some steroids, some ibuprofen and some  hydrocodone, and then apparently had altered mental status, not acting  like herself. She is completely confused, was vomiting, had to be  intubated to protect her airway and she was subsequently brought over  here and admitted into the intensive care unit for evaluation of acute  altered mental status. In the emergency department, she had a slight  leukocytosis at 30.3, lactic acid 1.3, normal liver function tests,  ammonia level 12. Thyroid normal.  Initial drug screen negative. CT of  the head showed no acute intracranial pathology. Chest x-ray, no acute  cardiopulmonary pathology. CT of the chest, no PE, only mild vascular  congestion.   She is going to be brought in for the evaluation of  etiology of altered mental status. MEDICATIONS:  Prior to admission included aspirin 81, Lexapro 20 daily,  gabapentin 400 t.i.d., Antabuse 250 daily, Brilinta 90 b.i.d., Lipitor  80 daily, Seroquel  at h.s., Protonix 40 daily, Cozaar 25 daily,  Flexeril as needed, metoprolol  daily, Nitrostat as needed. PAST MEDICAL HISTORY:  Significant for acute kidney injury, chest pain,  hypotension, degenerative disk disease. She had an ST-segment  myocardial infarction. History of alcohol abuse and drug use. She  claims she has allergies to NITRO and TRAMADOL. PAST SURGICAL HISTORY:  Positive for coronary angioplasty with stent,  breast lumpectomy,  section, hemorrhoid surgery, hysterectomy,  joint replacement, tonsillectomy. FAMILY MEDICAL HISTORY:  Noted and discussed. SOCIAL HISTORY:  Recently just quit smoking but smoked most of her life. Denies use of narcotics at this time or alcohol at this time, although  she has had some history of that problem. REVIEW OF SYSTEMS:  Basically taken from old records. HEENT:  Negative for vertigo, cephalgia, ringing in ears, hearing loss,  difficulty swallowing, hoarseness in her voice, bloody noses, or  epistaxis. CARDIOVASCULAR:  Currently, she had some chest pain recently, consistent  with myocardial infarction. No arrhythmias. RESPIRATORY:  There is some cough, some shortness of breath. No  hemoptysis. GASTROINTESTINAL:  There is no nausea, vomiting, diarrhea, or  constipation. No change in weight. No blood in her stool. GENITOURINARY:  No urgency, frequency, dysuria, or hematuria. ENDOCRINE:  Negative for diabetes or thyroid disorder. MUSCULOSKELETAL:  Positive for degenerative disk disease. PSYCHIATRIC:  Positive for depression. NEUROLOGIC:  Negative for CVA, seizures, tremors, tics, or TIAs.     PHYSICAL EXAMINATION:  VITAL SIGNS:  Shows this to be a 69-year-old white female in moderate  distress with

## 2020-12-06 NOTE — PROGRESS NOTES
Comprehensive Nutrition Assessment    Type and Reason for Visit:  Initial, Consult    Nutrition Recommendations/Plan: Tube Feeding recommendation per physician consult. Recommend Standard without fiber (Osmolite 1.2) @ 50/hr + 1 protein modular daily to provide 1200ml, 1440 calories, 67g protein, 984ml water (1544 calories and 93g protein w/ protein modular). Nutrition Assessment:  Pt currently NPO ; pt also intubated and sedated ; tube feedings have been ordered but not started ; hx of substance abuse ; propofol has been discontinued ; will provide TF recommendations    Malnutrition Assessment:  Malnutrition Status: At risk for malnutrition (Comment)    Context:  Acute Illness     Findings of the 6 clinical characteristics of malnutrition:  Energy Intake:  Mild decrease in energy intake (Comment)(NPO x 3 days since admission)  Weight Loss:  No significant weight loss     Body Fat Loss:  Unable to assess(data not available to assess at this time)     Muscle Mass Loss:  Unable to assess(data not available to assess at this time)    Fluid Accumulation:  No significant fluid accumulation     Strength:  Not Performed    Estimated Daily Nutrient Needs:  Energy (kcal):  2942-6667 (REE 1580 ; Minute volume 7.35 ; Tmax 99.3); Weight Used for Energy Requirements:  Current     Protein (g):   (1.5-1.8g/kg IBW);  Weight Used for Protein Requirements:  Ideal        Fluid (ml/day):  per critical care; Method Used for Fluid Requirements:  1 ml/kcal      Nutrition Related Findings:  +I&Os (+2.6 L), no edema, intubated/sedated, edentulous, active BS, rounded abd, obesity, OG tube      Wounds:  None       Current Nutrition Therapies:    DIET TUBE FEED CONTINUOUS/CYCLIC NPO; STANDARD WITHOUT FIBER; Orogastric; 20; 40    Anthropometric Measures:  · Height: 5' 5\" (165.1 cm)  · Current Body Weight: 197 lb (89.4 kg)(12/6, actual)   · Admission Body Weight: 200 lb (90.7 kg)(12/4, bedscale)    · Usual Body Weight: 193 lb (87.5 kg)(9/21/20, actual)     · Ideal Body Weight: 125 lbs; % Ideal Body Weight 157.6 %   · BMI: 32.8  · BMI Categories: Obese Class 1 (BMI 30.0-34. 9)       Nutrition Diagnosis:   · Inadequate oral intake related to impaired respiratory function as evidenced by NPO or clear liquid status due to medical condition, intubation, nutrition support - enteral nutrition      Nutrition Interventions:   Food and/or Nutrient Delivery:  Continue NPO, Start Tube Feeding  Nutrition Education/Counseling:  Education not indicated   Coordination of Nutrition Care:  Continue to monitor while inpatient    Goals:  Tube feeding will meet nutritional needs with good tolerance       Nutrition Monitoring and Evaluation:   Behavioral-Environmental Outcomes:  Beliefs and Attitutes   Food/Nutrient Intake Outcomes:  Diet Advancement/Tolerance, Enteral Nutrition Intake/Tolerance  Physical Signs/Symptoms Outcomes:  Biochemical Data, GI Status, Fluid Status or Edema, Hemodynamic Status, Nutrition Focused Physical Findings, Skin, Weight     Discharge Planning:     Too soon to determine     Electronically signed by Lynne Armijo RD, LD on 12/6/20 at 10:33 AM EST    Contact: [phone]

## 2020-12-06 NOTE — PROCEDURES
Central Line Insertion     Procedure: right internal jugular vein Triple Lumen Catheter placement. Indications: vascular access    Consent: The patient provided verbal consent for this procedure. Number of sticks: 2    Number of Kits used: 1    Procedure: Time Out: Immediately prior to the procedure a \"timeout\" was called to verify the correct patient and procedure. The patient was place in the trendelenburg position and the skin over the right internal jugular vein was prepped with betadine and draped in a sterile fashion. Local anesthesia was not performed due to the emergent nature of this procedure. With Ultrasound guidance a large bore needle was used to identify the vein, dark non pulsatile blood returned. The guide wire was then inserted through the needle with minimal resistance. 2 mm nick was made in the skin beside the guidewire. Then a dilator was inserted and removed. A triple lumen catheter was then inserted into the vessel over the guide wire using the Seldinger technique to the 15 cm santy. All ports showed good, free flowing blood return and were flushed with saline solution. The catheter was then securely fastened to the skin with sutures and covered with a bio patch and sterile dressing. A post procedure X-ray was ordered and showed good line position. Complications: None   The patient tolerated the procedure well. Estimated blood loss: 5 ml.     Wilder Stahl DO PGY-3  12/5/2020  9:28 PM

## 2020-12-06 NOTE — PROGRESS NOTES
Progress  Note  Chief Complaint   Patient presents with    Altered Mental Status     pt not acting herself. Was normal when she went to bed last night around 11pm.  Son said she was screaming in her sleep around midnight and he woke her but she was fine. Woke up confused at 9am with hard time breathing, leg pain. She did not know where she was or who her son was. Historical Issues:    47-year-old woman with history of alcoholism with over 1 year of sobriety presenting with acute mental status change. History obtained from chart review and from discussing with patient's son and daughter-in-law with whom she lives with. Morning of the event she was normal and picked up the [employer] from the front door. About 2 hours later she started to become increasingly confused with some repetitive speech and then walked outside as if she was going to just walk into the road. At that time her son suggested they go for a drive and it was during the drive that day he noticed her becoming more confused. In the ER she was noted to be markedly confused and was not cooperating with any testing and ultimately required sedation and intubation. CT scan of the head has been negative. Does have evidence of a UTI and a mild leukocytosis with no fever. She was transferred to our hospital for further management. She is requiring some fair amount of sedation to stay safe while intubated and attempts to lower her met with moving all extremities trying to get out of bed and self extubated. Pt is continuous to require significant amounts of sedation to stay safe in bed.      Current Facility-Administered Medications   Medication Dose Route Frequency Provider Last Rate Last Dose    potassium chloride 40 mEq in 100 mL IVPB (Central Line)  40 mEq Intravenous Q2H Tiffanie Wells MD   40 mEq at 12/06/20 1012    dexmedetomidine (PRECEDEX) 1,000 mcg in sodium chloride 0.9 % 250 mL infusion  0.2 mcg/kg/hr Intravenous Continuous Breanna Pastor MD 20.4 mL/hr at 12/06/20 0849 0.9 mcg/kg/hr at 12/06/20 0849    ipratropium-albuterol (DUONEB) nebulizer solution 1 ampule  1 ampule Inhalation Q4H WA Adelaide Melody Jr., DO   1 ampule at 12/06/20 0920    Arformoterol Tartrate (BROVANA) nebulizer solution 15 mcg  15 mcg Nebulization BID Adelaide Melody Jr., DO   15 mcg at 12/06/20 0920    ampicillin-sulbactam (UNASYN) 3 g ivpb minibag  3 g Intravenous Q6H [address]  mL/hr at 12/06/20 1006 3 g at 12/06/20 1006    0.9 % sodium chloride infusion   Intravenous Continuous [address]  mL/hr at 12/05/20 2316      midazolam (VERSED) 100 mg in dextrose 5 % 100 mL infusion  1 mg/hr Intravenous Continuous Breanna Pastor MD 5 mL/hr at 12/06/20 0958 5 mg/hr at 12/06/20 0958    aspirin chewable tablet 81 mg  81 mg Oral Daily Breanna Pastor MD   81 mg at 12/06/20 0847    atorvastatin (LIPITOR) tablet 80 mg  80 mg Oral Nightly Breanna Pastor MD   80 mg at 12/05/20 2013    pantoprazole (PROTONIX) injection 40 mg  40 mg Intravenous Daily Breanna Pastor MD   40 mg at 12/06/20 0847    OLANZapine (ZYPREXA) injection 10 mg  10 mg Intramuscular BID PRN Breanna Pastor MD        metoprolol succinate (TOPROL XL) extended release tablet 50 mg  50 mg Oral Daily Breanna Pastor MD   50 mg at 12/06/20 0847    hydrALAZINE (APRESOLINE) injection 10 mg  10 mg Intravenous Q15 Min PRN Breanna Pastor MD        sodium chloride flush 0.9 % injection 10 mL  10 mL Intravenous 2 times per day Breanna Pastor MD   10 mL at 12/06/20 0847    sodium chloride flush 0.9 % injection 10 mL  10 mL Intravenous PRN Breanna Pastor MD        acetaminophen (TYLENOL) tablet 650 mg  650 mg Oral Q6H PRN Breanna Pastor MD        Or    acetaminophen (TYLENOL) suppository 650 mg  650 mg Rectal Q6H PRN Breanna Pastor MD        polyethylene glycol (GLYCOLAX) packet 17 g  17 g Oral Daily PRN Breanna Pastor MD        enoxaparin promptly would get CT head to evaluate for interval change. Discussed case with ICU resident.      Electronically signed by Tee Melgar MD on 12/6/2020 at 10:32 AM

## 2020-12-06 NOTE — PROCEDURES
12/6/20 11 am - Aguila Rockwell RN called for a time to schedule pt for MRI today. Unable to give a time at this point due to a possible double study stat MRI under anesthesia pending and still waiting to hear back from anesthesia a time they will be able to possibly schedule today.  Once we hear from anesthesia regarding a time, we will let RN know if we have a time for today

## 2020-12-06 NOTE — PLAN OF CARE
Problem: Restraint Use - Nonviolent/Non-Self-Destructive Behavior:  Goal: Absence of restraint indications  12/6/2020 2787 by Tomi Nguyen, RN  Outcome: Not Met This Shift     Problem: Restraint Use - Nonviolent/Non-Self-Destructive Behavior:  Goal: Absence of restraint-related injury  12/6/2020 4309 by Tomi Nguyen, RN  Outcome: Met This Shift

## 2020-12-07 ENCOUNTER — APPOINTMENT (OUTPATIENT)
Dept: MRI IMAGING | Age: 65
DRG: 207 | End: 2020-12-07
Payer: MEDICARE

## 2020-12-07 ENCOUNTER — APPOINTMENT (OUTPATIENT)
Dept: GENERAL RADIOLOGY | Age: 65
DRG: 207 | End: 2020-12-07
Payer: MEDICARE

## 2020-12-07 LAB
AADO2: 120.6 MMHG
AADO2: 158.3 MMHG
ALBUMIN SERPL-MCNC: 2.8 G/DL (ref 3.5–5.2)
ALP BLD-CCNC: 77 U/L (ref 35–104)
ALT SERPL-CCNC: 13 U/L (ref 0–32)
ANION GAP SERPL CALCULATED.3IONS-SCNC: 8 MMOL/L (ref 7–16)
AST SERPL-CCNC: 11 U/L (ref 0–31)
B.E.: -0.4 MMOL/L (ref -3–3)
B.E.: -2.9 MMOL/L (ref -3–3)
BASOPHILS ABSOLUTE: 0.02 E9/L (ref 0–0.2)
BASOPHILS RELATIVE PERCENT: 0.3 % (ref 0–2)
BILIRUB SERPL-MCNC: 0.4 MG/DL (ref 0–1.2)
BUN BLDV-MCNC: 7 MG/DL (ref 8–23)
CALCIUM SERPL-MCNC: 8.2 MG/DL (ref 8.6–10.2)
CHLORIDE BLD-SCNC: 109 MMOL/L (ref 98–107)
CO2: 20 MMOL/L (ref 22–29)
COHB: 0.3 % (ref 0–1.5)
COHB: 1.2 % (ref 0–1.5)
CREAT SERPL-MCNC: 0.6 MG/DL (ref 0.5–1)
CRITICAL: ABNORMAL
CRITICAL: ABNORMAL
CULTURE, RESPIRATORY: ABNORMAL
CULTURE, RESPIRATORY: ABNORMAL
DATE ANALYZED: ABNORMAL
DATE ANALYZED: ABNORMAL
DATE OF COLLECTION: ABNORMAL
DATE OF COLLECTION: ABNORMAL
EOSINOPHILS ABSOLUTE: 0.31 E9/L (ref 0.05–0.5)
EOSINOPHILS RELATIVE PERCENT: 3.9 % (ref 0–6)
FIO2: 40 %
FIO2: 40 %
GFR AFRICAN AMERICAN: >60
GFR NON-AFRICAN AMERICAN: >60 ML/MIN/1.73
GLUCOSE BLD-MCNC: 118 MG/DL (ref 74–99)
HCO3: 19.4 MMOL/L (ref 22–26)
HCO3: 23.2 MMOL/L (ref 22–26)
HCT VFR BLD CALC: 25 % (ref 34–48)
HEMOGLOBIN: 7.9 G/DL (ref 11.5–15.5)
HHB: 1.8 % (ref 0–5)
HHB: 5.4 % (ref 0–5)
IMMATURE GRANULOCYTES #: 0.02 E9/L
IMMATURE GRANULOCYTES %: 0.3 % (ref 0–5)
LAB: ABNORMAL
LAB: ABNORMAL
LV EF: 73 %
LVEF MODALITY: NORMAL
LYMPHOCYTES ABSOLUTE: 1.63 E9/L (ref 1.5–4)
LYMPHOCYTES RELATIVE PERCENT: 20.7 % (ref 20–42)
Lab: ABNORMAL
Lab: ABNORMAL
MAGNESIUM: 1.8 MG/DL (ref 1.6–2.6)
MCH RBC QN AUTO: 30.9 PG (ref 26–35)
MCHC RBC AUTO-ENTMCNC: 31.6 % (ref 32–34.5)
MCV RBC AUTO: 97.7 FL (ref 80–99.9)
METHB: 0 % (ref 0–1.5)
METHB: 0.3 % (ref 0–1.5)
MODE: AC
MODE: AC
MONOCYTES ABSOLUTE: 0.56 E9/L (ref 0.1–0.95)
MONOCYTES RELATIVE PERCENT: 7.1 % (ref 2–12)
NEUTROPHILS ABSOLUTE: 5.32 E9/L (ref 1.8–7.3)
NEUTROPHILS RELATIVE PERCENT: 67.7 % (ref 43–80)
O2 CONTENT: 12.1 ML/DL
O2 CONTENT: 14 ML/DL
O2 SATURATION: 94.6 % (ref 92–98.5)
O2 SATURATION: 98.2 % (ref 92–98.5)
O2HB: 94.3 % (ref 94–97)
O2HB: 96.7 % (ref 94–97)
OPERATOR ID: 882
OPERATOR ID: ABNORMAL
ORGANISM: ABNORMAL
PATIENT TEMP: 37 C
PATIENT TEMP: 37 C
PCO2: 25.2 MMHG (ref 35–45)
PCO2: 34.4 MMHG (ref 35–45)
PDW BLD-RTO: 13.2 FL (ref 11.5–15)
PEEP/CPAP: 6 CMH2O
PEEP/CPAP: 6 CMH2O
PFO2: 1.93 MMHG/%
PFO2: 3.14 MMHG/%
PH BLOOD GAS: 7.45 (ref 7.35–7.45)
PH BLOOD GAS: 7.5 (ref 7.35–7.45)
PHOSPHORUS: 3.6 MG/DL (ref 2.5–4.5)
PLATELET # BLD: 153 E9/L (ref 130–450)
PMV BLD AUTO: 10.4 FL (ref 7–12)
PO2: 125.6 MMHG (ref 75–100)
PO2: 77.3 MMHG (ref 75–100)
POTASSIUM SERPL-SCNC: 3.5 MMOL/L (ref 3.5–5)
RBC # BLD: 2.56 E12/L (ref 3.5–5.5)
RI(T): 0.96
RI(T): 2.05
RR MECHANICAL: 16 B/MIN
RR MECHANICAL: 16 B/MIN
SMEAR, RESPIRATORY: ABNORMAL
SODIUM BLD-SCNC: 137 MMOL/L (ref 132–146)
SOURCE, BLOOD GAS: ABNORMAL
SOURCE, BLOOD GAS: ABNORMAL
THB: 10.5 G/DL (ref 11.5–16.5)
THB: 8.7 G/DL (ref 11.5–16.5)
TIME ANALYZED: 1555
TIME ANALYZED: 429
TOTAL PROTEIN: 5 G/DL (ref 6.4–8.3)
TRIGL SERPL-MCNC: 189 MG/DL (ref 0–149)
VT MECHANICAL: 360 ML
VT MECHANICAL: 450 ML
WBC # BLD: 7.9 E9/L (ref 4.5–11.5)

## 2020-12-07 PROCEDURE — 85025 COMPLETE CBC W/AUTO DIFF WBC: CPT

## 2020-12-07 PROCEDURE — 6370000000 HC RX 637 (ALT 250 FOR IP): Performed by: INTERNAL MEDICINE

## 2020-12-07 PROCEDURE — 84100 ASSAY OF PHOSPHORUS: CPT

## 2020-12-07 PROCEDURE — 2000000000 HC ICU R&B

## 2020-12-07 PROCEDURE — C9113 INJ PANTOPRAZOLE SODIUM, VIA: HCPCS | Performed by: INTERNAL MEDICINE

## 2020-12-07 PROCEDURE — 83735 ASSAY OF MAGNESIUM: CPT

## 2020-12-07 PROCEDURE — 80053 COMPREHEN METABOLIC PANEL: CPT

## 2020-12-07 PROCEDURE — 6360000002 HC RX W HCPCS: Performed by: INTERNAL MEDICINE

## 2020-12-07 PROCEDURE — 2500000003 HC RX 250 WO HCPCS: Performed by: INTERNAL MEDICINE

## 2020-12-07 PROCEDURE — 94003 VENT MGMT INPAT SUBQ DAY: CPT

## 2020-12-07 PROCEDURE — 93306 TTE W/DOPPLER COMPLETE: CPT

## 2020-12-07 PROCEDURE — 2580000003 HC RX 258: Performed by: INTERNAL MEDICINE

## 2020-12-07 PROCEDURE — 71045 X-RAY EXAM CHEST 1 VIEW: CPT

## 2020-12-07 PROCEDURE — 87040 BLOOD CULTURE FOR BACTERIA: CPT

## 2020-12-07 PROCEDURE — 36569 INSJ PICC 5 YR+ W/O IMAGING: CPT

## 2020-12-07 PROCEDURE — 94640 AIRWAY INHALATION TREATMENT: CPT

## 2020-12-07 PROCEDURE — 84478 ASSAY OF TRIGLYCERIDES: CPT

## 2020-12-07 PROCEDURE — 82805 BLOOD GASES W/O2 SATURATION: CPT

## 2020-12-07 PROCEDURE — 70553 MRI BRAIN STEM W/O & W/DYE: CPT

## 2020-12-07 PROCEDURE — 36415 COLL VENOUS BLD VENIPUNCTURE: CPT

## 2020-12-07 PROCEDURE — 99233 SBSQ HOSP IP/OBS HIGH 50: CPT | Performed by: NURSE PRACTITIONER

## 2020-12-07 PROCEDURE — C1751 CATH, INF, PER/CENT/MIDLINE: HCPCS

## 2020-12-07 PROCEDURE — 99291 CRITICAL CARE FIRST HOUR: CPT | Performed by: INTERNAL MEDICINE

## 2020-12-07 PROCEDURE — A9579 GAD-BASE MR CONTRAST NOS,1ML: HCPCS | Performed by: RADIOLOGY

## 2020-12-07 PROCEDURE — 6360000004 HC RX CONTRAST MEDICATION: Performed by: RADIOLOGY

## 2020-12-07 RX ORDER — DIVALPROEX SODIUM 125 MG/1
500 CAPSULE, COATED PELLETS ORAL EVERY 12 HOURS SCHEDULED
Status: DISCONTINUED | OUTPATIENT
Start: 2020-12-07 | End: 2020-12-15

## 2020-12-07 RX ORDER — FUROSEMIDE 10 MG/ML
40 INJECTION INTRAMUSCULAR; INTRAVENOUS ONCE
Status: COMPLETED | OUTPATIENT
Start: 2020-12-07 | End: 2020-12-07

## 2020-12-07 RX ORDER — POTASSIUM CHLORIDE 29.8 MG/ML
40 INJECTION INTRAVENOUS ONCE
Status: COMPLETED | OUTPATIENT
Start: 2020-12-07 | End: 2020-12-07

## 2020-12-07 RX ORDER — CHLORHEXIDINE GLUCONATE 0.12 MG/ML
15 RINSE ORAL 2 TIMES DAILY
Status: DISCONTINUED | OUTPATIENT
Start: 2020-12-07 | End: 2020-12-14

## 2020-12-07 RX ADMIN — PROPOFOL 20 MCG/KG/MIN: 10 INJECTION, EMULSION INTRAVENOUS at 22:39

## 2020-12-07 RX ADMIN — VANCOMYCIN HYDROCHLORIDE 2000 MG: 10 INJECTION, POWDER, LYOPHILIZED, FOR SOLUTION INTRAVENOUS at 11:03

## 2020-12-07 RX ADMIN — IPRATROPIUM BROMIDE AND ALBUTEROL SULFATE 1 AMPULE: 2.5; .5 SOLUTION RESPIRATORY (INHALATION) at 14:20

## 2020-12-07 RX ADMIN — MIDAZOLAM HYDROCHLORIDE 9 MG/HR: 5 INJECTION, SOLUTION INTRAMUSCULAR; INTRAVENOUS at 10:57

## 2020-12-07 RX ADMIN — DIVALPROEX SODIUM 500 MG: 125 CAPSULE, COATED PELLETS ORAL at 20:34

## 2020-12-07 RX ADMIN — ASPIRIN 81 MG CHEWABLE TABLET 81 MG: 81 TABLET CHEWABLE at 08:20

## 2020-12-07 RX ADMIN — TICAGRELOR 90 MG: 90 TABLET ORAL at 08:20

## 2020-12-07 RX ADMIN — Medication 200 MCG/HR: at 12:21

## 2020-12-07 RX ADMIN — POTASSIUM CHLORIDE 40 MEQ: 400 INJECTION, SOLUTION INTRAVENOUS at 08:33

## 2020-12-07 RX ADMIN — SODIUM CHLORIDE 3 G: 900 INJECTION INTRAVENOUS at 16:45

## 2020-12-07 RX ADMIN — ARFORMOTEROL TARTRATE 15 MCG: 15 SOLUTION RESPIRATORY (INHALATION) at 19:58

## 2020-12-07 RX ADMIN — PROPOFOL 5 MCG/KG/MIN: 10 INJECTION, EMULSION INTRAVENOUS at 10:50

## 2020-12-07 RX ADMIN — ARFORMOTEROL TARTRATE 15 MCG: 15 SOLUTION RESPIRATORY (INHALATION) at 10:07

## 2020-12-07 RX ADMIN — SODIUM CHLORIDE 3 G: 900 INJECTION INTRAVENOUS at 21:39

## 2020-12-07 RX ADMIN — TICAGRELOR 90 MG: 90 TABLET ORAL at 20:34

## 2020-12-07 RX ADMIN — SODIUM CHLORIDE: 9 INJECTION, SOLUTION INTRAVENOUS at 20:36

## 2020-12-07 RX ADMIN — Medication 200 MCG/HR: at 18:27

## 2020-12-07 RX ADMIN — CHLORHEXIDINE GLUCONATE 0.12% ORAL RINSE 15 ML: 1.2 LIQUID ORAL at 11:04

## 2020-12-07 RX ADMIN — FOLIC ACID 1 MG: 5 INJECTION, SOLUTION INTRAMUSCULAR; INTRAVENOUS; SUBCUTANEOUS at 08:20

## 2020-12-07 RX ADMIN — THIAMINE HYDROCHLORIDE 100 MG: 100 INJECTION, SOLUTION INTRAMUSCULAR; INTRAVENOUS at 08:21

## 2020-12-07 RX ADMIN — ATORVASTATIN CALCIUM 80 MG: 80 TABLET, FILM COATED ORAL at 20:34

## 2020-12-07 RX ADMIN — METOPROLOL SUCCINATE 50 MG: 50 TABLET, EXTENDED RELEASE ORAL at 08:20

## 2020-12-07 RX ADMIN — FUROSEMIDE 40 MG: 10 INJECTION, SOLUTION INTRAVENOUS at 11:03

## 2020-12-07 RX ADMIN — MIDAZOLAM HYDROCHLORIDE 10 MG/HR: 5 INJECTION, SOLUTION INTRAMUSCULAR; INTRAVENOUS at 18:26

## 2020-12-07 RX ADMIN — Medication 200 MCG/HR: at 06:03

## 2020-12-07 RX ADMIN — PROPOFOL 20 MCG/KG/MIN: 10 INJECTION, EMULSION INTRAVENOUS at 18:26

## 2020-12-07 RX ADMIN — IPRATROPIUM BROMIDE AND ALBUTEROL SULFATE 1 AMPULE: 2.5; .5 SOLUTION RESPIRATORY (INHALATION) at 19:58

## 2020-12-07 RX ADMIN — ENOXAPARIN SODIUM 40 MG: 40 INJECTION SUBCUTANEOUS at 08:30

## 2020-12-07 RX ADMIN — DIVALPROEX SODIUM 500 MG: 125 CAPSULE, COATED PELLETS ORAL at 12:17

## 2020-12-07 RX ADMIN — CHLORHEXIDINE GLUCONATE 0.12% ORAL RINSE 15 ML: 1.2 LIQUID ORAL at 20:34

## 2020-12-07 RX ADMIN — GADOTERIDOL 20 ML: 279.3 INJECTION, SOLUTION INTRAVENOUS at 18:07

## 2020-12-07 RX ADMIN — Medication 10 ML: at 08:21

## 2020-12-07 RX ADMIN — SODIUM CHLORIDE: 9 INJECTION, SOLUTION INTRAVENOUS at 06:29

## 2020-12-07 RX ADMIN — PANTOPRAZOLE SODIUM 40 MG: 40 INJECTION, POWDER, FOR SOLUTION INTRAVENOUS at 08:32

## 2020-12-07 RX ADMIN — IPRATROPIUM BROMIDE AND ALBUTEROL SULFATE 1 AMPULE: 2.5; .5 SOLUTION RESPIRATORY (INHALATION) at 10:07

## 2020-12-07 RX ADMIN — SODIUM CHLORIDE 3 G: 900 INJECTION INTRAVENOUS at 03:51

## 2020-12-07 RX ADMIN — SODIUM CHLORIDE 3 G: 900 INJECTION INTRAVENOUS at 10:09

## 2020-12-07 ASSESSMENT — PAIN SCALES - GENERAL
PAINLEVEL_OUTOF10: 0

## 2020-12-07 ASSESSMENT — PULMONARY FUNCTION TESTS
PIF_VALUE: 29
PIF_VALUE: 26
PIF_VALUE: 28
PIF_VALUE: 28
PIF_VALUE: 29
PIF_VALUE: 30
PIF_VALUE: 28
PIF_VALUE: 33
PIF_VALUE: 28
PIF_VALUE: 28
PIF_VALUE: 21
PIF_VALUE: 29
PIF_VALUE: 50
PIF_VALUE: 32
PIF_VALUE: 28
PIF_VALUE: 30
PIF_VALUE: 30
PIF_VALUE: 23
PIF_VALUE: 28
PIF_VALUE: 30
PIF_VALUE: 26
PIF_VALUE: 24
PIF_VALUE: 12
PIF_VALUE: 28
PIF_VALUE: 25
PIF_VALUE: 31
PIF_VALUE: 34

## 2020-12-07 NOTE — PLAN OF CARE
Problem: Restraint Use - Nonviolent/Non-Self-Destructive Behavior:  Goal: Absence of restraint-related injury  Description: Absence of restraint-related injury  12/7/2020 0453 by Phong Galeas RN  Outcome: Met This Shift     Problem: Restraint Use - Nonviolent/Non-Self-Destructive Behavior:  Goal: Absence of restraint indications  Description: Absence of restraint indications  12/7/2020 0453 by Phong Galeas RN  Outcome: Not Met This Shift

## 2020-12-07 NOTE — PROGRESS NOTES
picc Placement 12/7/2020    Product number: [id number]   Lot Number: [id number]      Ultrasound: yes   Right Brachial vein:                Upper Arm Circumference: 38cm    Size: 5    Exposed Length: 3cm    Internal Length: 38cm   Cut: 9cm   Vein Measurement: 0.52cm    Marion Morel  12/7/2020              Lorena achieved picc line in 1/3 distal svc or atrial  Caval junction.    1:20 PM

## 2020-12-07 NOTE — CARE COORDINATION
Pt remains in MICU: Covid (-). Intubated, FiO2 40%. Fentanyl, Versed and Precedex gtts continue. Pt remains in 2 pt restraints. Awaiting CT head, MRI Brain. Spoke to son Stacia Rainey and he stated they live together in a 1 story home. Independent with ADL's. No assistive devices. No Home O2. Will need ambulation testing if O2 needed at discharge. PCP . Pharmacy [facility] mail order or [facility]. Unsure of discharge plan at this point. He wants to see oif she weans off the vent and can participate with therapy. CM will continue to follow and update son as she progresses.     Florencia Jain, RN  [facility] Case Management  [phone]

## 2020-12-07 NOTE — PROGRESS NOTES
Isidoro Riojas is a 77 y.o.  female     Neurology is following for altered mental status. PMH: CAD, hypertension, prior MI in August, degenerative d/o of bone, [place], alcohol abuse with 1 year sobriety    Patient presented to ED on 12/3 with complaint of confusion that began the night prior to arrival.   Symptoms gradually worsened over time--- with repetitive speech and walking around outside. While driving with her son, she became more confused and continued to decline including hard time breathing and bilateral leg pain. She also did not know where she was or who her son was on arrival.     Patient would not cooperate with testing and ultimately required sedation and intubation. Patient has no history of similar episodes in the past.     Patient was intubated for airway protection and altered mental status in [place] urgent care via EMS. In ED patient O2 sats found in the 30s with her cyanotic. CT scan of the head was negative. Patient was found to have UTI and mild leukocytosis without fever. MRI brain with anesthesia is pending. Repeat CT head pending    Hypertensive although other vitals stable on vent---- On propofol, Versed, Fentanyl and Precedex     ROS limited d/t intubation and sedation    No family present      Objective:     BP (!) 153/85   Pulse 64   Temp 95.5 °F (35.3 °C) (Esophageal)   Resp 18   Ht 5' 5\" (1.651 m)   Wt 199 lb 1.6 oz (90.3 kg)   SpO2 100%   BMI 33.13 kg/m²     General appearance: intubated, sedated, and in no obvious distress  Head: normocephalic, without obvious abnormality, atraumatic  Eyes: conjunctivae/corneas clear.  .  Neck:  supple, symmetrical, trachea midline   Lungs: Rhonchi to auscultation bilaterally, unlabored breaths  Heart: regular rate and rhythm, SB on tele   Extremities: normal, atraumatic, no cyanosis or edema  Pulses: 2+ and symmetric  Skin: color, texture, turgor normal---no rashes or lesions      Mental Status:  intubated, sedated, not following commands, eyes closed     Poor attention/concentration    Speech/Language: intubated    Cranial Nerves: CN 2-12 limited d/t pt's condition   I: smell NA   II: visual acuity  NA   II: visual fields No blink to threat   II: pupils Sluggish but equal   III,VII: ptosis None   III,IV,VI: extraocular muscles  Pupils midline, no gaze preference   V: mastication    V: facial light touch sensation  No response to LT   V,VII: corneal reflex  Present   VII: facial muscle function - upper  Normal   VII: facial muscle function - lower Appears Normal   VIII: hearing No response to loud claps or voice   IX: soft palate elevation     IX,X: gag reflex Present   XI: trapezius strength     XI: sternocleidomastoid strength    XI: neck extension strength     XII: tongue strength       Motor:  No purposeful or spontaneous movements   No withdraw to pain   Normal bulk and tone  No abnormal movements    Sensory:  No withdraw to pain     Coordination:   Unable to complete d/t pt's condition     Gait:  Unable to complete d/t pt's condition     DTR:   Right Brachioradialis reflex 2+  Left Brachioradialis reflex 2+  Right Biceps reflex 2+  Left Biceps reflex 2+  Right Triceps reflex 2+  Left Triceps reflex 2+  Right Quadriceps reflex 2+  Left Quadriceps reflex 2+  Right Achilles reflex 2+  Left Achilles reflex 2+    ? L Babinskis  No Castro's    No pathological reflexes    Laboratory/Radiology:     CBC:   Lab Results   Component Value Date    WBC 7.9 12/07/2020    RBC 2.56 12/07/2020    HGB 7.9 12/07/2020    HCT 25.0 12/07/2020    MCV 97.7 12/07/2020    MCH 30.9 12/07/2020    MCHC 31.6 12/07/2020    RDW 13.2 12/07/2020     12/07/2020    MPV 10.4 12/07/2020     CMP:    Lab Results   Component Value Date     12/07/2020    K 3.5 12/07/2020    K 4.0 10/19/2020     12/07/2020    CO2 20 12/07/2020    BUN 7 12/07/2020    CREATININE 0.6 12/07/2020    GFRAA >60 12/07/2020    LABGLOM >60 12/07/2020    GLUCOSE 118 12/07/2020    PROT 5.0 12/07/2020    LABALBU 2.8 12/07/2020    CALCIUM 8.2 12/07/2020    BILITOT 0.4 12/07/2020    ALKPHOS 77 12/07/2020    AST 11 12/07/2020    ALT 13 12/07/2020     Hepatic Function Panel:    Lab Results   Component Value Date    ALKPHOS 77 12/07/2020    ALT 13 12/07/2020    AST 11 12/07/2020    PROT 5.0 12/07/2020    BILITOT 0.4 12/07/2020    LABALBU 2.8 12/07/2020     U/A:    Lab Results   Component Value Date    COLORU Yellow 12/05/2020    PROTEINU Negative 12/05/2020    PHUR 7.0 12/05/2020    WBCUA 2-5 12/05/2020    RBCUA 10-20 12/05/2020    BACTERIA FEW 12/05/2020    CLARITYU Clear 12/05/2020    SPECGRAV 1.020 12/05/2020    LEUKOCYTESUR TRACE 12/05/2020    UROBILINOGEN 0.2 12/05/2020    BILIRUBINUR Negative 12/05/2020    BLOODU SMALL 12/05/2020    GLUCOSEU Negative 12/05/2020     HgBA1c:    Lab Results   Component Value Date    LABA1C 5.7 12/06/2020     FLP:    Lab Results   Component Value Date    TRIG 189 12/07/2020    HDL 45 12/06/2020    LDLCALC 44 12/06/2020    LABVLDL 37 12/06/2020     TSH:    Lab Results   Component Value Date    TSH 1.320 12/03/2020     XR CHEST PORTABLE   Final Result   Continued left lung base atelectasis and/or infiltrate and possible left   pleural effusion      XR CHEST PORTABLE   Final Result   Focal infiltrates, left lung base. Cardiomegaly. XR CHEST PORTABLE   Final Result   New left basilar airspace opacities which may be on the basis of atelectasis   or pneumonia. Suspect small left pleural effusion. CTA CHEST W CONTRAST   Final Result   1. No pulmonary embolism. 2.  No pneumonia or pleural effusion. 3.  Mild pulmonary vascular congestion. 4.  Endotracheal tube is just above level of the sofy and can be retracted   approximately 3 cm. CT ABDOMEN PELVIS W IV CONTRAST Additional Contrast? None   Final Result   Diverticulosis without definite CT evidence of diverticulitis   Minimal pericardial effusion without gross cardiomegaly. Suggestion of   coronary artery calcification   Bilateral lung base slight atelectasis may be physiologic   Grade 1 anterolisthesis and degenerative disc disease at L4-5 without   evidence of spondylolysis      XR CHEST PORTABLE   Final Result   1. Endotracheal tube is just above level of the sofy. This tube could be   retracted approximately 3 centimetres. 2.  Stable mild pulmonary vascular congestion. No focal airspace opacity or   pleural effusion. CT HEAD WO CONTRAST   Final Result   1. There is no acute intracranial abnormality. Specifically, there is no   intracranial hemorrhage. 2. Atrophy and periventricular leukomalacia,      XR ABDOMEN FOR NG/OG/NE TUBE PLACEMENT   Final Result   Satisfactory position of nasogastric tube. XR CHEST PORTABLE   Final Result   The tip of the endotracheal tube is seen within the right mainstem bronchus   and should be pulled back by approximately 3 cm. MRI BRAIN W WO CONTRAST    (Results Pending)   XR CHEST PORTABLE    (Results Pending)   XR CHEST PORTABLE    (Results Pending)   XR CHEST PORTABLE    (Results Pending)   CT HEAD WO CONTRAST    (Results Pending)     Limited echo 10/20/2020:  Normal left ventricle size and systolic function. Ejection fraction is visually estimated at 60-65%. No regional wall motion abnormalities seen. Mild concentric left ventricular hypertrophy. Indeterminate diastolic function. The left atrium is mildly dilated. Normal right ventricular size and function. Physiologic and/or trace mitral regurgitation is present. No hemodynamically significant aortic stenosis is present. Physiologic and/or trace tricuspid regurgitation. RVSP is 29 mmHg. Normal estimated PA systolic pressure. No evidence for hemodynamically significant pericardial effusion. Compared to prior echo of 8/23/2020, changes noted. LVEF has improved from 40-45% to 60-65%.      All labs and images personally reviewed today    Assessment:     Acute confusional state with speech disturbances   Differentials include acute stroke with aphasia versus postictal state from unwitnessed seizure; medication intoxication or primary psych event are less likely   MRI brain today with anesthesia      Acute anemia   H/H 7.9/25.0-- monitor H/H     UTI    On Unasyn     Plan:     Continue supportive care  Wean as able  MRI brain with anesthesia pending  If MRI is negative would recommend EEG if sedation can be weaned/significantly lowered  Continue aspirin, Brilinta and statin   A1C 5.7, LDL 44   Continue tele   SCDs    YASIR Lewis NP-C   8:58 AM  12/7/2020

## 2020-12-07 NOTE — PROGRESS NOTES
Patient seen in icu still on vent and sedated. For further images today.  Etiology of encephalopathy still uncertain

## 2020-12-07 NOTE — PROGRESS NOTES
[address] [facility]  Department of Internal Medicine   Internal Medicine Residency   MICU Progress Note    Patient:  Jason Wilkins 77 y.o. female  MRN: [id number]     Date of Service: 12/7/2020    Allergy: Nitroglycerin and Tramadol  Cc follow up of encephalopathy    Subjective     Patient was seen and examined this morning. She is intubated. Blood pressure is elevated this morning . She is sedated on Precedex, Versed and Fentanyl. Weaned off she is still very agitated, but has eye opening, not following commands.     - MRI still pending.   - Urine Cultures and Respiratory Cultures (+) for staph aureu, Blood cultures negative to date. On Vancomycin and Unasyn      Objective     VS: BP (!) 144/83   Pulse 87   Temp 97.2 °F (36.2 °C) (Esophageal)   Resp 19   Ht 5' 5\" (1.651 m)   Wt 199 lb 1.6 oz (90.3 kg)   SpO2 96%   BMI 33.13 kg/m²           I & O - 24hr:     Intake/Output Summary (Last 24 hours) at 12/7/2020 1116  Last data filed at 12/7/2020 0900  Gross per 24 hour   Intake 4123 ml   Output 1705 ml   Net 2418 ml       Physical Exam:  Physical Exam  Constitutional:       Interventions: She is sedated, intubated and restrained. HENT:      Head: Normocephalic and atraumatic. Cardiovascular:      Rate and Rhythm: Regular rhythm. Tachycardia present. Pulmonary:      Effort: She is intubated. , (+) Bilateral Rales   Abdominal:      General: Abdomen is flat. Bowel sounds are normal.      Palpations: Abdomen is soft. Musculoskeletal:         General: No swelling. Skin:     General: Skin is warm and dry.      Lines     site day    Art line   None    TLC None    PICC None    Hemoaccess None        Mechanical Ventilation:   Mode: AC/VC   TV: 360  Ml RR: 16 PEEP 6 cmH2O FiO2 40     ABG:     Lab Results   Component Value Date    PH 7.505 12/07/2020    PCO2 25.2 12/07/2020    PO2 125.6 12/07/2020    HCO3 19.4 12/07/2020    BE -2.9 12/07/2020    THB 8.7 12/07/2020    O2SAT 98.2 12/07/2020 Medications     Infusions: (Fluid, Sedation, Vasopressors)  IVF:    None   Vasopressors   None   Sedation   Fentanyl, Propofol, Midazolam     Nutrition:   NPO     ATB:   Antibiotics  Days   Unasyn  3   Vancomycin  1           Patient currently has   Urinary cath  Restraints  DVT prophylaxis/ GI prophylaxis,        Labs     CBC:   Lab Results   Component Value Date    WBC 7.9 12/07/2020    RBC 2.56 12/07/2020    HGB 7.9 12/07/2020    HCT 25.0 12/07/2020    MCV 97.7 12/07/2020    MCH 30.9 12/07/2020    MCHC 31.6 12/07/2020    RDW 13.2 12/07/2020     12/07/2020    MPV 10.4 12/07/2020     CMP:    Lab Results   Component Value Date     12/07/2020    K 3.5 12/07/2020    K 4.0 10/19/2020     12/07/2020    CO2 20 12/07/2020    BUN 7 12/07/2020    CREATININE 0.6 12/07/2020    GFRAA >60 12/07/2020    LABGLOM >60 12/07/2020    GLUCOSE 118 12/07/2020    PROT 5.0 12/07/2020    LABALBU 2.8 12/07/2020    CALCIUM 8.2 12/07/2020    BILITOT 0.4 12/07/2020    ALKPHOS 77 12/07/2020    AST 11 12/07/2020    ALT 13 12/07/2020       Imaging Studies:    CXR:         Resident's Assessment and Plan     Alisa Kim is a 77 y.o. female with a past medical history of CAD, MI (8/22 2020), hypertension, degenerative bone disease who was brought into the ED for altered mental status for the following:     Neurology   Acute encephalopathy, likely 2/2 to UTI   - Urine Culture (+) Staph Aureus   - possible exacerbated by Baclofen toxicity? Patient was complaining of back pain to son and takes baclofen and gabapentin. - Work-up has shown normal ammonia, negative urine drug and serum drug screen, hepatic function panel normal, CT negative for stroke no acute processes. Thiamine 913 mg, folic acid 1 mg daily  ? Currently sedated on Fentanyl, Versed and Precedex, try to wean off sedation as able. Precedex DC-ed today, Depakote added   ? Unasyn and Vancomycin for Anbx   ? For MRI today, follow   ?  Neurology Following, infectious etiology. MRI of the brain to be done today, will follow and re-evaluate for need for LP    Damian Arellano MD, PGY-1   Attending physician: Dr. Jarvis Rosario     I personally saw, examined and provided care for the patient. Radiographs, labs and medication list were reviewed by me independently. I spoke with bedside nursing, therapists and consultants. Critical care services and times documented are independent of procedures and multidisciplinary rounds with Residents. Additionally comprehensive, multidisciplinary rounds were conducted with the MICU team. The case was discussed in detail and plans for care were established. Review of Residents documentation was conducted and revisions were made as appropriate. I agree with the above documented exam, problem list and plan of care. Sepsis   Acute respiratory failure   change in mental status   control BP  MRI   Will discuss with Neurology ID ,if they think LP needed  Hold Lovenox    Care reviewed with nursing staff, medical and surgical specialty care, primary care and the patient's family as available. Chart review/lab review/X-ray viewing/documentation and had long Conversation with patient/family re: prognosis, care options and any end of life issues:      Critical care time spent reviewing labs/films, examining patient, collaborating with other physicians more than 33  Minutes  excluding procedures . Luther Rosario M.D.   12/7/2020  9:22 PM

## 2020-12-07 NOTE — PROGRESS NOTES
Patient vomited, tube feeds were held and OG tube placed to LIS with scant amount of return noted. Resident on call notified via perfect serve.

## 2020-12-07 NOTE — CONSULTS
[address] [place] [address] [facility]    Consultation Note     Admit Date: 12/3/2020 11:27 AM    Reason for Consult:   Staph aureus and urine cultures    Attending Physician:  Salinas Coon DO     Chief Complaint: Change in mental status    HISTORY OF PRESENT ILLNESS:   The patient is a 77 y.o.  woman not known to the Infectious Diseases service. The patient is admitted through the emergency room on 12/3/2020. She had a change in mental status and confusion the evening before admission. He was seen by neurology who recorded the fact that she had been an alcoholic. She lives with her daughter-in-law and son who verified that she was not in her normal state of being. Initial's CAT scan did not show any changes and she had to be sedated and intubated to protect her airways. Initial white count was 13,000 is come down to 10,000. An MRI of the brain is being attempted. General laboratory data shows that her BUN and creatinine have been fairly stable now with size 7 and 0.6. Currently her white count is come down to 7.9 7. viral respiratory panel was negative and sed rate was 6. The UA had 2-5 white cells per high-power field  And a respiratory cultures grew MSSA and oral juana and a urine culture from 12 5 exposed to be a clean-catch is growing staph aureus as well as another gram-positive. She has been on Unasyn since 2020 as well as vancomycin. Her sputum is very productive at this time and she is sedated because of moving erratically on the ventilator.   Currently sure eyes are open and she seems to track and moves all 4 extremities patient is on 40% PEEP of 6          Past Medical History:        Diagnosis Date    CAD (coronary artery disease)     Degenerative disorder of bone     Hard of hearing     Hypertension     [facility]     Past Surgical History:        Procedure Laterality Date    BREAST LUMPECTOMY Left      SECTION  CORONARY ANGIOPLASTY WITH STENT PLACEMENT  08/22/2020    3.0 x 33mm Xience Sara to Prox LAD, EF35%, Dr. Elier Hernandez       Current Medications:   Scheduled Meds:   potassium chloride  40 mEq Intravenous Once    vancomycin  2,000 mg Intravenous Once    [START ON 12/8/2020] metoprolol tartrate  25 mg Oral BID    divalproex  500 mg Oral 2 times per day    chlorhexidine  15 mL Mouth/Throat BID    lidocaine PF  5 mL Intradermal Once    heparin flush  3 mL Intravenous 2 times per day    ipratropium-albuterol  1 ampule Inhalation Q4H WA    Arformoterol Tartrate  15 mcg Nebulization BID    ampicillin-sulbactam  3 g Intravenous Q6H    aspirin  81 mg Oral Daily    atorvastatin  80 mg Oral Nightly    pantoprazole  40 mg Intravenous Daily    [Held by provider] metoprolol succinate  50 mg Oral Daily    sodium chloride flush  10 mL Intravenous 2 times per day    enoxaparin  40 mg Subcutaneous Daily    folic acid  1 mg Intravenous Daily    thiamine (VITAMIN B1) IVPB  100 mg Intravenous Q24H    ticagrelor  90 mg Oral BID     Continuous Infusions:   sodium chloride 100 mL/hr at 12/07/20 7214    midazolam 9 mg/hr (12/07/20 1057)    propofol 5 mcg/kg/min (12/07/20 1050)    fentaNYL 5 mcg/ml in 0.9%  ml infusion 200 mcg/hr (12/07/20 0603)     PRN Meds:perflutren lipid microspheres, sodium chloride flush, heparin flush, hydrALAZINE, sodium chloride flush, acetaminophen **OR** acetaminophen, polyethylene glycol, labetalol    Allergies:  Nitroglycerin and Tramadol    Social History:   Social History     Socioeconomic History    Marital status: Single     Spouse name: None    Number of children: None    Years of education: None    Highest education level: None   Occupational History    None   Social Needs    Financial resource strain: None    Food insecurity     Worry: None     Inability: None    Transportation needs ROM.  NEUROLOGICAL:  No focal motor sensory deficit. BEHAVIOR/PSYCH:  No psychosis. Change in mental status as described in the history of present illness    PHYSICAL EXAM:    Vitals:    BP (!) 144/83   Pulse 108   Temp 97.2 °F (36.2 °C) (Esophageal)   Resp 17   Ht 5' 5\" (1.651 m)   Wt 199 lb 1.6 oz (90.3 kg)   SpO2 95%   BMI 33.13 kg/m²   Constitutional: The patient is awake, eyes open intubated 40% FiO2  Skin: Warm and dry. No rashes were noted. No jaundice. HEENT: Eyes show round, and reactive pupils. Moist mucous membranes, no ulcerations, no thrush. Neck: Supple to movements. No lymphadenopathy. Chest: No use of accessory muscles to breathe. Symmetrical expansion. Auscultation reveals no wheezing, crackles, or rhonchi. Cardiovascular: S1 and S2 are rhythmic and regular. No murmurs appreciated. Abdomen: Positive bowel sounds to auscultation. Benign to palpation. No masses felt. No hepatosplenomegaly. Genitourinary: Female Martin  Extremities: No clubbing, no cyanosis, no edema. Musculoskeletal: Equal and symmetrical  Neurological: No focal  Lines: peripheral      CBC+dif:  Recent Labs     12/05/20  0427  12/06/20  0402  12/07/20  0420   WBC 13.4*  --  10.3  --  7.9   HGB 9.7*   < > 8.4*   < > 7.9*   HCT 29.7*   < > 25.7*   < > 25.0*   MCV 97.4  --  97.7  --  97.7     --  176  --  153   NEUTROABS 10.20*  --  7.91*  --  5.32    < > = values in this interval not displayed.      Lab Results   Component Value Date    CRP 1.9 (H) 12/05/2020     No results found for: CRPHS  Lab Results   Component Value Date    SEDRATE 6 12/05/2020     Lab Results   Component Value Date    ALT 13 12/07/2020    AST 11 12/07/2020    ALKPHOS 77 12/07/2020    BILITOT 0.4 12/07/2020     Lab Results   Component Value Date     12/07/2020    K 3.5 12/07/2020    K 4.0 10/19/2020     12/07/2020    CO2 20 12/07/2020    BUN 7 12/07/2020    CREATININE 0.6 12/07/2020    GFRAA >60 12/07/2020    LABGLOM >60 12/07/2020    GLUCOSE 118 12/07/2020    PROT 5.0 12/07/2020    LABALBU 2.8 12/07/2020    CALCIUM 8.2 12/07/2020    BILITOT 0.4 12/07/2020    ALKPHOS 77 12/07/2020    AST 11 12/07/2020    ALT 13 12/07/2020       Lab Results   Component Value Date    PROTIME 10.1 12/03/2020    INR 0.9 12/03/2020       Lab Results   Component Value Date    TSH 1.320 12/03/2020       Lab Results   Component Value Date    COLORU Yellow 12/05/2020    PHUR 7.0 12/05/2020    WBCUA 2-5 12/05/2020    RBCUA 10-20 12/05/2020    BACTERIA FEW 12/05/2020    CLARITYU Clear 12/05/2020    SPECGRAV 1.020 12/05/2020    LEUKOCYTESUR TRACE 12/05/2020    UROBILINOGEN 0.2 12/05/2020    BILIRUBINUR Negative 12/05/2020    BLOODU SMALL 12/05/2020    GLUCOSEU Negative 12/05/2020       No results found for: CBG6MME, BEART, V3ZKUEYT, PHART, THGBART, OKN7DUI, PO2ART, XYW0OWE  Radiology:  XR CHEST PORTABLE   Preliminary Result   1. Multifocal bilateral airspace disease slightly more prominent within the   left lung when compared with the prior study of one day earlier. XR CHEST PORTABLE   Final Result   Continued left lung base atelectasis and/or infiltrate and possible left   pleural effusion      XR CHEST PORTABLE   Final Result   Focal infiltrates, left lung base. Cardiomegaly. XR CHEST PORTABLE   Final Result   New left basilar airspace opacities which may be on the basis of atelectasis   or pneumonia. Suspect small left pleural effusion. CTA CHEST W CONTRAST   Final Result   1. No pulmonary embolism. 2.  No pneumonia or pleural effusion. 3.  Mild pulmonary vascular congestion. 4.  Endotracheal tube is just above level of the sofy and can be retracted   approximately 3 cm. CT ABDOMEN PELVIS W IV CONTRAST Additional Contrast? None   Final Result   Diverticulosis without definite CT evidence of diverticulitis   Minimal pericardial effusion without gross cardiomegaly.   Suggestion of   coronary artery calcification   Bilateral lung base slight atelectasis may be physiologic   Grade 1 anterolisthesis and degenerative disc disease at L4-5 without   evidence of spondylolysis      XR CHEST PORTABLE   Final Result   1. Endotracheal tube is just above level of the sofy. This tube could be   retracted approximately 3 centimetres. 2.  Stable mild pulmonary vascular congestion. No focal airspace opacity or   pleural effusion. CT HEAD WO CONTRAST   Final Result   1. There is no acute intracranial abnormality. Specifically, there is no   intracranial hemorrhage. 2. Atrophy and periventricular leukomalacia,      XR ABDOMEN FOR NG/OG/NE TUBE PLACEMENT   Final Result   Satisfactory position of nasogastric tube. XR CHEST PORTABLE   Final Result   The tip of the endotracheal tube is seen within the right mainstem bronchus   and should be pulled back by approximately 3 cm. MRI BRAIN W WO CONTRAST    (Results Pending)   XR CHEST PORTABLE    (Results Pending)   XR CHEST PORTABLE    (Results Pending)   CT HEAD WO CONTRAST    (Results Pending)       Microbiology:  Pending  Recent Labs     12/05/20  1529   BC 24 Hours no growth     Recent Labs     12/05/20  1240 12/05/20  1359   ORG Staphylococcus aureus*  Gram positive cocci* Staphylococcus aureus*     Recent Labs     12/05/20  2200   BLOODCULT2 24 Hours no growth     No results for input(s): STREPNEUMAGU in the last 72 hours. No results for input(s): LP1UAG in the last 72 hours. No results for input(s): ASO in the last 72 hours.   Recent Labs     12/05/20  1359   CULTRESP Oral Pharyngeal Becky absent*  Moderate growth       Assessment:  · Staph aureus tracho- bronchitis  · Rule out pneumonia  · She really does not have a purulent urine on analysis and probably staph aureus colonizing especially since it was a clean-catch which probably was not so clean    Plan:    · Cont Unasyn; stop Vanco  · Check cultures  · Baseline ESR, CRP  · Monitor labs  · Will follow with you    Thank you for

## 2020-12-08 ENCOUNTER — APPOINTMENT (OUTPATIENT)
Dept: GENERAL RADIOLOGY | Age: 65
DRG: 207 | End: 2020-12-08
Payer: MEDICARE

## 2020-12-08 ENCOUNTER — APPOINTMENT (OUTPATIENT)
Dept: NEUROLOGY | Age: 65
DRG: 207 | End: 2020-12-08
Payer: MEDICARE

## 2020-12-08 LAB
AADO2: 151.9 MMHG
ALBUMIN SERPL-MCNC: 3.4 G/DL (ref 3.5–5.2)
ALP BLD-CCNC: 93 U/L (ref 35–104)
ALT SERPL-CCNC: 14 U/L (ref 0–32)
ANION GAP SERPL CALCULATED.3IONS-SCNC: 10 MMOL/L (ref 7–16)
AST SERPL-CCNC: 12 U/L (ref 0–31)
B.E.: 1.7 MMOL/L (ref -3–3)
BASOPHILS ABSOLUTE: 0.02 E9/L (ref 0–0.2)
BASOPHILS RELATIVE PERCENT: 0.2 % (ref 0–2)
BILIRUB SERPL-MCNC: 0.4 MG/DL (ref 0–1.2)
BUN BLDV-MCNC: 5 MG/DL (ref 8–23)
CALCIUM SERPL-MCNC: 8.8 MG/DL (ref 8.6–10.2)
CHLORIDE BLD-SCNC: 102 MMOL/L (ref 98–107)
CO2: 26 MMOL/L (ref 22–29)
COHB: 0.6 % (ref 0–1.5)
CREAT SERPL-MCNC: 0.7 MG/DL (ref 0.5–1)
CRITICAL: ABNORMAL
DATE ANALYZED: ABNORMAL
DATE OF COLLECTION: ABNORMAL
EOSINOPHILS ABSOLUTE: 0.43 E9/L (ref 0.05–0.5)
EOSINOPHILS RELATIVE PERCENT: 4.7 % (ref 0–6)
FIO2: 40 %
GFR AFRICAN AMERICAN: >60
GFR NON-AFRICAN AMERICAN: >60 ML/MIN/1.73
GLUCOSE BLD-MCNC: 104 MG/DL (ref 74–99)
HCO3: 25.6 MMOL/L (ref 22–26)
HCT VFR BLD CALC: 29 % (ref 34–48)
HEMOGLOBIN: 9.4 G/DL (ref 11.5–15.5)
HHB: 4.5 % (ref 0–5)
IMMATURE GRANULOCYTES #: 0.03 E9/L
IMMATURE GRANULOCYTES %: 0.3 % (ref 0–5)
LAB: ABNORMAL
LYMPHOCYTES ABSOLUTE: 1 E9/L (ref 1.5–4)
LYMPHOCYTES RELATIVE PERCENT: 11 % (ref 20–42)
Lab: ABNORMAL
MAGNESIUM: 1.7 MG/DL (ref 1.6–2.6)
MCH RBC QN AUTO: 31.3 PG (ref 26–35)
MCHC RBC AUTO-ENTMCNC: 32.4 % (ref 32–34.5)
MCV RBC AUTO: 96.7 FL (ref 80–99.9)
METHB: 0.3 % (ref 0–1.5)
MODE: AC
MONOCYTES ABSOLUTE: 0.66 E9/L (ref 0.1–0.95)
MONOCYTES RELATIVE PERCENT: 7.3 % (ref 2–12)
NEUTROPHILS ABSOLUTE: 6.94 E9/L (ref 1.8–7.3)
NEUTROPHILS RELATIVE PERCENT: 76.5 % (ref 43–80)
O2 CONTENT: 14.5 ML/DL
O2 SATURATION: 95.5 % (ref 92–98.5)
O2HB: 94.6 % (ref 94–97)
OPERATOR ID: 2577
ORGANISM: ABNORMAL
ORGANISM: ABNORMAL
PATIENT TEMP: 37 C
PCO2: 37.5 MMHG (ref 35–45)
PDW BLD-RTO: 13.3 FL (ref 11.5–15)
PEEP/CPAP: 6 CMH2O
PFO2: 2 MMHG/%
PH BLOOD GAS: 7.45 (ref 7.35–7.45)
PHOSPHORUS: 4.4 MG/DL (ref 2.5–4.5)
PLATELET # BLD: 219 E9/L (ref 130–450)
PMV BLD AUTO: 10.3 FL (ref 7–12)
PO2: 80.2 MMHG (ref 75–100)
POTASSIUM SERPL-SCNC: 3.2 MMOL/L (ref 3.5–5)
RBC # BLD: 3 E12/L (ref 3.5–5.5)
RI(T): 1.89
RR MECHANICAL: 16 B/MIN
SODIUM BLD-SCNC: 138 MMOL/L (ref 132–146)
SOURCE, BLOOD GAS: ABNORMAL
THB: 10.8 G/DL (ref 11.5–16.5)
TIME ANALYZED: 409
TOTAL PROTEIN: 6 G/DL (ref 6.4–8.3)
URINE CULTURE, ROUTINE: ABNORMAL
URINE CULTURE, ROUTINE: ABNORMAL
VT MECHANICAL: 360 ML
WBC # BLD: 9.1 E9/L (ref 4.5–11.5)

## 2020-12-08 PROCEDURE — 99233 SBSQ HOSP IP/OBS HIGH 50: CPT | Performed by: NURSE PRACTITIONER

## 2020-12-08 PROCEDURE — 94640 AIRWAY INHALATION TREATMENT: CPT

## 2020-12-08 PROCEDURE — 6370000000 HC RX 637 (ALT 250 FOR IP): Performed by: INTERNAL MEDICINE

## 2020-12-08 PROCEDURE — 82805 BLOOD GASES W/O2 SATURATION: CPT

## 2020-12-08 PROCEDURE — 2000000000 HC ICU R&B

## 2020-12-08 PROCEDURE — 6360000002 HC RX W HCPCS: Performed by: INTERNAL MEDICINE

## 2020-12-08 PROCEDURE — 83735 ASSAY OF MAGNESIUM: CPT

## 2020-12-08 PROCEDURE — 71045 X-RAY EXAM CHEST 1 VIEW: CPT

## 2020-12-08 PROCEDURE — 2580000003 HC RX 258: Performed by: INTERNAL MEDICINE

## 2020-12-08 PROCEDURE — 2500000003 HC RX 250 WO HCPCS: Performed by: INTERNAL MEDICINE

## 2020-12-08 PROCEDURE — 85025 COMPLETE CBC W/AUTO DIFF WBC: CPT

## 2020-12-08 PROCEDURE — 99291 CRITICAL CARE FIRST HOUR: CPT | Performed by: INTERNAL MEDICINE

## 2020-12-08 PROCEDURE — 95816 EEG AWAKE AND DROWSY: CPT

## 2020-12-08 PROCEDURE — 84100 ASSAY OF PHOSPHORUS: CPT

## 2020-12-08 PROCEDURE — C9113 INJ PANTOPRAZOLE SODIUM, VIA: HCPCS | Performed by: INTERNAL MEDICINE

## 2020-12-08 PROCEDURE — 80053 COMPREHEN METABOLIC PANEL: CPT

## 2020-12-08 PROCEDURE — 94003 VENT MGMT INPAT SUBQ DAY: CPT

## 2020-12-08 RX ORDER — POTASSIUM CHLORIDE 29.8 MG/ML
40 INJECTION INTRAVENOUS
Status: COMPLETED | OUTPATIENT
Start: 2020-12-08 | End: 2020-12-08

## 2020-12-08 RX ORDER — MIDAZOLAM HYDROCHLORIDE 2 MG/2ML
2 INJECTION, SOLUTION INTRAMUSCULAR; INTRAVENOUS EVERY 4 HOURS PRN
Status: DISCONTINUED | OUTPATIENT
Start: 2020-12-08 | End: 2020-12-09

## 2020-12-08 RX ADMIN — ARFORMOTEROL TARTRATE 15 MCG: 15 SOLUTION RESPIRATORY (INHALATION) at 20:09

## 2020-12-08 RX ADMIN — METOPROLOL TARTRATE 25 MG: 25 TABLET, FILM COATED ORAL at 21:07

## 2020-12-08 RX ADMIN — SODIUM CHLORIDE 3 G: 900 INJECTION INTRAVENOUS at 03:47

## 2020-12-08 RX ADMIN — DIVALPROEX SODIUM 500 MG: 125 CAPSULE, COATED PELLETS ORAL at 21:07

## 2020-12-08 RX ADMIN — SODIUM CHLORIDE 3 G: 900 INJECTION INTRAVENOUS at 21:45

## 2020-12-08 RX ADMIN — PANTOPRAZOLE SODIUM 40 MG: 40 INJECTION, POWDER, FOR SOLUTION INTRAVENOUS at 07:51

## 2020-12-08 RX ADMIN — SODIUM CHLORIDE 3 G: 900 INJECTION INTRAVENOUS at 10:30

## 2020-12-08 RX ADMIN — Medication 10 ML: at 07:51

## 2020-12-08 RX ADMIN — CHLORHEXIDINE GLUCONATE 0.12% ORAL RINSE 15 ML: 1.2 LIQUID ORAL at 07:53

## 2020-12-08 RX ADMIN — Medication 10 ML: at 21:08

## 2020-12-08 RX ADMIN — FOLIC ACID 1 MG: 5 INJECTION, SOLUTION INTRAMUSCULAR; INTRAVENOUS; SUBCUTANEOUS at 09:41

## 2020-12-08 RX ADMIN — ASPIRIN 81 MG CHEWABLE TABLET 81 MG: 81 TABLET CHEWABLE at 07:51

## 2020-12-08 RX ADMIN — MIDAZOLAM 2 MG: 1 INJECTION INTRAMUSCULAR; INTRAVENOUS at 13:23

## 2020-12-08 RX ADMIN — PROPOFOL 20 MCG/KG/MIN: 10 INJECTION, EMULSION INTRAVENOUS at 15:28

## 2020-12-08 RX ADMIN — IPRATROPIUM BROMIDE AND ALBUTEROL SULFATE 1 AMPULE: 2.5; .5 SOLUTION RESPIRATORY (INHALATION) at 20:09

## 2020-12-08 RX ADMIN — Medication 200 MCG/HR: at 12:18

## 2020-12-08 RX ADMIN — TICAGRELOR 90 MG: 90 TABLET ORAL at 07:51

## 2020-12-08 RX ADMIN — TICAGRELOR 90 MG: 90 TABLET ORAL at 21:07

## 2020-12-08 RX ADMIN — Medication 200 MCG/HR: at 18:08

## 2020-12-08 RX ADMIN — POTASSIUM CHLORIDE 40 MEQ: 400 INJECTION, SOLUTION INTRAVENOUS at 09:57

## 2020-12-08 RX ADMIN — LABETALOL HYDROCHLORIDE 10 MG: 5 INJECTION INTRAVENOUS at 18:11

## 2020-12-08 RX ADMIN — IPRATROPIUM BROMIDE AND ALBUTEROL SULFATE 1 AMPULE: 2.5; .5 SOLUTION RESPIRATORY (INHALATION) at 17:39

## 2020-12-08 RX ADMIN — SODIUM CHLORIDE: 9 INJECTION, SOLUTION INTRAVENOUS at 02:31

## 2020-12-08 RX ADMIN — DIVALPROEX SODIUM 500 MG: 125 CAPSULE, COATED PELLETS ORAL at 07:51

## 2020-12-08 RX ADMIN — ARFORMOTEROL TARTRATE 15 MCG: 15 SOLUTION RESPIRATORY (INHALATION) at 08:39

## 2020-12-08 RX ADMIN — HYDRALAZINE HYDROCHLORIDE 10 MG: 20 INJECTION, SOLUTION INTRAMUSCULAR; INTRAVENOUS at 03:04

## 2020-12-08 RX ADMIN — IPRATROPIUM BROMIDE AND ALBUTEROL SULFATE 1 AMPULE: 2.5; .5 SOLUTION RESPIRATORY (INHALATION) at 08:39

## 2020-12-08 RX ADMIN — THIAMINE HYDROCHLORIDE 100 MG: 100 INJECTION, SOLUTION INTRAMUSCULAR; INTRAVENOUS at 09:40

## 2020-12-08 RX ADMIN — CHLORHEXIDINE GLUCONATE 0.12% ORAL RINSE 15 ML: 1.2 LIQUID ORAL at 21:07

## 2020-12-08 RX ADMIN — SODIUM CHLORIDE: 9 INJECTION, SOLUTION INTRAVENOUS at 16:37

## 2020-12-08 RX ADMIN — POTASSIUM CHLORIDE 40 MEQ: 400 INJECTION, SOLUTION INTRAVENOUS at 08:06

## 2020-12-08 RX ADMIN — ATORVASTATIN CALCIUM 80 MG: 80 TABLET, FILM COATED ORAL at 21:07

## 2020-12-08 RX ADMIN — Medication 200 MCG/HR: at 00:04

## 2020-12-08 RX ADMIN — Medication 200 MCG/HR: at 06:42

## 2020-12-08 RX ADMIN — Medication 300 UNITS: at 21:08

## 2020-12-08 RX ADMIN — ENOXAPARIN SODIUM 40 MG: 40 INJECTION SUBCUTANEOUS at 16:34

## 2020-12-08 RX ADMIN — MIDAZOLAM HYDROCHLORIDE 10 MG/HR: 5 INJECTION, SOLUTION INTRAMUSCULAR; INTRAVENOUS at 02:32

## 2020-12-08 RX ADMIN — METOPROLOL TARTRATE 25 MG: 25 TABLET, FILM COATED ORAL at 08:00

## 2020-12-08 RX ADMIN — LABETALOL HYDROCHLORIDE 10 MG: 5 INJECTION INTRAVENOUS at 04:36

## 2020-12-08 RX ADMIN — SODIUM CHLORIDE 3 G: 900 INJECTION INTRAVENOUS at 16:33

## 2020-12-08 RX ADMIN — PROPOFOL 30 MCG/KG/MIN: 10 INJECTION, EMULSION INTRAVENOUS at 23:52

## 2020-12-08 RX ADMIN — PROPOFOL 15 MCG/KG/MIN: 10 INJECTION, EMULSION INTRAVENOUS at 02:31

## 2020-12-08 ASSESSMENT — PULMONARY FUNCTION TESTS
PIF_VALUE: 27
PIF_VALUE: 22
PIF_VALUE: 29
PIF_VALUE: 27
PIF_VALUE: 21
PIF_VALUE: 23
PIF_VALUE: 24
PIF_VALUE: 21
PIF_VALUE: 26
PIF_VALUE: 23
PIF_VALUE: 15
PIF_VALUE: 30
PIF_VALUE: 20
PIF_VALUE: 26
PIF_VALUE: 16
PIF_VALUE: 22
PIF_VALUE: 23
PIF_VALUE: 21
PIF_VALUE: 44
PIF_VALUE: 30
PIF_VALUE: 22
PIF_VALUE: 21
PIF_VALUE: 21
PIF_VALUE: 29
PIF_VALUE: 24
PIF_VALUE: 21
PIF_VALUE: 21
PIF_VALUE: 20

## 2020-12-08 ASSESSMENT — PAIN SCALES - GENERAL
PAINLEVEL_OUTOF10: 0

## 2020-12-08 NOTE — PROGRESS NOTES
Patient continues to reach for ETT tube when restraints removed. Will continue restraints and continue to educate on discontinuation criteria.

## 2020-12-08 NOTE — PROCEDURES
EEG Report  Triston Viramontes is a 77 y.o. female      Appointment Date 12/2/2020 Appointment Time 12:30pm   [place] [facility] [id number]   Type of Study Portable Floor 4409-A     Technical Specifications  Technician C/Gary Robles of consciousness On vent/Sedation off   Sleep deprived? No   Hyperventilation tested? No   Photic stim tested? No   EEG recording Standard 10-20 electrode placement    Duration of recording 25:10   EEG complete? Yes       Clinical History  Patient is a 57-year-old woman with history of alcoholism with over 1 year of sobriety presenting with acute mental status change. History obtained from chart review and from discussing with patient's son and daughter-in-law with whom she lives with. Morning of the event she was normal and picked up the [employer] from the front door. About 2 hours later she started to become increasingly confused with some repetitive speech and then walked outside as if she was going to just walk into the road. At that time her son suggested they go for a drive and it was during the drive that day he noticed her becoming more confused. In the ER she was noted to be markedly confused and was not cooperating with any testing and ultimately required sedation and intubation. CT scan of the head has been negative. Does have evidence of a UTI and a mild leukocytosis with no fever. She was transferred to our hospital for further management. She is requiring some fair amount of sedation to stay safe while intubated and attempts to lower her met with moving all extremities trying to get out of bed and self extubated.      Medications    Current Facility-Administered Medications:     midazolam PF (VERSED) injection 2 mg, 2 mg, Intravenous, Q4H PRN, Linwood Buckley MD, 2 mg at 12/08/20 1323    perflutren lipid microspheres (DEFINITY) injection 1.65 mg, 1.5 mL, Intravenous, ONCE PRN, Linwood Buckley MD    metoprolol tartrate (LOPRESSOR) tablet 25 mg, 25 mg, Oral, BID, Danuta Santiago MD, 25 mg at 12/08/20 0800    divalproex (DEPAKOTE SPRINKLE) capsule 500 mg, 500 mg, Oral, 2 times per day, Tammy Ramirez MD, 500 mg at 12/08/20 0751    chlorhexidine (PERIDEX) 0.12 % solution 15 mL, 15 mL, Mouth/Throat, BID, Danuta Santiago MD, 15 mL at 12/08/20 0753    lidocaine PF 1 % injection 5 mL, 5 mL, Intradermal, Once, Christine Childress MD    sodium chloride flush 0.9 % injection 10 mL, 10 mL, Intravenous, PRN, Christine Childress MD    heparin flush 100 UNIT/ML injection 300 Units, 3 mL, Intravenous, 2 times per day, Christine Childress MD, Stopped at 12/06/20 1959    heparin flush 100 UNIT/ML injection 300 Units, 3 mL, Intracatheter, PRN, Christine Childress MD    ipratropium-albuterol (DUONEB) nebulizer solution 1 ampule, 1 ampule, Inhalation, Q4H WA, Gt Dyson., DO, Stopped at 12/08/20 1243    Arformoterol Tartrate (BROVANA) nebulizer solution 15 mcg, 15 mcg, Nebulization, BID, Lise Overton, DO, 15 mcg at 12/08/20 9089    ampicillin-sulbactam (UNASYN) 3 g ivpb minibag, 3 g, Intravenous, Q6H, Lise Overton, DO, Stopped at 12/08/20 1100    0.9 % sodium chloride infusion, , Intravenous, Continuous, Meenakshi Berman, DO, Last Rate: 100 mL/hr at 12/08/20 0231    aspirin chewable tablet 81 mg, 81 mg, Oral, Daily, Cheryal Schaumann, MD, 81 mg at 12/08/20 0751    atorvastatin (LIPITOR) tablet 80 mg, 80 mg, Oral, Nightly, Cheryal Schaumann, MD, 80 mg at 12/07/20 2034    pantoprazole (PROTONIX) injection 40 mg, 40 mg, Intravenous, Daily, Cheryal Schaumann, MD, 40 mg at 12/08/20 0751    [Held by provider] metoprolol succinate (TOPROL XL) extended release tablet 50 mg, 50 mg, Oral, Daily, Cheryal Schaumann, MD, 50 mg at 12/07/20 0820    hydrALAZINE (APRESOLINE) injection 10 mg, 10 mg, Intravenous, Q15 Min PRN, Cheryal Schaumann, MD, 10 mg at 12/08/20 0304    sodium chloride flush 0.9 % injection 10 mL, 10 mL, Intravenous, 2 times per day, Cheryal Schaumann, MD, 10 mL at 12/08/20 opened. Ictal  not aplicable    General Impression  Abnormal EEG with generalized slowing with increased amplitude activity with frontocentral sharps indicative of a moderate nonspecific encephalopathy. Consideration of toxic or metabolic encephalopathy vs sedation effect vs less likely structural abnormality. The absence of epileptiform activity during EEG study does not exclude the possibility of seizures or epilepsy given limited duration of study and lack of epileptic type activity during study. Clinical correlation is indicated.     MD Felicity Blanchard

## 2020-12-08 NOTE — PLAN OF CARE
Problem: Restraint Use - Nonviolent/Non-Self-Destructive Behavior:  Goal: Absence of restraint-related injury  Description: Absence of restraint-related injury  12/8/2020 0018 by Javier Murdock RN  Outcome: Met This Shift     Problem: Restraint Use - Nonviolent/Non-Self-Destructive Behavior:  Goal: Absence of restraint indications  Description: Absence of restraint indications  12/8/2020 0018 by Javier Murdock RN  Outcome: Not Met This Shift

## 2020-12-08 NOTE — PROGRESS NOTES
Patient seen in icu still sedated and on vent. Mri negative for intracranial pathology. Altered mental status cause still not determined. Cultures with S Aureus and on antibiotics.  Continue wuth current  treatment

## 2020-12-08 NOTE — PROGRESS NOTES
[address] [place] [address] [facility]  Progress Note      Chief Complaint   Patient presents with    Altered Mental Status     pt not acting herself. Was normal when she went to bed last night around 11pm.  Son said she was screaming in her sleep around midnight and he woke her but she was fine. Woke up confused at 9am with hard time breathing, leg pain. She did not know where she was or who her son was. SUBJECTIVE:  Patient is tolerating medications. No reported adverse drug reactions. No nausea, vomiting, diarrhea. Eyes open but not following or tracking or responding to verbal does flex lower extremities but may be a reflex response  Afebrile, FiO2 40%; still on fentanyl off of propofol  Review of systems:  As stated above in the chief complaint, otherwise negative.     Medications:  Scheduled Meds:   metoprolol tartrate  25 mg Oral BID    divalproex  500 mg Oral 2 times per day    chlorhexidine  15 mL Mouth/Throat BID    lidocaine PF  5 mL Intradermal Once    heparin flush  3 mL Intravenous 2 times per day    ipratropium-albuterol  1 ampule Inhalation Q4H WA    Arformoterol Tartrate  15 mcg Nebulization BID    ampicillin-sulbactam  3 g Intravenous Q6H    aspirin  81 mg Oral Daily    atorvastatin  80 mg Oral Nightly    pantoprazole  40 mg Intravenous Daily    [Held by provider] metoprolol succinate  50 mg Oral Daily    sodium chloride flush  10 mL Intravenous 2 times per day    [Held by provider] enoxaparin  40 mg Subcutaneous Daily    folic acid  1 mg Intravenous Daily    thiamine (VITAMIN B1) IVPB  100 mg Intravenous Q24H    ticagrelor  90 mg Oral BID     Continuous Infusions:   sodium chloride 100 mL/hr at 12/08/20 0231    propofol Stopped (12/08/20 1228)    fentaNYL 5 mcg/ml in 0.9%  ml infusion 200 mcg/hr (12/08/20 1218)     PRN Meds:midazolam, perflutren lipid microspheres, sodium chloride flush, heparin flush, hydrALAZINE, sodium chloride flush, acetaminophen **OR** acetaminophen, polyethylene glycol, labetalol    OBJECTIVE:  BP (!) 163/81   Pulse 84   Temp 99.3 °F (37.4 °C) (Esophageal)   Resp 16   Ht 5' 5\" (1.651 m)   Wt 199 lb 1.6 oz (90.3 kg)   SpO2 98%   BMI 33.13 kg/m²   Temp  Av.8 °F (37.1 °C)  Min: 97 °F (36.1 °C)  Max: 99.3 °F (37.4 °C)  Constitutional: The patient eyes open but no response to verbal  Skin: Warm and dry. No rashes were noted. HEENT: Round and reactive pupils. Moist mucous membranes. No ulcerations or thrush. Neck: Supple to movements. Chest: No use of accessory muscles to breathe. Symmetrical expansion. No wheezing, crackles or rhonchi. Cardiovascular: S1 and S2 are rhythmic and regular. No murmurs appreciated. Abdomen: Positive bowel sounds to auscultation. Benign to palpation. No masses felt. No hepatosplenomegaly. Genitourinary: Martin  Extremities: No clubbing, no cyanosis, no edema.   Lines: peripheral  PICC line 2020 right brachial      Laboratory and Tests Review:  Lab Results   Component Value Date    WBC 9.1 2020    WBC 7.9 2020    WBC 10.3 2020    HGB 9.4 (L) 2020    HCT 29.0 (L) 2020    MCV 96.7 2020     2020     Lab Results   Component Value Date    NEUTROABS 6.94 2020    NEUTROABS 5.32 2020    NEUTROABS 7.91 (H) 2020     No results found for: [facility]  Lab Results   Component Value Date    ALT 14 2020    AST 12 2020    ALKPHOS 93 2020    BILITOT 0.4 2020     Lab Results   Component Value Date     2020    K 3.2 2020    K 4.0 10/19/2020     2020    CO2 26 2020    BUN 5 2020    CREATININE 0.7 2020    CREATININE 0.6 2020    CREATININE 0.7 2020    GFRAA >60 2020    LABGLOM >60 2020    GLUCOSE 104 2020    PROT 6.0 2020    LABALBU 3.4 2020    CALCIUM 8.8 2020    BILITOT 0.4 2020    ALKPHOS 93 2020    AST 12 12/08/2020    ALT 14 12/08/2020     Lab Results   Component Value Date    CRP 1.9 (H) 12/05/2020     Lab Results   Component Value Date    SEDRATE 6 12/05/2020     Radiology:  Reviewed    Microbiology:   Lab Results   Component Value Date    BC 24 Hours no growth 12/05/2020    BC 5 Days no growth 08/28/2020    ORG Staphylococcus aureus 12/05/2020    ORG Staphylococcus aureus 12/05/2020    ORG Enterococcus faecalis 12/05/2020     Lab Results   Component Value Date    BLOODCULT2 24 Hours no growth 12/05/2020    ORG Staphylococcus aureus 12/05/2020    ORG Staphylococcus aureus 12/05/2020    ORG Enterococcus faecalis 12/05/2020     No results found for: WNDABS  Smear, Respiratory   Date Value Ref Range Status   12/05/2020   Final    Group 5: >25 PMN's/LPF and <10 Epithelial cells/LPF  Abundant Polymorphonuclear leukocytes  Epithelial cells not seen  Rare Gram negative rods  Rare gram positive rods Diphtheroid like  Rare Gram positive cocci in clusters  few Gram positive cocci in pairs       No results found for: MPNEUMO, CLAMYDCU, LABLEGI, AFBCX, FUNGSM, LABFUNG  CULTURE, RESPIRATORY   Date Value Ref Range Status   12/05/2020 Oral Pharyngeal Becky absent (A)  Final   12/05/2020 Moderate growth  Final     No results found for: CXCATHTIP  No results found for: BFCS  No results found for: CXSURG  Urine Culture, Routine   Date Value Ref Range Status   12/05/2020 >100,000 CFU/ml  Final   12/05/2020 >100,000 CFU/ml  Final     No results found for: [facility]    ASSESSMENT:  · Aspiration pneumonia  · UTI    PLAN:  · Continue Unasyn  · Check final cultures  · Monitor labs    Graham Bob  12:46 PM  12/8/2020

## 2020-12-08 NOTE — PROGRESS NOTES
[address] [facility]  Department of Internal Medicine   Internal Medicine Residency   MICU Progress Note    Patient:  Isidra Lomas 77 y.o. female  MRN: [id number]     Date of Service: 12/8/2020    Allergy: Nitroglycerin and Tramadol  Cc follow up of encephalopathy    Subjective     Patient was seen and examined this morning. She is intubated. Blood pressure is elevated this morning . She is sedated on Fentanyl and Propofol. Weaned off she has some eye opening, not following commands and has somewhat preferential gaze to the right. MRI is negative. Objective     VS: BP (!) 161/83   Pulse 88   Temp 99.1 °F (37.3 °C) (Esophageal)   Resp 16   Ht 5' 5\" (1.651 m)   Wt 199 lb 1.6 oz (90.3 kg)   SpO2 96%   BMI 33.13 kg/m²           I & O - 24hr:     Intake/Output Summary (Last 24 hours) at 12/8/2020 1325  Last data filed at 12/8/2020 0800  Gross per 24 hour   Intake 3463 ml   Output 3750 ml   Net -287 ml       Physical Exam:  Physical Exam  Constitutional:       Interventions: She is sedated, intubated and restrained. HENT:      Head: Normocephalic and atraumatic. Cardiovascular:      Rate and Rhythm: Regular rhythm. Tachycardia present. Pulmonary:      Effort: She is intubated. , (+) Bilateral Rales   Abdominal:      General: Abdomen is flat. Bowel sounds are normal.      Palpations: Abdomen is soft. Musculoskeletal:         General: No swelling. Skin:     General: Skin is warm and dry.      Lines     site day    Art line   None    TLC None    PICC None    Hemoaccess None        Mechanical Ventilation:   Mode: AC/VC   TV: 360  Ml RR: 16 PEEP 6 cmH2O FiO2 40     ABG:     Lab Results   Component Value Date    PH 7.452 12/08/2020    PCO2 37.5 12/08/2020    PO2 80.2 12/08/2020    HCO3 25.6 12/08/2020    BE 1.7 12/08/2020    THB 10.8 12/08/2020    O2SAT 95.5 12/08/2020        Medications     Infusions: (Fluid, Sedation, Vasopressors)  IVF:    None   Vasopressors   None Sedation  Fentanyl, (Dc propofol)  Nutrition:   NPO     ATB:   Antibiotics  Days   Unasyn  4   Vancomycin  2           Patient currently has   Urinary cath  Restraints  DVT prophylaxis/ GI prophylaxis,        Labs     CBC:   Lab Results   Component Value Date    WBC 9.1 12/08/2020    RBC 3.00 12/08/2020    HGB 9.4 12/08/2020    HCT 29.0 12/08/2020    MCV 96.7 12/08/2020    MCH 31.3 12/08/2020    MCHC 32.4 12/08/2020    RDW 13.3 12/08/2020     12/08/2020    MPV 10.3 12/08/2020     CMP:    Lab Results   Component Value Date     12/08/2020    K 3.2 12/08/2020    K 4.0 10/19/2020     12/08/2020    CO2 26 12/08/2020    BUN 5 12/08/2020    CREATININE 0.7 12/08/2020    GFRAA >60 12/08/2020    LABGLOM >60 12/08/2020    GLUCOSE 104 12/08/2020    PROT 6.0 12/08/2020    LABALBU 3.4 12/08/2020    CALCIUM 8.8 12/08/2020    BILITOT 0.4 12/08/2020    ALKPHOS 93 12/08/2020    AST 12 12/08/2020    ALT 14 12/08/2020       Imaging Studies:    CXR:         Resident's Assessment and Plan     Triston Viramontes is a 77 y.o. female with a past medical history of CAD, MI (8/22 2020), hypertension, degenerative bone disease who was brought into the ED for altered mental status for the following:     Neurology   Acute encephalopathy, likely 2/2 to UTI r/o meningitis   - Urine Culture (+) Staph Aureus   - possible exacerbated by Baclofen toxicity? Patient was complaining of back pain to son and takes baclofen and gabapentin. - Work-up has shown normal ammonia, negative urine drug and serum drug screen, hepatic function panel normal,   - CT and MRI both negative for stroke no acute processes  ? Currently sedated on Fentanyl, And propofol, Wean down on propofol   ? Depakote sprinkle  ? Thiamine and Folic Acid   ? Unasyn and Vancomycin for Anbx   ? For EEG per neurology. ? For  Possible LP   ?  Neurology Following, recommendations are appreciated.      Pulmonology    Acute hypoxic Respiratory failure 2/2 to AMS and PNA   - physician: Dr. Tavon Blake         I personally saw, examined and provided care for the patient. Radiographs, labs and medication list were reviewed by me independently. I spoke with bedside nursing, therapists and consultants. Critical care services and times documented are independent of procedures and multidisciplinary rounds with Residents. Additionally comprehensive, multidisciplinary rounds were conducted with the MICU team. The case was discussed in detail and plans for care were established. Review of Residents documentation was conducted and revisions were made as appropriate. I agree with the above documented exam, problem list and plan of care. Sepsis, encephalopathy  Acute respiratory failure - intubated  MRI negative  Now with right upper gaze  Will discuss with Neurology ID ,if they think LP needed  Hold Lovenox  EEG did not add anything to what we already [facility] with more DDx   Discuss  with neurology ,I feel we need LP ,they want to hold ,so will discuss again     Has lateral gaze   MRI ok     Care reviewed with nursing staff, medical and surgical specialty care, primary care and the patient's family as available. Chart review/lab review/X-ray viewing/documentation and had long Conversation with patient/family re: prognosis, care options and any end of life issues:      Critical care time spent reviewing labs/films, examining patient, collaborating with other physicians more than 33 Minutes  excluding procedures . Scottie Blake M.D.   12/8/2020  11:32 PM      Danuta Santiago MD,[facility]  Pulmonary&Critical Care Medicine   Director of [address] [place] [address] Director of [address] [place] [address]    Sae Min

## 2020-12-08 NOTE — PLAN OF CARE
Problem: Restraint Use - Nonviolent/Non-Self-Destructive Behavior:  Goal: Absence of restraint indications  12/7/2020 1913 by Basim Nguyen RN  Outcome: Not Met This Shift     Problem: Restraint Use - Nonviolent/Non-Self-Destructive Behavior:  Goal: Absence of restraint-related injury  12/7/2020 1913 by Basim Nguyen RN  Outcome: Met This Shift     Problem: Falls - Risk of:  Goal: Will remain free from falls  12/7/2020 1913 by Natalie Sims RN  Outcome: Met This Shift     Problem: Falls - Risk of:  Goal: Absence of physical injury  12/7/2020 1913 by Natalie Sims RN  Outcome: Met This Shift     Problem: Injury - Risk of, Physical Injury:  Goal: Will remain free from falls  12/7/2020 1913 by Natalie Sims RN  Outcome: Met This Shift     Problem: Injury - Risk of, Physical Injury:  Goal: Absence of physical injury  12/7/2020 1913 by Natalie Sims RN  Outcome: Met This Shift

## 2020-12-08 NOTE — PROGRESS NOTES
symmetric  Skin: color, texture, turgor normal---no rashes or lesions      Mental Status:  intubated, sedated, not following commands, eyes open with stimulation    Poor attention/concentration    Speech/Language: intubated    Cranial Nerves: CN 2-12 limited d/t pt's condition   I: smell NA   II: visual acuity  NA   II: visual fields blinks to threat   II: pupils Sluggish but equal   III,VII: ptosis None   III,IV,VI: extraocular muscles  Pupils initially midline then left gaze preference noted; does not track examiner   V: mastication    V: facial light touch sensation   little to no response to LT   V,VII: corneal reflex  Present   VII: facial muscle function - upper  Normal   VII: facial muscle function - lower Appears Normal   VIII: hearing No response to loud claps or voice   IX: soft palate elevation     IX,X: gag reflex Present   XI: trapezius strength     XI: sternocleidomastoid strength    XI: neck extension strength     XII: tongue strength       Motor: Withdraws to pain   No purposeful or spontaneous movements   Normal bulk and tone  No abnormal movements    Sensory:  Withdraws to pain     Coordination:   Unable to complete d/t pt's condition     Gait:  Unable to complete d/t pt's condition     DTR:   Right Brachioradialis reflex 2+  Left Brachioradialis reflex 2+  Right Biceps reflex 2+  Left Biceps reflex 2+  Right Triceps reflex 2+  Left Triceps reflex 2+  Right Quadriceps reflex 2+  Left Quadriceps reflex 2+  Right Achilles reflex 2+  Left Achilles reflex 2+    ? L Babinskis  No Castro's    No pathological reflexes    Laboratory/Radiology:     CBC:   Lab Results   Component Value Date    WBC 9.1 12/08/2020    RBC 3.00 12/08/2020    HGB 9.4 12/08/2020    HCT 29.0 12/08/2020    MCV 96.7 12/08/2020    MCH 31.3 12/08/2020    MCHC 32.4 12/08/2020    RDW 13.3 12/08/2020     12/08/2020    MPV 10.3 12/08/2020     CMP:    Lab Results   Component Value Date     12/08/2020    K 3.2 12/08/2020    K 4.0 10/19/2020     12/08/2020    CO2 26 12/08/2020    BUN 5 12/08/2020    CREATININE 0.7 12/08/2020    GFRAA >60 12/08/2020    LABGLOM >60 12/08/2020    GLUCOSE 104 12/08/2020    PROT 6.0 12/08/2020    LABALBU 3.4 12/08/2020    CALCIUM 8.8 12/08/2020    BILITOT 0.4 12/08/2020    ALKPHOS 93 12/08/2020    AST 12 12/08/2020    ALT 14 12/08/2020     Hepatic Function Panel:    Lab Results   Component Value Date    ALKPHOS 93 12/08/2020    ALT 14 12/08/2020    AST 12 12/08/2020    PROT 6.0 12/08/2020    BILITOT 0.4 12/08/2020    LABALBU 3.4 12/08/2020     U/A:    Lab Results   Component Value Date    COLORU Yellow 12/05/2020    PROTEINU Negative 12/05/2020    PHUR 7.0 12/05/2020    WBCUA 2-5 12/05/2020    RBCUA 10-20 12/05/2020    BACTERIA FEW 12/05/2020    CLARITYU Clear 12/05/2020    SPECGRAV 1.020 12/05/2020    LEUKOCYTESUR TRACE 12/05/2020    UROBILINOGEN 0.2 12/05/2020    BILIRUBINUR Negative 12/05/2020    BLOODU SMALL 12/05/2020    GLUCOSEU Negative 12/05/2020     HgBA1c:    Lab Results   Component Value Date    LABA1C 5.7 12/06/2020     FLP:    Lab Results   Component Value Date    TRIG 189 12/07/2020    HDL 45 12/06/2020    LDLCALC 44 12/06/2020    LABVLDL 37 12/06/2020     TSH:    Lab Results   Component Value Date    TSH 1.320 12/03/2020     XR CHEST PORTABLE   Preliminary Result   Dense left perihilar and left lower lobe infiltrate. Stable position of the support lines and tubes. MRI BRAIN W WO CONTRAST   Final Result   1. No acute intracranial abnormality. 2. No mass, mass effect, edema or hemorrhage is seen. 3. Prominent inflammatory changes in the paranasal sinuses with air-fluid   levels, which may signify acute sinusitis. 4. Bilateral mastoid effusions. XR CHEST PORTABLE   Final Result   1. Multifocal bilateral airspace disease slightly more prominent within the   left lung when compared with the prior study of one day earlier.       XR CHEST PORTABLE   Final Result   Continued left lung base atelectasis and/or infiltrate and possible left   pleural effusion      XR CHEST PORTABLE   Final Result   Focal infiltrates, left lung base. Cardiomegaly. XR CHEST PORTABLE   Final Result   New left basilar airspace opacities which may be on the basis of atelectasis   or pneumonia. Suspect small left pleural effusion. CTA CHEST W CONTRAST   Final Result   1. No pulmonary embolism. 2.  No pneumonia or pleural effusion. 3.  Mild pulmonary vascular congestion. 4.  Endotracheal tube is just above level of the sofy and can be retracted   approximately 3 cm. CT ABDOMEN PELVIS W IV CONTRAST Additional Contrast? None   Final Result   Diverticulosis without definite CT evidence of diverticulitis   Minimal pericardial effusion without gross cardiomegaly. Suggestion of   coronary artery calcification   Bilateral lung base slight atelectasis may be physiologic   Grade 1 anterolisthesis and degenerative disc disease at L4-5 without   evidence of spondylolysis      XR CHEST PORTABLE   Final Result   1. Endotracheal tube is just above level of the sofy. This tube could be   retracted approximately 3 centimetres. 2.  Stable mild pulmonary vascular congestion. No focal airspace opacity or   pleural effusion. CT HEAD WO CONTRAST   Final Result   1. There is no acute intracranial abnormality. Specifically, there is no   intracranial hemorrhage. 2. Atrophy and periventricular leukomalacia,      XR ABDOMEN FOR NG/OG/NE TUBE PLACEMENT   Final Result   Satisfactory position of nasogastric tube. XR CHEST PORTABLE   Final Result   The tip of the endotracheal tube is seen within the right mainstem bronchus   and should be pulled back by approximately 3 cm. XR CHEST PORTABLE    (Results Pending)     Complete echo 12/7/2020:   Left ventricular internal dimensions were normal in diastole and systole. Mild left ventricular concentric hypertrophy noted.    No regional wall motion

## 2020-12-09 ENCOUNTER — APPOINTMENT (OUTPATIENT)
Dept: GENERAL RADIOLOGY | Age: 65
DRG: 207 | End: 2020-12-09
Payer: MEDICARE

## 2020-12-09 LAB
AADO2: 169.6 MMHG
ALBUMIN SERPL-MCNC: 3 G/DL (ref 3.5–5.2)
ALP BLD-CCNC: 76 U/L (ref 35–104)
ALT SERPL-CCNC: 15 U/L (ref 0–32)
ANION GAP SERPL CALCULATED.3IONS-SCNC: 9 MMOL/L (ref 7–16)
AST SERPL-CCNC: 17 U/L (ref 0–31)
B.E.: 0.6 MMOL/L (ref -3–3)
BASOPHILS ABSOLUTE: 0.02 E9/L (ref 0–0.2)
BASOPHILS RELATIVE PERCENT: 0.2 % (ref 0–2)
BILIRUB SERPL-MCNC: 0.4 MG/DL (ref 0–1.2)
BUN BLDV-MCNC: 6 MG/DL (ref 8–23)
CALCIUM SERPL-MCNC: 8.5 MG/DL (ref 8.6–10.2)
CHLORIDE BLD-SCNC: 106 MMOL/L (ref 98–107)
CO2: 25 MMOL/L (ref 22–29)
COHB: 1.2 % (ref 0–1.5)
CREAT SERPL-MCNC: 0.6 MG/DL (ref 0.5–1)
CRITICAL: ABNORMAL
DATE ANALYZED: ABNORMAL
DATE OF COLLECTION: ABNORMAL
EOSINOPHILS ABSOLUTE: 0.42 E9/L (ref 0.05–0.5)
EOSINOPHILS RELATIVE PERCENT: 4.9 % (ref 0–6)
FIO2: 40 %
GFR AFRICAN AMERICAN: >60
GFR NON-AFRICAN AMERICAN: >60 ML/MIN/1.73
GLUCOSE BLD-MCNC: 86 MG/DL (ref 74–99)
HCO3: 24.5 MMOL/L (ref 22–26)
HCT VFR BLD CALC: 25 % (ref 34–48)
HEMOGLOBIN: 8.5 G/DL (ref 11.5–15.5)
HHB: 8.5 % (ref 0–5)
IMMATURE GRANULOCYTES #: 0.02 E9/L
IMMATURE GRANULOCYTES %: 0.2 % (ref 0–5)
LAB: ABNORMAL
LYMPHOCYTES ABSOLUTE: 1.1 E9/L (ref 1.5–4)
LYMPHOCYTES RELATIVE PERCENT: 12.9 % (ref 20–42)
Lab: ABNORMAL
MAGNESIUM: 2 MG/DL (ref 1.6–2.6)
MCH RBC QN AUTO: 33.2 PG (ref 26–35)
MCHC RBC AUTO-ENTMCNC: 34 % (ref 32–34.5)
MCV RBC AUTO: 97.7 FL (ref 80–99.9)
METHB: 0.3 % (ref 0–1.5)
MODE: AC
MONOCYTES ABSOLUTE: 0.8 E9/L (ref 0.1–0.95)
MONOCYTES RELATIVE PERCENT: 9.3 % (ref 2–12)
NEUTROPHILS ABSOLUTE: 6.2 E9/L (ref 1.8–7.3)
NEUTROPHILS RELATIVE PERCENT: 72.5 % (ref 43–80)
O2 CONTENT: 11.7 ML/DL
O2 SATURATION: 91.4 % (ref 92–98.5)
O2HB: 90 % (ref 94–97)
OPERATOR ID: 2577
PATIENT TEMP: 37 C
PCO2: 36.1 MMHG (ref 35–45)
PDW BLD-RTO: 13.3 FL (ref 11.5–15)
PEEP/CPAP: 6 CMH2O
PFO2: 1.6 MMHG/%
PH BLOOD GAS: 7.45 (ref 7.35–7.45)
PHOSPHORUS: 4.2 MG/DL (ref 2.5–4.5)
PLATELET # BLD: 208 E9/L (ref 130–450)
PMV BLD AUTO: 10.3 FL (ref 7–12)
PO2: 64.1 MMHG (ref 75–100)
POTASSIUM SERPL-SCNC: 3.6 MMOL/L (ref 3.5–5)
RBC # BLD: 2.56 E12/L (ref 3.5–5.5)
RI(T): 2.65
RR MECHANICAL: 16 B/MIN
SODIUM BLD-SCNC: 140 MMOL/L (ref 132–146)
SOURCE, BLOOD GAS: ABNORMAL
THB: 9.2 G/DL (ref 11.5–16.5)
TIME ANALYZED: 432
TOTAL PROTEIN: 5.6 G/DL (ref 6.4–8.3)
VT MECHANICAL: 360 ML
WBC # BLD: 8.6 E9/L (ref 4.5–11.5)

## 2020-12-09 PROCEDURE — 82805 BLOOD GASES W/O2 SATURATION: CPT

## 2020-12-09 PROCEDURE — 6370000000 HC RX 637 (ALT 250 FOR IP): Performed by: INTERNAL MEDICINE

## 2020-12-09 PROCEDURE — 94640 AIRWAY INHALATION TREATMENT: CPT

## 2020-12-09 PROCEDURE — 99233 SBSQ HOSP IP/OBS HIGH 50: CPT | Performed by: NURSE PRACTITIONER

## 2020-12-09 PROCEDURE — 83735 ASSAY OF MAGNESIUM: CPT

## 2020-12-09 PROCEDURE — 85025 COMPLETE CBC W/AUTO DIFF WBC: CPT

## 2020-12-09 PROCEDURE — C9113 INJ PANTOPRAZOLE SODIUM, VIA: HCPCS | Performed by: INTERNAL MEDICINE

## 2020-12-09 PROCEDURE — 94003 VENT MGMT INPAT SUBQ DAY: CPT

## 2020-12-09 PROCEDURE — 2500000003 HC RX 250 WO HCPCS: Performed by: INTERNAL MEDICINE

## 2020-12-09 PROCEDURE — 6360000002 HC RX W HCPCS: Performed by: INTERNAL MEDICINE

## 2020-12-09 PROCEDURE — 71045 X-RAY EXAM CHEST 1 VIEW: CPT

## 2020-12-09 PROCEDURE — 82746 ASSAY OF FOLIC ACID SERUM: CPT

## 2020-12-09 PROCEDURE — 80053 COMPREHEN METABOLIC PANEL: CPT

## 2020-12-09 PROCEDURE — 2580000003 HC RX 258: Performed by: INTERNAL MEDICINE

## 2020-12-09 PROCEDURE — 2000000000 HC ICU R&B

## 2020-12-09 PROCEDURE — 82607 VITAMIN B-12: CPT

## 2020-12-09 PROCEDURE — 36591 DRAW BLOOD OFF VENOUS DEVICE: CPT

## 2020-12-09 PROCEDURE — 84100 ASSAY OF PHOSPHORUS: CPT

## 2020-12-09 PROCEDURE — 99233 SBSQ HOSP IP/OBS HIGH 50: CPT | Performed by: INTERNAL MEDICINE

## 2020-12-09 RX ORDER — PROPOFOL 10 MG/ML
10 INJECTION, EMULSION INTRAVENOUS
Status: DISCONTINUED | OUTPATIENT
Start: 2020-12-09 | End: 2020-12-10

## 2020-12-09 RX ORDER — QUETIAPINE FUMARATE 25 MG/1
50 TABLET, FILM COATED ORAL 2 TIMES DAILY
Status: DISCONTINUED | OUTPATIENT
Start: 2020-12-09 | End: 2020-12-11 | Stop reason: SDUPTHER

## 2020-12-09 RX ADMIN — CHLORHEXIDINE GLUCONATE 0.12% ORAL RINSE 15 ML: 1.2 LIQUID ORAL at 21:00

## 2020-12-09 RX ADMIN — Medication 200 MCG/HR: at 07:06

## 2020-12-09 RX ADMIN — SODIUM CHLORIDE 3 G: 900 INJECTION INTRAVENOUS at 15:16

## 2020-12-09 RX ADMIN — CHLORHEXIDINE GLUCONATE 0.12% ORAL RINSE 15 ML: 1.2 LIQUID ORAL at 07:36

## 2020-12-09 RX ADMIN — MIDAZOLAM 2 MG: 1 INJECTION INTRAMUSCULAR; INTRAVENOUS at 07:20

## 2020-12-09 RX ADMIN — SODIUM CHLORIDE 3 G: 900 INJECTION INTRAVENOUS at 09:02

## 2020-12-09 RX ADMIN — METOPROLOL TARTRATE 37.5 MG: 25 TABLET, FILM COATED ORAL at 21:01

## 2020-12-09 RX ADMIN — PROPOFOL 50 MCG/KG/MIN: 10 INJECTION, EMULSION INTRAVENOUS at 22:24

## 2020-12-09 RX ADMIN — Medication 200 MCG/HR: at 00:42

## 2020-12-09 RX ADMIN — ARFORMOTEROL TARTRATE 15 MCG: 15 SOLUTION RESPIRATORY (INHALATION) at 09:20

## 2020-12-09 RX ADMIN — ARFORMOTEROL TARTRATE 15 MCG: 15 SOLUTION RESPIRATORY (INHALATION) at 21:17

## 2020-12-09 RX ADMIN — HYDRALAZINE HYDROCHLORIDE 10 MG: 20 INJECTION, SOLUTION INTRAMUSCULAR; INTRAVENOUS at 15:15

## 2020-12-09 RX ADMIN — IPRATROPIUM BROMIDE AND ALBUTEROL SULFATE 1 AMPULE: 2.5; .5 SOLUTION RESPIRATORY (INHALATION) at 17:06

## 2020-12-09 RX ADMIN — PROPOFOL 40 MCG/KG/MIN: 10 INJECTION, EMULSION INTRAVENOUS at 05:54

## 2020-12-09 RX ADMIN — PROPOFOL 50 MCG/KG/MIN: 10 INJECTION, EMULSION INTRAVENOUS at 07:49

## 2020-12-09 RX ADMIN — IPRATROPIUM BROMIDE AND ALBUTEROL SULFATE 1 AMPULE: 2.5; .5 SOLUTION RESPIRATORY (INHALATION) at 21:16

## 2020-12-09 RX ADMIN — LABETALOL HYDROCHLORIDE 10 MG: 5 INJECTION INTRAVENOUS at 04:16

## 2020-12-09 RX ADMIN — DIVALPROEX SODIUM 500 MG: 125 CAPSULE, COATED PELLETS ORAL at 21:00

## 2020-12-09 RX ADMIN — METOPROLOL TARTRATE 25 MG: 25 TABLET, FILM COATED ORAL at 07:35

## 2020-12-09 RX ADMIN — LABETALOL HYDROCHLORIDE 10 MG: 5 INJECTION INTRAVENOUS at 13:28

## 2020-12-09 RX ADMIN — SODIUM CHLORIDE 3 G: 900 INJECTION INTRAVENOUS at 21:45

## 2020-12-09 RX ADMIN — Medication 10 ML: at 07:36

## 2020-12-09 RX ADMIN — Medication 200 MCG/HR: at 17:35

## 2020-12-09 RX ADMIN — PANTOPRAZOLE SODIUM 40 MG: 40 INJECTION, POWDER, FOR SOLUTION INTRAVENOUS at 07:35

## 2020-12-09 RX ADMIN — TICAGRELOR 90 MG: 90 TABLET ORAL at 21:01

## 2020-12-09 RX ADMIN — SODIUM CHLORIDE 3 G: 900 INJECTION INTRAVENOUS at 04:16

## 2020-12-09 RX ADMIN — QUETIAPINE FUMARATE 50 MG: 25 TABLET ORAL at 21:01

## 2020-12-09 RX ADMIN — ENOXAPARIN SODIUM 40 MG: 40 INJECTION SUBCUTANEOUS at 07:36

## 2020-12-09 RX ADMIN — PROPOFOL 50 MCG/KG/MIN: 10 INJECTION, EMULSION INTRAVENOUS at 16:09

## 2020-12-09 RX ADMIN — Medication 10 ML: at 21:02

## 2020-12-09 RX ADMIN — FOLIC ACID 1 MG: 5 INJECTION, SOLUTION INTRAMUSCULAR; INTRAVENOUS; SUBCUTANEOUS at 09:01

## 2020-12-09 RX ADMIN — Medication 200 MCG/HR: at 13:11

## 2020-12-09 RX ADMIN — SODIUM CHLORIDE 0.4 MCG/KG/HR: 9 INJECTION INTRAVENOUS at 10:59

## 2020-12-09 RX ADMIN — THIAMINE HYDROCHLORIDE 100 MG: 100 INJECTION, SOLUTION INTRAMUSCULAR; INTRAVENOUS at 08:11

## 2020-12-09 RX ADMIN — ATORVASTATIN CALCIUM 80 MG: 80 TABLET, FILM COATED ORAL at 21:03

## 2020-12-09 RX ADMIN — DIVALPROEX SODIUM 500 MG: 125 CAPSULE, COATED PELLETS ORAL at 07:35

## 2020-12-09 RX ADMIN — TICAGRELOR 90 MG: 90 TABLET ORAL at 07:39

## 2020-12-09 RX ADMIN — IPRATROPIUM BROMIDE AND ALBUTEROL SULFATE 1 AMPULE: 2.5; .5 SOLUTION RESPIRATORY (INHALATION) at 09:20

## 2020-12-09 RX ADMIN — Medication 300 UNITS: at 21:03

## 2020-12-09 RX ADMIN — PROPOFOL 50 MCG/KG/MIN: 10 INJECTION, EMULSION INTRAVENOUS at 18:33

## 2020-12-09 RX ADMIN — ASPIRIN 81 MG CHEWABLE TABLET 81 MG: 81 TABLET CHEWABLE at 07:35

## 2020-12-09 ASSESSMENT — PULMONARY FUNCTION TESTS
PIF_VALUE: 18
PIF_VALUE: 41
PIF_VALUE: 24
PIF_VALUE: 18
PIF_VALUE: 18
PIF_VALUE: 22
PIF_VALUE: 27
PIF_VALUE: 30
PIF_VALUE: 25
PIF_VALUE: 18
PIF_VALUE: 20
PIF_VALUE: 18
PIF_VALUE: 22
PIF_VALUE: 23
PIF_VALUE: 20
PIF_VALUE: 21
PIF_VALUE: 21
PIF_VALUE: 30
PIF_VALUE: 23

## 2020-12-09 ASSESSMENT — PAIN SCALES - GENERAL
PAINLEVEL_OUTOF10: 0

## 2020-12-09 NOTE — CARE COORDINATION
12/9 Care Coordination: Pt remains in [address] [place] [address], sedated, not following commands, eyes open with stimulation. Unsure of discharge plan at this point. CM/SW will continue to follow for discharge planning.    Alejandra BUTLERN,RN-BC  [phone]

## 2020-12-09 NOTE — PROGRESS NOTES
Patient continues to pull at all lines and ETT when restraints removed. Will continue restraints and continue to educate on discontinuation criteria.

## 2020-12-09 NOTE — PROGRESS NOTES
[address] [place] [address] [facility]  Progress Note      Chief Complaint   Patient presents with    Altered Mental Status     pt not acting herself. Was normal when she went to bed last night around 11pm.  Son said she was screaming in her sleep around midnight and he woke her but she was fine. Woke up confused at 9am with hard time breathing, leg pain. She did not know where she was or who her son was. SUBJECTIVE:  Patient is tolerating medications. No reported adverse drug reactions. No nausea, vomiting, diarrhea. Febrile  Eyes open but not following or tracking or responding to verbal does flex lower extremities but may be a reflex response  Afebrile, FiO2 40%; As stated above in the chief complaint, otherwise negative.     Medications:  Scheduled Meds:   metoprolol tartrate  37.5 mg Oral BID    [START ON 12/10/2020] thiamine (VITAMIN B1) IVPB  250 mg Intravenous Q24H    [Held by provider] enoxaparin  40 mg Subcutaneous Daily    divalproex  500 mg Oral 2 times per day    chlorhexidine  15 mL Mouth/Throat BID    lidocaine PF  5 mL Intradermal Once    heparin flush  3 mL Intravenous 2 times per day    ipratropium-albuterol  1 ampule Inhalation Q4H [place]    Arformoterol Tartrate  15 mcg Nebulization BID    ampicillin-sulbactam  3 g Intravenous Q6H    aspirin  81 mg Oral Daily    atorvastatin  80 mg Oral Nightly    pantoprazole  40 mg Intravenous Daily    [Held by provider] metoprolol succinate  50 mg Oral Daily    sodium chloride flush  10 mL Intravenous 2 times per day    folic acid  1 mg Intravenous Daily    ticagrelor  90 mg Oral BID     Continuous Infusions:   dexmedetomidine (PRECEDEX) IV infusion 0.4 mcg/kg/hr (12/09/20 1059)    fentaNYL 5 mcg/ml in 0.9%  ml infusion 200 mcg/hr (12/09/20 0706)     PRN Meds:perflutren lipid microspheres, sodium chloride flush, heparin flush, hydrALAZINE, sodium chloride flush, acetaminophen **OR** acetaminophen, polyethylene glycol, labetalol    OBJECTIVE:  BP (!) 143/74   Pulse 67   Temp 97.7 °F (36.5 °C) (Esophageal)   Resp 10   Ht 5' 5\" (1.651 m)   Wt 204 lb 8 oz (92.8 kg)   SpO2 96%   BMI 34.03 kg/m²   Temp  Av.3 °F (36.8 °C)  Min: 97.5 °F (36.4 °C)  Max: 99.1 °F (37.3 °C)  Constitutional: The patient eyes open but no response to verbal  Skin: Warm and dry. No rashes were noted. HEENT: Round and reactive pupils. Moist mucous membranes. No ulcerations or thrush. Neck: Supple to movements. Chest: No use of accessory muscles to breathe. Symmetrical expansion. No wheezing, crackles or rhonchi. Cardiovascular: S1 and S2 are rhythmic and regular. No murmurs appreciated. Abdomen: Positive bowel sounds to auscultation. Benign to palpation. No masses felt. No hepatosplenomegaly. Genitourinary: Martin  Extremities: No clubbing, no cyanosis, no edema.   Lines: peripheral  PICC line 2020 right brachial      Laboratory and Tests Review:  Lab Results   Component Value Date    WBC 8.6 2020    WBC 9.1 2020    WBC 7.9 2020    HGB 8.5 (L) 2020    HCT 25.0 (L) 2020    MCV 97.7 2020     2020     Lab Results   Component Value Date    NEUTROABS 6.20 2020    NEUTROABS 6.94 2020    NEUTROABS 5.32 2020     No results found for: [facility]  Lab Results   Component Value Date    ALT 15 2020    AST 17 2020    ALKPHOS 76 2020    BILITOT 0.4 2020     Lab Results   Component Value Date     2020    K 3.6 2020    K 4.0 10/19/2020     2020    CO2 25 2020    BUN 6 2020    CREATININE 0.6 2020    CREATININE 0.7 2020    CREATININE 0.6 2020    GFRAA >60 2020    LABGLOM >60 2020    GLUCOSE 86 2020    PROT 5.6 2020    LABALBU 3.0 2020    CALCIUM 8.5 2020    BILITOT 0.4 2020    ALKPHOS 76 2020    AST 17 2020    ALT 15 2020     Lab Results Component Value Date    CRP 1.9 (H) 12/05/2020     Lab Results   Component Value Date    SEDRATE 6 12/05/2020     Radiology:  Reviewed    Microbiology:   Lab Results   Component Value Date    BC 24 Hours no growth 12/07/2020    BC 24 Hours no growth 12/05/2020    BC 5 Days no growth 08/28/2020    ORG Staphylococcus aureus 12/05/2020    ORG Staphylococcus aureus 12/05/2020    ORG Enterococcus faecalis 12/05/2020     Lab Results   Component Value Date    BLOODCULT2 24 Hours no growth 12/05/2020    ORG Staphylococcus aureus 12/05/2020    ORG Staphylococcus aureus 12/05/2020    ORG Enterococcus faecalis 12/05/2020     No results found for: WNDABS  Smear, Respiratory   Date Value Ref Range Status   12/05/2020   Final    Group 5: >25 PMN's/LPF and <10 Epithelial cells/LPF  Abundant Polymorphonuclear leukocytes  Epithelial cells not seen  Rare Gram negative rods  Rare gram positive rods Diphtheroid like  Rare Gram positive cocci in clusters  few Gram positive cocci in pairs       No results found for: MPNEUMO, CLAMYDCU, LABLEGI, AFBCX, FUNGSM, LABFUNG  CULTURE, RESPIRATORY   Date Value Ref Range Status   12/05/2020 Oral Pharyngeal Becky absent (A)  Final   12/05/2020 Moderate growth  Final     No results found for: CXCATHTIP  No results found for: BFCS  No results found for: CXSURG  Urine Culture, Routine   Date Value Ref Range Status   12/05/2020 >100,000 CFU/ml  Final   12/05/2020 >100,000 CFU/ml  Final     No results found for: [facility]    ASSESSMENT:  · Aspiration pneumonia- MSSA  · UTI-E faecalis and MSSA    PLAN:  · Continue Unasyn  · Check final cultures  · Monitor labs    Urmila Burton  11:24 AM  12/9/2020

## 2020-12-09 NOTE — PROGRESS NOTES
Weight: 125 lbs; % Ideal Body Weight 157.6 %   · BMI: 32.8  · BMI Categories: Obese Class 1 (BMI 30.0-34. 9)       Nutrition Diagnosis:   · Inadequate oral intake related to impaired respiratory function as evidenced by NPO or clear liquid status due to medical condition, intubation, nutrition support - enteral nutrition    Nutrition Interventions:   Nutrition Education/Counseling:  Education not indicated   Coordination of Nutrition Care:  Continue to monitor while inpatient, Coordination of Community Care    Goals:   Tolerance to EN       Nutrition Monitoring and Evaluation:   Food/Nutrient Intake Outcomes:  Enteral Nutrition Intake/Tolerance  Physical Signs/Symptoms Outcomes:  Biochemical Data, Nutrition Focused Physical Findings, Skin, Weight, GI Status, Fluid Status or Edema, Hemodynamic Status     Electronically signed by Prabha Day, MS, RD, LD on 12/9/20 at 10:03 AM EST

## 2020-12-09 NOTE — PLAN OF CARE
Problem: Restraint Use - Nonviolent/Non-Self-Destructive Behavior:  Goal: Absence of restraint-related injury  Description: Absence of restraint-related injury  12/9/2020 1138 by Remonia Angelucci, RN  Outcome: Met This Shift  12/9/2020 0018 by Katelin Garcia RN  Outcome: Met This Shift     Problem: Restraint Use - Nonviolent/Non-Self-Destructive Behavior:  Goal: Absence of restraint indications  Description: Absence of restraint indications  12/9/2020 1138 by Remonia Angelucci, RN  Outcome: Not Met This Shift  12/9/2020 0018 by Katelin Garcia RN  Outcome: Not Met This Shift

## 2020-12-09 NOTE — PROGRESS NOTES
Patient seen still in icu . Encephalopathy  With no definite etiology. possible lp.  Continue with current care

## 2020-12-09 NOTE — PROGRESS NOTES
Zacarias Mendiola is a 77 y.o.  female     Neurology is following for altered mental status. PMH: CAD, hypertension, prior MI in August, degenerative d/o of bone, [place], alcohol abuse with 1 year sobriety    Patient presented to ED on 12/3 with complaint of confusion that began the night prior to arrival.   Symptoms gradually worsened over time--- with repetitive speech and walking around outside. While driving with her son, she became more confused and continued to decline including hard time breathing and bilateral leg pain. She also did not know where she was or who her son was on arrival.     Patient would not cooperate with testing and ultimately required sedation and intubation. Patient has no history of similar episodes in the past.     Patient was intubated for airway protection and altered mental status in [facility] urgent [facility] via EMS. In ED patient O2 sats found in the 30s with her cyanotic. CT scan of the head was negative. Patient was found to have UTI and mild leukocytosis without fever. MRI brain with anesthesia showed no acute intracranial abnormality. EEG completed 12/8 consistent with moderate nonspecific encephalopathy   Plan for LP per critical care team    Hypertensive with other vitals stable on vent--afebrile today   ---- On propofol and Fentanyl; off of Versed and Precedex     ROS limited d/t intubation and sedation    No family present      Objective:     BP (!) 143/74   Pulse 67   Temp 97.5 °F (36.4 °C) (Esophageal)   Resp 10   Ht 5' 5\" (1.651 m)   Wt 204 lb 8 oz (92.8 kg)   SpO2 96%   BMI 34.03 kg/m²     General appearance: intubated, sedated, and in no obvious distress  Head: normocephalic, without obvious abnormality, atraumatic  Eyes: conjunctivae/corneas clear.  .  Neck:  supple, symmetrical, trachea midline   Lungs: Crackles and diminished to auscultation bilaterally, unlabored breaths  Heart: regular rate and rhythm, SB-NSR with PVCs on tele Extremities: normal, atraumatic, no cyanosis, generalized edema  Pulses: 2+ and symmetric  Skin: color, texture, turgor normal---no rashes or lesions      Mental Status:  intubated, sedated, not following commands, eyes open with stimulation    Poor attention/concentration    Speech/Language: intubated    Cranial Nerves: CN 2-12 limited d/t pt's condition   I: smell NA   II: visual acuity  NA   II: visual fields blinks to threat   II: pupils Sluggish but equal-- 4mm     III,VII: ptosis None   III,IV,VI: extraocular muscles  Pupils initially midline then left gaze preference noted; does not track examiner   V: mastication    V: facial light touch sensation   little to no response to LT   V,VII: corneal reflex  Present   VII: facial muscle function - upper  Normal   VII: facial muscle function - lower Appears Normal   VIII: hearing No response to loud claps or voice   IX: soft palate elevation     IX,X: gag reflex Present   XI: trapezius strength     XI: sternocleidomastoid strength    XI: neck extension strength     XII: tongue strength       Motor:   Withdraws to pain   Spontaneous movement of BLE--- up to waist in bed  Normal bulk and tone  No abnormal movements    Sensory:  Withdraws to pain     Coordination:   Unable to complete d/t pt's condition     Gait:  Unable to complete d/t pt's condition     DTR:   Right Brachioradialis reflex 2+  Left Brachioradialis reflex 2+  Right Biceps reflex 2+  Left Biceps reflex 2+  Right Triceps reflex 2+  Left Triceps reflex 2+  Right Quadriceps reflex 2+  Left Quadriceps reflex 2+  Right Achilles reflex 2+  Left Achilles reflex 2+    No Babinskis  No Castro's    No pathological reflexes    Laboratory/Radiology:     CBC:   Lab Results   Component Value Date    WBC 8.6 12/09/2020    RBC 2.56 12/09/2020    HGB 8.5 12/09/2020    HCT 25.0 12/09/2020    MCV 97.7 12/09/2020    MCH 33.2 12/09/2020    MCHC 34.0 12/09/2020    RDW 13.3 12/09/2020     12/09/2020    MPV 10.3 12/09/2020     CMP:    Lab Results   Component Value Date     12/09/2020    K 3.6 12/09/2020    K 4.0 10/19/2020     12/09/2020    CO2 25 12/09/2020    BUN 6 12/09/2020    CREATININE 0.6 12/09/2020    GFRAA >60 12/09/2020    LABGLOM >60 12/09/2020    GLUCOSE 86 12/09/2020    PROT 5.6 12/09/2020    LABALBU 3.0 12/09/2020    CALCIUM 8.5 12/09/2020    BILITOT 0.4 12/09/2020    ALKPHOS 76 12/09/2020    AST 17 12/09/2020    ALT 15 12/09/2020     Hepatic Function Panel:    Lab Results   Component Value Date    ALKPHOS 76 12/09/2020    ALT 15 12/09/2020    AST 17 12/09/2020    PROT 5.6 12/09/2020    BILITOT 0.4 12/09/2020    LABALBU 3.0 12/09/2020     U/A:    Lab Results   Component Value Date    COLORU Yellow 12/05/2020    PROTEINU Negative 12/05/2020    PHUR 7.0 12/05/2020    WBCUA 2-5 12/05/2020    RBCUA 10-20 12/05/2020    BACTERIA FEW 12/05/2020    CLARITYU Clear 12/05/2020    SPECGRAV 1.020 12/05/2020    LEUKOCYTESUR TRACE 12/05/2020    UROBILINOGEN 0.2 12/05/2020    BILIRUBINUR Negative 12/05/2020    BLOODU SMALL 12/05/2020    GLUCOSEU Negative 12/05/2020     HgBA1c:    Lab Results   Component Value Date    LABA1C 5.7 12/06/2020     FLP:    Lab Results   Component Value Date    TRIG 189 12/07/2020    HDL 45 12/06/2020    LDLCALC 44 12/06/2020    LABVLDL 37 12/06/2020     TSH:    Lab Results   Component Value Date    TSH 1.320 12/03/2020     XR CHEST PORTABLE   Preliminary Result   Patchy bilateral lower lobe pulmonary infiltrates. XR CHEST PORTABLE   Final Result   Dense left perihilar and left lower lobe infiltrate. Stable position of the support lines and tubes. MRI BRAIN W WO CONTRAST   Final Result   1. No acute intracranial abnormality. 2. No mass, mass effect, edema or hemorrhage is seen. 3. Prominent inflammatory changes in the paranasal sinuses with air-fluid   levels, which may signify acute sinusitis. 4. Bilateral mastoid effusions. XR CHEST PORTABLE   Final Result   1. Multifocal bilateral airspace disease slightly more prominent within the   left lung when compared with the prior study of one day earlier. XR CHEST PORTABLE   Final Result   Continued left lung base atelectasis and/or infiltrate and possible left   pleural effusion      XR CHEST PORTABLE   Final Result   Focal infiltrates, left lung base. Cardiomegaly. XR CHEST PORTABLE   Final Result   New left basilar airspace opacities which may be on the basis of atelectasis   or pneumonia. Suspect small left pleural effusion. CTA CHEST W CONTRAST   Final Result   1. No pulmonary embolism. 2.  No pneumonia or pleural effusion. 3.  Mild pulmonary vascular congestion. 4.  Endotracheal tube is just above level of the sofy and can be retracted   approximately 3 cm. CT ABDOMEN PELVIS W IV CONTRAST Additional Contrast? None   Final Result   Diverticulosis without definite CT evidence of diverticulitis   Minimal pericardial effusion without gross cardiomegaly. Suggestion of   coronary artery calcification   Bilateral lung base slight atelectasis may be physiologic   Grade 1 anterolisthesis and degenerative disc disease at L4-5 without   evidence of spondylolysis      XR CHEST PORTABLE   Final Result   1. Endotracheal tube is just above level of the sofy. This tube could be   retracted approximately 3 centimetres. 2.  Stable mild pulmonary vascular congestion. No focal airspace opacity or   pleural effusion. CT HEAD WO CONTRAST   Final Result   1. There is no acute intracranial abnormality. Specifically, there is no   intracranial hemorrhage. 2. Atrophy and periventricular leukomalacia,      XR ABDOMEN FOR NG/OG/NE TUBE PLACEMENT   Final Result   Satisfactory position of nasogastric tube. XR CHEST PORTABLE   Final Result   The tip of the endotracheal tube is seen within the right mainstem bronchus   and should be pulled back by approximately 3 cm.       XR CHEST PORTABLE    (Results Pending)   XR CHEST ABDOMEN NG PLACEMENT    (Results Pending)     Complete echo 12/7/2020:   Left ventricular internal dimensions were normal in diastole and systole. Mild left ventricular concentric hypertrophy noted. No regional wall motion abnormalities seen. Normal left ventricular ejection fraction. Limited echo 10/20/2020:  Normal left ventricle size and systolic function. Ejection fraction is visually estimated at 60-65%. No regional wall motion abnormalities seen. Mild concentric left ventricular hypertrophy. Indeterminate diastolic function. The left atrium is mildly dilated. Normal right ventricular size and function. Physiologic and/or trace mitral regurgitation is present. No hemodynamically significant aortic stenosis is present. Physiologic and/or trace tricuspid regurgitation. RVSP is 29 mmHg. Normal estimated PA systolic pressure. No evidence for hemodynamically significant pericardial effusion. Compared to prior echo of 8/23/2020, changes noted. LVEF has improved from 40-45% to 60-65%. EEG 12/8/2020: Abnormal EEG with generalized slowing with increased amplitude activity with frontocentral sharps indicative of a moderate nonspecific encephalopathy. Consideration of toxic or metabolic encephalopathy vs sedation effect vs less likely structural abnormality. The absence of epileptiform activity during EEG study does not exclude the possibility of seizures or epilepsy given limited duration of study and lack of epileptic type activity during study. Clinical correlation is indicated.      All labs and images personally reviewed today    Assessment:     Acute confusional state with speech disturbances   Moderate encephalopathy due to multiple medical issues   MRI brain showed no acute intracranial pathology   Complete echo unrevealing for acute changes   EEG showed moderate nonspecific encephalopathy     Acute anemia   H/H today 8.5/25.0    UTI    On antibiotics per ID--urine cultures positive for staph aureus     Acute hypoxic respiratory failure    secondary to altered mental status and pneumonia    respiratory cultures positive for staph aureus    Called and spoke to patient's son [facility] YESSENIA per request.  MRI, EEG and other medical diagnosis is discussed over the phone. All questions and concerns addressed. Explained plan to proceed with LP per critical care team to investigate continued altered mental status.     Plan:     Continue supportive care  Wean as able  LP per critical care team/ID  Antibiotics per ID  Continue aspirin, Brilinta and statin   A1C 5.7, LDL 44   Continue tele   SCDs    YASIR Carlson NP-C   9:50 AM  12/9/2020

## 2020-12-09 NOTE — PLAN OF CARE
Problem: Restraint Use - Nonviolent/Non-Self-Destructive Behavior:  Goal: Absence of restraint-related injury  Description: Absence of restraint-related injury  12/9/2020 0018 by Naomi Gagnon RN  Outcome: Met This Shift     Problem: Falls - Risk of:  Goal: Will remain free from falls  Description: Will remain free from falls  12/9/2020 0018 by Naomi Gagnon RN  Outcome: Met This Shift     Problem: Falls - Risk of:  Goal: Absence of physical injury  Description: Absence of physical injury  12/9/2020 0018 by Naomi Gagnon RN  Outcome: Met This Shift     Problem: Confusion - Acute:  Goal: Absence of continued neurological deterioration signs and symptoms  Description: Absence of continued neurological deterioration signs and symptoms  Outcome: Met This Shift     Problem: Confusion - Acute:  Goal: Mental status will be restored to baseline  Description: Mental status will be restored to baseline  Outcome: Met This Shift     Problem: Discharge Planning:  Goal: Ability to perform activities of daily living will improve  Description: Ability to perform activities of daily living will improve  Outcome: Met This Shift     Problem: Discharge Planning:  Goal: Participates in care planning  Description: Participates in care planning  Outcome: Met This Shift     Problem: Injury - Risk of, Physical Injury:  Goal: Will remain free from falls  Description: Will remain free from falls  12/9/2020 0018 by Naomi Gagnon RN  Outcome: Met This Shift     Problem: Injury - Risk of, Physical Injury:  Goal: Absence of physical injury  Description: Absence of physical injury  12/9/2020 0018 by Naomi Gagnon RN  Outcome: Met This Shift     Problem: Skin Integrity:  Goal: Will show no infection signs and symptoms  Description: Will show no infection signs and symptoms  Outcome: Met This Shift     Problem: Restraint Use - Nonviolent/Non-Self-Destructive Behavior:  Goal: Absence of restraint indications  Description: Absence of restraint indications  12/9/2020 0018 by Janae Maguire, RN  Outcome: Not Met This Shift

## 2020-12-09 NOTE — PLAN OF CARE
Problem: Restraint Use - Nonviolent/Non-Self-Destructive Behavior:  Goal: Absence of restraint-related injury  Description: Absence of restraint-related injury  12/8/2020 2017 by Aysha Lomeli RN  Outcome: Met This Shift     Problem: Falls - Risk of:  Goal: Will remain free from falls  Description: Will remain free from falls  Outcome: Met This Shift     Problem: Falls - Risk of:  Goal: Absence of physical injury  Description: Absence of physical injury  Outcome: Met This Shift     Problem: Confusion - Acute:  Goal: Absence of continued neurological deterioration signs and symptoms  Description: Absence of continued neurological deterioration signs and symptoms  Outcome: Met This Shift     Problem: Confusion - Acute:  Goal: Mental status will be restored to baseline  Description: Mental status will be restored to baseline  Outcome: Met This Shift     Problem: Discharge Planning:  Goal: Ability to perform activities of daily living will improve  Description: Ability to perform activities of daily living will improve  Outcome: Met This Shift     Problem: Discharge Planning:  Goal: Participates in care planning  Description: Participates in care planning  Outcome: Met This Shift     Problem: Injury - Risk of, Physical Injury:  Goal: Will remain free from falls  Description: Will remain free from falls  Outcome: Met This Shift     Problem: Injury - Risk of, Physical Injury:  Goal: Absence of physical injury  Description: Absence of physical injury  Outcome: Met This Shift     Problem: Restraint Use - Nonviolent/Non-Self-Destructive Behavior:  Goal: Absence of restraint indications  Description: Absence of restraint indications  12/8/2020 2017 by Aysha Lomeli RN  Outcome: Not Met This Shift

## 2020-12-10 ENCOUNTER — APPOINTMENT (OUTPATIENT)
Dept: GENERAL RADIOLOGY | Age: 65
DRG: 207 | End: 2020-12-10
Payer: MEDICARE

## 2020-12-10 LAB
AADO2: 147.2 MMHG
ALBUMIN SERPL-MCNC: 2.8 G/DL (ref 3.5–5.2)
ALP BLD-CCNC: 70 U/L (ref 35–104)
ALT SERPL-CCNC: 11 U/L (ref 0–32)
ANION GAP SERPL CALCULATED.3IONS-SCNC: 10 MMOL/L (ref 7–16)
AST SERPL-CCNC: 14 U/L (ref 0–31)
B.E.: -0.4 MMOL/L (ref -3–3)
BASOPHILS ABSOLUTE: 0.02 E9/L (ref 0–0.2)
BASOPHILS RELATIVE PERCENT: 0.3 % (ref 0–2)
BILIRUB SERPL-MCNC: 0.3 MG/DL (ref 0–1.2)
BLOOD CULTURE, ROUTINE: NORMAL
BUN BLDV-MCNC: 10 MG/DL (ref 8–23)
CALCIUM SERPL-MCNC: 8.6 MG/DL (ref 8.6–10.2)
CHLORIDE BLD-SCNC: 107 MMOL/L (ref 98–107)
CO2: 23 MMOL/L (ref 22–29)
COHB: 0.4 % (ref 0–1.5)
CREAT SERPL-MCNC: 0.6 MG/DL (ref 0.5–1)
CRITICAL: ABNORMAL
CULTURE, BLOOD 2: NORMAL
DATE ANALYZED: ABNORMAL
DATE OF COLLECTION: ABNORMAL
EOSINOPHILS ABSOLUTE: 0.44 E9/L (ref 0.05–0.5)
EOSINOPHILS RELATIVE PERCENT: 7 % (ref 0–6)
FIO2: 40 %
GFR AFRICAN AMERICAN: >60
GFR NON-AFRICAN AMERICAN: >60 ML/MIN/1.73
GLUCOSE BLD-MCNC: 83 MG/DL (ref 74–99)
HCO3: 23.8 MMOL/L (ref 22–26)
HCT VFR BLD CALC: 23.3 % (ref 34–48)
HEMOGLOBIN: 7.8 G/DL (ref 11.5–15.5)
HHB: 4.4 % (ref 0–5)
IMMATURE GRANULOCYTES #: 0.03 E9/L
IMMATURE GRANULOCYTES %: 0.5 % (ref 0–5)
LAB: ABNORMAL
LYMPHOCYTES ABSOLUTE: 0.99 E9/L (ref 1.5–4)
LYMPHOCYTES RELATIVE PERCENT: 15.7 % (ref 20–42)
Lab: ABNORMAL
MAGNESIUM: 1.9 MG/DL (ref 1.6–2.6)
MCH RBC QN AUTO: 33.1 PG (ref 26–35)
MCHC RBC AUTO-ENTMCNC: 33.5 % (ref 32–34.5)
MCV RBC AUTO: 98.7 FL (ref 80–99.9)
METHB: 0.1 % (ref 0–1.5)
MODE: AC
MONOCYTES ABSOLUTE: 0.55 E9/L (ref 0.1–0.95)
MONOCYTES RELATIVE PERCENT: 8.7 % (ref 2–12)
NEUTROPHILS ABSOLUTE: 4.28 E9/L (ref 1.8–7.3)
NEUTROPHILS RELATIVE PERCENT: 67.8 % (ref 43–80)
O2 CONTENT: 11.8 ML/DL
O2 SATURATION: 95.6 % (ref 92–98.5)
O2HB: 95.1 % (ref 94–97)
OPERATOR ID: 1023
PATIENT TEMP: 37 C
PCO2: 36.7 MMHG (ref 35–45)
PDW BLD-RTO: 13.6 FL (ref 11.5–15)
PEEP/CPAP: 6 CMH2O
PFO2: 2.14 MMHG/%
PH BLOOD GAS: 7.43 (ref 7.35–7.45)
PHOSPHORUS: 3.5 MG/DL (ref 2.5–4.5)
PLATELET # BLD: 187 E9/L (ref 130–450)
PMV BLD AUTO: 10.1 FL (ref 7–12)
PO2: 85.8 MMHG (ref 75–100)
POTASSIUM SERPL-SCNC: 3.3 MMOL/L (ref 3.5–5)
RBC # BLD: 2.36 E12/L (ref 3.5–5.5)
RI(T): 1.72
RR MECHANICAL: 16 B/MIN
SODIUM BLD-SCNC: 140 MMOL/L (ref 132–146)
SOURCE, BLOOD GAS: ABNORMAL
THB: 8.7 G/DL (ref 11.5–16.5)
TIME ANALYZED: 446
TOTAL PROTEIN: 5.1 G/DL (ref 6.4–8.3)
TRIGL SERPL-MCNC: 714 MG/DL (ref 0–149)
VT MECHANICAL: 360 ML
WBC # BLD: 6.3 E9/L (ref 4.5–11.5)

## 2020-12-10 PROCEDURE — 2500000003 HC RX 250 WO HCPCS: Performed by: INTERNAL MEDICINE

## 2020-12-10 PROCEDURE — 51702 INSERT TEMP BLADDER CATH: CPT

## 2020-12-10 PROCEDURE — 6370000000 HC RX 637 (ALT 250 FOR IP): Performed by: INTERNAL MEDICINE

## 2020-12-10 PROCEDURE — 84100 ASSAY OF PHOSPHORUS: CPT

## 2020-12-10 PROCEDURE — 80053 COMPREHEN METABOLIC PANEL: CPT

## 2020-12-10 PROCEDURE — 6360000002 HC RX W HCPCS: Performed by: INTERNAL MEDICINE

## 2020-12-10 PROCEDURE — C9113 INJ PANTOPRAZOLE SODIUM, VIA: HCPCS | Performed by: INTERNAL MEDICINE

## 2020-12-10 PROCEDURE — 2580000003 HC RX 258: Performed by: INTERNAL MEDICINE

## 2020-12-10 PROCEDURE — 2000000000 HC ICU R&B

## 2020-12-10 PROCEDURE — 94003 VENT MGMT INPAT SUBQ DAY: CPT

## 2020-12-10 PROCEDURE — 84478 ASSAY OF TRIGLYCERIDES: CPT

## 2020-12-10 PROCEDURE — 83735 ASSAY OF MAGNESIUM: CPT

## 2020-12-10 PROCEDURE — 31500 INSERT EMERGENCY AIRWAY: CPT

## 2020-12-10 PROCEDURE — 82805 BLOOD GASES W/O2 SATURATION: CPT

## 2020-12-10 PROCEDURE — 71045 X-RAY EXAM CHEST 1 VIEW: CPT

## 2020-12-10 PROCEDURE — 99231 SBSQ HOSP IP/OBS SF/LOW 25: CPT | Performed by: NURSE PRACTITIONER

## 2020-12-10 PROCEDURE — 94640 AIRWAY INHALATION TREATMENT: CPT

## 2020-12-10 PROCEDURE — 99233 SBSQ HOSP IP/OBS HIGH 50: CPT | Performed by: INTERNAL MEDICINE

## 2020-12-10 PROCEDURE — 85025 COMPLETE CBC W/AUTO DIFF WBC: CPT

## 2020-12-10 RX ORDER — POTASSIUM CHLORIDE 7.45 MG/ML
10 INJECTION INTRAVENOUS
Status: COMPLETED | OUTPATIENT
Start: 2020-12-10 | End: 2020-12-10

## 2020-12-10 RX ADMIN — SODIUM CHLORIDE 3 G: 900 INJECTION INTRAVENOUS at 21:59

## 2020-12-10 RX ADMIN — Medication 10 ML: at 08:02

## 2020-12-10 RX ADMIN — ARFORMOTEROL TARTRATE 15 MCG: 15 SOLUTION RESPIRATORY (INHALATION) at 21:00

## 2020-12-10 RX ADMIN — MIDAZOLAM 10 MG/HR: 5 INJECTION INTRAMUSCULAR; INTRAVENOUS at 16:01

## 2020-12-10 RX ADMIN — DIVALPROEX SODIUM 500 MG: 125 CAPSULE, COATED PELLETS ORAL at 20:37

## 2020-12-10 RX ADMIN — IPRATROPIUM BROMIDE AND ALBUTEROL SULFATE 1 AMPULE: 2.5; .5 SOLUTION RESPIRATORY (INHALATION) at 16:17

## 2020-12-10 RX ADMIN — TICAGRELOR 90 MG: 90 TABLET ORAL at 09:59

## 2020-12-10 RX ADMIN — FOLIC ACID 1 MG: 5 INJECTION, SOLUTION INTRAMUSCULAR; INTRAVENOUS; SUBCUTANEOUS at 08:24

## 2020-12-10 RX ADMIN — MIDAZOLAM 1 MG/HR: 5 INJECTION INTRAMUSCULAR; INTRAVENOUS at 07:56

## 2020-12-10 RX ADMIN — ARFORMOTEROL TARTRATE 15 MCG: 15 SOLUTION RESPIRATORY (INHALATION) at 08:30

## 2020-12-10 RX ADMIN — DIVALPROEX SODIUM 500 MG: 125 CAPSULE, COATED PELLETS ORAL at 08:00

## 2020-12-10 RX ADMIN — METOPROLOL TARTRATE 37.5 MG: 25 TABLET, FILM COATED ORAL at 08:01

## 2020-12-10 RX ADMIN — TICAGRELOR 90 MG: 90 TABLET ORAL at 20:37

## 2020-12-10 RX ADMIN — ATORVASTATIN CALCIUM 80 MG: 80 TABLET, FILM COATED ORAL at 20:37

## 2020-12-10 RX ADMIN — Medication 200 MCG/HR: at 13:28

## 2020-12-10 RX ADMIN — IPRATROPIUM BROMIDE AND ALBUTEROL SULFATE 1 AMPULE: 2.5; .5 SOLUTION RESPIRATORY (INHALATION) at 12:59

## 2020-12-10 RX ADMIN — CHLORHEXIDINE GLUCONATE 0.12% ORAL RINSE 15 ML: 1.2 LIQUID ORAL at 08:00

## 2020-12-10 RX ADMIN — THIAMINE HYDROCHLORIDE 250 MG: 100 INJECTION, SOLUTION INTRAMUSCULAR; INTRAVENOUS at 08:24

## 2020-12-10 RX ADMIN — PROPOFOL 50 MCG/KG/MIN: 10 INJECTION, EMULSION INTRAVENOUS at 02:43

## 2020-12-10 RX ADMIN — POTASSIUM CHLORIDE 10 MEQ: 10 INJECTION, SOLUTION INTRAVENOUS at 09:15

## 2020-12-10 RX ADMIN — Medication 200 MCG/HR: at 00:38

## 2020-12-10 RX ADMIN — IPRATROPIUM BROMIDE AND ALBUTEROL SULFATE 1 AMPULE: 2.5; .5 SOLUTION RESPIRATORY (INHALATION) at 21:00

## 2020-12-10 RX ADMIN — CHLORHEXIDINE GLUCONATE 0.12% ORAL RINSE 15 ML: 1.2 LIQUID ORAL at 20:36

## 2020-12-10 RX ADMIN — IPRATROPIUM BROMIDE AND ALBUTEROL SULFATE 1 AMPULE: 2.5; .5 SOLUTION RESPIRATORY (INHALATION) at 08:30

## 2020-12-10 RX ADMIN — PROPOFOL 50 MCG/KG/MIN: 10 INJECTION, EMULSION INTRAVENOUS at 06:31

## 2020-12-10 RX ADMIN — Medication 200 MCG/HR: at 06:36

## 2020-12-10 RX ADMIN — PANTOPRAZOLE SODIUM 40 MG: 40 INJECTION, POWDER, FOR SOLUTION INTRAVENOUS at 08:00

## 2020-12-10 RX ADMIN — SODIUM CHLORIDE 3 G: 900 INJECTION INTRAVENOUS at 16:02

## 2020-12-10 RX ADMIN — ASPIRIN 81 MG CHEWABLE TABLET 81 MG: 81 TABLET CHEWABLE at 08:01

## 2020-12-10 RX ADMIN — METOPROLOL TARTRATE 37.5 MG: 25 TABLET, FILM COATED ORAL at 20:37

## 2020-12-10 RX ADMIN — SODIUM CHLORIDE 3 G: 900 INJECTION INTRAVENOUS at 04:31

## 2020-12-10 RX ADMIN — QUETIAPINE FUMARATE 50 MG: 25 TABLET ORAL at 08:00

## 2020-12-10 RX ADMIN — POTASSIUM CHLORIDE 10 MEQ: 10 INJECTION, SOLUTION INTRAVENOUS at 10:19

## 2020-12-10 RX ADMIN — POTASSIUM CHLORIDE 10 MEQ: 10 INJECTION, SOLUTION INTRAVENOUS at 07:56

## 2020-12-10 RX ADMIN — Medication 200 MCG/HR: at 20:43

## 2020-12-10 RX ADMIN — QUETIAPINE FUMARATE 50 MG: 25 TABLET ORAL at 21:12

## 2020-12-10 RX ADMIN — SODIUM CHLORIDE 3 G: 900 INJECTION INTRAVENOUS at 09:59

## 2020-12-10 RX ADMIN — Medication 10 ML: at 20:37

## 2020-12-10 ASSESSMENT — PAIN SCALES - GENERAL
PAINLEVEL_OUTOF10: 0

## 2020-12-10 ASSESSMENT — PULMONARY FUNCTION TESTS
PIF_VALUE: 18
PIF_VALUE: 19
PIF_VALUE: 19
PIF_VALUE: 24
PIF_VALUE: 19
PIF_VALUE: 19
PIF_VALUE: 18
PIF_VALUE: 21
PIF_VALUE: 18
PIF_VALUE: 22
PIF_VALUE: 17
PIF_VALUE: 19
PIF_VALUE: 17
PIF_VALUE: 21
PIF_VALUE: 27
PIF_VALUE: 19
PIF_VALUE: 22
PIF_VALUE: 21
PIF_VALUE: 18
PIF_VALUE: 17
PIF_VALUE: 20
PIF_VALUE: 19
PIF_VALUE: 22
PIF_VALUE: 18
PIF_VALUE: 17
PIF_VALUE: 22

## 2020-12-10 NOTE — PROGRESS NOTES
[address] [place] [address] [facility]  [facility]  Progress Note      Chief Complaint   Patient presents with    Altered Mental Status     pt not acting herself. Was normal when she went to bed last night around 11pm.  Son said she was screaming in her sleep around midnight and he woke her but she was fine. Woke up confused at 9am with hard time breathing, leg pain. She did not know where she was or who her son was. SUBJECTIVE:  Patient is tolerating medications. No reported adverse drug reactions. No nausea, vomiting, diarrhea. Febrile  Eyes open but not following or tracking-neurology following patient  Afebrile, FiO2 40%; As stated above in the chief complaint, otherwise negative.     Medications:  Scheduled Meds:   metoprolol tartrate  37.5 mg Oral BID    thiamine (VITAMIN B1) IVPB  250 mg Intravenous Q24H    QUEtiapine  50 mg Oral BID    [Held by provider] enoxaparin  40 mg Subcutaneous Daily    divalproex  500 mg Oral 2 times per day    chlorhexidine  15 mL Mouth/Throat BID    lidocaine PF  5 mL Intradermal Once    heparin flush  3 mL Intravenous 2 times per day    ipratropium-albuterol  1 ampule Inhalation Q4H WA    Arformoterol Tartrate  15 mcg Nebulization BID    ampicillin-sulbactam  3 g Intravenous Q6H    aspirin  81 mg Oral Daily    atorvastatin  80 mg Oral Nightly    pantoprazole  40 mg Intravenous Daily    [Held by provider] metoprolol succinate  50 mg Oral Daily    sodium chloride flush  10 mL Intravenous 2 times per day    folic acid  1 mg Intravenous Daily    ticagrelor  90 mg Oral BID     Continuous Infusions:   midazolam 5 mg/hr (12/10/20 1016)    fentaNYL 5 mcg/ml in 0.9%  ml infusion 200 mcg/hr (12/10/20 0636)     PRN Meds:perflutren lipid microspheres, sodium chloride flush, heparin flush, hydrALAZINE, sodium chloride flush, acetaminophen **OR** acetaminophen, polyethylene glycol, labetalol    OBJECTIVE:  BP (!) 157/86   Pulse 88   Temp 98.8 °F (37.1 °C) (Esophageal)   Resp 22   Ht 5' 5\" (1.651 m)   Wt 221 lb 3.2 oz (100.3 kg)   SpO2 96%   BMI 36.81 kg/m²   Temp  Av °F (36.7 °C)  Min: 96.6 °F (35.9 °C)  Max: 99.3 °F (37.4 °C)  Constitutional: The patient eyes open but no response to verbal  Skin: Warm and dry. No rashes were noted. HEENT: Round and reactive pupils. Moist mucous membranes. No ulcerations or thrush. Neck: Supple to movements. Chest: No use of accessory muscles to breathe. Symmetrical expansion. No wheezing, crackles or rhonchi. Cardiovascular: S1 and S2 are rhythmic and regular. No murmurs appreciated. Abdomen: Positive bowel sounds to auscultation. Benign to palpation. No masses felt. No hepatosplenomegaly. Genitourinary: Martin  Extremities: No clubbing, no cyanosis, no edema.   Lines: peripheral  PICC line 2020 right brachial      Laboratory and Tests Review:  Lab Results   Component Value Date    WBC 6.3 12/10/2020    WBC 8.6 2020    WBC 9.1 2020    HGB 7.8 (L) 12/10/2020    HCT 23.3 (L) 12/10/2020    MCV 98.7 12/10/2020     12/10/2020     Lab Results   Component Value Date    NEUTROABS 4.28 12/10/2020    NEUTROABS 6.20 2020    NEUTROABS 6.94 2020     No results found for: [facility]  Lab Results   Component Value Date    ALT 11 12/10/2020    AST 14 12/10/2020    ALKPHOS 70 12/10/2020    BILITOT 0.3 12/10/2020     Lab Results   Component Value Date     12/10/2020    K 3.3 12/10/2020    K 4.0 10/19/2020     12/10/2020    CO2 23 12/10/2020    BUN 10 12/10/2020    CREATININE 0.6 12/10/2020    CREATININE 0.6 2020    CREATININE 0.7 2020    GFRAA >60 12/10/2020    LABGLOM >60 12/10/2020    GLUCOSE 83 12/10/2020    PROT 5.1 12/10/2020    LABALBU 2.8 12/10/2020    CALCIUM 8.6 12/10/2020    BILITOT 0.3 12/10/2020    ALKPHOS 70 12/10/2020    AST 14 12/10/2020    ALT 11 12/10/2020     Lab Results   Component Value Date    CRP 1.9 (H) 2020     Lab Results   Component Value Date    SEDRATE 6 12/05/2020     Radiology:  Reviewed    Microbiology:   Lab Results   Component Value Date    BC 24 Hours no growth 12/07/2020    BC 24 Hours no growth 12/05/2020    BC 5 Days no growth 08/28/2020    ORG Staphylococcus aureus 12/05/2020    ORG Staphylococcus aureus 12/05/2020    ORG Enterococcus faecalis 12/05/2020     Lab Results   Component Value Date    BLOODCULT2 24 Hours no growth 12/05/2020    ORG Staphylococcus aureus 12/05/2020    ORG Staphylococcus aureus 12/05/2020    ORG Enterococcus faecalis 12/05/2020     No results found for: WNDABS  Smear, Respiratory   Date Value Ref Range Status   12/05/2020   Final    Group 5: >25 PMN's/LPF and <10 Epithelial cells/LPF  Abundant Polymorphonuclear leukocytes  Epithelial cells not seen  Rare Gram negative rods  Rare gram positive rods Diphtheroid like  Rare Gram positive cocci in clusters  few Gram positive cocci in pairs       No results found for: MPNEUMO, CLAMYDCU, LABLEGI, AFBCX, FUNGSM, LABFUNG  CULTURE, RESPIRATORY   Date Value Ref Range Status   12/05/2020 Oral Pharyngeal Becky absent (A)  Final   12/05/2020 Moderate growth  Final     No results found for: CXCATHTIP  No results found for: BFCS  No results found for: CXSURG  Urine Culture, Routine   Date Value Ref Range Status   12/05/2020 >100,000 CFU/ml  Final   12/05/2020 >100,000 CFU/ml  Final     No results found for: [facility]    ASSESSMENT:  · Aspiration pneumonia- MSSA  · UTI-E faecalis and MSSA    PLAN:  · Continue Unasyn  · Follow up neuro status  · Check final cultures  · Monitor labs    Yariel Mock  1:02 PM  12/10/2020

## 2020-12-10 NOTE — PLAN OF CARE
Spoke with her son, Lester Troy ([phone]), this afternoon who gave consent for LP tomorrow. Risks and benefits of procedure explained.     Electronically signed by Kathy Frazier MD on 12/10/2020 at 4:04 PM

## 2020-12-10 NOTE — PROGRESS NOTES
Palliative Care Department  [phone]  Palliative Care Progress Note  Provider YASIR Saha-CNP      PATIENT: Isidra Lomas  : 1955  MRN: [id number]  ADMISSION DATE: 12/3/2020 11:27 AM    Palliative Medicine was consulted on hospital day 7 for assistance with Goals of care, Code Status Discussion, Family support, Symptom management     HPI:     Corby Guzman is a 77 y.o. y/o female with a history of CAD, MI (2020), hypertension, degenerative bone disease who presented to [facility]) on 12/3/2020 with altered mental status, she was subsequently intubated. ASSESSMENT/PLAN:     Pertinent Hospital Diagnoses      Acute encephalopathy    Palliative Care Encounter / Counseling Regarding Goals of Care  Please see detailed goals of care discussion as below   At this time, Isidra Lomas, Does Not have capacity for medical decision-making. Capacity is time limited and situation/question specific   Outcome of goals of care meeting: Spoke to Arden over the phone and he wants to continue with intubation and her being full code at the moment.  Code status Full Code   Advanced Directives: no POA or living will in [facility]   Surrogate/Legal NOK: Thai Gleason, son, [phone]  Zannie Cowden Palliative medicine will continue to follow on provide ongoing support and assistance with goals of care pending further clinical progression. Symptom management - As per ICU      SUBJECTIVE:     Details of Conversation:   2020:  Spoke to Saeed son Arden, he told me that his mother was doing just fine before this confusion. She recently moved from [place] in May to begin her life here in [facility]. She is a recovering alcoholic about a year ago, also abuse drugs in the past about 10 years ago. CT scan did not show any abnormalities. She is being taken care of in the ICU. Spoke to him about advanced directives and CODE STATUS, at this time he wants to keep her mother a full code.   He would like resuscitation and intubation, as this happened so suddenly. His goals are to bring her back home, and continue care for her. 12/10/2020:  Mrs. Eckert seen at the bedside, no family present. She continues to be mechanically ventilated, sedated, now with Versed and fentanyl, and unfortunate does not wake and follow commands. She has become restless make random movements with stimulation. An update was received from the bedside nurse, and she reports that there is a plan for lumbar puncture later today to further evaluate cause of her encephalopathy. I did make a call to the patient's son Chauncey Plan, he was appreciative of the ongoing support, unfortunately he reports he is currently in [facility] for evaluation of a heart condition. At this time he states that he would wish for everything to continue for his mother, including full resuscitation. There otherwise are no other immediate needs from palliative medicine service, will continue to follow and provide ongoing support throughout her hospitalization. Prognosis: Guarded    OBJECTIVE:     BP (!) 171/95   Pulse 87   Temp 98.8 °F (37.1 °C)   Resp 20   Ht 5' 5\" (1.651 m)   Wt 221 lb 3.2 oz (100.3 kg)   SpO2 96%   BMI 36.81 kg/m²     Physical Examination:  Gen:  intubated  HEENT: normocephalic, atraumatic,  Lungs: respirations as per vent   Heart: regular rate   Abdomen:  soft  Skin: warm  Neuro: Intubated    Objective data reviewed: labs, images, records, medication use, vitals and chart    Alexander Victoria. [address] [place] [address]  Palliative Medicine    Time/Communication  Greater than 50% of time spent, total 15 minutes in counseling and coordination of care at the bedside regarding goals of care and symptom management. Thank you for allowing Palliative Medicine to participate in the care of [facility]. Note: This report was completed using computerize voiced recognition software.   Every effort has been made to ensure accuracy; however, inadvertent computerized transcription errors may be present.

## 2020-12-10 NOTE — PROGRESS NOTES
12/10/2020    O2SAT 95.6 12/10/2020        Medications     Infusions: (Fluid, Sedation, Vasopressors)  IVF:    None  Vasopressors   None  Sedation   Versed 5 mg/h, fentanyl 200 mcg/h    Nutrition:   Tube feeding  ATB:   Antibiotics  Days   Unasyn 6             Labs     CBC with Differential:    Lab Results   Component Value Date    WBC 6.3 12/10/2020    RBC 2.36 12/10/2020    HGB 7.8 12/10/2020    HCT 23.3 12/10/2020     12/10/2020    MCV 98.7 12/10/2020    MCH 33.1 12/10/2020    MCHC 33.5 12/10/2020    RDW 13.6 12/10/2020    LYMPHOPCT 15.7 12/10/2020    MONOPCT 8.7 12/10/2020    BASOPCT 0.3 12/10/2020    MONOSABS 0.55 12/10/2020    LYMPHSABS 0.99 12/10/2020    EOSABS 0.44 12/10/2020    BASOSABS 0.02 12/10/2020     CMP:    Lab Results   Component Value Date     12/10/2020    K 3.3 12/10/2020    K 4.0 10/19/2020     12/10/2020    CO2 23 12/10/2020    BUN 10 12/10/2020    CREATININE 0.6 12/10/2020    GFRAA >60 12/10/2020    LABGLOM >60 12/10/2020    GLUCOSE 83 12/10/2020    PROT 5.1 12/10/2020    LABALBU 2.8 12/10/2020    CALCIUM 8.6 12/10/2020    BILITOT 0.3 12/10/2020    ALKPHOS 70 12/10/2020    AST 14 12/10/2020    ALT 11 12/10/2020       Imaging Studies:      Resident's Assessment and Plan     Jose Francisco Payne is a 68-year-old female with PMHx of CAD, MI (8/22/2020), HTN, degenerative bone disease who was brought to the ED for altered mental status and currently being treated for the following:    Neurology:   Acute encephalopathy likely 2/2 UTI versus baclofen toxicity, rule out meningitis? Urine culture positive for staph. Has history of chronic baclofen use for back pain. Ammonia, UDS, SDS, LFT normal on admission. CT/MRI brain negative.   EEG showed moderate nonspecific encephalopathy.  - Neurology following, appreciate recommendations  - Currently sedated on fentanyl and propofol; continue to wean sedation as able  - Continue Depakote sprinkle, thiamine, folic acid  - Unasyn (D6)  - For possible LP  - Follow B12 and folate levels    Cardiology:   History of MI (8/22/2020)  - Continue Toprol, aspirin, and Brilinta    History of hypertension, uncontrolled  - SBP 160s today  - Continue Lopressor to 37.5 mg twice daily  - As needed hydralazine/labetalol ordered with holding parameters    Pulmonary:   Acute hypoxic respiratory failure 2/2 AMS versus pneumonia  - Respiratory culture grew staph aureus. CTA negative for PE.  - Current vent settings AC 16/360/40/6  - ABG 7.42/36.7/25.8/23.8, PF 2.14  - On Unasyn (D6) per ID    Nephrology (Fluids/ Electrolytes & Nutrition):   Lactic acidosis, resolved    Genitourinary:   UTI  - Urine culture positive for staph aureus, could be contributing to AMS  - ID following, appreciate recommendations  - Continue Unasyn per ID      DVT ppx: Lovenox  GI ppx: Protonix      Final note: Continue current management. For possible LP today. Donnel Councilman, MD, PGY-1  Attending physician: Dr. Allison Moore personally saw, examined and provided care for the patient. Radiographs, labs and medication list were reviewed by me independently. I spoke with bedside nursing, therapists and consultants. Critical care services and times documented are independent of procedures and multidisciplinary rounds with Residents. Additionally comprehensive, multidisciplinary rounds were conducted with the MICU team. The case was discussed in detail and plans for care were established. Review of Residents documentation was conducted and revisions were made as appropriate. I agree with the above documented exam, problem list and plan of care.     Could not get consent  LP when consent obtain   Neurology following  [address]  Pulmonary&Critical Care Medicine   Director of [address] [place] [address] Director of [address] [place] [address]    Cely Matamoros

## 2020-12-10 NOTE — PROGRESS NOTES
Patient seen in icu no b ig changes. On the vent and antibiotics for ams and aspiration pneumonia.  Continue with current treatment

## 2020-12-10 NOTE — PLAN OF CARE
Notes reviewed. No significant changes noted overnight or this morning. Patient remains hypertensive with other vitals stable on ventilator    Multiple specialists following   --- palliative consult pending for goals of care, CODE STATUS discussion, family support and symptom management. Labs and Imaging:  Lab Results   Component Value Date    WBC 6.3 12/10/2020    HGB 7.8 (L) 12/10/2020    HCT 23.3 (L) 12/10/2020    MCV 98.7 12/10/2020     12/10/2020     Lab Results   Component Value Date     12/10/2020    K 3.3 (L) 12/10/2020     12/10/2020    CO2 23 12/10/2020    BUN 10 12/10/2020    CREATININE 0.6 12/10/2020    GLUCOSE 83 12/10/2020    CALCIUM 8.6 12/10/2020    PROT 5.1 (L) 12/10/2020    LABALBU 2.8 (L) 12/10/2020    BILITOT 0.3 12/10/2020    ALKPHOS 70 12/10/2020    AST 14 12/10/2020    ALT 11 12/10/2020    LABGLOM >60 12/10/2020    GFRAA >60 12/10/2020     XR CHEST PORTABLE   Final Result   1. Partial interval clearing of the small patchy infiltrate within the right   lung base. 2. There is no interval change in the position of support lines and tubes. XR CHEST ABDOMEN NG PLACEMENT   Final Result   Endotracheal tube in lower borderline position above the sofy at the level   of the lower margin of the arch of the aorta. Right-sided PICC line tip in the SVC right atrial junction. No pneumothorax on the right on the left. Expiratory study causing crowding of the bronchovascular markings at the   bases. XR CHEST PORTABLE   Final Result   Patchy bilateral lower lobe pulmonary infiltrates. XR CHEST PORTABLE   Final Result   Dense left perihilar and left lower lobe infiltrate. Stable position of the support lines and tubes. MRI BRAIN W WO CONTRAST   Final Result   1. No acute intracranial abnormality. 2. No mass, mass effect, edema or hemorrhage is seen.    3. Prominent inflammatory changes in the paranasal sinuses with air-fluid   levels, which may signify acute sinusitis. 4. Bilateral mastoid effusions. XR CHEST PORTABLE   Final Result   1. Multifocal bilateral airspace disease slightly more prominent within the   left lung when compared with the prior study of one day earlier. XR CHEST PORTABLE   Final Result   Continued left lung base atelectasis and/or infiltrate and possible left   pleural effusion      XR CHEST PORTABLE   Final Result   Focal infiltrates, left lung base. Cardiomegaly. XR CHEST PORTABLE   Final Result   New left basilar airspace opacities which may be on the basis of atelectasis   or pneumonia. Suspect small left pleural effusion. CTA CHEST W CONTRAST   Final Result   1. No pulmonary embolism. 2.  No pneumonia or pleural effusion. 3.  Mild pulmonary vascular congestion. 4.  Endotracheal tube is just above level of the sofy and can be retracted   approximately 3 cm. CT ABDOMEN PELVIS W IV CONTRAST Additional Contrast? None   Final Result   Diverticulosis without definite CT evidence of diverticulitis   Minimal pericardial effusion without gross cardiomegaly. Suggestion of   coronary artery calcification   Bilateral lung base slight atelectasis may be physiologic   Grade 1 anterolisthesis and degenerative disc disease at L4-5 without   evidence of spondylolysis      XR CHEST PORTABLE   Final Result   1. Endotracheal tube is just above level of the sofy. This tube could be   retracted approximately 3 centimetres. 2.  Stable mild pulmonary vascular congestion. No focal airspace opacity or   pleural effusion. CT HEAD WO CONTRAST   Final Result   1. There is no acute intracranial abnormality. Specifically, there is no   intracranial hemorrhage. 2. Atrophy and periventricular leukomalacia,      XR ABDOMEN FOR NG/OG/NE TUBE PLACEMENT   Final Result   Satisfactory position of nasogastric tube.       XR CHEST PORTABLE   Final Result   The tip of the endotracheal tube is seen within the right mainstem bronchus   and should be pulled back by approximately 3 cm. XR CHEST PORTABLE    (Results Pending)     MRI brain with and without contrast 12/7/2020:   1. No acute intracranial abnormality. 2.  No mass, mass-effect, edema or hemorrhage is seen. 3.  Prominent inflammatory changes in the paranasal sinuses with air-fluid levels, which may signify acute sinusitis. 4.  Bilateral mastoid effusions. Assessment:  Acute confusional state with speech disturbances              Moderate encephalopathy due to multiple medical issues              MRI brain showed no acute intracranial pathology              Complete echo unrevealing for acute changes              EEG showed moderate nonspecific encephalopathy                Acute anemia              H/H today 7.8/23.3     UTI               On antibiotics per ID--urine cultures positive for staph aureus    No leukocytosis     Acute hypoxic respiratory failure               secondary to altered mental status and pneumonia               respiratory cultures positive for staph aureus     Called and spoke to patient's son Maicol Miles per request on Wednesday evening. MRI, EEG and other medical diagnosis is discussed over the phone. All questions and concerns addressed. Explained plan to proceed with LP per critical care team to investigate continued altered mental status. Plan:  Continue supportive care  Wean as able  Replace potassium per CCU team  LP per critical care team/ID--- pending since 12/7-12/8  Antibiotics per ID  Continue aspirin, Brilinta and statin   A1C 5.7, LDL 44   Continue tele   SCDs    Neuro will follow as needed. Please call with additional questions or concerns.

## 2020-12-10 NOTE — PLAN OF CARE
Problem: Restraint Use - Nonviolent/Non-Self-Destructive Behavior:  Goal: Absence of restraint-related injury  Description: Absence of restraint-related injury  12/9/2020 2053 by Reno Bhatia RN  Outcome: Met This Shift     Problem: Falls - Risk of:  Goal: Will remain free from falls  Description: Will remain free from falls  Outcome: Met This Shift     Problem: Falls - Risk of:  Goal: Absence of physical injury  Description: Absence of physical injury  Outcome: Met This Shift     Problem: Injury - Risk of, Physical Injury:  Goal: Will remain free from falls  Description: Will remain free from falls  Outcome: Met This Shift     Problem: Injury - Risk of, Physical Injury:  Goal: Absence of physical injury  Description: Absence of physical injury  Outcome: Met This Shift     Problem: Restraint Use - Nonviolent/Non-Self-Destructive Behavior:  Goal: Absence of restraint indications  Description: Absence of restraint indications  12/9/2020 2053 by Reno Bhatia RN  Outcome: Not Met This Shift

## 2020-12-11 ENCOUNTER — APPOINTMENT (OUTPATIENT)
Dept: GENERAL RADIOLOGY | Age: 65
DRG: 207 | End: 2020-12-11
Payer: MEDICARE

## 2020-12-11 LAB
AADO2: 143.3 MMHG
ALBUMIN SERPL-MCNC: 3.2 G/DL (ref 3.5–5.2)
ALP BLD-CCNC: 80 U/L (ref 35–104)
ALT SERPL-CCNC: 24 U/L (ref 0–32)
ANION GAP SERPL CALCULATED.3IONS-SCNC: 8 MMOL/L (ref 7–16)
AST SERPL-CCNC: 35 U/L (ref 0–31)
B.E.: 0.6 MMOL/L (ref -3–3)
BASOPHILS ABSOLUTE: 0.01 E9/L (ref 0–0.2)
BASOPHILS RELATIVE PERCENT: 0.1 % (ref 0–2)
BILIRUB SERPL-MCNC: 0.4 MG/DL (ref 0–1.2)
BUN BLDV-MCNC: 11 MG/DL (ref 8–23)
CALCIUM SERPL-MCNC: 8.8 MG/DL (ref 8.6–10.2)
CHLORIDE BLD-SCNC: 103 MMOL/L (ref 98–107)
CO2: 29 MMOL/L (ref 22–29)
COHB: 0.8 % (ref 0–1.5)
CREAT SERPL-MCNC: 0.7 MG/DL (ref 0.5–1)
CRITICAL: ABNORMAL
DATE ANALYZED: ABNORMAL
DATE OF COLLECTION: ABNORMAL
EOSINOPHILS ABSOLUTE: 0.49 E9/L (ref 0.05–0.5)
EOSINOPHILS RELATIVE PERCENT: 6.5 % (ref 0–6)
FIO2: 40 %
FOLATE: 8.1 NG/ML (ref 4.8–24.2)
GFR AFRICAN AMERICAN: >60
GFR NON-AFRICAN AMERICAN: >60 ML/MIN/1.73
GLUCOSE BLD-MCNC: 85 MG/DL (ref 74–99)
GLUCOSE, CSF: 57 MG/DL (ref 40–70)
HCO3: 24.7 MMOL/L (ref 22–26)
HCT VFR BLD CALC: 26.2 % (ref 34–48)
HEMOGLOBIN: 8.2 G/DL (ref 11.5–15.5)
HHB: 4.3 % (ref 0–5)
IMMATURE GRANULOCYTES #: 0.02 E9/L
IMMATURE GRANULOCYTES %: 0.3 % (ref 0–5)
LAB: ABNORMAL
LYMPHOCYTES ABSOLUTE: 1.42 E9/L (ref 1.5–4)
LYMPHOCYTES RELATIVE PERCENT: 18.9 % (ref 20–42)
Lab: ABNORMAL
MAGNESIUM: 1.8 MG/DL (ref 1.6–2.6)
MCH RBC QN AUTO: 31.2 PG (ref 26–35)
MCHC RBC AUTO-ENTMCNC: 31.3 % (ref 32–34.5)
MCV RBC AUTO: 99.6 FL (ref 80–99.9)
METHB: 0.3 % (ref 0–1.5)
MODE: AC
MONOCYTES ABSOLUTE: 0.68 E9/L (ref 0.1–0.95)
MONOCYTES RELATIVE PERCENT: 9 % (ref 2–12)
NEUTROPHILS ABSOLUTE: 4.9 E9/L (ref 1.8–7.3)
NEUTROPHILS RELATIVE PERCENT: 65.2 % (ref 43–80)
O2 CONTENT: 12.4 ML/DL
O2 SATURATION: 95.7 % (ref 92–98.5)
O2HB: 94.6 % (ref 94–97)
OPERATOR ID: 2593
PATIENT TEMP: 37 C
PCO2: 37.3 MMHG (ref 35–45)
PDW BLD-RTO: 13.4 FL (ref 11.5–15)
PEEP/CPAP: 6 CMH2O
PFO2: 2.22 MMHG/%
PH BLOOD GAS: 7.44 (ref 7.35–7.45)
PHOSPHORUS: 4.9 MG/DL (ref 2.5–4.5)
PLATELET # BLD: 208 E9/L (ref 130–450)
PMV BLD AUTO: 10.1 FL (ref 7–12)
PO2: 89 MMHG (ref 75–100)
POTASSIUM SERPL-SCNC: 3.5 MMOL/L (ref 3.5–5)
PROTEIN CSF: 42 MG/DL (ref 15–40)
RBC # BLD: 2.63 E12/L (ref 3.5–5.5)
RI(T): 1.61
RR MECHANICAL: 16 B/MIN
SODIUM BLD-SCNC: 140 MMOL/L (ref 132–146)
SOURCE, BLOOD GAS: ABNORMAL
THB: 9.2 G/DL (ref 11.5–16.5)
TIME ANALYZED: 419
TOTAL PROTEIN: 5.6 G/DL (ref 6.4–8.3)
VITAMIN B-12: 302 PG/ML (ref 211–946)
VT MECHANICAL: 360 ML
WBC # BLD: 7.5 E9/L (ref 4.5–11.5)

## 2020-12-11 PROCEDURE — 94003 VENT MGMT INPAT SUBQ DAY: CPT

## 2020-12-11 PROCEDURE — 2580000003 HC RX 258: Performed by: INTERNAL MEDICINE

## 2020-12-11 PROCEDURE — 6360000002 HC RX W HCPCS: Performed by: INTERNAL MEDICINE

## 2020-12-11 PROCEDURE — 71045 X-RAY EXAM CHEST 1 VIEW: CPT

## 2020-12-11 PROCEDURE — 82805 BLOOD GASES W/O2 SATURATION: CPT

## 2020-12-11 PROCEDURE — 89051 BODY FLUID CELL COUNT: CPT

## 2020-12-11 PROCEDURE — 99233 SBSQ HOSP IP/OBS HIGH 50: CPT | Performed by: INTERNAL MEDICINE

## 2020-12-11 PROCEDURE — 87070 CULTURE OTHR SPECIMN AEROBIC: CPT

## 2020-12-11 PROCEDURE — 6370000000 HC RX 637 (ALT 250 FOR IP): Performed by: INTERNAL MEDICINE

## 2020-12-11 PROCEDURE — C9113 INJ PANTOPRAZOLE SODIUM, VIA: HCPCS | Performed by: INTERNAL MEDICINE

## 2020-12-11 PROCEDURE — 94640 AIRWAY INHALATION TREATMENT: CPT

## 2020-12-11 PROCEDURE — 2500000003 HC RX 250 WO HCPCS: Performed by: INTERNAL MEDICINE

## 2020-12-11 PROCEDURE — 83735 ASSAY OF MAGNESIUM: CPT

## 2020-12-11 PROCEDURE — 84100 ASSAY OF PHOSPHORUS: CPT

## 2020-12-11 PROCEDURE — 85025 COMPLETE CBC W/AUTO DIFF WBC: CPT

## 2020-12-11 PROCEDURE — 82945 GLUCOSE OTHER FLUID: CPT

## 2020-12-11 PROCEDURE — 80053 COMPREHEN METABOLIC PANEL: CPT

## 2020-12-11 PROCEDURE — 84157 ASSAY OF PROTEIN OTHER: CPT

## 2020-12-11 PROCEDURE — 009U3ZX DRAINAGE OF SPINAL CANAL, PERCUTANEOUS APPROACH, DIAGNOSTIC: ICD-10-PCS | Performed by: INTERNAL MEDICINE

## 2020-12-11 PROCEDURE — 99231 SBSQ HOSP IP/OBS SF/LOW 25: CPT | Performed by: NURSE PRACTITIONER

## 2020-12-11 PROCEDURE — 62270 DX LMBR SPI PNXR: CPT | Performed by: INTERNAL MEDICINE

## 2020-12-11 PROCEDURE — 87205 SMEAR GRAM STAIN: CPT

## 2020-12-11 PROCEDURE — 6360000002 HC RX W HCPCS

## 2020-12-11 PROCEDURE — 2000000000 HC ICU R&B

## 2020-12-11 RX ORDER — POTASSIUM CHLORIDE 29.8 MG/ML
20 INJECTION INTRAVENOUS
Status: COMPLETED | OUTPATIENT
Start: 2020-12-11 | End: 2020-12-11

## 2020-12-11 RX ORDER — PROPOFOL 10 MG/ML
INJECTION, EMULSION INTRAVENOUS
Status: COMPLETED
Start: 2020-12-11 | End: 2020-12-11

## 2020-12-11 RX ORDER — GABAPENTIN 100 MG/1
100 CAPSULE ORAL 3 TIMES DAILY
Status: DISCONTINUED | OUTPATIENT
Start: 2020-12-11 | End: 2020-12-17 | Stop reason: HOSPADM

## 2020-12-11 RX ORDER — PROPOFOL 10 MG/ML
10 INJECTION, EMULSION INTRAVENOUS
Status: DISCONTINUED | OUTPATIENT
Start: 2020-12-11 | End: 2020-12-12

## 2020-12-11 RX ORDER — ESCITALOPRAM OXALATE 10 MG/1
20 TABLET ORAL DAILY
Status: DISCONTINUED | OUTPATIENT
Start: 2020-12-11 | End: 2020-12-17 | Stop reason: HOSPADM

## 2020-12-11 RX ORDER — QUETIAPINE FUMARATE 25 MG/1
25 TABLET, FILM COATED ORAL 2 TIMES DAILY
Status: DISCONTINUED | OUTPATIENT
Start: 2020-12-11 | End: 2020-12-17 | Stop reason: HOSPADM

## 2020-12-11 RX ADMIN — DIVALPROEX SODIUM 500 MG: 125 CAPSULE, COATED PELLETS ORAL at 21:00

## 2020-12-11 RX ADMIN — SODIUM CHLORIDE 3 G: 900 INJECTION INTRAVENOUS at 09:45

## 2020-12-11 RX ADMIN — METOPROLOL TARTRATE 37.5 MG: 25 TABLET, FILM COATED ORAL at 07:51

## 2020-12-11 RX ADMIN — FOLIC ACID 1 MG: 5 INJECTION, SOLUTION INTRAMUSCULAR; INTRAVENOUS; SUBCUTANEOUS at 08:22

## 2020-12-11 RX ADMIN — MIDAZOLAM 10 MG/HR: 5 INJECTION INTRAMUSCULAR; INTRAVENOUS at 15:28

## 2020-12-11 RX ADMIN — Medication 10 ML: at 07:52

## 2020-12-11 RX ADMIN — PROPOFOL 20 MCG/KG/MIN: 10 INJECTION, EMULSION INTRAVENOUS at 13:52

## 2020-12-11 RX ADMIN — Medication 10 ML: at 20:32

## 2020-12-11 RX ADMIN — IPRATROPIUM BROMIDE AND ALBUTEROL SULFATE 1 AMPULE: 2.5; .5 SOLUTION RESPIRATORY (INHALATION) at 12:08

## 2020-12-11 RX ADMIN — PANTOPRAZOLE SODIUM 40 MG: 40 INJECTION, POWDER, FOR SOLUTION INTRAVENOUS at 07:52

## 2020-12-11 RX ADMIN — ARFORMOTEROL TARTRATE 15 MCG: 15 SOLUTION RESPIRATORY (INHALATION) at 20:21

## 2020-12-11 RX ADMIN — GABAPENTIN 100 MG: 100 CAPSULE ORAL at 20:56

## 2020-12-11 RX ADMIN — CHLORHEXIDINE GLUCONATE 0.12% ORAL RINSE 15 ML: 1.2 LIQUID ORAL at 20:32

## 2020-12-11 RX ADMIN — IPRATROPIUM BROMIDE AND ALBUTEROL SULFATE 1 AMPULE: 2.5; .5 SOLUTION RESPIRATORY (INHALATION) at 16:25

## 2020-12-11 RX ADMIN — QUETIAPINE FUMARATE 50 MG: 25 TABLET ORAL at 07:52

## 2020-12-11 RX ADMIN — POTASSIUM CHLORIDE 20 MEQ: 400 INJECTION, SOLUTION INTRAVENOUS at 08:45

## 2020-12-11 RX ADMIN — CHLORHEXIDINE GLUCONATE 0.12% ORAL RINSE 15 ML: 1.2 LIQUID ORAL at 07:51

## 2020-12-11 RX ADMIN — SODIUM CHLORIDE 3 G: 900 INJECTION INTRAVENOUS at 21:03

## 2020-12-11 RX ADMIN — TICAGRELOR 90 MG: 90 TABLET ORAL at 20:34

## 2020-12-11 RX ADMIN — ESCITALOPRAM 20 MG: 10 TABLET, FILM COATED ORAL at 21:36

## 2020-12-11 RX ADMIN — ARFORMOTEROL TARTRATE 15 MCG: 15 SOLUTION RESPIRATORY (INHALATION) at 09:17

## 2020-12-11 RX ADMIN — IPRATROPIUM BROMIDE AND ALBUTEROL SULFATE 1 AMPULE: 2.5; .5 SOLUTION RESPIRATORY (INHALATION) at 20:21

## 2020-12-11 RX ADMIN — POTASSIUM CHLORIDE 20 MEQ: 400 INJECTION, SOLUTION INTRAVENOUS at 09:44

## 2020-12-11 RX ADMIN — SODIUM CHLORIDE 3 G: 900 INJECTION INTRAVENOUS at 03:55

## 2020-12-11 RX ADMIN — ASPIRIN 81 MG CHEWABLE TABLET 81 MG: 81 TABLET CHEWABLE at 07:52

## 2020-12-11 RX ADMIN — TICAGRELOR 90 MG: 90 TABLET ORAL at 07:52

## 2020-12-11 RX ADMIN — ATORVASTATIN CALCIUM 80 MG: 80 TABLET, FILM COATED ORAL at 21:01

## 2020-12-11 RX ADMIN — POTASSIUM CHLORIDE 20 MEQ: 400 INJECTION, SOLUTION INTRAVENOUS at 11:55

## 2020-12-11 RX ADMIN — Medication 200 MCG/HR: at 15:28

## 2020-12-11 RX ADMIN — THIAMINE HYDROCHLORIDE 250 MG: 100 INJECTION, SOLUTION INTRAMUSCULAR; INTRAVENOUS at 08:23

## 2020-12-11 RX ADMIN — Medication 200 MCG/HR: at 08:47

## 2020-12-11 RX ADMIN — LABETALOL HYDROCHLORIDE 10 MG: 5 INJECTION INTRAVENOUS at 14:26

## 2020-12-11 RX ADMIN — SODIUM CHLORIDE 3 G: 900 INJECTION INTRAVENOUS at 15:31

## 2020-12-11 RX ADMIN — MIDAZOLAM 10 MG/HR: 5 INJECTION INTRAMUSCULAR; INTRAVENOUS at 01:48

## 2020-12-11 RX ADMIN — Medication 200 MCG/HR: at 02:31

## 2020-12-11 RX ADMIN — IPRATROPIUM BROMIDE AND ALBUTEROL SULFATE 1 AMPULE: 2.5; .5 SOLUTION RESPIRATORY (INHALATION) at 09:16

## 2020-12-11 RX ADMIN — DIVALPROEX SODIUM 500 MG: 125 CAPSULE, COATED PELLETS ORAL at 07:52

## 2020-12-11 RX ADMIN — Medication 200 MCG/HR: at 22:01

## 2020-12-11 RX ADMIN — METOPROLOL TARTRATE 37.5 MG: 25 TABLET, FILM COATED ORAL at 20:34

## 2020-12-11 ASSESSMENT — PAIN SCALES - GENERAL
PAINLEVEL_OUTOF10: 0

## 2020-12-11 ASSESSMENT — PULMONARY FUNCTION TESTS
PIF_VALUE: 26
PIF_VALUE: 18
PIF_VALUE: 23
PIF_VALUE: 18
PIF_VALUE: 20
PIF_VALUE: 28
PIF_VALUE: 24
PIF_VALUE: 18
PIF_VALUE: 22
PIF_VALUE: 18
PIF_VALUE: 19
PIF_VALUE: 19
PIF_VALUE: 22
PIF_VALUE: 33
PIF_VALUE: 19
PIF_VALUE: 20
PIF_VALUE: 25
PIF_VALUE: 20
PIF_VALUE: 19
PIF_VALUE: 18
PIF_VALUE: 23
PIF_VALUE: 23
PIF_VALUE: 21
PIF_VALUE: 23
PIF_VALUE: 19
PIF_VALUE: 18
PIF_VALUE: 20
PIF_VALUE: 29

## 2020-12-11 NOTE — PLAN OF CARE
Problem: Restraint Use - Nonviolent/Non-Self-Destructive Behavior:  Goal: Absence of restraint-related injury  Description: Absence of restraint-related injury  12/11/2020 3153 by Gelacio Trent RN  Outcome: Met This Shift     Problem: Falls - Risk of:  Goal: Will remain free from falls  Description: Will remain free from falls  12/11/2020 7255 by Gelacio Trent RN  Outcome: Met This Shift     Problem: Falls - Risk of:  Goal: Absence of physical injury  Description: Absence of physical injury  12/11/2020 3888 by Gelacio Trent RN  Outcome: Met This Shift     Problem: Injury - Risk of, Physical Injury:  Goal: Will remain free from falls  Description: Will remain free from falls  12/11/2020 1315 by Gelacio Trent RN  Outcome: Met This Shift     Problem: Injury - Risk of, Physical Injury:  Goal: Absence of physical injury  Description: Absence of physical injury  12/11/2020 1246 by Gelacio Trent RN  Outcome: Met This Shift     Problem: Mood - Altered:  Goal: Absence of abusive behavior  Description: Absence of abusive behavior  12/11/2020 4517 by Gelacio Trent RN  Outcome: Met This Shift     Problem: Sleep Pattern Disturbance:  Goal: Appears well-rested  Description: Appears well-rested  12/11/2020 1600 by Gelacio Trent RN  Outcome: Met This Shift     Problem: Skin Integrity:  Goal: Will show no infection signs and symptoms  Description: Will show no infection signs and symptoms  12/11/2020 6853 by Gelacio Trent RN  Outcome: Met This Shift     Problem: Skin Integrity:  Goal: Absence of new skin breakdown  Description: Absence of new skin breakdown  12/11/2020 6627 by Gelacio Trent RN  Outcome: Met This Shift     Problem: Restraint Use - Nonviolent/Non-Self-Destructive Behavior:  Goal: Absence of restraint indications  Description: Absence of restraint indications  12/11/2020 7354 by Gelacio Trent RN  Outcome: Not Met This Shift     Problem: Confusion - Acute:  Goal: Absence of continued neurological deterioration signs Perception - Impaired:  Goal: Able to refrain from responding to false sensory perceptions  Description: Able to refrain from responding to false sensory perceptions  12/11/2020 1685 by Uzma Oro RN  Outcome: Not Met This Shift     Problem: Sensory Perception - Impaired:  Goal: Demonstrates accurate environmental perceptions  Description: Demonstrates accurate environmental perceptions  12/11/2020 5159 by Uzma Oro RN  Outcome: Not Met This Shift     Problem: Sensory Perception - Impaired:  Goal: Able to distinguish between reality-based and nonreality-based thinking  Description: Able to distinguish between reality-based and nonreality-based thinking  12/11/2020 2096 by Uzma Oro RN  Outcome: Not Met This Shift     Problem: Sensory Perception - Impaired:  Goal: Able to interrupt nonreality-based thinking  Description: Able to interrupt nonreality-based thinking  12/11/2020 3748 by Uzma Oro RN  Outcome: Not Met This Shift

## 2020-12-11 NOTE — PROGRESS NOTES
[address] [place] [address] [facility]  Progress Note      Chief Complaint   Patient presents with    Altered Mental Status     pt not acting herself. Was normal when she went to bed last night around 11pm.  Son said she was screaming in her sleep around midnight and he woke her but she was fine. Woke up confused at 9am with hard time breathing, leg pain. She did not know where she was or who her son was. SUBJECTIVE:  Patient is tolerating medications. No reported adverse drug reactions. No nausea, vomiting, diarrhea.  moves all extremities and eyes are open  Eyes open but not following or tracking-neurology following patient  Afebrile, FiO2 40%; As stated above in the chief complaint, otherwise negative.     Medications:  Scheduled Meds:   metoprolol tartrate  37.5 mg Oral BID    thiamine (VITAMIN B1) IVPB  250 mg Intravenous Q24H    QUEtiapine  50 mg Oral BID    [Held by provider] enoxaparin  40 mg Subcutaneous Daily    divalproex  500 mg Oral 2 times per day    chlorhexidine  15 mL Mouth/Throat BID    lidocaine PF  5 mL Intradermal Once    heparin flush  3 mL Intravenous 2 times per day    ipratropium-albuterol  1 ampule Inhalation Q4H WA    Arformoterol Tartrate  15 mcg Nebulization BID    ampicillin-sulbactam  3 g Intravenous Q6H    aspirin  81 mg Oral Daily    atorvastatin  80 mg Oral Nightly    pantoprazole  40 mg Intravenous Daily    [Held by provider] metoprolol succinate  50 mg Oral Daily    sodium chloride flush  10 mL Intravenous 2 times per day    folic acid  1 mg Intravenous Daily    ticagrelor  90 mg Oral BID     Continuous Infusions:   propofol 20 mcg/kg/min (12/11/20 1352)    midazolam 10 mg/hr (12/11/20 0148)    fentaNYL 5 mcg/ml in 0.9%  ml infusion 200 mcg/hr (12/11/20 1391)     PRN Meds:perflutren lipid microspheres, sodium chloride flush, heparin flush, hydrALAZINE, sodium chloride flush, acetaminophen **OR** acetaminophen, polyethylene glycol, labetalol    OBJECTIVE:  BP (!) 163/84   Pulse 86   Temp 99.3 °F (37.4 °C) (Esophageal)   Resp 22   Ht 5' 5\" (1.651 m)   Wt 205 lb 12.8 oz (93.4 kg)   SpO2 98%   BMI 34.25 kg/m²   Temp  Av.9 °F (37.2 °C)  Min: 98.2 °F (36.8 °C)  Max: 99.3 °F (37.4 °C)  Constitutional: The patient's eyes were open and she is tracking  Skin: Warm and dry. No rashes were noted. HEENT: Round and reactive pupils. Moist mucous membranes. No ulcerations or thrush. Neck: Supple to movements. Chest: No use of accessory muscles to breathe. Symmetrical expansion. No wheezing, crackles or rhonchi. Cardiovascular: S1 and S2 are rhythmic and regular. No murmurs appreciated. Abdomen: Positive bowel sounds to auscultation. Benign to palpation. No masses felt. No hepatosplenomegaly. Genitourinary: Martin  Extremities: No clubbing, no cyanosis, no edema.   Lines: peripheral  PICC line 2020 right brachial      Laboratory and Tests Review:  Lab Results   Component Value Date    WBC 7.5 2020    WBC 6.3 12/10/2020    WBC 8.6 2020    HGB 8.2 (L) 2020    HCT 26.2 (L) 2020    MCV 99.6 2020     2020     Lab Results   Component Value Date    NEUTROABS 4.90 2020    NEUTROABS 4.28 12/10/2020    NEUTROABS 6.20 2020     No results found for: [facility]  Lab Results   Component Value Date    ALT 24 2020    AST 35 (H) 2020    ALKPHOS 80 2020    BILITOT 0.4 2020     Lab Results   Component Value Date     2020    K 3.5 2020    K 4.0 10/19/2020     2020    CO2 29 2020    BUN 11 2020    CREATININE 0.7 2020    CREATININE 0.6 12/10/2020    CREATININE 0.6 2020    GFRAA >60 2020    LABGLOM >60 2020    GLUCOSE 85 2020    PROT 5.6 2020    LABALBU 3.2 2020    CALCIUM 8.8 2020    BILITOT 0.4 2020    ALKPHOS 80 2020    AST 35 2020    ALT 24 2020     Lab Results   Component Value Date    CRP 1.9 (H) 12/05/2020     Lab Results   Component Value Date    SEDRATE 6 12/05/2020     Radiology:  Reviewed    Microbiology:   Lab Results   Component Value Date    BC 24 Hours no growth 12/07/2020    BC 5 Days no growth 12/05/2020    BC 5 Days no growth 08/28/2020    ORG Staphylococcus aureus 12/05/2020    ORG Staphylococcus aureus 12/05/2020    ORG Enterococcus faecalis 12/05/2020     Lab Results   Component Value Date    BLOODCULT2 5 Days no growth 12/05/2020    ORG Staphylococcus aureus 12/05/2020    ORG Staphylococcus aureus 12/05/2020    ORG Enterococcus faecalis 12/05/2020     No results found for: WNDABS  Smear, Respiratory   Date Value Ref Range Status   12/05/2020   Final    Group 5: >25 PMN's/LPF and <10 Epithelial cells/LPF  Abundant Polymorphonuclear leukocytes  Epithelial cells not seen  Rare Gram negative rods  Rare gram positive rods Diphtheroid like  Rare Gram positive cocci in clusters  few Gram positive cocci in pairs       No results found for: MPNEUMO, CLAMYDCU, LABLEGI, AFBCX, FUNGSM, LABFUNG  CULTURE, RESPIRATORY   Date Value Ref Range Status   12/05/2020 Oral Pharyngeal Becky absent (A)  Final   12/05/2020 Moderate growth  Final     No results found for: CXCATHTIP  No results found for: BFCS  No results found for: CXSURG  Urine Culture, Routine   Date Value Ref Range Status   12/05/2020 >100,000 CFU/ml  Final   12/05/2020 >100,000 CFU/ml  Final     No results found for: [address]     ASSESSMENT:  · Aspiration pneumonia- MSSA  · UTI-E faecalis and MSSA  · No evidence to support meningitis, patient is afebrile, white counts normal, neck is supple, moves all extremities and presented with a significant amount of CNS medications that superimposed on narcotics could cause a metabolic encephalopathy    PLAN:  · Continue Unasyn  · Follow up neuro status  · Discussed with pulmonary critical care  · Check final cultures  · Monitor labs    Prasanna Valiente  2:15 PM  12/11/2020

## 2020-12-11 NOTE — FLOWSHEET NOTE
Patient reaching for lines and tubes when able. Will continue restraint order at this time for patient safety.

## 2020-12-11 NOTE — PROCEDURES
Lumbar Puncture Procedure Note    Indication: Suspected meningitis    Consent: Consent obtained from family members     Procedure: Time Out: Immediately prior to the procedure a \"timeout\" was called to verify the correct patient and procedure. The patient was placed in the left lateral decubitus position and the appropriate landmarks were identified. The area was prepped and draped in the usual sterile fashion. Anesthesia was obtained using 2 cc of 2% Lidocaine without epinephrine. A spinal needle was inserted at the L4- L5 level with the stylet in place until spinal fluid was returned. At this point 5.0 cc of clear cerebral spinal fluid was obtained and sent for appropriate testing. The stylet was then replaced and the needle was withdrawn. A sterile dressing was placed over the site and the patient was placed in the supine position. The patient tolerated the procedure well. Complications: None    Aria Fox, PGY-1    Attending: Dr Iwona Monae personally supervised the performance of the documented procedure. I was present for the critical elements of this procedure and was immediately available for the entire procedure.   [address]

## 2020-12-11 NOTE — PROGRESS NOTES
Patient seen stil on vent and sedated. cxr improved. Tolerating antibiotics. Neuro status  Unchanged.  Hopefully can start to wean

## 2020-12-11 NOTE — PROGRESS NOTES
89.0  24.7 PF 2.22   - CTA is negative for PE or any acute processes. ? Follow mentation and wean from ventilator as able. ? Sedated on fentanyl and versed propofol held   ? Unasyn for Anbx       Cardiology  Myocardial infarction (8/22/2020)  -Maintained on aspirin, Brilinta, lisinopril, losartan, nitroglycerin  -Troponin less than 0.01, EKG stable  ? Continue Toprol 50 mg, aspirin, Brilinta     Hypertension, uncontrolled    - Maintained on lisinopril, losartan, metoprolol  - BP still uncontrolled and 168-170.  ? Toprol 50 mg, hold other antihypertensives for now and slowly reintroduce  ? Labetalol 10 mg q6 as needed or hydralazine 10 mg PRN      Nephrology   Lactic acidosis, Resolved   -Lactic acid initially 1.3, now 0.9   ? Stop Trending  lactic acid  ? S/p Normal saline 75 cc an hour for 12 hours     ID  UTI   - (+) Urine culture for staph Aureus, likely contributing to AMS   -unasyn 3g q6 day    ? Continue antibiotics  ? Blood cultures negative to date, will do repeat. ? ID consulted, awaiting recommendations     CAP vs Aspiration PNA   - Cultures done on admission growing staph aureus, patient comes from home.   -on vancomycin 500 mg day 1 and unasyn 3g q6 day 7   ? Continue antibiotics  ? Blood cultures negative to date, will do repeat. ? ID consulted, awaiting recommendations     Leukocytosis, resolved   -WBC 13.3  Now 7.9  -Blood cultures negative to date, urine and respiratory culture (+) for Staph Aureus  ? Follow CBC daily     PT/OT evaluation: Not warranted at present time   DVT prophylaxis/ GI prophylaxis: Lovenox/Protonix  Disposition: Continue Current Care     Final note: Metabolic workup negative to date, and CT and MRI also negative for any processes. Possible LP to complete work up. Vidhi Farah MD, PGY-1   Attending physician: Dr. Sanjuanita Henry     I personally saw, examined and provided care for the patient.  Radiographs, labs and medication list were reviewed by me independently. I spoke with bedside nursing, therapists and consultants. Critical care services and times documented are independent of procedures and multidisciplinary rounds with Residents. Additionally comprehensive, multidisciplinary rounds were conducted with the MICU team. The case was discussed in detail and plans for care were established. Review of Residents documentation was conducted and revisions were made as appropriate. I agree with the above documented exam, problem list and plan of care.     Restart psych meds and psych consult when more awake  Danuta Santiago MD,[facility]  Pulmonary&Critical Care Medicine   Director of [address] [place] [address] Director of [address] [place] [address]    Cely Matamoros

## 2020-12-11 NOTE — ACP (ADVANCE CARE PLANNING)
· At this time, Sharath Quinteros, Does Not have capacity for medical decision-making.  Capacity is time limited and situation/question specific  · Outcome of goals of care meeting: Spoke to son, Jonathan Burks, for support. Graeme's number had no answer and unable to leave voice message. Plan is to continue current care as diagnostic testing continues. Will follow for family support. · Code status Full Code  · Advanced Directives: no POA or living will in epic  · Surrogate/Legal NOK:   ? Javad Heart (son): [phone]  ? Kari Min (son): [phone]   · Palliative medicine will continue to follow on provide ongoing support and assistance with goals of care pending further clinical progression. · Will see patient again on Monday. Please call with any needs over the weekend.      Jackie Lawrence APRN-[facility]  Palliative Care

## 2020-12-11 NOTE — PLAN OF CARE
Problem: Restraint Use - Nonviolent/Non-Self-Destructive Behavior:  Goal: Absence of restraint-related injury  Description: Absence of restraint-related injury  12/11/2020 0816 by Chetan Ramirez RN  Outcome: Met This Shift     Problem: Falls - Risk of:  Goal: Will remain free from falls  Description: Will remain free from falls  12/11/2020 0816 by Chetan Ramirez RN  Outcome: Met This Shift     Problem: Falls - Risk of:  Goal: Absence of physical injury  Description: Absence of physical injury  12/11/2020 0816 by Chetan Ramirez RN  Outcome: Met This Shift     Problem: Injury - Risk of, Physical Injury:  Goal: Will remain free from falls  Description: Will remain free from falls  12/11/2020 0816 by Chetan Ramirez RN  Outcome: Met This Shift     Problem: Injury - Risk of, Physical Injury:  Goal: Absence of physical injury  Description: Absence of physical injury  12/11/2020 0816 by Chetan Ramirez RN  Outcome: Met This Shift     Problem: Confusion - Acute:  Goal: Absence of continued neurological deterioration signs and symptoms  Description: Absence of continued neurological deterioration signs and symptoms  12/11/2020 0816 by Chetan Ramirez RN  Outcome: Ongoing     Problem: Restraint Use - Nonviolent/Non-Self-Destructive Behavior:  Goal: Absence of restraint indications  Description: Absence of restraint indications  12/11/2020 0816 by Chetan Ramirez RN  Outcome: Not Met This Shift     Problem: Confusion - Acute:  Goal: Mental status will be restored to baseline  Description: Mental status will be restored to baseline  12/11/2020 0816 by Chetan Ramirez RN  Outcome: Not Met This Shift     Problem: Discharge Planning:  Goal: Ability to perform activities of daily living will improve  Description: Ability to perform activities of daily living will improve  12/11/2020 0816 by Chetan Ramirez RN  Outcome: Not Met This Shift     Problem: Discharge Planning:  Goal: Participates in care planning  Description: Participates in care

## 2020-12-11 NOTE — PLAN OF CARE
Problem: Restraint Use - Nonviolent/Non-Self-Destructive Behavior:  Goal: Absence of restraint-related injury  Description: Absence of restraint-related injury  12/11/2020 1635 by Lata Otto RN  Outcome: Met This Shift     Problem: Falls - Risk of:  Goal: Will remain free from falls  Description: Will remain free from falls  12/11/2020 1635 by Lata Otto RN  Outcome: Met This Shift     Problem: Falls - Risk of:  Goal: Absence of physical injury  Description: Absence of physical injury  12/11/2020 1635 by Lata Otto RN  Outcome: Met This Shift     Problem: Injury - Risk of, Physical Injury:  Goal: Will remain free from falls  Description: Will remain free from falls  12/11/2020 1635 by Lata Otto RN  Outcome: Met This Shift

## 2020-12-11 NOTE — PROGRESS NOTES
Palliative Care Department  [phone]  Palliative Care Progress Note  Provider YASIR Carmona-CNP      PATIENT: Conchita Landa  : 1955  MRN: [id number]  ADMISSION DATE: 12/3/2020 11:27 AM    Palliative Medicine was consulted on hospital day 8 for assistance with Goals of care, Code Status Discussion, Family support, Symptom management. HPI:     Ayaka Arthur is a 77 y.o. y/o female with a history of CAD, MI (20), hypertension, degenerative bone disease who presented to [facility]) on 12/3/2020 with altered mental status, she was subsequently intubated for airway protection. She was found to have a UTI with leukocytosis. CT of the head was negative. She was admitted to the ICU for further management. Palliative care consulted for goals of care, code status discussion, family support, and symptom management. ASSESSMENT/PLAN:     Pertinent Hospital Diagnoses      Acute encephalopathy: Neurology following, CT and MRI brain negative, EEG showed moderate nonspecific encephalopathy, for LP   Acute hypoxic respiratory failure: Per ICU team, mechanically ventilated, respiratory culture positive for MSSA, Unasyn   UTI: Culture positive for E faecalis and MSSA, ID following, Unasyn    Palliative Care Encounter / Counseling Regarding Goals of Care  Please see detailed goals of care discussion as below   At this time, Conchita Landa, Does Not have capacity for medical decision-making. Capacity is time limited and situation/question specific   Outcome of goals of care meeting: Spoke to son, [place], for support. Graeme's number had no answer and unable to leave voice message. Plan is to continue current care as diagnostic testing continues. Will follow for family support.     Code status Full Code   Advanced Directives: no POA or living will in [facility]   Surrogate/Legal NOK:   nicky Leono Olindayonatan (son): [phone]  o Donell Sanchez (son): [phone]   [facility] Palliative medicine will continue to follow on provide ongoing support and assistance with goals of care pending further clinical progression.  Will see patient again on Monday. Please call with any needs over the weekend. Symptom management : Per ICU team.       SUBJECTIVE:     Details of Conversation:     12/11/20: Chart reviewed and patient seen at the bedside with no family present. She remains mechanically ventilated and sedated. She does not open eyes to voice or tactile stimulation but does move all extremities to tactile stimuli. She did not follow commands. Spoke with bedside RN and plan is to obtain LP today. Call placed to patient's son, Buddy Mclean, with no answer and inability to leave voice message. Call to patient's son, Pelon Escobar appears to be coping well at this time and they are awaiting the results of the diagnostic testing that is to be done including LP. Ted Vo would like to continue patient's current plan of care at this time until more information is found about her condition. Patient's CODE STATUS will maintain FULL CODE at this time. Informed him that palliative medicine will continue to follow along in patient's care and will see her again on Monday. He will update his brother, Buddy Mclean, and have him call palliative medicine with any needs or questions. 12/10/2020: Mrs. Eckert seen at the bedside, no family present. She continues to be mechanically ventilated, sedated, now with Versed and fentanyl, and unfortunate does not wake and follow commands. She has become restless make random movements with stimulation. An update was received from the bedside nurse, and she reports that there is a plan for lumbar puncture later today to further evaluate cause of her encephalopathy. I did make a call to the patient's son Buddy Mclean, he was appreciative of the ongoing support, unfortunately he reports he is currently in [facility] for evaluation of a heart condition.   At this time he states that he would wish all 4 extremities to tactile stimulation    Objective data reviewed: labs, images, records, medication use, vitals and chart    Jackie March APRN-CNP  Palliative Medicine    Time/Communication  Greater than 50% of time spent, total 15 minutes in counseling and coordination of care at the bedside regarding goals of care and symptom management. Thank you for allowing Palliative Medicine to participate in the care of [facility].

## 2020-12-11 NOTE — PLAN OF CARE
Problem: Restraint Use - Nonviolent/Non-Self-Destructive Behavior:  Goal: Absence of restraint-related injury  Description: Absence of restraint-related injury  Outcome: Met This Shift     Problem: Falls - Risk of:  Goal: Will remain free from falls  Description: Will remain free from falls  Outcome: Met This Shift  Goal: Absence of physical injury  Description: Absence of physical injury  Outcome: Met This Shift     Problem: Injury - Risk of, Physical Injury:  Goal: Will remain free from falls  Description: Will remain free from falls  Outcome: Met This Shift  Goal: Absence of physical injury  Description: Absence of physical injury  Outcome: Met This Shift     Problem: Mood - Altered:  Goal: Absence of abusive behavior  Description: Absence of abusive behavior  Outcome: Met This Shift     Problem: Sleep Pattern Disturbance:  Goal: Appears well-rested  Description: Appears well-rested  Outcome: Met This Shift     Problem: Skin Integrity:  Goal: Will show no infection signs and symptoms  Description: Will show no infection signs and symptoms  Outcome: Met This Shift  Goal: Absence of new skin breakdown  Description: Absence of new skin breakdown  Outcome: Met This Shift     Problem: Restraint Use - Nonviolent/Non-Self-Destructive Behavior:  Goal: Absence of restraint indications  Description: Absence of restraint indications  Outcome: Not Met This Shift     Problem: Confusion - Acute:  Goal: Absence of continued neurological deterioration signs and symptoms  Description: Absence of continued neurological deterioration signs and symptoms  Outcome: Not Met This Shift  Goal: Mental status will be restored to baseline  Description: Mental status will be restored to baseline  Outcome: Not Met This Shift     Problem: Discharge Planning:  Goal: Ability to perform activities of daily living will improve  Description: Ability to perform activities of daily living will improve  Outcome: Not Met This Shift  Goal: Participates in care planning  Description: Participates in care planning  Outcome: Not Met This Shift     Problem: Mood - Altered:  Goal: Mood stable  Description: Mood stable  Outcome: Not Met This Shift  Goal: Verbalizations of feeling emotionally comfortable while being cared for will increase  Description: Verbalizations of feeling emotionally comfortable while being cared for will increase  Outcome: Not Met This Shift     Problem: Psychomotor Activity - Altered:  Goal: Absence of psychomotor disturbance signs and symptoms  Description: Absence of psychomotor disturbance signs and symptoms  Outcome: Not Met This Shift     Problem: Sensory Perception - Impaired:  Goal: Demonstrations of improved sensory functioning will increase  Description: Demonstrations of improved sensory functioning will increase  Outcome: Not Met This Shift  Goal: Decrease in sensory misperception frequency  Description: Decrease in sensory misperception frequency  Outcome: Not Met This Shift  Goal: Able to refrain from responding to false sensory perceptions  Description: Able to refrain from responding to false sensory perceptions  Outcome: Not Met This Shift  Goal: Demonstrates accurate environmental perceptions  Description: Demonstrates accurate environmental perceptions  Outcome: Not Met This Shift  Goal: Able to distinguish between reality-based and nonreality-based thinking  Description: Able to distinguish between reality-based and nonreality-based thinking  Outcome: Not Met This Shift  Goal: Able to interrupt nonreality-based thinking  Description: Able to interrupt nonreality-based thinking  Outcome: Not Met This Shift

## 2020-12-12 ENCOUNTER — APPOINTMENT (OUTPATIENT)
Dept: GENERAL RADIOLOGY | Age: 65
DRG: 207 | End: 2020-12-12
Payer: MEDICARE

## 2020-12-12 LAB
AADO2: 138.8 MMHG
ALBUMIN SERPL-MCNC: 3.2 G/DL (ref 3.5–5.2)
ALP BLD-CCNC: 82 U/L (ref 35–104)
ALT SERPL-CCNC: 41 U/L (ref 0–32)
ANION GAP SERPL CALCULATED.3IONS-SCNC: 12 MMOL/L (ref 7–16)
APPEARANCE CSF: CLEAR
AST SERPL-CCNC: 55 U/L (ref 0–31)
B.E.: 0.7 MMOL/L (ref -3–3)
BASOPHILS ABSOLUTE: 0.01 E9/L (ref 0–0.2)
BASOPHILS RELATIVE PERCENT: 0.1 % (ref 0–2)
BILIRUB SERPL-MCNC: 0.4 MG/DL (ref 0–1.2)
BLOOD CULTURE, ROUTINE: NORMAL
BUN BLDV-MCNC: 11 MG/DL (ref 8–23)
CALCIUM SERPL-MCNC: 9.1 MG/DL (ref 8.6–10.2)
CHLORIDE BLD-SCNC: 103 MMOL/L (ref 98–107)
CO2: 22 MMOL/L (ref 22–29)
COHB: 0.6 % (ref 0–1.5)
COLOR CSF: COLORLESS
CREAT SERPL-MCNC: 0.7 MG/DL (ref 0.5–1)
CRITICAL: ABNORMAL
DATE ANALYZED: ABNORMAL
DATE OF COLLECTION: ABNORMAL
EOSINOPHILS ABSOLUTE: 0.41 E9/L (ref 0.05–0.5)
EOSINOPHILS RELATIVE PERCENT: 5.3 % (ref 0–6)
FIO2: 40 %
FOLATE: >20 NG/ML (ref 4.8–24.2)
GFR AFRICAN AMERICAN: >60
GFR NON-AFRICAN AMERICAN: >60 ML/MIN/1.73
GLUCOSE BLD-MCNC: 86 MG/DL (ref 74–99)
HCO3: 24.8 MMOL/L (ref 22–26)
HCT VFR BLD CALC: 26.8 % (ref 34–48)
HEMOGLOBIN: 8.4 G/DL (ref 11.5–15.5)
HHB: 3.5 % (ref 0–5)
IMMATURE GRANULOCYTES #: 0.03 E9/L
IMMATURE GRANULOCYTES %: 0.4 % (ref 0–5)
LAB: ABNORMAL
LYMPHOCYTES ABSOLUTE: 0.95 E9/L (ref 1.5–4)
LYMPHOCYTES RELATIVE PERCENT: 12.3 % (ref 20–42)
Lab: ABNORMAL
MAGNESIUM: 1.6 MG/DL (ref 1.6–2.6)
MCH RBC QN AUTO: 31.1 PG (ref 26–35)
MCHC RBC AUTO-ENTMCNC: 31.3 % (ref 32–34.5)
MCV RBC AUTO: 99.3 FL (ref 80–99.9)
METHB: 0.3 % (ref 0–1.5)
MODE: AC
MONOCYTE, CSF: 67 % (ref 10–70)
MONOCYTES ABSOLUTE: 0.65 E9/L (ref 0.1–0.95)
MONOCYTES RELATIVE PERCENT: 8.4 % (ref 2–12)
NEUTROPHILS ABSOLUTE: 5.68 E9/L (ref 1.8–7.3)
NEUTROPHILS RELATIVE PERCENT: 73.5 % (ref 43–80)
NEUTROPHILS, CSF: 33 % (ref 0–10)
O2 CONTENT: 13.3 ML/DL
O2 SATURATION: 96.5 % (ref 92–98.5)
O2HB: 95.6 % (ref 94–97)
OPERATOR ID: ABNORMAL
PATIENT TEMP: 37 C
PCO2: 37.2 MMHG (ref 35–45)
PDW BLD-RTO: 13.2 FL (ref 11.5–15)
PEEP/CPAP: 6 CMH2O
PFO2: 2.34 MMHG/%
PH BLOOD GAS: 7.44 (ref 7.35–7.45)
PHOSPHORUS: 4.5 MG/DL (ref 2.5–4.5)
PLATELET # BLD: 221 E9/L (ref 130–450)
PMV BLD AUTO: 10 FL (ref 7–12)
PO2: 93.6 MMHG (ref 75–100)
POTASSIUM SERPL-SCNC: 3.5 MMOL/L (ref 3.5–5)
RBC # BLD: 2.7 E12/L (ref 3.5–5.5)
RBC CSF: 2000 /UL
RI(T): 1.48
RR MECHANICAL: 16 B/MIN
SODIUM BLD-SCNC: 137 MMOL/L (ref 132–146)
SOURCE, BLOOD GAS: ABNORMAL
THB: 9.8 G/DL (ref 11.5–16.5)
TIME ANALYZED: 531
TOTAL PROTEIN: 5.7 G/DL (ref 6.4–8.3)
TUBE NUMBER CSF: ABNORMAL
VITAMIN B-12: 556 PG/ML (ref 211–946)
VT MECHANICAL: 350 ML
WBC # BLD: 7.7 E9/L (ref 4.5–11.5)
WBC CSF: 3 /UL (ref 0–2)

## 2020-12-12 PROCEDURE — 2500000003 HC RX 250 WO HCPCS: Performed by: INTERNAL MEDICINE

## 2020-12-12 PROCEDURE — 6370000000 HC RX 637 (ALT 250 FOR IP): Performed by: INTERNAL MEDICINE

## 2020-12-12 PROCEDURE — 2580000003 HC RX 258: Performed by: INTERNAL MEDICINE

## 2020-12-12 PROCEDURE — 6360000002 HC RX W HCPCS: Performed by: INTERNAL MEDICINE

## 2020-12-12 PROCEDURE — C9113 INJ PANTOPRAZOLE SODIUM, VIA: HCPCS | Performed by: INTERNAL MEDICINE

## 2020-12-12 PROCEDURE — 2000000000 HC ICU R&B

## 2020-12-12 PROCEDURE — 99233 SBSQ HOSP IP/OBS HIGH 50: CPT | Performed by: INTERNAL MEDICINE

## 2020-12-12 PROCEDURE — 94003 VENT MGMT INPAT SUBQ DAY: CPT

## 2020-12-12 PROCEDURE — 82805 BLOOD GASES W/O2 SATURATION: CPT

## 2020-12-12 PROCEDURE — 87529 HSV DNA AMP PROBE: CPT

## 2020-12-12 PROCEDURE — 83735 ASSAY OF MAGNESIUM: CPT

## 2020-12-12 PROCEDURE — 94640 AIRWAY INHALATION TREATMENT: CPT

## 2020-12-12 PROCEDURE — 85025 COMPLETE CBC W/AUTO DIFF WBC: CPT

## 2020-12-12 PROCEDURE — 80053 COMPREHEN METABOLIC PANEL: CPT

## 2020-12-12 PROCEDURE — 84100 ASSAY OF PHOSPHORUS: CPT

## 2020-12-12 PROCEDURE — 71045 X-RAY EXAM CHEST 1 VIEW: CPT

## 2020-12-12 RX ORDER — MIDAZOLAM HYDROCHLORIDE 2 MG/2ML
2 INJECTION, SOLUTION INTRAMUSCULAR; INTRAVENOUS EVERY 4 HOURS PRN
Status: DISCONTINUED | OUTPATIENT
Start: 2020-12-12 | End: 2020-12-15

## 2020-12-12 RX ORDER — POTASSIUM CHLORIDE 29.8 MG/ML
40 INJECTION INTRAVENOUS ONCE
Status: COMPLETED | OUTPATIENT
Start: 2020-12-12 | End: 2020-12-12

## 2020-12-12 RX ADMIN — IPRATROPIUM BROMIDE AND ALBUTEROL SULFATE 1 AMPULE: 2.5; .5 SOLUTION RESPIRATORY (INHALATION) at 09:19

## 2020-12-12 RX ADMIN — Medication 10 ML: at 07:56

## 2020-12-12 RX ADMIN — QUETIAPINE FUMARATE 25 MG: 25 TABLET ORAL at 21:10

## 2020-12-12 RX ADMIN — CHLORHEXIDINE GLUCONATE 0.12% ORAL RINSE 15 ML: 1.2 LIQUID ORAL at 07:55

## 2020-12-12 RX ADMIN — DIVALPROEX SODIUM 500 MG: 125 CAPSULE, COATED PELLETS ORAL at 07:55

## 2020-12-12 RX ADMIN — MIDAZOLAM 10 MG/HR: 5 INJECTION INTRAMUSCULAR; INTRAVENOUS at 00:29

## 2020-12-12 RX ADMIN — LABETALOL HYDROCHLORIDE 10 MG: 5 INJECTION INTRAVENOUS at 16:08

## 2020-12-12 RX ADMIN — IPRATROPIUM BROMIDE AND ALBUTEROL SULFATE 1 AMPULE: 2.5; .5 SOLUTION RESPIRATORY (INHALATION) at 21:00

## 2020-12-12 RX ADMIN — CHLORHEXIDINE GLUCONATE 0.12% ORAL RINSE 15 ML: 1.2 LIQUID ORAL at 21:09

## 2020-12-12 RX ADMIN — GABAPENTIN 100 MG: 100 CAPSULE ORAL at 21:11

## 2020-12-12 RX ADMIN — SODIUM CHLORIDE 0.5 MCG/KG/HR: 9 INJECTION, SOLUTION INTRAVENOUS at 16:10

## 2020-12-12 RX ADMIN — IPRATROPIUM BROMIDE AND ALBUTEROL SULFATE 1 AMPULE: 2.5; .5 SOLUTION RESPIRATORY (INHALATION) at 11:30

## 2020-12-12 RX ADMIN — SODIUM CHLORIDE 3 G: 900 INJECTION INTRAVENOUS at 04:10

## 2020-12-12 RX ADMIN — LABETALOL HYDROCHLORIDE 10 MG: 5 INJECTION INTRAVENOUS at 12:28

## 2020-12-12 RX ADMIN — PANTOPRAZOLE SODIUM 40 MG: 40 INJECTION, POWDER, FOR SOLUTION INTRAVENOUS at 07:56

## 2020-12-12 RX ADMIN — ESCITALOPRAM 20 MG: 10 TABLET, FILM COATED ORAL at 10:05

## 2020-12-12 RX ADMIN — TICAGRELOR 90 MG: 90 TABLET ORAL at 07:56

## 2020-12-12 RX ADMIN — IPRATROPIUM BROMIDE AND ALBUTEROL SULFATE 1 AMPULE: 2.5; .5 SOLUTION RESPIRATORY (INHALATION) at 17:07

## 2020-12-12 RX ADMIN — FOLIC ACID 1 MG: 5 INJECTION, SOLUTION INTRAMUSCULAR; INTRAVENOUS; SUBCUTANEOUS at 10:04

## 2020-12-12 RX ADMIN — Medication 200 MCG/HR: at 04:24

## 2020-12-12 RX ADMIN — SODIUM CHLORIDE 3 G: 900 INJECTION INTRAVENOUS at 10:02

## 2020-12-12 RX ADMIN — GABAPENTIN 100 MG: 100 CAPSULE ORAL at 13:51

## 2020-12-12 RX ADMIN — SODIUM CHLORIDE 3 G: 900 INJECTION INTRAVENOUS at 15:45

## 2020-12-12 RX ADMIN — THIAMINE HYDROCHLORIDE 250 MG: 100 INJECTION, SOLUTION INTRAMUSCULAR; INTRAVENOUS at 10:04

## 2020-12-12 RX ADMIN — POTASSIUM CHLORIDE 40 MEQ: 400 INJECTION, SOLUTION INTRAVENOUS at 08:15

## 2020-12-12 RX ADMIN — DIVALPROEX SODIUM 500 MG: 125 CAPSULE, COATED PELLETS ORAL at 21:10

## 2020-12-12 RX ADMIN — METOPROLOL TARTRATE 37.5 MG: 25 TABLET, FILM COATED ORAL at 07:54

## 2020-12-12 RX ADMIN — ATORVASTATIN CALCIUM 80 MG: 80 TABLET, FILM COATED ORAL at 21:28

## 2020-12-12 RX ADMIN — ARFORMOTEROL TARTRATE 15 MCG: 15 SOLUTION RESPIRATORY (INHALATION) at 09:19

## 2020-12-12 RX ADMIN — TICAGRELOR 90 MG: 90 TABLET ORAL at 21:10

## 2020-12-12 RX ADMIN — METOPROLOL TARTRATE 37.5 MG: 25 TABLET, FILM COATED ORAL at 21:11

## 2020-12-12 RX ADMIN — ASPIRIN 81 MG CHEWABLE TABLET 81 MG: 81 TABLET CHEWABLE at 07:55

## 2020-12-12 RX ADMIN — ARFORMOTEROL TARTRATE 15 MCG: 15 SOLUTION RESPIRATORY (INHALATION) at 21:00

## 2020-12-12 RX ADMIN — SODIUM CHLORIDE 3 G: 900 INJECTION INTRAVENOUS at 22:40

## 2020-12-12 RX ADMIN — GABAPENTIN 100 MG: 100 CAPSULE ORAL at 07:55

## 2020-12-12 RX ADMIN — QUETIAPINE FUMARATE 25 MG: 25 TABLET ORAL at 07:56

## 2020-12-12 ASSESSMENT — PAIN SCALES - GENERAL
PAINLEVEL_OUTOF10: 0
PAINLEVEL_OUTOF10: 1

## 2020-12-12 ASSESSMENT — PULMONARY FUNCTION TESTS
PIF_VALUE: 20
PIF_VALUE: 25
PIF_VALUE: 20
PIF_VALUE: 21
PIF_VALUE: 22
PIF_VALUE: 20
PIF_VALUE: 22
PIF_VALUE: 24
PIF_VALUE: 21
PIF_VALUE: 22
PIF_VALUE: 20
PIF_VALUE: 28
PIF_VALUE: 28
PIF_VALUE: 20
PIF_VALUE: 24
PIF_VALUE: 26
PIF_VALUE: 23
PIF_VALUE: 19
PIF_VALUE: 21
PIF_VALUE: 20
PIF_VALUE: 19
PIF_VALUE: 20
PIF_VALUE: 23
PIF_VALUE: 19
PIF_VALUE: 20
PIF_VALUE: 22

## 2020-12-12 NOTE — FLOWSHEET NOTE
When able patient will pull at lines and tubes, unable to redirect due to mental status. Bilateral wrist restraints continued for patient safety.

## 2020-12-12 NOTE — PLAN OF CARE
I called this patient  Mrs. Jodi Archuleta family member, Maciel Kurtz, to provide him the most recent update regarding the work-up of the altered mental status. Thus far, LP results is not indicative of any infectious process. In addition, CT and MRI both are unremarkable for acute process including ischemic or hemorrhagic stroke. Also metabolic wise, patient has normal ammonia negative for UDS and serum drug screen, hepatic panel is normal.  Maciel Kurtz is also informed that if everything goes as planned, patient is plan to be extubated tomorrow.

## 2020-12-12 NOTE — PROGRESS NOTES
Patient failed PSV of 15. Tried patient on volume support as well and patient still went apneic. Will try again later and continue to monitor.

## 2020-12-12 NOTE — PROGRESS NOTES
Patient seen in icu . s aureus and eteroccocus . 0n antibiotics per id.  Still on vent attempt to wean

## 2020-12-12 NOTE — PROGRESS NOTES
Vasopressors   None   Sedation  Fentanyl, Versed    Nutrition:   Tube feedings     ATB:   Antibiotics  Days   Unasyn  8             Patient currently has   Urinary cath  Restraints  DVT prophylaxis/ GI prophylaxis,      Labs     CBC:   Lab Results   Component Value Date    WBC 7.7 12/12/2020    RBC 2.70 12/12/2020    HGB 8.4 12/12/2020    HCT 26.8 12/12/2020    MCV 99.3 12/12/2020    MCH 31.1 12/12/2020    MCHC 31.3 12/12/2020    RDW 13.2 12/12/2020     12/12/2020    MPV 10.0 12/12/2020     CMP:    Lab Results   Component Value Date     12/12/2020    K 3.5 12/12/2020    K 4.0 10/19/2020     12/12/2020    CO2 22 12/12/2020    BUN 11 12/12/2020    CREATININE 0.7 12/12/2020    GFRAA >60 12/12/2020    LABGLOM >60 12/12/2020    GLUCOSE 86 12/12/2020    PROT 5.7 12/12/2020    LABALBU 3.2 12/12/2020    CALCIUM 9.1 12/12/2020    BILITOT 0.4 12/12/2020    ALKPHOS 82 12/12/2020    AST 55 12/12/2020    ALT 41 12/12/2020       Imaging Studies:    CXR:         Resident's Assessment and Plan     Cabrera Lee is a 77 y.o. female with a past medical history of CAD, MI (8/22 2020), hypertension, degenerative bone disease who was brought into the ED for altered mental status for the following:     Neurology   Acute encephalopathy, likely 2/2 to UTI  - Urine Culture (+) Staph Aureus   - possible exacerbated by Baclofen toxicity? Patient was complaining of back pain to son and takes baclofen and gabapentin. - Work-up has shown normal ammonia, negative urine drug and serum drug screen, hepatic function panel normal,   - CT and MRI both negative for stroke no acute processes. EEG also indeterminate of definite process. - LP not indicative of infectious process   ? Currently sedated on Fentanyl and versed. avoid propofol due to elevated TG  ? Depakote sprinkle  ? Thiamine and Folic Acid   ? Unasyn for Anbx   ?  Neurology Following, recommendations are appreciated.      Pulmonology    Acute hypoxic Respiratory failure 2/2 to AMS and PNA   - Respiratory culture (+) Staph Aureus   - Vent settings AC VC/16/350/6/40 %  - ABG 7.43  37.2  93.6  24.8 PF 2.22   - CTA is negative for PE or any acute processes. ? Follow mentation and wean from ventilator as able. ? Sedated on fentanyl and versed propofol held   ? Unasyn for Anbx       Cardiology  Myocardial infarction (8/22/2020)  -Maintained on aspirin, Brilinta, lisinopril, losartan, nitroglycerin  -Troponin less than 0.01, EKG stable  ? Continue Toprol 50 mg, aspirin, Brilinta     Hypertension, improving  - Maintained on lisinopril, losartan, metoprolol  - BP  Better control in the 150s. ? Toprol 50 mg, hold other antihypertensives for now and slowly reintroduce  ? Labetalol 10 mg q6 as needed or hydralazine 10 mg PRN      Nephrology   Lactic acidosis, Resolved   -Lactic acid initially 1.3, now 0.9   ? Stop Trending  lactic acid  ? S/p Normal saline 75 cc an hour for 12 hours     ID  UTI   - (+) Urine culture for staph Aureus, likely contributing to AMS   -unasyn 3g q6 day    ? Continue unasyn (day 8)  ? Blood cultures negative to date,  ? ID following, recommendations are appreciated    CAP vs Aspiration PNA   - Cultures done on admission growing staph aureus, patient comes from home.   -on vancomycin 500 mg day 1 and unasyn 3g q6 day 7   ? Continue antibiotics  ? Blood cultures negative to date, will do repeat. ? ID consulted, awaiting recommendations     Leukocytosis, resolved   -WBC 13.3  Now 7.9  -Blood cultures negative to date, urine and respiratory culture (+) for Staph Aureus  ? Follow CBC daily     PT/OT evaluation: Not warranted at present time   DVT prophylaxis/ GI prophylaxis: Lovenox/Protonix  Disposition: Continue Current Care     Final note: LP did not suggest any infectious causes. Will attempt to wean her off sedation and the ventilator today.      Diego Quinonez MD, PGY-1   Attending physician: Dr. Libertad De La Cruz personally

## 2020-12-12 NOTE — PLAN OF CARE
Problem: Restraint Use - Nonviolent/Non-Self-Destructive Behavior:  Goal: Absence of restraint-related injury  Description: Absence of restraint-related injury  12/12/2020 0858 by Manpreet hSah RN  Outcome: Met This Shift     Problem: Falls - Risk of:  Goal: Will remain free from falls  Description: Will remain free from falls  12/12/2020 0858 by Manpreet Shah RN  Outcome: Met This Shift     Problem: Falls - Risk of:  Goal: Absence of physical injury  Description: Absence of physical injury  12/12/2020 0858 by Manpreet Shah RN  Outcome: Met This Shift     Problem: Injury - Risk of, Physical Injury:  Goal: Will remain free from falls  Description: Will remain free from falls  12/12/2020 0858 by Manpreet Shah RN  Outcome: Met This Shift     Problem: Injury - Risk of, Physical Injury:  Goal: Absence of physical injury  Description: Absence of physical injury  12/12/2020 0858 by Manpreet Shah RN  Outcome: Met This Shift     Problem: Restraint Use - Nonviolent/Non-Self-Destructive Behavior:  Goal: Absence of restraint indications  Description: Absence of restraint indications  12/12/2020 0858 by Manpreet Shah RN  Outcome: Not Met This Shift     Problem: Confusion - Acute:  Goal: Absence of continued neurological deterioration signs and symptoms  Description: Absence of continued neurological deterioration signs and symptoms  12/12/2020 0858 by Manpreet Shah RN  Outcome: Not Met This Shift     Problem: Confusion - Acute:  Goal: Mental status will be restored to baseline  Description: Mental status will be restored to baseline  Outcome: Not Met This Shift     Problem: Discharge Planning:  Goal: Ability to perform activities of daily living will improve  Description: Ability to perform activities of daily living will improve  Outcome: Not Met This Shift     Problem: Discharge Planning:  Goal: Participates in care planning  Description: Participates in care planning  Outcome: Not Met This Shift

## 2020-12-12 NOTE — PROGRESS NOTES
[address] [place] [address] [facility]  Progress Note      C/C : Aspiration pneumonia, respiratory failure         Intubated, sedated       Medications:  Scheduled Meds:   QUEtiapine  25 mg Oral BID    escitalopram  20 mg Oral Daily    gabapentin  100 mg Oral TID    metoprolol tartrate  37.5 mg Oral BID    thiamine (VITAMIN B1) IVPB  250 mg Intravenous Q24H    [Held by provider] enoxaparin  40 mg Subcutaneous Daily    divalproex  500 mg Oral 2 times per day    chlorhexidine  15 mL Mouth/Throat BID    lidocaine PF  5 mL Intradermal Once    heparin flush  3 mL Intravenous 2 times per day    ipratropium-albuterol  1 ampule Inhalation Q4H WA    Arformoterol Tartrate  15 mcg Nebulization BID    ampicillin-sulbactam  3 g Intravenous Q6H    aspirin  81 mg Oral Daily    atorvastatin  80 mg Oral Nightly    pantoprazole  40 mg Intravenous Daily    [Held by provider] metoprolol succinate  50 mg Oral Daily    sodium chloride flush  10 mL Intravenous 2 times per day    folic acid  1 mg Intravenous Daily    ticagrelor  90 mg Oral BID     Continuous Infusions:   propofol Stopped (20 1530)    midazolam 10 mg/hr (20 0029)    fentaNYL 5 mcg/ml in 0.9%  ml infusion 200 mcg/hr (20 0424)     PRN Meds:perflutren lipid microspheres, sodium chloride flush, heparin flush, hydrALAZINE, sodium chloride flush, acetaminophen **OR** acetaminophen, polyethylene glycol, labetalol    OBJECTIVE:  /78   Pulse 68   Temp 98.4 °F (36.9 °C) (Bladder)   Resp 16   Ht 5' 5\" (1.651 m)   Wt 210 lb (95.3 kg)   SpO2 95%   BMI 34.95 kg/m²   Temp  Av.6 °F (37 °C)  Min: 98.1 °F (36.7 °C)  Max: 99.3 °F (37.4 °C)     Constitutional: Intubated, sedated - FiO 2 40%, PEEP 6   Skin: Warm and dry. No rashes were noted. HEENT: Round and reactive pupils. Moist mucous membranes. No ulcerations or thrush. Neck: Supple to movements.    Chest: Bilateral rhonchi   Cardiovascular: S1 and S2 are rhythmic and regular. No murmurs appreciated. Abdomen: Positive bowel sounds, soft ,not distended   Extremities: No clubbing, no cyanosis, no edema. PICC line 12/7/2020 right brachial      Laboratory and Tests Review:  Lab Results   Component Value Date    WBC 7.7 12/12/2020    WBC 7.5 12/11/2020    WBC 6.3 12/10/2020    HGB 8.4 (L) 12/12/2020    HCT 26.8 (L) 12/12/2020    MCV 99.3 12/12/2020     12/12/2020     Lab Results   Component Value Date    NEUTROABS 5.68 12/12/2020    NEUTROABS 4.90 12/11/2020    NEUTROABS 4.28 12/10/2020     No results found for: [facility]  Lab Results   Component Value Date    ALT 41 (H) 12/12/2020    AST 55 (H) 12/12/2020    ALKPHOS 82 12/12/2020    BILITOT 0.4 12/12/2020     Lab Results   Component Value Date     12/12/2020    K 3.5 12/12/2020    K 4.0 10/19/2020     12/12/2020    CO2 22 12/12/2020    BUN 11 12/12/2020    CREATININE 0.7 12/12/2020    CREATININE 0.7 12/11/2020    CREATININE 0.6 12/10/2020    GFRAA >60 12/12/2020    LABGLOM >60 12/12/2020    GLUCOSE 86 12/12/2020    PROT 5.7 12/12/2020    LABALBU 3.2 12/12/2020    CALCIUM 9.1 12/12/2020    BILITOT 0.4 12/12/2020    ALKPHOS 82 12/12/2020    AST 55 12/12/2020    ALT 41 12/12/2020     Lab Results   Component Value Date    CRP 1.9 (H) 12/05/2020     Lab Results   Component Value Date    SEDRATE 6 12/05/2020     Radiology:  Reviewed    Microbiology:     Sputum cx -    Susceptibility    Staphylococcus aureus (1)    Antibiotic Interpretation ZHOU Status    clindamycin Sensitive ^0. 25 mcg/mL     doxycycline Sensitive <=^0.5 mcg/mL     erythromycin Resistant >=^8 mcg/mL     gentamicin Sensitive <=^0.5 mcg/mL     oxacillin Sensitive ^0.5 mcg/mL     trimethoprim-sulfamethoxazole Sensitive <=^10 mcg/mL     vancomycin Sensitive ^1 mcg/mL         Culture, Urine [[id number]] (Abnormal)  Collected: 12/05/20 1240   Order Status: Completed Specimen: Urine, clean catch Updated: 12/08/20 0814    Organism Staphylococcus aureusAbnormal     Urine Culture, Routine >100,000 CFU/ml    Organism Enterococcus faecalisAbnormal     Urine Culture, Routine >100,000 CFU/ml   Narrative:     Source: URINE       Site: Urine Catheter                 Chest x ray - bibasilar airspace opacities       ASSESSMENT:  · Aspiration pneumonia- MSSA  · UTI-E faecalis and MSSA  · Respiratory failure  · Leukocytosis - improved       PLAN:  · Continue Unasyn  3 grams IV q 6 hrs   · Follow up neuro status  · Discussed with pulmonary critical care  · Monitor labs    Radhames HUMPHRIES Limbu  7:53 AM  12/12/2020

## 2020-12-12 NOTE — FLOWSHEET NOTE
Patient unable to follow commands and reaches for ETT and lines despite verbal direction. Will continue bilateral soft wrist restraints for patient safety. Will continue to monitor.

## 2020-12-12 NOTE — PLAN OF CARE
Problem: Restraint Use - Nonviolent/Non-Self-Destructive Behavior:  Goal: Absence of restraint-related injury  Description: Absence of restraint-related injury  12/12/2020 1651 by Alin Noel RN  Outcome: Met This Shift     Problem: Falls - Risk of:  Goal: Will remain free from falls  Description: Will remain free from falls  12/12/2020 1651 by Alin Noel RN  Outcome: Met This Shift     Problem: Falls - Risk of:  Goal: Absence of physical injury  Description: Absence of physical injury  12/12/2020 1651 by Alin Noel RN  Outcome: Met This Shift     Problem: Injury - Risk of, Physical Injury:  Goal: Will remain free from falls  Description: Will remain free from falls  12/12/2020 1651 by Alin Noel RN  Outcome: Met This Shift     Problem: Injury - Risk of, Physical Injury:  Goal: Absence of physical injury  Description: Absence of physical injury  12/12/2020 1651 by Alin Noel RN  Outcome: Met This Shift     Problem: Restraint Use - Nonviolent/Non-Self-Destructive Behavior:  Goal: Absence of restraint indications  Description: Absence of restraint indications  12/12/2020 1651 by Alin Noel RN  Outcome: Not Met This Shift

## 2020-12-12 NOTE — PLAN OF CARE
Problem: Restraint Use - Nonviolent/Non-Self-Destructive Behavior:  Goal: Absence of restraint-related injury  Description: Absence of restraint-related injury  12/12/2020 0052 by Ishmael Lam RN  Outcome: Met This Shift     Problem: Falls - Risk of:  Goal: Will remain free from falls  Description: Will remain free from falls  12/12/2020 0052 by Ishmael Lam RN  Outcome: Met This Shift     Problem: Falls - Risk of:  Goal: Absence of physical injury  Description: Absence of physical injury  12/12/2020 0052 by Ishmael Lam RN  Outcome: Met This Shift     Problem: Injury - Risk of, Physical Injury:  Goal: Will remain free from falls  Description: Will remain free from falls  12/12/2020 0052 by Ishmael Lam RN  Outcome: Met This Shift     Problem: Injury - Risk of, Physical Injury:  Goal: Absence of physical injury  Description: Absence of physical injury  12/12/2020 0052 by Ishmael Lam RN  Outcome: Met This Shift     Problem: Skin Integrity:  Goal: Absence of new skin breakdown  Description: Absence of new skin breakdown  Outcome: Met This Shift     Problem: Mood - Altered:  Goal: Absence of abusive behavior  Description: Absence of abusive behavior  Outcome: Met This Shift     Problem: Confusion - Acute:  Goal: Absence of continued neurological deterioration signs and symptoms  Description: Absence of continued neurological deterioration signs and symptoms  Outcome: Ongoing

## 2020-12-13 ENCOUNTER — APPOINTMENT (OUTPATIENT)
Dept: GENERAL RADIOLOGY | Age: 65
DRG: 207 | End: 2020-12-13
Payer: MEDICARE

## 2020-12-13 LAB
ALBUMIN SERPL-MCNC: 3.4 G/DL (ref 3.5–5.2)
ALP BLD-CCNC: 85 U/L (ref 35–104)
ALT SERPL-CCNC: 33 U/L (ref 0–32)
ANION GAP SERPL CALCULATED.3IONS-SCNC: 13 MMOL/L (ref 7–16)
AST SERPL-CCNC: 29 U/L (ref 0–31)
BASOPHILS ABSOLUTE: 0.02 E9/L (ref 0–0.2)
BASOPHILS RELATIVE PERCENT: 0.3 % (ref 0–2)
BILIRUB SERPL-MCNC: 0.4 MG/DL (ref 0–1.2)
BUN BLDV-MCNC: 12 MG/DL (ref 8–23)
CALCIUM SERPL-MCNC: 9.3 MG/DL (ref 8.6–10.2)
CHLORIDE BLD-SCNC: 104 MMOL/L (ref 98–107)
CO2: 23 MMOL/L (ref 22–29)
CREAT SERPL-MCNC: 0.6 MG/DL (ref 0.5–1)
EOSINOPHILS ABSOLUTE: 0.15 E9/L (ref 0.05–0.5)
EOSINOPHILS RELATIVE PERCENT: 2.4 % (ref 0–6)
GFR AFRICAN AMERICAN: >60
GFR NON-AFRICAN AMERICAN: >60 ML/MIN/1.73
GLUCOSE BLD-MCNC: 90 MG/DL (ref 74–99)
HCT VFR BLD CALC: 26.2 % (ref 34–48)
HEMOGLOBIN: 8.5 G/DL (ref 11.5–15.5)
IMMATURE GRANULOCYTES #: 0.03 E9/L
IMMATURE GRANULOCYTES %: 0.5 % (ref 0–5)
LYMPHOCYTES ABSOLUTE: 0.63 E9/L (ref 1.5–4)
LYMPHOCYTES RELATIVE PERCENT: 10 % (ref 20–42)
MAGNESIUM: 1.7 MG/DL (ref 1.6–2.6)
MCH RBC QN AUTO: 31.4 PG (ref 26–35)
MCHC RBC AUTO-ENTMCNC: 32.4 % (ref 32–34.5)
MCV RBC AUTO: 96.7 FL (ref 80–99.9)
MONOCYTES ABSOLUTE: 0.58 E9/L (ref 0.1–0.95)
MONOCYTES RELATIVE PERCENT: 9.3 % (ref 2–12)
NEUTROPHILS ABSOLUTE: 4.86 E9/L (ref 1.8–7.3)
NEUTROPHILS RELATIVE PERCENT: 77.5 % (ref 43–80)
PDW BLD-RTO: 12.6 FL (ref 11.5–15)
PHOSPHORUS: 4 MG/DL (ref 2.5–4.5)
PLATELET # BLD: 212 E9/L (ref 130–450)
PMV BLD AUTO: 9.8 FL (ref 7–12)
POTASSIUM SERPL-SCNC: 3.5 MMOL/L (ref 3.5–5)
RBC # BLD: 2.71 E12/L (ref 3.5–5.5)
SODIUM BLD-SCNC: 140 MMOL/L (ref 132–146)
TOTAL PROTEIN: 5.9 G/DL (ref 6.4–8.3)
WBC # BLD: 6.3 E9/L (ref 4.5–11.5)

## 2020-12-13 PROCEDURE — 6360000002 HC RX W HCPCS: Performed by: INTERNAL MEDICINE

## 2020-12-13 PROCEDURE — 2500000003 HC RX 250 WO HCPCS: Performed by: INTERNAL MEDICINE

## 2020-12-13 PROCEDURE — 6370000000 HC RX 637 (ALT 250 FOR IP): Performed by: INTERNAL MEDICINE

## 2020-12-13 PROCEDURE — 2000000000 HC ICU R&B

## 2020-12-13 PROCEDURE — C9113 INJ PANTOPRAZOLE SODIUM, VIA: HCPCS | Performed by: INTERNAL MEDICINE

## 2020-12-13 PROCEDURE — 2580000003 HC RX 258: Performed by: INTERNAL MEDICINE

## 2020-12-13 PROCEDURE — 71045 X-RAY EXAM CHEST 1 VIEW: CPT

## 2020-12-13 PROCEDURE — 80053 COMPREHEN METABOLIC PANEL: CPT

## 2020-12-13 PROCEDURE — 74018 RADEX ABDOMEN 1 VIEW: CPT

## 2020-12-13 PROCEDURE — 83735 ASSAY OF MAGNESIUM: CPT

## 2020-12-13 PROCEDURE — 99232 SBSQ HOSP IP/OBS MODERATE 35: CPT | Performed by: CLINICAL NURSE SPECIALIST

## 2020-12-13 PROCEDURE — 94003 VENT MGMT INPAT SUBQ DAY: CPT

## 2020-12-13 PROCEDURE — 93005 ELECTROCARDIOGRAM TRACING: CPT | Performed by: INTERNAL MEDICINE

## 2020-12-13 PROCEDURE — 94640 AIRWAY INHALATION TREATMENT: CPT

## 2020-12-13 PROCEDURE — 85025 COMPLETE CBC W/AUTO DIFF WBC: CPT

## 2020-12-13 PROCEDURE — 99233 SBSQ HOSP IP/OBS HIGH 50: CPT | Performed by: INTERNAL MEDICINE

## 2020-12-13 PROCEDURE — 6360000002 HC RX W HCPCS

## 2020-12-13 PROCEDURE — 84100 ASSAY OF PHOSPHORUS: CPT

## 2020-12-13 RX ORDER — ONDANSETRON 2 MG/ML
INJECTION INTRAMUSCULAR; INTRAVENOUS
Status: COMPLETED
Start: 2020-12-13 | End: 2020-12-13

## 2020-12-13 RX ORDER — POTASSIUM CHLORIDE 29.8 MG/ML
40 INJECTION INTRAVENOUS ONCE
Status: COMPLETED | OUTPATIENT
Start: 2020-12-13 | End: 2020-12-13

## 2020-12-13 RX ORDER — ONDANSETRON 2 MG/ML
4 INJECTION INTRAMUSCULAR; INTRAVENOUS EVERY 6 HOURS PRN
Status: DISCONTINUED | OUTPATIENT
Start: 2020-12-13 | End: 2020-12-13

## 2020-12-13 RX ORDER — MAGNESIUM SULFATE IN WATER 40 MG/ML
2 INJECTION, SOLUTION INTRAVENOUS ONCE
Status: COMPLETED | OUTPATIENT
Start: 2020-12-13 | End: 2020-12-13

## 2020-12-13 RX ADMIN — QUETIAPINE FUMARATE 25 MG: 25 TABLET ORAL at 09:01

## 2020-12-13 RX ADMIN — SODIUM CHLORIDE 0.7 MCG/KG/HR: 9 INJECTION, SOLUTION INTRAVENOUS at 18:28

## 2020-12-13 RX ADMIN — QUETIAPINE FUMARATE 25 MG: 25 TABLET ORAL at 21:23

## 2020-12-13 RX ADMIN — ASPIRIN 81 MG CHEWABLE TABLET 81 MG: 81 TABLET CHEWABLE at 09:02

## 2020-12-13 RX ADMIN — PANTOPRAZOLE SODIUM 40 MG: 40 INJECTION, POWDER, FOR SOLUTION INTRAVENOUS at 09:02

## 2020-12-13 RX ADMIN — CHLORHEXIDINE GLUCONATE 0.12% ORAL RINSE 15 ML: 1.2 LIQUID ORAL at 09:02

## 2020-12-13 RX ADMIN — FOLIC ACID 1 MG: 5 INJECTION, SOLUTION INTRAMUSCULAR; INTRAVENOUS; SUBCUTANEOUS at 09:02

## 2020-12-13 RX ADMIN — ONDANSETRON 4 MG: 2 INJECTION INTRAMUSCULAR; INTRAVENOUS at 12:25

## 2020-12-13 RX ADMIN — ENOXAPARIN SODIUM 40 MG: 40 INJECTION SUBCUTANEOUS at 10:32

## 2020-12-13 RX ADMIN — ATORVASTATIN CALCIUM 80 MG: 80 TABLET, FILM COATED ORAL at 21:22

## 2020-12-13 RX ADMIN — IPRATROPIUM BROMIDE AND ALBUTEROL SULFATE 1 AMPULE: 2.5; .5 SOLUTION RESPIRATORY (INHALATION) at 16:30

## 2020-12-13 RX ADMIN — SODIUM CHLORIDE 3 G: 900 INJECTION INTRAVENOUS at 17:23

## 2020-12-13 RX ADMIN — ARFORMOTEROL TARTRATE 15 MCG: 15 SOLUTION RESPIRATORY (INHALATION) at 09:36

## 2020-12-13 RX ADMIN — SODIUM CHLORIDE 3 G: 900 INJECTION INTRAVENOUS at 04:40

## 2020-12-13 RX ADMIN — ARFORMOTEROL TARTRATE 15 MCG: 15 SOLUTION RESPIRATORY (INHALATION) at 20:48

## 2020-12-13 RX ADMIN — POTASSIUM CHLORIDE 40 MEQ: 400 INJECTION, SOLUTION INTRAVENOUS at 07:45

## 2020-12-13 RX ADMIN — SODIUM CHLORIDE 3 G: 900 INJECTION INTRAVENOUS at 22:29

## 2020-12-13 RX ADMIN — ESCITALOPRAM 20 MG: 10 TABLET, FILM COATED ORAL at 09:01

## 2020-12-13 RX ADMIN — DIVALPROEX SODIUM 500 MG: 125 CAPSULE, COATED PELLETS ORAL at 09:01

## 2020-12-13 RX ADMIN — MAGNESIUM SULFATE HEPTAHYDRATE 2 G: 40 INJECTION, SOLUTION INTRAVENOUS at 07:45

## 2020-12-13 RX ADMIN — GABAPENTIN 100 MG: 100 CAPSULE ORAL at 21:23

## 2020-12-13 RX ADMIN — ACETAMINOPHEN 650 MG: 650 SUPPOSITORY RECTAL at 15:00

## 2020-12-13 RX ADMIN — SODIUM CHLORIDE 0.5 MCG/KG/HR: 9 INJECTION, SOLUTION INTRAVENOUS at 01:06

## 2020-12-13 RX ADMIN — SODIUM CHLORIDE 0.4 MCG/KG/HR: 9 INJECTION, SOLUTION INTRAVENOUS at 11:00

## 2020-12-13 RX ADMIN — TICAGRELOR 90 MG: 90 TABLET ORAL at 21:23

## 2020-12-13 RX ADMIN — IPRATROPIUM BROMIDE AND ALBUTEROL SULFATE 1 AMPULE: 2.5; .5 SOLUTION RESPIRATORY (INHALATION) at 09:36

## 2020-12-13 RX ADMIN — IPRATROPIUM BROMIDE AND ALBUTEROL SULFATE 1 AMPULE: 2.5; .5 SOLUTION RESPIRATORY (INHALATION) at 20:48

## 2020-12-13 RX ADMIN — TICAGRELOR 90 MG: 90 TABLET ORAL at 09:01

## 2020-12-13 RX ADMIN — SODIUM CHLORIDE 3 G: 900 INJECTION INTRAVENOUS at 10:31

## 2020-12-13 RX ADMIN — IPRATROPIUM BROMIDE AND ALBUTEROL SULFATE 1 AMPULE: 2.5; .5 SOLUTION RESPIRATORY (INHALATION) at 13:58

## 2020-12-13 RX ADMIN — METOPROLOL TARTRATE 37.5 MG: 25 TABLET, FILM COATED ORAL at 21:22

## 2020-12-13 RX ADMIN — CHLORHEXIDINE GLUCONATE 0.12% ORAL RINSE 15 ML: 1.2 LIQUID ORAL at 21:24

## 2020-12-13 RX ADMIN — GABAPENTIN 100 MG: 100 CAPSULE ORAL at 09:02

## 2020-12-13 RX ADMIN — Medication 10 ML: at 09:02

## 2020-12-13 RX ADMIN — DIVALPROEX SODIUM 500 MG: 125 CAPSULE, COATED PELLETS ORAL at 21:23

## 2020-12-13 ASSESSMENT — PULMONARY FUNCTION TESTS
PIF_VALUE: 22
PIF_VALUE: 21
PIF_VALUE: 24
PIF_VALUE: 27
PIF_VALUE: 22
PIF_VALUE: 14
PIF_VALUE: 20
PIF_VALUE: 28
PIF_VALUE: 20
PIF_VALUE: 24
PIF_VALUE: 23
PIF_VALUE: 26
PIF_VALUE: 28
PIF_VALUE: 19
PIF_VALUE: 24
PIF_VALUE: 25
PIF_VALUE: 24
PIF_VALUE: 26
PIF_VALUE: 26
PIF_VALUE: 22
PIF_VALUE: 21
PIF_VALUE: 19
PIF_VALUE: 19
PIF_VALUE: 24
PIF_VALUE: 22
PIF_VALUE: 23
PIF_VALUE: 29
PIF_VALUE: 32

## 2020-12-13 NOTE — PROGRESS NOTES
Aleksandra Pierre is a 77 y.o. right handed female     On the day of admission, she became increasingly confused    The chart does reflect 1 year sobriety     She was brought to ED - CT unrevealing - urine was positive for leukocytes    She was transferred downtown for further neurological evaluation   She was increasingly agitated and confused and required intubation and sedation    She appeared to be moving everything well    MRI Brain obtained which was normal  EEG was obtained which demonstrated encephalopathy and no seizures  LP obtained which was unrevealing for infection      She is on copious medications including gabapentin 400mg TID, Fklexeril, Baclofen, Seroquel, Lexapro and Antabuse       Allergies as of 12/03/2020 - Review Complete 12/03/2020   Allergen Reaction Noted    Nitroglycerin Other (See Comments) 08/28/2020    Tramadol  08/02/2020       Objective:     BP (!) 166/91   Pulse 59   Temp 97.5 °F (36.4 °C)   Resp 29   Ht 5' 5\" (1.651 m)   Wt 188 lb 11.4 oz (85.6 kg)   SpO2 94%   BMI 31.40 kg/m²      General appearance: alert, appears to follow commands   Head: Normocephalic, without obvious abnormality, atraumatic  Extremities: no cyanosis or edema  Pulses: 2+ and symmetric  Skin: no rashes or lesions    Mental Status: Alert, nodding appropriately at times    Some perseveration with commands it appears    Appears to have a hard time hearing given her need to have questions repeated before nodding   Again, when she does nod, it appears appropriate     Cranial Nerves:  I: smell    II: visual acuity     II: visual fields Full   II: pupils ARNOLDO   III,VII: ptosis None   III,IV,VI: extraocular muscles  EOMI without nystagmus    V: mastication Normal   V: facial light touch sensation  Normal   V,VII: corneal reflex  Present   VII: facial muscle function - upper     VII: facial muscle function - lower Normal   VIII: hearing ? [place] based on how she appears to need things repeated    IX: soft palate elevation  Normal   IX,X: gag reflex    XI: trapezius strength  5/5   XI: sternocleidomastoid strength 5/5   XI: neck extension strength  5/5   XII: tongue strength  Normal     Motor:  Moving all limbs symmetrically     Sensory:  Normal to LT and Vibration     Coordination:   No ataxia appreciated     DTR:   No reflexes    No Babinski  No Castro's     Laboratory/Radiology:     CBC with Differential:    Lab Results   Component Value Date    WBC 6.3 12/13/2020    RBC 2.71 12/13/2020    HGB 8.5 12/13/2020    HCT 26.2 12/13/2020     12/13/2020    MCV 96.7 12/13/2020    MCH 31.4 12/13/2020    MCHC 32.4 12/13/2020    RDW 12.6 12/13/2020    LYMPHOPCT 10.0 12/13/2020    MONOPCT 9.3 12/13/2020    BASOPCT 0.3 12/13/2020    MONOSABS 0.58 12/13/2020    LYMPHSABS 0.63 12/13/2020    EOSABS 0.15 12/13/2020    BASOSABS 0.02 12/13/2020     CMP:    Lab Results   Component Value Date     12/13/2020    K 3.5 12/13/2020    K 4.0 10/19/2020     12/13/2020    CO2 23 12/13/2020    BUN 12 12/13/2020    CREATININE 0.6 12/13/2020    GFRAA >60 12/13/2020    LABGLOM >60 12/13/2020    GLUCOSE 90 12/13/2020    PROT 5.9 12/13/2020    LABALBU 3.4 12/13/2020    CALCIUM 9.3 12/13/2020    BILITOT 0.4 12/13/2020    ALKPHOS 85 12/13/2020    AST 29 12/13/2020    ALT 33 12/13/2020        Ref.  Range 12/11/2020 15:32   Appearance, CSF Latest Ref Range: Clear  Clear   CSF Culture Unknown Growth not present, incubation continues   Glucose, CSF Latest Ref Range: 40 - 70 mg/dL 57   Protein, CSF Latest Ref Range: 15 - 40 mg/dL 42 (H)   RBC, CSF Latest Units: /uL 2000   WBC, CSF Latest Ref Range: 0 - 2 /uL 3 (H)   Neutrophils, CSF Latest Ref Range: 0 - 10 % 33 (H)   Monocytes, CSF Latest Ref Range: 10 - 70 % 67   Color, CSF Unknown Colorless   Tube Number + CELL CT + DIFF-CSF Unknown Tube 3     EEG:  Abnormal EEG with generalized slowing with increased amplitude   activity with frontocentral sharps indicative of a moderate   nonspecific encephalopathy. Consideration of toxic or metabolic   encephalopathy vs sedation effect vs less likely structural   abnormality. MRI Brain:  1. No acute intracranial abnormality. 2. No mass, mass effect, edema or hemorrhage is seen. 3. Prominent inflammatory changes in the paranasal sinuses with air-fluid  levels, which may signify acute sinusitis. 4. Bilateral mastoid effusions. I personally reviewed the patient's lab and imaging studies at this time. Assessment:     Alteration in mental status less likely stroke or infections   When extubated, a more thorough HPI will need to be obtained regarding events leading to admission primarily her medication utilization     Possible underlying cognitive difficulties -- reportedly living with son and daughter-in-law   (does she manage her own medications)     Plan:      Will assess when extubated and off sedation   Neurologically improving from previous examinations however     Rody Purchase  8:48 AM  12/13/2020

## 2020-12-13 NOTE — PROGRESS NOTES
[address] [facility]  Department of Internal Medicine   Internal Medicine Residency   MICU Progress Note    Patient:  Mamta Brunson 77 y.o. female  MRN: [id number]     Date of Service: 12/13/2020    Allergy: Nitroglycerin and Tramadol  Cc follow up of encephalopathy    Subjective     No events overnight     Patient was seen and examined this morning. She is intubated. She is sedated on Precedex but more responsive, and following commands. She does not complain of any pain. PS trial failed yesterday will attempt again today and possibly extubate. Objective     VS: BP (!) 166/81   Pulse 61   Temp 98.2 °F (36.8 °C)   Resp 18   Ht 5' 5\" (1.651 m)   Wt 188 lb 11.4 oz (85.6 kg)   SpO2 95%   BMI 31.40 kg/m²           I & O - 24hr:     Intake/Output Summary (Last 24 hours) at 12/13/2020 0950  Last data filed at 12/13/2020 3276  Gross per 24 hour   Intake 1100 ml   Output 2745 ml   Net -1645 ml       Physical Exam:  Physical Exam  Constitutional:       Interventions: She is sedated, intubated and restrained. HENT:      Head: Normocephalic and atraumatic. Cardiovascular:      Rate and Rhythm: Regular rhythm. Regular rate    Pulmonary:      Effort: She is intubated. , (+) Bilateral Rales   Abdominal:      General: Abdomen is flat. Bowel sounds are normal.      Palpations: Abdomen is soft. Musculoskeletal:         General: No swelling. Skin:     General: Skin is warm and dry.      Lines     site day    Art line   None    TLC None    PICC R Arm 5   Hemoaccess None        Mechanical Ventilation:   Mode: AC/VC   TV: 360  Ml RR: 16 PEEP 6 cmH2O FiO2 40     ABG:     Lab Results   Component Value Date    PH 7.441 12/12/2020    PCO2 37.2 12/12/2020    PO2 93.6 12/12/2020    HCO3 24.8 12/12/2020    BE 0.7 12/12/2020    THB 9.8 12/12/2020    O2SAT 96.5 12/12/2020        Medications     Infusions: (Fluid, Sedation, Vasopressors)  IVF:    None   Vasopressors   None   Sedation  Precedex     Nutrition: Tube feedings     ATB:   Antibiotics  Days   Unasyn  9             Patient currently has   Urinary cath  Restraints  DVT prophylaxis/ GI prophylaxis,      Labs     CBC:   Lab Results   Component Value Date    WBC 6.3 12/13/2020    RBC 2.71 12/13/2020    HGB 8.5 12/13/2020    HCT 26.2 12/13/2020    MCV 96.7 12/13/2020    MCH 31.4 12/13/2020    MCHC 32.4 12/13/2020    RDW 12.6 12/13/2020     12/13/2020    MPV 9.8 12/13/2020     CMP:    Lab Results   Component Value Date     12/13/2020    K 3.5 12/13/2020    K 4.0 10/19/2020     12/13/2020    CO2 23 12/13/2020    BUN 12 12/13/2020    CREATININE 0.6 12/13/2020    GFRAA >60 12/13/2020    LABGLOM >60 12/13/2020    GLUCOSE 90 12/13/2020    PROT 5.9 12/13/2020    LABALBU 3.4 12/13/2020    CALCIUM 9.3 12/13/2020    BILITOT 0.4 12/13/2020    ALKPHOS 85 12/13/2020    AST 29 12/13/2020    ALT 33 12/13/2020       Imaging Studies:    CXR:         Resident's Assessment and Plan     Jessenia Bernabe is a 77 y.o. female with a past medical history of CAD, MI (8/22 2020), hypertension, degenerative bone disease who was brought into the ED for altered mental status for the following:     Neurology   Acute encephalopathy, likely 2/2 to UTI vs Psychiatric issue?  - Urine Culture (+) Staph Aureus   - possible exacerbated by Baclofen toxicity? Patient was complaining of back pain to son and takes baclofen and gabapentin. - Work-up has shown normal ammonia, negative urine drug and serum drug screen, hepatic function panel normal,   - CT and MRI both negative for stroke no acute processes. EEG also indeterminate of definite process. - LP not indicative of infectious process   ? Currently sedated precedex, will wean and do weaning trial   ? Depakote sprinkle  ? Thiamine and Folic Acid   ? Unasyn for Anbx   ?  Neurology Following, recommendations are appreciated.      Pulmonology    Acute hypoxic Respiratory failure 2/2 to AMS and PNA   - Respiratory culture (+) Staph Aureus   - Vent settings AC VC/16/360/6/40 %  - CTA is negative for PE or any acute processes. ? Follow mentation and wean from ventilator as able. ? Sedated on Precedex, will wean. ? Unasyn for Anbx       Cardiology  Myocardial infarction (8/22/2020)  -Maintained on aspirin, Brilinta, lisinopril, losartan, nitroglycerin  -Troponin less than 0.01, EKG stable  ? Continue Toprol 50 mg, aspirin, Brilinta     Hypertension, improving  - Maintained on lisinopril, losartan, metoprolol  - BP  Better control in the 150s. ? Toprol 50 mg, hold other antihypertensives for now and slowly reintroduce  ? Labetalol 10 mg q6 as needed or hydralazine 10 mg PRN      Nephrology   Lactic acidosis, Resolved   -Lactic acid initially 1.3, now 0.9   ? Stop Trending  lactic acid  ? S/p Normal saline 75 cc an hour for 12 hours     ID  UTI   - (+) Urine culture for staph Aureus, likely contributing to AMS   -unasyn 3g q6 day    ? Continue unasyn (day 8)  ? Blood cultures negative to date,  ? ID following, recommendations are appreciated    CAP vs Aspiration PNA   - Cultures done on admission growing staph aureus, patient comes from home.   -on vancomycin 500 mg day 1 and unasyn 3g q6 day 7   ? Continue antibiotics  ? Blood cultures negative to date, will do repeat. ? ID consulted, awaiting recommendations     Leukocytosis, resolved   -WBC 13.3  Now 7.9  -Blood cultures negative to date, urine and respiratory culture (+) for Staph Aureus  ? Follow CBC daily     PT/OT evaluation: Not warranted at present time   DVT prophylaxis/ GI prophylaxis: Lovenox/Protonix  Disposition: Continue Current Care     Final note: She is following commands and is more awake and alert today, christiano do repeat of weaning trial and possibly extubate her today    Sarita Natarajan MD, PGY-1   Attending physician: Dr. Joe Guzmán   I personally saw, examined and provided care for the patient.  Radiographs, labs and medication list were reviewed by me independently. I spoke with bedside nursing, therapists and consultants. Critical care services and times documented are independent of procedures and multidisciplinary rounds with Residents. Additionally comprehensive, multidisciplinary rounds were conducted with the MICU team. The case was discussed in detail and plans for care were established. Review of Residents documentation was conducted and revisions were made as appropriate. I agree with the above documented exam, problem list and plan of care.     SBT and extubation if possible  LORETTA ESPINOZA

## 2020-12-13 NOTE — PROGRESS NOTES
Patient seen a little better. Hopefully can wean seen. vss afebrile.  Continue wit current antibiotics

## 2020-12-13 NOTE — PROGRESS NOTES
12/08/20 0814    Organism Staphylococcus aureusAbnormal     Urine Culture, Routine >100,000 CFU/ml    Organism Enterococcus faecalisAbnormal     Urine Culture, Routine >100,000 CFU/ml   Narrative:     Source: URINE       Site: Urine Catheter                 Chest x ray - bibasilar airspace opacities       Echo-     Left ventricular internal dimensions were normal in diastole and systole. Mild left ventricular concentric hypertrophy noted. No regional wall motion abnormalities seen. Normal left ventricular ejection fraction.       ASSESSMENT:  · Aspiration pneumonia- MSSA ( no meningitis )   · UTI-E faecalis and MSSA  · Respiratory failure  · Leukocytosis - improved       PLAN:  · Unasyn  3 grams IV q 6 hrs   · Vent support   · Monitor labs    Radhames P Limbu  7:39 AM  12/13/2020

## 2020-12-13 NOTE — PLAN OF CARE
Tristan Carrillo is a 77 y.o. woman    Neurology is following for an altered mental status    PMH: Alcoholism1 year sober, CAD with stents, HTN, MI    Patient presented from an outside hospital with confusion, repetitive speech, and then walk outside as if she was going to walk into the road. Was apparently normal 2 hours prior to this. Markedly confused in ER and uncooperative, requiring intubation and sedation. Positive UTI and mild leukocytosis. EEG showed moderate encephalopathy. Contrasted MRI was normal.  Tox screen was positive for benzos and fentanyl (patient prescribed antipsychotics which can give false positive)    She remains in ICU    Notes reviewed. She remains intubated and sedated on fentanyl and Versed--with plans to attempt to extubate tomorrow. Per documented assessments by resident this morning, she will open eyes but not follow commands. LP was done and grossly unrevealing. She supplement with folic acid and thiamine. Multiple medications at home including Antabuse, gabapentin, Seroquel XR, Flexeril, and baclofen--all of which have either been stopped or significantly reduced while here.      Plan:    Neuro to assess once off sedation and extubation    Jennifer COOLEY-CNP  7:43 PM

## 2020-12-13 NOTE — PLAN OF CARE
Problem: Restraint Use - Nonviolent/Non-Self-Destructive Behavior:  Goal: Absence of restraint indications  12/13/2020 0629 by Sd Lewis RN  Outcome: Not Met This Shift     Problem: Restraint Use - Nonviolent/Non-Self-Destructive Behavior:  Goal: Absence of restraint-related injury  12/13/2020 0629 by Sd Lewis RN  Outcome: Met This Shift     Problem: Falls - Risk of:  Goal: Will remain free from falls  12/13/2020 0629 by Sd Lewis RN  Outcome: Met This Shift     Problem: Falls - Risk of:  Goal: Absence of physical injury  12/13/2020 4147 by Sd Lewis RN  Outcome: Met This Shift     Problem: Injury - Risk of, Physical Injury:  Goal: Will remain free from falls  12/13/2020 0629 by Sd Lewis RN  Outcome: Met This Shift     Problem: Injury - Risk of, Physical Injury:  Goal: Absence of physical injury  12/13/2020 0629 by Sd Lewis RN  Outcome: Met This Shift     Problem: Skin Integrity:  Goal: Will show no infection signs and symptoms  Outcome: Met This Shift     Problem: Skin Integrity:  Goal: Absence of new skin breakdown  Outcome: Met This Shift

## 2020-12-14 ENCOUNTER — APPOINTMENT (OUTPATIENT)
Dept: GENERAL RADIOLOGY | Age: 65
DRG: 207 | End: 2020-12-14
Payer: MEDICARE

## 2020-12-14 LAB
AADO2: 125.3 MMHG
ALBUMIN SERPL-MCNC: 3.5 G/DL (ref 3.5–5.2)
ALP BLD-CCNC: 81 U/L (ref 35–104)
ALT SERPL-CCNC: 22 U/L (ref 0–32)
ANION GAP SERPL CALCULATED.3IONS-SCNC: 16 MMOL/L (ref 7–16)
AST SERPL-CCNC: 16 U/L (ref 0–31)
B.E.: -0.6 MMOL/L (ref -3–3)
BASOPHILS ABSOLUTE: 0.02 E9/L (ref 0–0.2)
BASOPHILS RELATIVE PERCENT: 0.3 % (ref 0–2)
BILIRUB SERPL-MCNC: 0.4 MG/DL (ref 0–1.2)
BUN BLDV-MCNC: 15 MG/DL (ref 8–23)
CALCIUM SERPL-MCNC: 9.3 MG/DL (ref 8.6–10.2)
CHLORIDE BLD-SCNC: 104 MMOL/L (ref 98–107)
CO2: 21 MMOL/L (ref 22–29)
COHB: 0.4 % (ref 0–1.5)
CREAT SERPL-MCNC: 0.6 MG/DL (ref 0.5–1)
CRITICAL: ABNORMAL
DATE ANALYZED: ABNORMAL
DATE OF COLLECTION: ABNORMAL
EKG ATRIAL RATE: 87 BPM
EKG P AXIS: 60 DEGREES
EKG P-R INTERVAL: 176 MS
EKG Q-T INTERVAL: 396 MS
EKG QRS DURATION: 80 MS
EKG QTC CALCULATION (BAZETT): 476 MS
EKG R AXIS: -8 DEGREES
EKG T AXIS: 44 DEGREES
EKG VENTRICULAR RATE: 87 BPM
EOSINOPHILS ABSOLUTE: 0.03 E9/L (ref 0.05–0.5)
EOSINOPHILS RELATIVE PERCENT: 0.4 % (ref 0–6)
FIO2: 40 %
GFR AFRICAN AMERICAN: >60
GFR NON-AFRICAN AMERICAN: >60 ML/MIN/1.73
GLUCOSE BLD-MCNC: 106 MG/DL (ref 74–99)
HCO3: 22.5 MMOL/L (ref 22–26)
HCT VFR BLD CALC: 25.8 % (ref 34–48)
HEMOGLOBIN: 8.3 G/DL (ref 11.5–15.5)
HHB: 2.2 % (ref 0–5)
IMMATURE GRANULOCYTES #: 0.04 E9/L
IMMATURE GRANULOCYTES %: 0.5 % (ref 0–5)
LAB: ABNORMAL
LYMPHOCYTES ABSOLUTE: 0.79 E9/L (ref 1.5–4)
LYMPHOCYTES RELATIVE PERCENT: 9.9 % (ref 20–42)
Lab: ABNORMAL
MAGNESIUM: 2 MG/DL (ref 1.6–2.6)
MCH RBC QN AUTO: 31.2 PG (ref 26–35)
MCHC RBC AUTO-ENTMCNC: 32.2 % (ref 32–34.5)
MCV RBC AUTO: 97 FL (ref 80–99.9)
METHB: 0.2 % (ref 0–1.5)
MODE: AC
MONOCYTES ABSOLUTE: 0.72 E9/L (ref 0.1–0.95)
MONOCYTES RELATIVE PERCENT: 9 % (ref 2–12)
NEUTROPHILS ABSOLUTE: 6.36 E9/L (ref 1.8–7.3)
NEUTROPHILS RELATIVE PERCENT: 79.9 % (ref 43–80)
O2 CONTENT: 12.8 ML/DL
O2 SATURATION: 97.8 % (ref 92–98.5)
O2HB: 97.2 % (ref 94–97)
OPERATOR ID: 1023
PATIENT TEMP: 37 C
PCO2: 30.7 MMHG (ref 35–45)
PDW BLD-RTO: 12.6 FL (ref 11.5–15)
PEEP/CPAP: 6 CMH2O
PFO2: 2.87 MMHG/%
PH BLOOD GAS: 7.48 (ref 7.35–7.45)
PHOSPHORUS: 3.6 MG/DL (ref 2.5–4.5)
PLATELET # BLD: 222 E9/L (ref 130–450)
PMV BLD AUTO: 9.9 FL (ref 7–12)
PO2: 114.6 MMHG (ref 75–100)
POTASSIUM SERPL-SCNC: 3.4 MMOL/L (ref 3.5–5)
RBC # BLD: 2.66 E12/L (ref 3.5–5.5)
RI(T): 1.09
RR MECHANICAL: 16 B/MIN
SODIUM BLD-SCNC: 141 MMOL/L (ref 132–146)
SOURCE, BLOOD GAS: ABNORMAL
THB: 9.2 G/DL (ref 11.5–16.5)
TIME ANALYZED: 556
TOTAL PROTEIN: 6.1 G/DL (ref 6.4–8.3)
VT MECHANICAL: 360 ML
WBC # BLD: 8 E9/L (ref 4.5–11.5)

## 2020-12-14 PROCEDURE — 6360000002 HC RX W HCPCS: Performed by: INTERNAL MEDICINE

## 2020-12-14 PROCEDURE — 83735 ASSAY OF MAGNESIUM: CPT

## 2020-12-14 PROCEDURE — 2700000000 HC OXYGEN THERAPY PER DAY

## 2020-12-14 PROCEDURE — 2580000003 HC RX 258: Performed by: INTERNAL MEDICINE

## 2020-12-14 PROCEDURE — C9113 INJ PANTOPRAZOLE SODIUM, VIA: HCPCS | Performed by: INTERNAL MEDICINE

## 2020-12-14 PROCEDURE — 80053 COMPREHEN METABOLIC PANEL: CPT

## 2020-12-14 PROCEDURE — 94003 VENT MGMT INPAT SUBQ DAY: CPT

## 2020-12-14 PROCEDURE — 84100 ASSAY OF PHOSPHORUS: CPT

## 2020-12-14 PROCEDURE — 71045 X-RAY EXAM CHEST 1 VIEW: CPT

## 2020-12-14 PROCEDURE — 99233 SBSQ HOSP IP/OBS HIGH 50: CPT | Performed by: INTERNAL MEDICINE

## 2020-12-14 PROCEDURE — 94640 AIRWAY INHALATION TREATMENT: CPT

## 2020-12-14 PROCEDURE — 6370000000 HC RX 637 (ALT 250 FOR IP): Performed by: INTERNAL MEDICINE

## 2020-12-14 PROCEDURE — 93010 ELECTROCARDIOGRAM REPORT: CPT | Performed by: INTERNAL MEDICINE

## 2020-12-14 PROCEDURE — 85025 COMPLETE CBC W/AUTO DIFF WBC: CPT

## 2020-12-14 PROCEDURE — 2500000003 HC RX 250 WO HCPCS: Performed by: INTERNAL MEDICINE

## 2020-12-14 PROCEDURE — 82805 BLOOD GASES W/O2 SATURATION: CPT

## 2020-12-14 PROCEDURE — 99231 SBSQ HOSP IP/OBS SF/LOW 25: CPT | Performed by: NURSE PRACTITIONER

## 2020-12-14 PROCEDURE — 2000000000 HC ICU R&B

## 2020-12-14 RX ORDER — POTASSIUM CHLORIDE 29.8 MG/ML
20 INJECTION INTRAVENOUS ONCE
Status: COMPLETED | OUTPATIENT
Start: 2020-12-14 | End: 2020-12-14

## 2020-12-14 RX ORDER — LOSARTAN POTASSIUM 25 MG/1
25 TABLET ORAL DAILY
Status: DISCONTINUED | OUTPATIENT
Start: 2020-12-15 | End: 2020-12-17 | Stop reason: HOSPADM

## 2020-12-14 RX ORDER — METOPROLOL SUCCINATE 50 MG/1
25 TABLET, EXTENDED RELEASE ORAL DAILY
Status: DISCONTINUED | OUTPATIENT
Start: 2020-12-15 | End: 2020-12-17 | Stop reason: HOSPADM

## 2020-12-14 RX ADMIN — SODIUM CHLORIDE 3 G: 900 INJECTION INTRAVENOUS at 04:45

## 2020-12-14 RX ADMIN — ARFORMOTEROL TARTRATE 15 MCG: 15 SOLUTION RESPIRATORY (INHALATION) at 21:29

## 2020-12-14 RX ADMIN — Medication 10 ML: at 09:46

## 2020-12-14 RX ADMIN — IPRATROPIUM BROMIDE AND ALBUTEROL SULFATE 1 AMPULE: 2.5; .5 SOLUTION RESPIRATORY (INHALATION) at 17:47

## 2020-12-14 RX ADMIN — SODIUM CHLORIDE 3 G: 900 INJECTION INTRAVENOUS at 16:15

## 2020-12-14 RX ADMIN — QUETIAPINE FUMARATE 25 MG: 25 TABLET ORAL at 09:41

## 2020-12-14 RX ADMIN — ATORVASTATIN CALCIUM 80 MG: 80 TABLET, FILM COATED ORAL at 20:05

## 2020-12-14 RX ADMIN — ARFORMOTEROL TARTRATE 15 MCG: 15 SOLUTION RESPIRATORY (INHALATION) at 09:25

## 2020-12-14 RX ADMIN — TICAGRELOR 90 MG: 90 TABLET ORAL at 20:06

## 2020-12-14 RX ADMIN — SODIUM CHLORIDE 3 G: 900 INJECTION INTRAVENOUS at 22:13

## 2020-12-14 RX ADMIN — IPRATROPIUM BROMIDE AND ALBUTEROL SULFATE 1 AMPULE: 2.5; .5 SOLUTION RESPIRATORY (INHALATION) at 21:29

## 2020-12-14 RX ADMIN — DIVALPROEX SODIUM 500 MG: 125 CAPSULE, COATED PELLETS ORAL at 20:05

## 2020-12-14 RX ADMIN — SODIUM CHLORIDE 0.2 MCG/KG/HR: 9 INJECTION, SOLUTION INTRAVENOUS at 13:39

## 2020-12-14 RX ADMIN — GABAPENTIN 100 MG: 100 CAPSULE ORAL at 09:43

## 2020-12-14 RX ADMIN — Medication 10 ML: at 20:06

## 2020-12-14 RX ADMIN — FOLIC ACID 1 MG: 5 INJECTION, SOLUTION INTRAMUSCULAR; INTRAVENOUS; SUBCUTANEOUS at 09:47

## 2020-12-14 RX ADMIN — IPRATROPIUM BROMIDE AND ALBUTEROL SULFATE 1 AMPULE: 2.5; .5 SOLUTION RESPIRATORY (INHALATION) at 09:25

## 2020-12-14 RX ADMIN — SODIUM CHLORIDE 0.7 MCG/KG/HR: 9 INJECTION, SOLUTION INTRAVENOUS at 02:07

## 2020-12-14 RX ADMIN — GABAPENTIN 100 MG: 100 CAPSULE ORAL at 20:06

## 2020-12-14 RX ADMIN — SODIUM CHLORIDE 3 G: 900 INJECTION INTRAVENOUS at 10:12

## 2020-12-14 RX ADMIN — POTASSIUM CHLORIDE 20 MEQ: 400 INJECTION, SOLUTION INTRAVENOUS at 08:06

## 2020-12-14 RX ADMIN — ENOXAPARIN SODIUM 40 MG: 40 INJECTION SUBCUTANEOUS at 09:40

## 2020-12-14 RX ADMIN — TICAGRELOR 90 MG: 90 TABLET ORAL at 09:45

## 2020-12-14 RX ADMIN — IPRATROPIUM BROMIDE AND ALBUTEROL SULFATE 1 AMPULE: 2.5; .5 SOLUTION RESPIRATORY (INHALATION) at 13:00

## 2020-12-14 RX ADMIN — CHLORHEXIDINE GLUCONATE 0.12% ORAL RINSE 15 ML: 1.2 LIQUID ORAL at 09:42

## 2020-12-14 RX ADMIN — PANTOPRAZOLE SODIUM 40 MG: 40 INJECTION, POWDER, FOR SOLUTION INTRAVENOUS at 09:45

## 2020-12-14 RX ADMIN — QUETIAPINE FUMARATE 25 MG: 25 TABLET ORAL at 20:06

## 2020-12-14 RX ADMIN — DIVALPROEX SODIUM 500 MG: 125 CAPSULE, COATED PELLETS ORAL at 09:41

## 2020-12-14 RX ADMIN — ESCITALOPRAM 20 MG: 10 TABLET, FILM COATED ORAL at 09:41

## 2020-12-14 RX ADMIN — ASPIRIN 81 MG CHEWABLE TABLET 81 MG: 81 TABLET CHEWABLE at 09:42

## 2020-12-14 RX ADMIN — GABAPENTIN 100 MG: 100 CAPSULE ORAL at 13:11

## 2020-12-14 ASSESSMENT — PAIN SCALES - GENERAL
PAINLEVEL_OUTOF10: 0

## 2020-12-14 ASSESSMENT — PULMONARY FUNCTION TESTS
PIF_VALUE: 31
PIF_VALUE: 18
PIF_VALUE: 21
PIF_VALUE: 32
PIF_VALUE: 25
PIF_VALUE: 23
PIF_VALUE: 21
PIF_VALUE: 13
PIF_VALUE: 10
PIF_VALUE: 24
PIF_VALUE: 14
PIF_VALUE: 38
PIF_VALUE: 25

## 2020-12-14 NOTE — PROGRESS NOTES
Palliative Care Department  [phone]  Palliative Care Progress Note  Provider Edwin Enrique March, APRN-CNP      PATIENT: Kimberley Wagner  : 1955  MRN: [id number]  ADMISSION DATE: 12/3/2020 11:27 AM    Palliative Medicine was consulted on hospital day 11 for assistance with Goals of care, Code Status Discussion, Family support, Symptom management. HPI:     Forrest Restrepo is a 77 y.o. y/o female with a history of CAD, MI (20), hypertension, degenerative bone disease who presented to [facility] on 12/3/2020 with altered mental status, she was subsequently intubated for airway protection. She was found to have a UTI with leukocytosis. CT of the head was negative. She was admitted to the ICU for further management. Palliative care consulted for goals of care, code status discussion, family support, and symptom management. ASSESSMENT/PLAN:     Pertinent Hospital Diagnoses      Acute encephalopathy: Neurology following, CT and MRI brain negative, EEG showed moderate nonspecific encephalopathy, LP negative for infectious process    Acute hypoxic respiratory failure: Per ICU team, mechanically ventilated, failed PS trial yesterday, continue to wean vent and sedation   CAP vs. Aspiration Pneumonia: Respiratory culture positive for MSSA, Unasyn   UTI: Culture positive for E faecalis and MSSA, BC negative, ID following, Unasyn    Palliative Care Encounter / Counseling Regarding Goals of Care  Please see detailed goals of care discussion as below   At this time, Kimberley Wagner, Does Not have capacity for medical decision-making. Capacity is time limited and situation/question specific   Outcome of goals of care meeting: Spoke to son, Moe Cohen, for support. Continue current care.      Code status Full Code   Advanced Directives: no POA or living will in [facility]   Surrogate/Legal NOK:   nicky Mccoy (NOK/son): [phone]  o Dawn Pierre (son): [phone]   [facility] Palliative medicine will continue to follow on provide ongoing support and assistance with goals of care pending further clinical progression. Symptom management : Per ICU team.       SUBJECTIVE:     Details of Conversation:     12/14/20: Chart reviewed and patient seen at bedside. She remains mechanically ventilated. She is alert and following commands. Spoke with bedside RN and patient has been following commands and they are pursuing weaning trials from ventilator. Call to patient's son, Ceci Cui, for support. He is pleased with patient's progress over the weekend and would like to continue FULL CODE status at this time as they continue weaning from the ventilator. Answered all questions regarding patient status. Palliative care will continue to follow along and will speak with patient once extubated. 12/11/20: Chart reviewed and patient seen at the bedside with no family present. She remains mechanically ventilated and sedated. She does not open eyes to voice or tactile stimulation but does move all extremities to tactile stimuli. She did not follow commands. Spoke with bedside RN and plan is to obtain LP today. Call placed to patient's son, Ceci Cui, with no answer and inability to leave voice message. Call to patient's son, Diomedes Long appears to be coping well at this time and they are awaiting the results of the diagnostic testing that is to be done including LP. Georgi Silver would like to continue patient's current plan of care at this time until more information is found about her condition. Patient's CODE STATUS will maintain FULL CODE at this time. Informed him that palliative medicine will continue to follow along in patient's care and will see her again on Monday. He will update his brother, Ceci Cui, and have him call palliative medicine with any needs or questions. 12/10/2020: Mrs. Eckert seen at the bedside, no family present.   She continues to be mechanically ventilated, sedated, now with Versed and fentanyl, and unfortunate does not wake and follow commands. She has become restless make random movements with stimulation. An update was received from the bedside nurse, and she reports that there is a plan for lumbar puncture later today to further evaluate cause of her encephalopathy. I did make a call to the patient's son Arden, he was appreciative of the ongoing support, unfortunately he reports he is currently in [facility] for evaluation of a heart condition. At this time he states that he would wish for everything to continue for his mother, including full resuscitation. There otherwise are no other immediate needs from palliative medicine service, will continue to follow and provide ongoing support throughout her hospitalization. 2020: Spoke to Lanre Lopez son Arden, he told me that his mother was doing just fine before this confusion. She recently moved from [place] in May to begin her life here in [facility]. She is a recovering alcoholic about a year ago, also abuse drugs in the past about 10 years ago. CT scan did not show any abnormalities. She is being taken care of in the ICU. Spoke to him about advanced directives and CODE STATUS, at this time he wants to keep her mother a full code. He would like resuscitation and intubation, as this happened so suddenly. His goals are to bring her back home, and continue care for her.        Prognosis: Guarded    Past Medical History:   Diagnosis Date    CAD (coronary artery disease)     Degenerative disorder of bone     Hard of hearing     Hypertension     MI (myocardial infarction) ([facility] [address] 2020     Past Surgical History:   Procedure Laterality Date    BREAST LUMPECTOMY Left      SECTION      CORONARY ANGIOPLASTY WITH STENT PLACEMENT  2020    3.0 x 33mm Domenico Ruiz to Prox LAD, EF35%, Dr. Vandana Cm       Family History   Problem Relation Age of Onset    Lung Cancer Mother     Diabetes Father        OBJECTIVE:     BP (!) 159/74   Pulse 57   Temp 99 °F (37.2 °C)   Resp 16   Ht 5' 5\" (1.651 m)   Wt 186 lb 8.2 oz (84.6 kg)   SpO2 98%   BMI 31.04 kg/m²     Physical Examination:  Gen:  Intubated and sedated  HEENT: normocephalic, atraumatic, eyes open  Lungs: mechanically ventilated    Heart: NSR per cardiac monitor  Abdomen: round, soft  Skin: warm and dry  Neuro: Intubated, alert, following commands    Objective data reviewed: labs, images, records, medication use, vitals and chart    Jackie March APRN-CNP  Palliative Medicine    Time/Communication  Greater than 50% of time spent, total 15 minutes in counseling and coordination of care at the bedside regarding goals of care and symptom management. Thank you for allowing Palliative Medicine to participate in the care of Kimberley Wagner.

## 2020-12-14 NOTE — PROGRESS NOTES
Spontaneous Parameters performed    VT = 293 ml  f = 16  B/M  Ve = 4.7 L/M  NIF = -26  cmH2O  VC = 569 L  RSBI = 66       Performed by Carolina Harrison

## 2020-12-14 NOTE — PLAN OF CARE
Problem: Restraint Use - Nonviolent/Non-Self-Destructive Behavior:  Goal: Absence of restraint indications  12/14/2020 0248 by Ximena Arreola RN  Outcome: Not Met This Shift     Problem: Restraint Use - Nonviolent/Non-Self-Destructive Behavior:  Goal: Absence of restraint-related injury  12/14/2020 0248 by Ximena Arreola RN  Outcome: Met This Shift     Problem: Falls - Risk of:  Goal: Will remain free from falls  12/14/2020 0248 by Ximena Arreola RN  Outcome: Met This Shift     Problem: Falls - Risk of:  Goal: Absence of physical injury  12/14/2020 0248 by Ximena Arreola RN  Outcome: Met This Shift     Problem: Injury - Risk of, Physical Injury:  Goal: Will remain free from falls  12/14/2020 0248 by Ximena Arreola RN  Outcome: Met This Shift     Problem: Injury - Risk of, Physical Injury:  Goal: Absence of physical injury  12/14/2020 0248 by Ximena Arreola RN  Outcome: Met This Shift     Problem: Skin Integrity:  Goal: Will show no infection signs and symptoms  Outcome: Met This Shift     Problem: Skin Integrity:  Goal: Absence of new skin breakdown  Outcome: Met This Shift

## 2020-12-14 NOTE — PROGRESS NOTES
[address] [facility]  [facility]  Progress Note      C/C : Aspiration pneumonia, respiratory failure         Intubated, sedated       Medications:  Scheduled Meds:   [START ON 12/15/2020] metoprolol succinate  25 mg Oral Daily    [START ON 12/15/2020] losartan  25 mg Oral Daily    QUEtiapine  25 mg Oral BID    escitalopram  20 mg Oral Daily    gabapentin  100 mg Oral TID    metoprolol tartrate  37.5 mg Oral BID    enoxaparin  40 mg Subcutaneous Daily    divalproex  500 mg Oral 2 times per day    lidocaine PF  5 mL Intradermal Once    heparin flush  3 mL Intravenous 2 times per day    ipratropium-albuterol  1 ampule Inhalation Q4H WA    Arformoterol Tartrate  15 mcg Nebulization BID    ampicillin-sulbactam  3 g Intravenous Q6H    aspirin  81 mg Oral Daily    atorvastatin  80 mg Oral Nightly    pantoprazole  40 mg Intravenous Daily    sodium chloride flush  10 mL Intravenous 2 times per day    folic acid  1 mg Intravenous Daily    ticagrelor  90 mg Oral BID     Continuous Infusions:   dexmedetomidine (PRECEDEX) IV infusion 0.6 mcg/kg/hr (20 1600)     PRN Meds:trimethobenzamide, midazolam, perflutren lipid microspheres, sodium chloride flush, heparin flush, hydrALAZINE, sodium chloride flush, acetaminophen **OR** acetaminophen, polyethylene glycol, labetalol    OBJECTIVE:  BP (!) 163/84   Pulse 69   Temp 98.2 °F (36.8 °C) (Oral)   Resp 16   Ht 5' 5\" (1.651 m)   Wt 186 lb 8.2 oz (84.6 kg)   SpO2 99%   BMI 31.04 kg/m²   Temp  Av.9 °F (37.2 °C)  Min: 98.2 °F (36.8 °C)  Max: 99.4 °F (37.4 °C)     Constitutional: Intubated, sedated - FiO2  40%, PEEP 6   Skin: Warm and dry. No rashes were noted. HEENT: Round and reactive pupils. Moist mucous membranes. No ulcerations or thrush. Neck: no nuchal rigidity,noLN    Chest: Bilateral rhonchi   Cardiovascular: S1 and S2 are rhythmic and regular. No murmurs appreciated.    Abdomen: Soft, bowel sound +, non distended Extremities: No clubbing, no cyanosis, no edema. PICC line 12/7/2020 right brachial      Laboratory and Tests Review:  Lab Results   Component Value Date    WBC 8.0 12/14/2020    WBC 6.3 12/13/2020    WBC 7.7 12/12/2020    HGB 8.3 (L) 12/14/2020    HCT 25.8 (L) 12/14/2020    MCV 97.0 12/14/2020     12/14/2020     Lab Results   Component Value Date    NEUTROABS 6.36 12/14/2020    NEUTROABS 4.86 12/13/2020    NEUTROABS 5.68 12/12/2020     No results found for: [facility]  Lab Results   Component Value Date    ALT 22 12/14/2020    AST 16 12/14/2020    ALKPHOS 81 12/14/2020    BILITOT 0.4 12/14/2020     Lab Results   Component Value Date     12/14/2020    K 3.4 12/14/2020    K 4.0 10/19/2020     12/14/2020    CO2 21 12/14/2020    BUN 15 12/14/2020    CREATININE 0.6 12/14/2020    CREATININE 0.6 12/13/2020    CREATININE 0.7 12/12/2020    GFRAA >60 12/14/2020    LABGLOM >60 12/14/2020    GLUCOSE 106 12/14/2020    PROT 6.1 12/14/2020    LABALBU 3.5 12/14/2020    CALCIUM 9.3 12/14/2020    BILITOT 0.4 12/14/2020    ALKPHOS 81 12/14/2020    AST 16 12/14/2020    ALT 22 12/14/2020     Lab Results   Component Value Date    CRP 1.9 (H) 12/05/2020     Lab Results   Component Value Date    SEDRATE 6 12/05/2020     Radiology:  Reviewed    Microbiology:     Sputum cx -    Susceptibility    Staphylococcus aureus (1)    Antibiotic Interpretation ZHOU Status    clindamycin Sensitive ^0. 25 mcg/mL     doxycycline Sensitive <=^0.5 mcg/mL     erythromycin Resistant >=^8 mcg/mL     gentamicin Sensitive <=^0.5 mcg/mL     oxacillin Sensitive ^0.5 mcg/mL     trimethoprim-sulfamethoxazole Sensitive <=^10 mcg/mL     vancomycin Sensitive ^1 mcg/mL         Culture, Urine [[id number]] (Abnormal)  Collected: 12/05/20 1240   Order Status: Completed Specimen: Urine, clean catch Updated: 12/08/20 0814    Organism Staphylococcus aureusAbnormal     Urine Culture, Routine >100,000 CFU/ml    Organism Enterococcus faecalisAbnormal     Urine

## 2020-12-14 NOTE — CARE COORDINATION
12/14 Care Coordination: Pt remains in [address] Vent wean, plan extubate. Spoke with her son Carla Cartwright discussed need for MAKI. He was hesitant with wanting his mom to go to nursing home. He will discuss options with his brother tonight. He didn't want to make any decisions until he saw she was having improvement. CM will touch base tomorrow for option. CM/SW will continue to follow for discharge planning.    Real BUTLERN,RN-BC  [phone]

## 2020-12-14 NOTE — PROGRESS NOTES
Patient seen in icu still on vent. Less sedated. Attempt to wean from vent. cxr pending. Tolerating meds and antibiotics .

## 2020-12-14 NOTE — PROGRESS NOTES
[address] [facility]  Department of Internal Medicine   Internal Medicine Residency   MICU Progress Note    Patient:  Cabrera Lee 77 y.o. female  MRN: [id number]     Date of Service: 12/14/2020    Allergy: Nitroglycerin and Tramadol    Subjective     Pt seen and examined at bedside this AM. Intubated and sedated on minimal Precedex. Appears in NAD   Able to answer yes/no questions - denying chest/abdominal pain, fevers/chills. Objective     VS: BP (!) 159/74   Pulse 57   Temp 99 °F (37.2 °C)   Resp 16   Ht 5' 5\" (1.651 m)   Wt 186 lb 8.2 oz (84.6 kg)   SpO2 98%   BMI 31.04 kg/m²           I & O - 24hr:     Intake/Output Summary (Last 24 hours) at 12/14/2020 0804  Last data filed at 12/14/2020 0526  Gross per 24 hour   Intake 845.7 ml   Output 1490 ml   Net -644.3 ml       Physical Exam:  Vitals: BP (!) 143/82   Pulse 64   Temp 98.2 °F (36.8 °C) (Oral)   Resp 15   Ht 5' 5\" (1.651 m)   Wt 186 lb 8.2 oz (84.6 kg)   SpO2 93%   BMI 31.04 kg/m²       Constitutional: Awake, alert. Follows commands. In no apparent distress. Head: Normocephalic and atraumatic. Eyes: Conjunctiva normal, (-) scleral icterus. Mucus membranes moist.  Mouth: Mucus membranes moist. Oropharynx clear. No deviation of the tongue or uvula. Normal dentition. Neck: (-)  swelling  Respiratory: ETT in place. (+) ronchi throughout. (-) wheezes, (-)  Rales. No increased work of breathing or respiratory distress. Cardiovascular: RRR. (-) murmurs, (-) gallops, (-) rubs. S1 and S2 were normal.   GI:  Abdomen soft, non-tender, non-distended. (+) BS. (-) guarding, (-) rigidity. Extremities: Warm and well perfused. (-) clubbing, (-) cyanosis. (-) peripheral edema.    Neurologic: No focal neurological deficits, no facial droop or tremor noted    Lines     site day    Art line   None    TLC None    PICC R Arm 7   Hemoaccess None      Mechanical Ventilation:  Mode: A/C   TV:360 ml RR: 16  PEEP 6cmH2O  FiO2 40%  Pplat: 18 Cs: 62      ABG:     Lab Results   Component Value Date    PH 7.482 12/14/2020    PCO2 30.7 12/14/2020    PO2 114.6 12/14/2020    HCO3 22.5 12/14/2020    BE -0.6 12/14/2020    THB 9.2 12/14/2020    O2SAT 97.8 12/14/2020     Medications     Infusions: (Fluid, Sedation, Vasopressors)  Sedation  Precedex at rate of 0.6mcg/kg/hr     Nutrition: Cardiac diet     ATB:   Antibiotics  Days   Unasyn 9             Skin issues: N/A     Patient currently has   Urinary cath  Restraints  DVT prophylaxis/ GI prophylaxis - Lovenox/Protonix  PT/OT - consulted     Labs     CBC with Differential:    Lab Results   Component Value Date    WBC 8.0 12/14/2020    RBC 2.66 12/14/2020    HGB 8.3 12/14/2020    HCT 25.8 12/14/2020     12/14/2020    MCV 97.0 12/14/2020    MCH 31.2 12/14/2020    MCHC 32.2 12/14/2020    RDW 12.6 12/14/2020    LYMPHOPCT 9.9 12/14/2020    MONOPCT 9.0 12/14/2020    BASOPCT 0.3 12/14/2020    MONOSABS 0.72 12/14/2020    LYMPHSABS 0.79 12/14/2020    EOSABS 0.03 12/14/2020    BASOSABS 0.02 12/14/2020     CMP:    Lab Results   Component Value Date     12/14/2020    K 3.4 12/14/2020    K 4.0 10/19/2020     12/14/2020    CO2 21 12/14/2020    BUN 15 12/14/2020    CREATININE 0.6 12/14/2020    GFRAA >60 12/14/2020    LABGLOM >60 12/14/2020    GLUCOSE 106 12/14/2020    PROT 6.1 12/14/2020    LABALBU 3.5 12/14/2020    CALCIUM 9.3 12/14/2020    BILITOT 0.4 12/14/2020    ALKPHOS 81 12/14/2020    AST 16 12/14/2020    ALT 22 12/14/2020     Magnesium:    Lab Results   Component Value Date    MG 2.0 12/14/2020     Phosphorus:    Lab Results   Component Value Date    PHOS 3.6 12/14/2020     Imaging Studies:    CXR 12/14/2020    Impression   Patchy left lung base airspace infiltrate and/or pleural effusion.      Resident's Assessment and Plan     Neurology:   Acute encephalopathy, possibly 2/2 underlying UTI vs undiagnosed psychiatric issus vs possible Baclofen toxicity   Pt was taking Baclofen and Gabapention PTA for back pain   Ammonia wnl, SDS and UDS both negative   Imaging negative for acute processes, EEG indeterminate   LP showing 3 WBCs and 33 neutrophils   Continue Lexapro, Seroquel, and Depakote   Continue Versed PRN   Continue to monitor neurological status      Cardiology   PMHx of MI as of 8/22/2020  Continue ASA and Lipitor   Continue hydralazine and labetalol PRN for HTN   Continue Losartan and Metoprolol daily   Continue Brilinta   Will continue to add back pt's home meds as BP and HR tolerate   Daily labs - will replete electrolytes as necessary   Continue to monitor on telemetry     Pulmonary:   Acute hypoxic respiratory failure, possibly 2/2 aspiration pneumonia in setting of acute encephalopathy    Continue sedation and intubation - will secure weaning parameters and attempt extubation today   Continue to wean sedation   Continue Duonebs and Brovana   Daily ABG and CXR while pt is intubated   Continue to monitor respiratory status     Heme/Onc:   Normocytic anemia possibly 2/2 inflammation in setting of UTI +/- pneumonia   Continue to monitor CBC daily - will trend H/H   Will transfuse to maintain Hgb >7.0     Infectious Disease:   Leukocytosis possibly d/t UTI vs pneumonia   Respiratory (+) for MSSA, Urine cx (+) for E. Faecalis and MSSA   Blood cx NGTD   ID following - pt currently on Unasyn   Leukocytosis resolved this AM  Daily labs - will continue to monitor pt WBCs    Continue to monitor pt vitals     Code Status: [address] S. Jaron Quiñonez DO, PGY-1    Attending physician: Dr. Roderick Tavares  Department of Pulmonary, Critical Care and Sleep Medicine  [address] [facility]  Department of Internal Medicine      During multidisciplinary team rounds Ethan Mandujano is a 77 y.o. female was seen, examined and discussed.  This is confirmation that I have personally seen and examined the patient and that the key elements of the encounter were performed by me (> 85 % time). The medications & laboratory data was discussed and adjusted where necessary. The radiographic images were reviewed or with radiologist or consultant if felt dis-concordant with the exam or history. The above findings were corroborated, plans confirmed and changes made if needed. Family is updated at the bedside as available. Key issues of the case were discussed among consultants. Critical Care time is documented if appropriate.       Danny Swanson DO, FACP, FCCP, [place],

## 2020-12-14 NOTE — PROGRESS NOTES
Comprehensive Nutrition Assessment    Type and Reason for Visit:  Reassess    Nutrition Recommendations/Plan: Modify Tube Feeding  Pending possible extubation, propofol off at this time however current EN regimen no longer meeting estimated needs. Noted emesis therefore recommend semi-elemental formula for optimal GI tolerance. EN Recommendation: Semi-Elemental @50ml/hr to provide: 1200ml, 1440kcals, 90gm pro, 973ml water     Nutrition Assessment:  Pt admit 2/2 acute encephalopathy d/t UTI w/ noted aspiration PNA. Remains on vent w/ continued EN. Malnutrition Assessment:  Malnutrition Status: At risk for malnutrition (Comment)    Context:  Acute Illness     Findings of the 6 clinical characteristics of malnutrition:  Energy Intake:  7 - 50% or less of estimated energy requirements for 5 or more days  Weight Loss:  No significant weight loss     Body Fat Loss:  No significant body fat loss     Muscle Mass Loss:  No significant muscle mass loss    Fluid Accumulation:  No significant fluid accumulation     Strength:  Not Performed    Estimated Daily Nutrient Needs:  Energy (kcal):  ; ; Weight Used for Energy Requirements:  Current     Protein (g):  (1.5-2.0gm/kg IBW);  Weight Used for Protein Requirements:  Ideal          Nutrition Related Findings:  vent, OGT, emesis, active BS, soft abd, trace to +2 edema, fluids WNL      Wounds:  None       Current Nutrition Therapies:    Current Tube Feeding (TF) Orders:  · Feeding Route: Orogastric  · Formula: Low Calorie, High Protein  · Additives/Modulars: Protein  · Current TF & Flush Orders Provides: 30ml/hr; 720ml, 720kcals, 63gm pro, 602ml water (with protein moduar daily: 824kcals, 89gm pro)    Anthropometric Measures:  · Height: 5' 5\" (165.1 cm)  · Current Body Weight: 186 lb (84.4 kg)(12/14 actual)   · Admission Body Weight: 197 lb (89.4 kg)(12/6 actual)    · Usual Body Weight: 193 lb (87.5 kg)(9/21/20, actual)     · Ideal Body Weight:

## 2020-12-15 ENCOUNTER — APPOINTMENT (OUTPATIENT)
Dept: GENERAL RADIOLOGY | Age: 65
DRG: 207 | End: 2020-12-15
Payer: MEDICARE

## 2020-12-15 LAB
ALBUMIN SERPL-MCNC: 3.6 G/DL (ref 3.5–5.2)
ALP BLD-CCNC: 76 U/L (ref 35–104)
ALT SERPL-CCNC: 18 U/L (ref 0–32)
ANION GAP SERPL CALCULATED.3IONS-SCNC: 14 MMOL/L (ref 7–16)
AST SERPL-CCNC: 15 U/L (ref 0–31)
BASOPHILS ABSOLUTE: 0.02 E9/L (ref 0–0.2)
BASOPHILS RELATIVE PERCENT: 0.3 % (ref 0–2)
BILIRUB SERPL-MCNC: 0.4 MG/DL (ref 0–1.2)
BUN BLDV-MCNC: 13 MG/DL (ref 8–23)
CALCIUM SERPL-MCNC: 9 MG/DL (ref 8.6–10.2)
CHLORIDE BLD-SCNC: 107 MMOL/L (ref 98–107)
CO2: 23 MMOL/L (ref 22–29)
CREAT SERPL-MCNC: 0.6 MG/DL (ref 0.5–1)
CSF CULTURE: NORMAL
EOSINOPHILS ABSOLUTE: 0.14 E9/L (ref 0.05–0.5)
EOSINOPHILS RELATIVE PERCENT: 2 % (ref 0–6)
GFR AFRICAN AMERICAN: >60
GFR NON-AFRICAN AMERICAN: >60 ML/MIN/1.73
GLUCOSE BLD-MCNC: 81 MG/DL (ref 74–99)
GRAM STAIN RESULT: NORMAL
HCT VFR BLD CALC: 26.5 % (ref 34–48)
HEMOGLOBIN: 8.5 G/DL (ref 11.5–15.5)
IMMATURE GRANULOCYTES #: 0.09 E9/L
IMMATURE GRANULOCYTES %: 1.3 % (ref 0–5)
LYMPHOCYTES ABSOLUTE: 0.94 E9/L (ref 1.5–4)
LYMPHOCYTES RELATIVE PERCENT: 13.6 % (ref 20–42)
MAGNESIUM: 1.8 MG/DL (ref 1.6–2.6)
MCH RBC QN AUTO: 31.1 PG (ref 26–35)
MCHC RBC AUTO-ENTMCNC: 32.1 % (ref 32–34.5)
MCV RBC AUTO: 97.1 FL (ref 80–99.9)
MONOCYTES ABSOLUTE: 0.72 E9/L (ref 0.1–0.95)
MONOCYTES RELATIVE PERCENT: 10.4 % (ref 2–12)
NEUTROPHILS ABSOLUTE: 4.98 E9/L (ref 1.8–7.3)
NEUTROPHILS RELATIVE PERCENT: 72.4 % (ref 43–80)
PDW BLD-RTO: 12.8 FL (ref 11.5–15)
PHOSPHORUS: 3.4 MG/DL (ref 2.5–4.5)
PLATELET # BLD: 250 E9/L (ref 130–450)
PMV BLD AUTO: 10.1 FL (ref 7–12)
POTASSIUM SERPL-SCNC: 3.1 MMOL/L (ref 3.5–5)
RBC # BLD: 2.73 E12/L (ref 3.5–5.5)
SODIUM BLD-SCNC: 144 MMOL/L (ref 132–146)
TOTAL PROTEIN: 5.9 G/DL (ref 6.4–8.3)
WBC # BLD: 6.9 E9/L (ref 4.5–11.5)

## 2020-12-15 PROCEDURE — 6370000000 HC RX 637 (ALT 250 FOR IP): Performed by: INTERNAL MEDICINE

## 2020-12-15 PROCEDURE — 71045 X-RAY EXAM CHEST 1 VIEW: CPT

## 2020-12-15 PROCEDURE — 2060000000 HC ICU INTERMEDIATE R&B

## 2020-12-15 PROCEDURE — 94640 AIRWAY INHALATION TREATMENT: CPT

## 2020-12-15 PROCEDURE — 84100 ASSAY OF PHOSPHORUS: CPT

## 2020-12-15 PROCEDURE — 6360000002 HC RX W HCPCS: Performed by: INTERNAL MEDICINE

## 2020-12-15 PROCEDURE — 2700000000 HC OXYGEN THERAPY PER DAY

## 2020-12-15 PROCEDURE — 2580000003 HC RX 258: Performed by: INTERNAL MEDICINE

## 2020-12-15 PROCEDURE — 83735 ASSAY OF MAGNESIUM: CPT

## 2020-12-15 PROCEDURE — 99233 SBSQ HOSP IP/OBS HIGH 50: CPT | Performed by: INTERNAL MEDICINE

## 2020-12-15 PROCEDURE — 85025 COMPLETE CBC W/AUTO DIFF WBC: CPT

## 2020-12-15 PROCEDURE — 80053 COMPREHEN METABOLIC PANEL: CPT

## 2020-12-15 PROCEDURE — 2500000003 HC RX 250 WO HCPCS: Performed by: INTERNAL MEDICINE

## 2020-12-15 PROCEDURE — 99231 SBSQ HOSP IP/OBS SF/LOW 25: CPT | Performed by: NURSE PRACTITIONER

## 2020-12-15 PROCEDURE — C9113 INJ PANTOPRAZOLE SODIUM, VIA: HCPCS | Performed by: INTERNAL MEDICINE

## 2020-12-15 RX ORDER — MAGNESIUM SULFATE IN WATER 40 MG/ML
2 INJECTION, SOLUTION INTRAVENOUS ONCE
Status: COMPLETED | OUTPATIENT
Start: 2020-12-15 | End: 2020-12-15

## 2020-12-15 RX ORDER — POTASSIUM CHLORIDE 20 MEQ/1
40 TABLET, EXTENDED RELEASE ORAL ONCE
Status: COMPLETED | OUTPATIENT
Start: 2020-12-15 | End: 2020-12-15

## 2020-12-15 RX ORDER — FOLIC ACID 1 MG/1
1 TABLET ORAL DAILY
Status: DISCONTINUED | OUTPATIENT
Start: 2020-12-15 | End: 2020-12-17 | Stop reason: HOSPADM

## 2020-12-15 RX ORDER — IPRATROPIUM BROMIDE AND ALBUTEROL SULFATE 2.5; .5 MG/3ML; MG/3ML
1 SOLUTION RESPIRATORY (INHALATION) EVERY 4 HOURS PRN
Status: DISCONTINUED | OUTPATIENT
Start: 2020-12-15 | End: 2020-12-17 | Stop reason: HOSPADM

## 2020-12-15 RX ORDER — QUETIAPINE FUMARATE 25 MG/1
25 TABLET, FILM COATED ORAL ONCE
Status: COMPLETED | OUTPATIENT
Start: 2020-12-15 | End: 2020-12-15

## 2020-12-15 RX ORDER — PANTOPRAZOLE SODIUM 40 MG/1
40 TABLET, DELAYED RELEASE ORAL
Status: DISCONTINUED | OUTPATIENT
Start: 2020-12-16 | End: 2020-12-17 | Stop reason: HOSPADM

## 2020-12-15 RX ADMIN — GABAPENTIN 100 MG: 100 CAPSULE ORAL at 08:51

## 2020-12-15 RX ADMIN — ARFORMOTEROL TARTRATE 15 MCG: 15 SOLUTION RESPIRATORY (INHALATION) at 08:50

## 2020-12-15 RX ADMIN — MAGNESIUM SULFATE HEPTAHYDRATE 2 G: 40 INJECTION, SOLUTION INTRAVENOUS at 09:03

## 2020-12-15 RX ADMIN — TICAGRELOR 90 MG: 90 TABLET ORAL at 22:09

## 2020-12-15 RX ADMIN — GABAPENTIN 100 MG: 100 CAPSULE ORAL at 22:08

## 2020-12-15 RX ADMIN — POTASSIUM CHLORIDE 40 MEQ: 1500 TABLET, EXTENDED RELEASE ORAL at 09:00

## 2020-12-15 RX ADMIN — ASPIRIN 81 MG CHEWABLE TABLET 81 MG: 81 TABLET CHEWABLE at 09:56

## 2020-12-15 RX ADMIN — Medication 10 ML: at 22:15

## 2020-12-15 RX ADMIN — METOPROLOL SUCCINATE 25 MG: 50 TABLET, EXTENDED RELEASE ORAL at 08:50

## 2020-12-15 RX ADMIN — ATORVASTATIN CALCIUM 80 MG: 80 TABLET, FILM COATED ORAL at 22:08

## 2020-12-15 RX ADMIN — Medication 10 ML: at 08:51

## 2020-12-15 RX ADMIN — QUETIAPINE FUMARATE 25 MG: 25 TABLET ORAL at 22:09

## 2020-12-15 RX ADMIN — IPRATROPIUM BROMIDE AND ALBUTEROL SULFATE 1 AMPULE: 2.5; .5 SOLUTION RESPIRATORY (INHALATION) at 11:25

## 2020-12-15 RX ADMIN — LABETALOL HYDROCHLORIDE 10 MG: 5 INJECTION INTRAVENOUS at 12:52

## 2020-12-15 RX ADMIN — DIVALPROEX SODIUM 500 MG: 125 CAPSULE, COATED PELLETS ORAL at 08:53

## 2020-12-15 RX ADMIN — GABAPENTIN 100 MG: 100 CAPSULE ORAL at 14:30

## 2020-12-15 RX ADMIN — QUETIAPINE FUMARATE 25 MG: 25 TABLET ORAL at 03:41

## 2020-12-15 RX ADMIN — LOSARTAN POTASSIUM 25 MG: 25 TABLET, FILM COATED ORAL at 08:50

## 2020-12-15 RX ADMIN — IPRATROPIUM BROMIDE AND ALBUTEROL SULFATE 1 AMPULE: 2.5; .5 SOLUTION RESPIRATORY (INHALATION) at 08:50

## 2020-12-15 RX ADMIN — TICAGRELOR 90 MG: 90 TABLET ORAL at 08:53

## 2020-12-15 RX ADMIN — Medication 300 UNITS: at 08:51

## 2020-12-15 RX ADMIN — PANTOPRAZOLE SODIUM 40 MG: 40 INJECTION, POWDER, FOR SOLUTION INTRAVENOUS at 09:04

## 2020-12-15 RX ADMIN — ARFORMOTEROL TARTRATE 15 MCG: 15 SOLUTION RESPIRATORY (INHALATION) at 21:36

## 2020-12-15 RX ADMIN — QUETIAPINE FUMARATE 25 MG: 25 TABLET ORAL at 08:54

## 2020-12-15 RX ADMIN — ENOXAPARIN SODIUM 40 MG: 40 INJECTION SUBCUTANEOUS at 08:50

## 2020-12-15 RX ADMIN — ACETAMINOPHEN 650 MG: 325 TABLET ORAL at 23:21

## 2020-12-15 RX ADMIN — SODIUM CHLORIDE 3 G: 900 INJECTION INTRAVENOUS at 04:30

## 2020-12-15 RX ADMIN — FOLIC ACID 1 MG: 1 TABLET ORAL at 22:08

## 2020-12-15 RX ADMIN — ESCITALOPRAM 20 MG: 10 TABLET, FILM COATED ORAL at 08:54

## 2020-12-15 RX ADMIN — Medication 300 UNITS: at 22:09

## 2020-12-15 RX ADMIN — SODIUM CHLORIDE 3 G: 900 INJECTION INTRAVENOUS at 09:56

## 2020-12-15 ASSESSMENT — PAIN SCALES - GENERAL
PAINLEVEL_OUTOF10: 0
PAINLEVEL_OUTOF10: 0
PAINLEVEL_OUTOF10: 4
PAINLEVEL_OUTOF10: 0
PAINLEVEL_OUTOF10: 0
PAINLEVEL_OUTOF10: 5

## 2020-12-15 NOTE — PROGRESS NOTES
Date    PH 7.482 12/14/2020    PCO2 30.7 12/14/2020    PO2 114.6 12/14/2020    HCO3 22.5 12/14/2020    BE -0.6 12/14/2020    THB 9.2 12/14/2020    O2SAT 97.8 12/14/2020     Medications     Infusions: (Fluid, Sedation, Vasopressors)    Nutrition: Cardiac diet     ATB:   Antibiotics  Days   Unasyn 10             Skin issues: N/A     Patient currently has   Urinary cath  DVT prophylaxis/ GI prophylaxis - Lovenox/Protonix  PT/OT - consulted     Labs     CBC with Differential:    Lab Results   Component Value Date    WBC 6.9 12/15/2020    RBC 2.73 12/15/2020    HGB 8.5 12/15/2020    HCT 26.5 12/15/2020     12/15/2020    MCV 97.1 12/15/2020    MCH 31.1 12/15/2020    MCHC 32.1 12/15/2020    RDW 12.8 12/15/2020    LYMPHOPCT 13.6 12/15/2020    MONOPCT 10.4 12/15/2020    BASOPCT 0.3 12/15/2020    MONOSABS 0.72 12/15/2020    LYMPHSABS 0.94 12/15/2020    EOSABS 0.14 12/15/2020    BASOSABS 0.02 12/15/2020     CMP:    Lab Results   Component Value Date     12/14/2020    K 3.4 12/14/2020    K 4.0 10/19/2020     12/14/2020    CO2 21 12/14/2020    BUN 15 12/14/2020    CREATININE 0.6 12/14/2020    GFRAA >60 12/14/2020    LABGLOM >60 12/14/2020    GLUCOSE 106 12/14/2020    PROT 6.1 12/14/2020    LABALBU 3.5 12/14/2020    CALCIUM 9.3 12/14/2020    BILITOT 0.4 12/14/2020    ALKPHOS 81 12/14/2020    AST 16 12/14/2020    ALT 22 12/14/2020     Magnesium:    Lab Results   Component Value Date    MG 2.0 12/14/2020     Phosphorus:    Lab Results   Component Value Date    PHOS 3.6 12/14/2020     Imaging Studies:    CXR 12/15/2020    Impression   No pulmonary infiltrates are identified.      Resident's Assessment and Plan     Neurology:   Acute encephalopathy, possibly 2/2 underlying UTI vs undiagnosed psychiatric issus vs possible Baclofen toxicity - resolved  Pt was taking Baclofen and Gabapention PTA for back pain   Continue Lexapro, Seroquel, and Depakote   Continue Versed PRN   Continue to monitor neurological status Cardiology   PMHx of MI as of 8/22/2020  Continue ASA and Lipitor   Continue hydralazine and labetalol PRN for HTN   Continue Losartan and Metoprolol daily   Continue Brilinta   Will continue to add back pt's home meds as BP and HR tolerate   Daily labs - will replete electrolytes as necessary   Continue to monitor on telemetry     Pulmonary:   Acute hypoxic respiratory failure, possibly 2/2 aspiration pneumonia in setting of acute encephalopathy - resolved   Continue Brovana and Duonebs   Continue to monitor respirator cx     Heme/Onc:   Normocytic anemia possibly 2/2 inflammation in setting of UTI +/- pneumonia   Continue to monitor CBC daily - will trend H/H   Will transfuse to maintain Hgb >7.0     Infectious Disease:   Leukocytosis possibly d/t UTI vs pneumonia   Respiratory (+) for MSSA, Urine cx (+) for E. Faecalis and MSSA   Blood cx NGTD   ID following - pt currently on Unasyn   Leukocytosis resolved this AM  Daily labs - will continue to monitor pt WBCs    Continue to monitor pt vitals     Code Status: FULL    Disposition: Transfer out of ICU     Briana Jeffries DO, PGY-1  Attending physician: Dr. Brook Cohen  Department of Pulmonary, Critical Care and Sleep Medicine  [address] [facility]  Department of Internal Medicine      During multidisciplinary team rounds Lee Hairston is a 77 y.o. female was seen, examined and discussed. This is confirmation that I have personally seen and examined the patient and that the key elements of the encounter were performed by me (> 85 % time). The medications & laboratory data was discussed and adjusted where necessary. The radiographic images were reviewed or with radiologist or consultant if felt dis-concordant with the exam or history. The above findings were corroborated, plans confirmed and changes made if needed. Family is updated at the bedside as available.  Key issues of the case were discussed among consultants. Critical Care time is documented if appropriate.       Maribel Anglin DO, FACP, FCCP, [place],

## 2020-12-15 NOTE — PROGRESS NOTES
[address] [place] [address] [facility]  [facility]  Progress Note      C/C : Aspiration pneumonia, respiratory failure     Transferred out of ICU. Tolerating medications  Room air. 96% SpO2. Afebrile. Medications:  Scheduled Meds:   [START ON 2020] pantoprazole  40 mg Oral QAM AC    metoprolol succinate  25 mg Oral Daily    losartan  25 mg Oral Daily    QUEtiapine  25 mg Oral BID    escitalopram  20 mg Oral Daily    gabapentin  100 mg Oral TID    enoxaparin  40 mg Subcutaneous Daily    divalproex  500 mg Oral 2 times per day    heparin flush  3 mL Intravenous 2 times per day    ipratropium-albuterol  1 ampule Inhalation Q4H WA    Arformoterol Tartrate  15 mcg Nebulization BID    ampicillin-sulbactam  3 g Intravenous Q6H    aspirin  81 mg Oral Daily    atorvastatin  80 mg Oral Nightly    sodium chloride flush  10 mL Intravenous 2 times per day    folic acid  1 mg Intravenous Daily    ticagrelor  90 mg Oral BID     Continuous Infusions:   dexmedetomidine (PRECEDEX) IV infusion Stopped (12/15/20 0558)     PRN Meds:trimethobenzamide, perflutren lipid microspheres, sodium chloride flush, heparin flush, hydrALAZINE, sodium chloride flush, acetaminophen **OR** acetaminophen, polyethylene glycol, labetalol    OBJECTIVE:  /69   Pulse 78   Temp 97.5 °F (36.4 °C) (Oral)   Resp 24   Ht 5' 5\" (1.651 m)   Wt 186 lb 8.2 oz (84.6 kg)   SpO2 96%   BMI 31.04 kg/m²   Temp  Av.1 °F (36.7 °C)  Min: 97.5 °F (36.4 °C)  Max: 98.6 °F (37 °C)     Constitutional: awake, alert, oriented. NAD. Room air. Skin: Warm and dry. No rashes were noted. HEENT: Round and reactive pupils. Moist mucous membranes. No ulcerations or thrush. Neck: no nuchal rigidity,no LN    Chest: clear to auscultation   Cardiovascular: S1 and S2 are rhythmic and regular. No murmurs appreciated. Abdomen: Soft, bowel sound +, non distended    Extremities: No clubbing, no cyanosis, no edema.     PICC line 2020

## 2020-12-15 NOTE — PROGRESS NOTES
Palliative Care Department  [phone]  Palliative Care Progress Note  Provider TRISTAN Ribeiro      PATIENT: Sharath Quinteros  : 1955  MRN: [id number]  ADMISSION DATE: 12/3/2020 11:27 AM    Palliative Medicine was consulted on hospital day 12 for assistance with Goals of care, Code Status Discussion, Family support, Symptom management. HPI:     Zach Singh is a 77 y.o. y/o female with a history of CAD, MI (20), hypertension, degenerative bone disease who presented to [facility]) on 12/3/2020 with altered mental status, she was subsequently intubated for airway protection. She was found to have a UTI with leukocytosis. CT of the head was negative. She was admitted to the ICU for further management. Palliative care consulted for goals of care, code status discussion, family support, and symptom management. ASSESSMENT/PLAN:     Pertinent Hospital Diagnoses      Acute encephalopathy: Neurology following, CT and MRI brain negative, EEG showed moderate nonspecific encephalopathy, LP negative for infectious process    Acute hypoxic respiratory failure: Extubated, doing well   UTI: Culture positive for E faecalis and MSSA, BC negative, ID following, Unasyn    Palliative Care Encounter / Counseling Regarding Goals of Care  Please see detailed goals of care discussion as below   At this time, Sharath Quinteros, Does have capacity for medical decision-making. Capacity is time limited and situation/question specific   Outcome of goals of care meeting:  Continue current care   Code status Full Code   Advanced Directives: no POA or living will in [facility]   Surrogate/Legal NOK:   nicky Javad Heart (NOK/son): [phone]  o Kari Min (son): [phone]     There are no further PM needs at this time. PM will now sign off. If new PM needs arise, please re-consult. Thank you.     Symptom management : Per ICU team.       SUBJECTIVE:     Details of Conversation: 12/15/2020: Patient seen the bedside, no family present. She is extubated, doing well nasal cannula, is alert, oriented, voice needs concerns well, and overall states she is feeling well. She is having to be free of the ventilator, looks forward to continuing to recover. She risks remain full code. Her goal is return home when she is able. She otherwise denies any other needs from bowel medicine service and palliative medicine service signed. 12/14/20: Chart reviewed and patient seen at bedside. She remains mechanically ventilated. She is alert and following commands. Spoke with bedside RN and patient has been following commands and they are pursuing weaning trials from ventilator. Call to patient's son, Narendra Herrera, for support. He is pleased with patient's progress over the weekend and would like to continue FULL CODE status at this time as they continue weaning from the ventilator. Answered all questions regarding patient status. Palliative care will continue to follow along and will speak with patient once extubated. 12/11/20: Chart reviewed and patient seen at the bedside with no family present. She remains mechanically ventilated and sedated. She does not open eyes to voice or tactile stimulation but does move all extremities to tactile stimuli. She did not follow commands. Spoke with bedside RN and plan is to obtain LP today. Call placed to patient's son, Narendra Herrera, with no answer and inability to leave voice message. Call to patient's son, Ally Rausch appears to be coping well at this time and they are awaiting the results of the diagnostic testing that is to be done including LP. Jenn Lanier would like to continue patient's current plan of care at this time until more information is found about her condition. Patient's CODE STATUS will maintain FULL CODE at this time. Informed him that palliative medicine will continue to follow along in patient's care and will see her again on Monday.  He will update his brother, Stacia Rainey, and have him call palliative medicine with any needs or questions. 12/10/2020: Mrs. Eckert seen at the bedside, no family present. She continues to be mechanically ventilated, sedated, now with Versed and fentanyl, and unfortunate does not wake and follow commands. She has become restless make random movements with stimulation. An update was received from the bedside nurse, and she reports that there is a plan for lumbar puncture later today to further evaluate cause of her encephalopathy. I did make a call to the patient's son Stacia Rainey, he was appreciative of the ongoing support, unfortunately he reports he is currently in [facility] for evaluation of a heart condition. At this time he states that he would wish for everything to continue for his mother, including full resuscitation. There otherwise are no other immediate needs from palliative medicine service, will continue to follow and provide ongoing support throughout her hospitalization. 2020: Spoke to [place] son Stacia Raniey, he told me that his mother was doing just fine before this confusion. She recently moved from [place] in May to begin her life here in [facility]. She is a recovering alcoholic about a year ago, also abuse drugs in the past about 10 years ago. CT scan did not show any abnormalities. She is being taken care of in the ICU. Spoke to him about advanced directives and CODE STATUS, at this time he wants to keep her mother a full code. He would like resuscitation and intubation, as this happened so suddenly. His goals are to bring her back home, and continue care for her.        Prognosis: Guarded    Past Medical History:   Diagnosis Date    CAD (coronary artery disease)     Degenerative disorder of bone     Hard of hearing     Hypertension     MI (myocardial infarction) ([facility] [address] 2020     Past Surgical History:   Procedure Laterality Date    BREAST LUMPECTOMY Left      SECTION  CORONARY ANGIOPLASTY WITH STENT PLACEMENT  08/22/2020    3.0 x 33mm Xience Sara to Prox LAD, EF35%, Dr. Clary Howell History   Problem Relation Age of Onset    Lung Cancer Mother     Diabetes Father        OBJECTIVE:     /69   Pulse 78   Temp 97.5 °F (36.4 °C) (Oral)   Resp 24   Ht 5' 5\" (1.651 m)   Wt 186 lb 8.2 oz (84.6 kg)   SpO2 96%   BMI 31.04 kg/m²     Physical Examination:  Gen: Alert, in no acute distress  HEENT: normocephalic, atraumatic, eyes open  Lungs: Clear to auscultation, no increased work of breathing  Heart: NSR per cardiac monitor  Abdomen: round, soft  Skin: warm and dry  Neuro: Alert, oriented, following commands    Objective data reviewed: labs, images, records, medication use, vitals and chart    Jackie March APRN-CNP  Palliative Medicine    Time/Communication  Greater than 50% of time spent, total 15 minutes in counseling and coordination of care at the bedside regarding goals of care and symptom management. Thank you for allowing Palliative Medicine to participate in the care of [facility].

## 2020-12-15 NOTE — PLAN OF CARE
Problem: Falls - Risk of:  Goal: Will remain free from falls  Description: Will remain free from falls  Outcome: Met This Shift     Problem: Falls - Risk of:  Goal: Absence of physical injury  Description: Absence of physical injury  Outcome: Met This Shift     Problem: Injury - Risk of, Physical Injury:  Goal: Will remain free from falls  Description: Will remain free from falls  Outcome: Met This Shift     Problem: Injury - Risk of, Physical Injury:  Goal: Absence of physical injury  Description: Absence of physical injury  Outcome: Met This Shift     Problem: Confusion - Acute:  Goal: Absence of continued neurological deterioration signs and symptoms  Description: Absence of continued neurological deterioration signs and symptoms  Outcome: Completed     Problem: Confusion - Acute:  Goal: Mental status will be restored to baseline  Description: Mental status will be restored to baseline  Outcome: Completed

## 2020-12-15 NOTE — PROGRESS NOTES
Patient awake and requesting another dose of seroquel to help her go back to sleep. Dr. Ortiz [place] notified and a one time dose ordered.

## 2020-12-16 LAB
ALBUMIN SERPL-MCNC: 3.4 G/DL (ref 3.5–5.2)
ALP BLD-CCNC: 80 U/L (ref 35–104)
ALT SERPL-CCNC: 17 U/L (ref 0–32)
ANION GAP SERPL CALCULATED.3IONS-SCNC: 15 MMOL/L (ref 7–16)
AST SERPL-CCNC: 15 U/L (ref 0–31)
BASOPHILS ABSOLUTE: 0.04 E9/L (ref 0–0.2)
BASOPHILS RELATIVE PERCENT: 0.4 % (ref 0–2)
BILIRUB SERPL-MCNC: 0.4 MG/DL (ref 0–1.2)
BUN BLDV-MCNC: 12 MG/DL (ref 8–23)
C DIFF TOXIN/ANTIGEN: NORMAL
CALCIUM SERPL-MCNC: 8.7 MG/DL (ref 8.6–10.2)
CHLORIDE BLD-SCNC: 109 MMOL/L (ref 98–107)
CO2: 18 MMOL/L (ref 22–29)
CREAT SERPL-MCNC: 0.6 MG/DL (ref 0.5–1)
EOSINOPHILS ABSOLUTE: 0.1 E9/L (ref 0.05–0.5)
EOSINOPHILS RELATIVE PERCENT: 1.1 % (ref 0–6)
GFR AFRICAN AMERICAN: >60
GFR NON-AFRICAN AMERICAN: >60 ML/MIN/1.73
GLUCOSE BLD-MCNC: 92 MG/DL (ref 74–99)
HCT VFR BLD CALC: 30 % (ref 34–48)
HEMOGLOBIN: 9.4 G/DL (ref 11.5–15.5)
IMMATURE GRANULOCYTES #: 0.1 E9/L
IMMATURE GRANULOCYTES %: 1.1 % (ref 0–5)
LYMPHOCYTES ABSOLUTE: 0.9 E9/L (ref 1.5–4)
LYMPHOCYTES RELATIVE PERCENT: 9.7 % (ref 20–42)
MAGNESIUM: 1.9 MG/DL (ref 1.6–2.6)
MCH RBC QN AUTO: 31.1 PG (ref 26–35)
MCHC RBC AUTO-ENTMCNC: 31.3 % (ref 32–34.5)
MCV RBC AUTO: 99.3 FL (ref 80–99.9)
MONOCYTES ABSOLUTE: 0.84 E9/L (ref 0.1–0.95)
MONOCYTES RELATIVE PERCENT: 9.1 % (ref 2–12)
NEUTROPHILS ABSOLUTE: 7.29 E9/L (ref 1.8–7.3)
NEUTROPHILS RELATIVE PERCENT: 78.6 % (ref 43–80)
PDW BLD-RTO: 13.1 FL (ref 11.5–15)
PHOSPHORUS: 3.4 MG/DL (ref 2.5–4.5)
PLATELET # BLD: 300 E9/L (ref 130–450)
PMV BLD AUTO: 9.8 FL (ref 7–12)
POTASSIUM SERPL-SCNC: 2.7 MMOL/L (ref 3.5–5)
RBC # BLD: 3.02 E12/L (ref 3.5–5.5)
SODIUM BLD-SCNC: 142 MMOL/L (ref 132–146)
TOTAL PROTEIN: 6.2 G/DL (ref 6.4–8.3)
WBC # BLD: 9.3 E9/L (ref 4.5–11.5)

## 2020-12-16 PROCEDURE — 99232 SBSQ HOSP IP/OBS MODERATE 35: CPT | Performed by: NURSE PRACTITIONER

## 2020-12-16 PROCEDURE — 6370000000 HC RX 637 (ALT 250 FOR IP): Performed by: INTERNAL MEDICINE

## 2020-12-16 PROCEDURE — 85025 COMPLETE CBC W/AUTO DIFF WBC: CPT

## 2020-12-16 PROCEDURE — 97161 PT EVAL LOW COMPLEX 20 MIN: CPT

## 2020-12-16 PROCEDURE — 2580000003 HC RX 258: Performed by: INTERNAL MEDICINE

## 2020-12-16 PROCEDURE — 84100 ASSAY OF PHOSPHORUS: CPT

## 2020-12-16 PROCEDURE — 94640 AIRWAY INHALATION TREATMENT: CPT

## 2020-12-16 PROCEDURE — 97530 THERAPEUTIC ACTIVITIES: CPT

## 2020-12-16 PROCEDURE — 36415 COLL VENOUS BLD VENIPUNCTURE: CPT

## 2020-12-16 PROCEDURE — 6360000002 HC RX W HCPCS: Performed by: INTERNAL MEDICINE

## 2020-12-16 PROCEDURE — 6370000000 HC RX 637 (ALT 250 FOR IP): Performed by: GENERAL PRACTICE

## 2020-12-16 PROCEDURE — 80053 COMPREHEN METABOLIC PANEL: CPT

## 2020-12-16 PROCEDURE — 97166 OT EVAL MOD COMPLEX 45 MIN: CPT

## 2020-12-16 PROCEDURE — 97535 SELF CARE MNGMENT TRAINING: CPT

## 2020-12-16 PROCEDURE — 87449 NOS EACH ORGANISM AG IA: CPT

## 2020-12-16 PROCEDURE — 87324 CLOSTRIDIUM AG IA: CPT

## 2020-12-16 PROCEDURE — 83735 ASSAY OF MAGNESIUM: CPT

## 2020-12-16 PROCEDURE — 2060000000 HC ICU INTERMEDIATE R&B

## 2020-12-16 RX ORDER — POTASSIUM CHLORIDE 20 MEQ/1
40 TABLET, EXTENDED RELEASE ORAL 2 TIMES DAILY WITH MEALS
Status: DISCONTINUED | OUTPATIENT
Start: 2020-12-16 | End: 2020-12-17 | Stop reason: HOSPADM

## 2020-12-16 RX ORDER — LOPERAMIDE HYDROCHLORIDE 2 MG/1
2 CAPSULE ORAL PRN
Status: DISCONTINUED | OUTPATIENT
Start: 2020-12-16 | End: 2020-12-17 | Stop reason: HOSPADM

## 2020-12-16 RX ADMIN — FOLIC ACID 1 MG: 1 TABLET ORAL at 08:26

## 2020-12-16 RX ADMIN — QUETIAPINE FUMARATE 25 MG: 25 TABLET ORAL at 08:26

## 2020-12-16 RX ADMIN — LOPERAMIDE HYDROCHLORIDE 2 MG: 2 CAPSULE ORAL at 12:13

## 2020-12-16 RX ADMIN — PANTOPRAZOLE SODIUM 40 MG: 40 TABLET, DELAYED RELEASE ORAL at 05:45

## 2020-12-16 RX ADMIN — ARFORMOTEROL TARTRATE 15 MCG: 15 SOLUTION RESPIRATORY (INHALATION) at 10:17

## 2020-12-16 RX ADMIN — Medication 300 UNITS: at 21:17

## 2020-12-16 RX ADMIN — LOPERAMIDE HYDROCHLORIDE 2 MG: 2 CAPSULE ORAL at 09:51

## 2020-12-16 RX ADMIN — ARFORMOTEROL TARTRATE 15 MCG: 15 SOLUTION RESPIRATORY (INHALATION) at 20:50

## 2020-12-16 RX ADMIN — GABAPENTIN 100 MG: 100 CAPSULE ORAL at 08:26

## 2020-12-16 RX ADMIN — TICAGRELOR 90 MG: 90 TABLET ORAL at 21:17

## 2020-12-16 RX ADMIN — ASPIRIN 81 MG CHEWABLE TABLET 81 MG: 81 TABLET CHEWABLE at 08:26

## 2020-12-16 RX ADMIN — ACETAMINOPHEN 650 MG: 325 TABLET ORAL at 04:33

## 2020-12-16 RX ADMIN — POTASSIUM CHLORIDE 40 MEQ: 1500 TABLET, EXTENDED RELEASE ORAL at 17:54

## 2020-12-16 RX ADMIN — LOPERAMIDE HYDROCHLORIDE 2 MG: 2 CAPSULE ORAL at 06:26

## 2020-12-16 RX ADMIN — LOPERAMIDE HYDROCHLORIDE 2 MG: 2 CAPSULE ORAL at 21:17

## 2020-12-16 RX ADMIN — QUETIAPINE FUMARATE 25 MG: 25 TABLET ORAL at 21:17

## 2020-12-16 RX ADMIN — GABAPENTIN 100 MG: 100 CAPSULE ORAL at 14:29

## 2020-12-16 RX ADMIN — TICAGRELOR 90 MG: 90 TABLET ORAL at 08:27

## 2020-12-16 RX ADMIN — ENOXAPARIN SODIUM 40 MG: 40 INJECTION SUBCUTANEOUS at 08:26

## 2020-12-16 RX ADMIN — Medication 10 ML: at 21:19

## 2020-12-16 RX ADMIN — ESCITALOPRAM 20 MG: 10 TABLET, FILM COATED ORAL at 08:25

## 2020-12-16 RX ADMIN — POTASSIUM CHLORIDE 40 MEQ: 1500 TABLET, EXTENDED RELEASE ORAL at 09:51

## 2020-12-16 RX ADMIN — GABAPENTIN 100 MG: 100 CAPSULE ORAL at 21:17

## 2020-12-16 RX ADMIN — ATORVASTATIN CALCIUM 80 MG: 80 TABLET, FILM COATED ORAL at 21:17

## 2020-12-16 RX ADMIN — Medication 300 UNITS: at 08:26

## 2020-12-16 RX ADMIN — METOPROLOL SUCCINATE 25 MG: 50 TABLET, EXTENDED RELEASE ORAL at 08:27

## 2020-12-16 RX ADMIN — Medication 10 ML: at 08:26

## 2020-12-16 RX ADMIN — LOSARTAN POTASSIUM 25 MG: 25 TABLET, FILM COATED ORAL at 08:25

## 2020-12-16 ASSESSMENT — PAIN SCALES - GENERAL
PAINLEVEL_OUTOF10: 6
PAINLEVEL_OUTOF10: 0

## 2020-12-16 NOTE — PROGRESS NOTES
[address] [place] [address] [facility]  Progress Note      C/C : Aspiration pneumonia, respiratory failure     No nausea, vomiting. Had 5-6 episodes of loose stools over night. Stool for cdiff sent this AM. Room air. 96% SpO2. Afebrile. Medications:  Scheduled Meds:   potassium chloride  40 mEq Oral BID WC    pantoprazole  40 mg Oral QAM AC    folic acid  1 mg Oral Daily    metoprolol succinate  25 mg Oral Daily    losartan  25 mg Oral Daily    QUEtiapine  25 mg Oral BID    escitalopram  20 mg Oral Daily    gabapentin  100 mg Oral TID    enoxaparin  40 mg Subcutaneous Daily    heparin flush  3 mL Intravenous 2 times per day    Arformoterol Tartrate  15 mcg Nebulization BID    aspirin  81 mg Oral Daily    atorvastatin  80 mg Oral Nightly    sodium chloride flush  10 mL Intravenous 2 times per day    ticagrelor  90 mg Oral BID     Continuous Infusions:    PRN Meds:loperamide, ipratropium-albuterol, trimethobenzamide, perflutren lipid microspheres, sodium chloride flush, heparin flush, hydrALAZINE, sodium chloride flush, acetaminophen **OR** acetaminophen, polyethylene glycol    OBJECTIVE:  BP (!) 142/78   Pulse 78   Temp 97 °F (36.1 °C) (Temporal)   Resp 12   Ht 5' 5\" (1.651 m)   Wt 162 lb 6.4 oz (73.7 kg)   SpO2 96%   BMI 27.02 kg/m²   Temp  Av.1 °F (36.7 °C)  Min: 97 °F (36.1 °C)  Max: 98.8 °F (37.1 °C)     Constitutional: awake, alert, oriented. NAD. Room air. Skin: Warm and dry. No rashes were noted. HEENT: Round and reactive pupils. Moist mucous membranes. No ulcerations or thrush. Neck: no nuchal rigidity,no LN    Chest: clear to auscultation   Cardiovascular: S1 and S2 are rhythmic and regular. No murmurs appreciated. Abdomen: Soft, bowel sound +, non distended    Extremities: No clubbing, no cyanosis, no edema.     PICC line 2020 right brachial      Laboratory and Tests Review:  Lab Results   Component Value Date    WBC 9.3 2020    WBC 6.9 12/15/2020    WBC 8.0 12/14/2020    HGB 9.4 (L) 12/16/2020    HCT 30.0 (L) 12/16/2020    MCV 99.3 12/16/2020     12/16/2020     Lab Results   Component Value Date    NEUTROABS 7.29 12/16/2020    NEUTROABS 4.98 12/15/2020    NEUTROABS 6.36 12/14/2020     No results found for: CRPHS  Lab Results   Component Value Date    ALT 17 12/16/2020    AST 15 12/16/2020    ALKPHOS 80 12/16/2020    BILITOT 0.4 12/16/2020     Lab Results   Component Value Date     12/16/2020    K 2.7 12/16/2020    K 4.0 10/19/2020     12/16/2020    CO2 18 12/16/2020    BUN 12 12/16/2020    CREATININE 0.6 12/16/2020    CREATININE 0.6 12/15/2020    CREATININE 0.6 12/14/2020    GFRAA >60 12/16/2020    LABGLOM >60 12/16/2020    GLUCOSE 92 12/16/2020    PROT 6.2 12/16/2020    LABALBU 3.4 12/16/2020    CALCIUM 8.7 12/16/2020    BILITOT 0.4 12/16/2020    ALKPHOS 80 12/16/2020    AST 15 12/16/2020    ALT 17 12/16/2020     Lab Results   Component Value Date    CRP 1.9 (H) 12/05/2020     Lab Results   Component Value Date    SEDRATE 6 12/05/2020     Radiology:  Reviewed    12/15/2020 CXR - no infiltrates seen    Microbiology:     Sputum cx -    Susceptibility    Staphylococcus aureus (1)    Antibiotic Interpretation ZHOU Status    clindamycin Sensitive ^0. 25 mcg/mL     doxycycline Sensitive <=^0.5 mcg/mL     erythromycin Resistant >=^8 mcg/mL     gentamicin Sensitive <=^0.5 mcg/mL     oxacillin Sensitive ^0.5 mcg/mL     trimethoprim-sulfamethoxazole Sensitive <=^10 mcg/mL     vancomycin Sensitive ^1 mcg/mL         Culture, Urine [[id number]] (Abnormal)  Collected: 12/05/20 1240   Order Status: Completed Specimen: Urine, clean catch Updated: 12/08/20 0814    Organism Staphylococcus aureusAbnormal     Urine Culture, Routine >100,000 CFU/ml    Organism Enterococcus faecalisAbnormal     Urine Culture, Routine >100,000 CFU/ml   Narrative:     Source: URINE       Site: Urine Catheter                 Chest x ray - bibasilar airspace opacities Echo-     Left ventricular internal dimensions were normal in diastole and systole. Mild left ventricular concentric hypertrophy noted. No regional wall motion abnormalities seen. Normal left ventricular ejection fraction. ASSESSMENT:  · Aspiration pneumonia- MSSA ( no meningitis )   · UTI- E faecalis and MSSA  · Respiratory failure - resolved  · Leukocytosis - resolved  · R/O C-Diff - having loose stools. Culture sent - negative      PLAN:  · Unasyn 3 grams IV q 6 hrs day 10 of 10 - ordered to stop after today's doses. · Stool for cdiff. - negative   · Monitor labs    [place] President  10:26 AM  12/16/2020     Pt seen and examined. Above discussed agree with advanced practice nurse. Labs, cultures, and radiographs reviewed. Face to Face encounter occurred. Changes made as necessary.      Uma Sterling MD

## 2020-12-16 NOTE — PROGRESS NOTES
OCCUPATIONAL THERAPY INITIAL EVALUATION      Date:2020  Patient Name: Jessenia Bernabe  MRN: [id number]  : 1955  Room: [address] [place] [address]    Evaluating OT: Rui Arellano, [facility], OTR/L   License #[id number]    Referring physician: Adriane Collins MD    AM-PAC Daily Activity Raw Score: 1724    Recommended Adaptive Equipment: ww     Diagnosis: Acute Delirium     Surgery: Intubated 12-3-20 and extubated 12-15-20  Coronary Angioplasty with stent placement 20    Pertinent Medical History: acute delirium, MI, Hard of Hearing, Degenerative Disorder of the bone, CAD, hypertension     Precautions:  Falls, bed alarm, chair alarm, Hard of hearing, contact precautions due to c-diff (occurred after therapy evaluation), hypertension      Home Living: Pt lives in a one story home with 1 REAGAN and 1 HR. Bedroom and bathroom are on the main floor. Bathroom setup: tub shower with St. Commode    Equipment owned: none     Prior Level of Function: Independent with ADLs, Independent with IADLs; using no AD for mobility. Driving: Yes                           Medication Management: Self  Occupation: Pt. Did not state      Pain Level: 0/10 pain pre and post treatment. Cognition: A&O: 4/4; Follows 1 to 2- step directions with increased processing time. Required cues for initiation of task and follow through when completing tasks.     Memory:  Good- (A & O x 4)    Sequencing: Fair    Problem solving:  Fair   Judgement/safety:  Poor +           Functional Assessment:    Initial Eval Status  Date: 20 Treatment Status  Date: STGs = LTGs  Time frame: 5-7 days    Feeding Min A   Cues for sequencing   Independent   Grooming Min A   With simulated tasks EOB   SBA when standing at sink with ww   UB Dressing Min A   To [facility] gown in bed  SBA   LB Dressing Mod A  Able to travis B/L socks at EOB   SBA with good safety awareness   Bathing Min A   With simulated tasks seated EOB   SBA Seated    Toileting Min A   Seated EOB with simulated tasks   SBA using [facility]   Bed Mobility  Supine to sit: SBA with HOB elevated    Sit to supine: NT    Rolling: NT  Supine to sit: Sup. Sit to supine: Sup. Rolling: Sup. Functional Transfers Sit > Stand: Min A with ww   Stand > Sit: Min A with ww   SPT: Min A with ww  SBA with ww for all functional transfers. Functional Mobility  Min A with ww to take 2-3 steps to bedside chair. Cues for sequencing and safety   SBA with ww  for all functional distances. Balance Sitting:       Static: SBA    Dynamic:Min A     Standing: Min A with ww   Sitting:       Static: Sup. Dynamic:SBA    Standing: SBA with ww   Activity Tolerance Fair- with light activity. Pt. Was limited by fatigue and feeling dizzy. Dizziness resolved when sitting in bedside chair    99 % Sp O2 on RA pre activity   96 bpm    98 % sp O2 on RA post activity   95 bpm     164/92 seated in bedside chair     166/88 standing with ww        Good with moderate activity. Visual/  Perceptual Glasses? Yes but did not have them in the room     Vision: Pt. Stated that she had trouble seeing print far away because she did not have her glasses        Safety Poor  Good when completing all ADL tasks. Hand Dominance Right      Strength ROM Additional Info:    RUE  4+/5  WFL       : Good     FM Coordination: Good     GM Coordination:    LUE 4+/5  WFL   : Good     FM Coordination: Good    GM Coordination: Good            Hearing: Hard of Hearing   Sensation:  No c/o numbness or tingling  Tone:  WFL  Edema: None noted                               Comments: RN cleared the patient to work with occupational therapy. Upon arrival, patient was supine in bed with HOB slightly elevated and agreeable to evaluation. . At end of session, patient was seated in bedside chair with chair alarm turned on with call light and phone within reach, all lines and tubes intact.  RN aware of the patients current position in bedside chair and aware of BP readings. Pt required cues and education as noted above for safe facilitation and completion of tasks. Prior to and at the end of session, environmental modifications/line management completed for patients safety and efficiency of treatment session. OT treatment: OT for functional assessment of  ADL, Functional Transfer/Mobility Training, Equipment Needs, Pt/Family Education, OT role and POC reviewed. Skilled occupational therapy services provided include instruction/training on safety and adapted techniques for completion of therapeutic activities, ADLs/IADLs, and deep breathing techniques.  Therapist facilitated bed mobility, functional transfers and functional mobility tasks. Pt. Required verbal cues for proper body positioning, sequencing with ww, hand placement and safety.  Pt. Required increased time when completing this task due to decreased safety awareness and feeling dizzy and lightheaded with positional changes. Pt. Educated on deep breathing techniques and demonstrated fair understanding of techniques.  Therapist facilitated self-care: LB and UB dressing tasks - providing min/mod cuing on body mechanics, posture, compensatory strategies, and safety. Pt. Demonstrated fair understanding of using energy conservation techniques when feeling SOB when completing tasks and the need to be seated in order to be safe.  Pt. Required verbal cues for safety when completing dynamic sitting tasks at EOB. Patient demonstrated fair understanding when completing these tasks seated EOB and in bedside chair.  Skilled monitoring of O2 sats, HR, and pt response throughout treatment. Patient would benefit from continued skilled OT to increase functional independence and quality of life. Eval Complexity: Mod     · Evaluation Complexity: Mod Complexity  ?  History: Expanded review of medical records and additional review of physical, cognitive, or psychosocial history related to current functional performance  ? Exam: 5+ performance deficits  ? Assistance/Modification: mod/max assistance or modifications required to perform tasks. May have comorbidities that affect occupational performance. Assessment of current deficits   Functional mobility [x]  ADLs [x] Strength [x]  Cognition [x]  Functional transfers  [x] IADLs [x] Safety Awareness [x]  Endurance [x]  Fine Motor Coordination [x] Balance [x] Vision/perception [] Sensation []   Gross Motor Coordination [x] ROM [x] Delirium [x]                  Motor Control [x]    Plan of Care: 2-4 days/week for 1-2 weeks PRN     Instruction/training on adapted ADL techniques and AE recommendations to increase functional independence within precautions  Training on energy conservation strategies/techniques to improve independence/tolerance for self-care routine  Functional transfer/mobility training/DME recommendations for increased independence, safety, and fall prevention  Patient/Family education to increase follow through with safety techniques and functional independence  Recommendation of environmental modifications for increased safety with functional transfers/mobility and ADLs  Cognitive retraining/development of therapeutic activities to improve problem solving, judgement, memory, and attention for increased safety/participation in ADL/IADL tasks  Therapeutic exercise to improve motor endurance, ROM, and functional strength for ADLs/functional transfers  Therapeutic activities to facilitate/challenge dynamic balance, stand tolerance for increased independence with ADLs  Positioning to improve functional independence    Rehab Potential: Good for established goals    Patient / Family Goal: Pt. Did not state     Patient and/or family were instructed diagnosis, prognosis/goals and plan of care. yes . Demonstrated good understanding of the need to receive additional rehabilitation to increase safety and independent with ADL tasks.     Time In: 8:07

## 2020-12-16 NOTE — PLAN OF CARE
Problem: Falls - Risk of:  Goal: Will remain free from falls  Description: Will remain free from falls  12/16/2020 0334 by Lise Joseph RN  Outcome: Met This Shift  12/15/2020 2250 by Gilberto Holland  Outcome: Met This Shift  Goal: Absence of physical injury  Description: Absence of physical injury  12/16/2020 0334 by Lise Joseph RN  Outcome: Met This Shift  12/15/2020 2250 by Gilberto Holland  Outcome: Met This Shift     Problem: Confusion - Acute:  Goal: Absence of continued neurological deterioration signs and symptoms  Description: Absence of continued neurological deterioration signs and symptoms  12/16/2020 0334 by Lise Joseph RN  Outcome: Met This Shift  12/15/2020 2250 by Gilberto Holland  Outcome: Met This Shift  12/15/2020 1424 by Parish Davis RN  Outcome: Not Met This Shift  Goal: Mental status will be restored to baseline  Description: Mental status will be restored to baseline  12/16/2020 0334 by Lise Joseph RN  Outcome: Met This Shift  12/15/2020 2250 by Gilberto Holland  Outcome: Met This Shift  12/15/2020 1424 by Parish Davis RN  Outcome: Not Met This Shift     Problem: Discharge Planning:  Goal: Ability to perform activities of daily living will improve  Description: Ability to perform activities of daily living will improve  12/15/2020 2250 by Gilberto Holland  Outcome: Met This Shift  12/15/2020 1424 by Parish Davis RN  Outcome: Not Met This Shift  Goal: Participates in care planning  Description: Participates in care planning  12/15/2020 2250 by Gilberto Holland  Outcome: Met This Shift  12/15/2020 1424 by Parish Davis RN  Outcome: Not Met This Shift     Problem: Injury - Risk of, Physical Injury:  Goal: Will remain free from falls  Description: Will remain free from falls  12/16/2020 0334 by Lise Joseph RN  Outcome: Met This Shift  12/15/2020 2250 by Gilberto Holland  Outcome: Met This Shift  Goal: Absence of physical injury  Description: Absence of physical injury  12/16/2020 0334 by Jamar Lopez RN  Outcome: Met This Shift  12/15/2020 2250 by Shahab Macario  Outcome: Met This Shift     Problem: Mood - Altered:  Goal: Mood stable  Description: Mood stable  12/15/2020 2250 by Shahab Macario  Outcome: Met This Shift  Goal: Absence of abusive behavior  Description: Absence of abusive behavior  12/15/2020 2250 by Shahab Macario  Outcome: Met This Shift  Goal: Verbalizations of feeling emotionally comfortable while being cared for will increase  Description: Verbalizations of feeling emotionally comfortable while being cared for will increase  12/15/2020 2250 by Shahab Macario  Outcome: Met This Shift     Problem: Psychomotor Activity - Altered:  Goal: Absence of psychomotor disturbance signs and symptoms  Description: Absence of psychomotor disturbance signs and symptoms  12/15/2020 2250 by Shhaab Macario  Outcome: Met This Shift     Problem: Sensory Perception - Impaired:  Goal: Demonstrations of improved sensory functioning will increase  Description: Demonstrations of improved sensory functioning will increase  12/15/2020 2250 by Shahab Macario  Outcome: Met This Shift  Goal: Decrease in sensory misperception frequency  Description: Decrease in sensory misperception frequency  12/15/2020 2250 by Shahab Macario  Outcome: Met This Shift  Goal: Able to refrain from responding to false sensory perceptions  Description: Able to refrain from responding to false sensory perceptions  12/15/2020 2250 by Shahab Macario  Outcome: Met This Shift  Goal: Demonstrates accurate environmental perceptions  Description: Demonstrates accurate environmental perceptions  12/15/2020 2250 by Shahab Macario  Outcome: Met This Shift  Goal: Able to distinguish between reality-based and nonreality-based thinking  Description: Able to distinguish between reality-based and nonreality-based thinking  12/15/2020 2250 by Shahab Macario  Outcome: Met This Shift  Goal: Able to interrupt nonreality-based thinking  Description: Able to interrupt nonreality-based thinking  12/15/2020 2250 by Bhargavi Petit  Outcome: Met This Shift     Problem: Sleep Pattern Disturbance:  Goal: Appears well-rested  Description: Appears well-rested  12/15/2020 2250 by Bhargavi Petit  Outcome: Met This Shift     Problem: Skin Integrity:  Goal: Will show no infection signs and symptoms  Description: Will show no infection signs and symptoms  12/15/2020 2250 by Bhargavi Petit  Outcome: Met This Shift  Goal: Absence of new skin breakdown  Description: Absence of new skin breakdown  12/15/2020 2250 by Bhargavi Petit  Outcome: Met This Shift

## 2020-12-16 NOTE — PROGRESS NOTES
Isidoro Riojas is a 77 y.o. right-handed female     Neurology is following for altered mental status. PMH: CAD, hypertension, prior MI in August, degenerative d/o of bone, [place], alcohol abuse with 1 year sobriety    Patient presented to ED on 12/3 with complaint of confusion that began the night prior to arrival.   Symptoms gradually worsened over time--- with repetitive speech and walking around outside. While driving with her son, she became more confused and continued to decline including hard time breathing and bilateral leg pain. She also did not know where she was or who her son was on arrival.     Patient would not cooperate with testing and ultimately required sedation and intubation. Patient has no history of similar episodes in the past.     Patient was intubated for airway protection and altered mental status in [facility] via EMS. In ED patient O2 sats found in the 30s with her cyanotic. CT scan of the head on 12/3 was negative. Patient was found to have UTI and mild leukocytosis without fever. MRI brain with anesthesia on 12/7 showed no acute intracranial abnormality. EEG from 12/8 consistent with moderate nonspecific encephalopathy    Hypertensive with other vitals stable at present time   Patient extubated on 12/14 around 1:10 PM transferred out of ICU on 12/15    Patient is doing well today with no complaints except diarrhea--- resting quietly in bed    No family present      Objective:     BP (!) 142/78   Pulse 78   Temp 97 °F (36.1 °C) (Temporal)   Resp 12   Ht 5' 5\" (1.651 m)   Wt 162 lb 6.4 oz (73.7 kg)   SpO2 96%   BMI 27.02 kg/m²     General appearance: Awake, alert, oriented, cooperative with no distress  Head: normocephalic, without obvious abnormality, atraumatic  Eyes: conjunctivae/corneas clear.  .  Neck:  supple, symmetrical, trachea midline   Lungs: Productive cough, unlabored breaths  Heart: regular rate and rhythm, NSR on tele   Extremities: normal, atraumatic, no cyanosis, generalized edema  Pulses: 2+ and symmetric  Skin: color, texture, turgor normal---no rashes or lesions      Mental Status: Awake, alert and oriented x4, able to state her age, able to state current president;   Able to provide 5 past presidents    Appropriate attention/concentration  Fundus of knowledge intact  Memories intact    Speech/Language: No aphasias or dysarthria; does well with object identification, repetition and conversation    Cranial Nerves:   I: smell NA   II: visual acuity  NA   II: visual fields blinks to threat   II: pupils  PERRL   III,VII: ptosis None   III,IV,VI: extraocular muscles   EOMI without nystagmus   V: mastication  normal   V: facial light touch sensation   normal   V,VII: corneal reflex     VII: facial muscle function - upper  Normal   VII: facial muscle function - lower Appears Normal   VIII: hearing  normal   IX: soft palate elevation   normal   IX,X: gag reflex    XI: trapezius strength  5/5   XI: sternocleidomastoid strength 5/5   XI: neck extension strength  5/5   XII: tongue strength   normal     Motor:  Strong  bilaterally  Grossly 5/5 throughout  Normal bulk and tone  No abnormal movements    Sensory:  LT intact throughout    Coordination:   FN, FFM and JEAN CARLOS intact bilaterally    Gait:  Not tested    DTR:   Right Brachioradialis reflex 2+  Left Brachioradialis reflex 2+  Right Biceps reflex 2+  Left Biceps reflex 2+  Right Triceps reflex 2+  Left Triceps reflex 2+  Right Quadriceps reflex 2+  Left Quadriceps reflex 2+  Right Achilles reflex 2+  Left Achilles reflex 2+    No Babinskis  No Castro's    No pathological reflexes    Laboratory/Radiology:     CBC:   Lab Results   Component Value Date    WBC 9.3 12/16/2020    RBC 3.02 12/16/2020    HGB 9.4 12/16/2020    HCT 30.0 12/16/2020    MCV 99.3 12/16/2020    MCH 31.1 12/16/2020    MCHC 31.3 12/16/2020    RDW 13.1 12/16/2020     12/16/2020    MPV 9.8 12/16/2020     CMP:    Lab Results   Component Value Date     12/16/2020    K 2.7 12/16/2020    K 4.0 10/19/2020     12/16/2020    CO2 18 12/16/2020    BUN 12 12/16/2020    CREATININE 0.6 12/16/2020    GFRAA >60 12/16/2020    LABGLOM >60 12/16/2020    GLUCOSE 92 12/16/2020    PROT 6.2 12/16/2020    LABALBU 3.4 12/16/2020    CALCIUM 8.7 12/16/2020    BILITOT 0.4 12/16/2020    ALKPHOS 80 12/16/2020    AST 15 12/16/2020    ALT 17 12/16/2020     Hepatic Function Panel:    Lab Results   Component Value Date    ALKPHOS 80 12/16/2020    ALT 17 12/16/2020    AST 15 12/16/2020    PROT 6.2 12/16/2020    BILITOT 0.4 12/16/2020    LABALBU 3.4 12/16/2020     U/A:    Lab Results   Component Value Date    COLORU Yellow 12/05/2020    PROTEINU Negative 12/05/2020    PHUR 7.0 12/05/2020    WBCUA 2-5 12/05/2020    RBCUA 10-20 12/05/2020    BACTERIA FEW 12/05/2020    CLARITYU Clear 12/05/2020    SPECGRAV 1.020 12/05/2020    LEUKOCYTESUR TRACE 12/05/2020    UROBILINOGEN 0.2 12/05/2020    BILIRUBINUR Negative 12/05/2020    BLOODU SMALL 12/05/2020    GLUCOSEU Negative 12/05/2020     HgBA1c:    Lab Results   Component Value Date    LABA1C 5.7 12/06/2020     FLP:    Lab Results   Component Value Date    TRIG 714 12/10/2020    HDL 45 12/06/2020    LDLCALC 44 12/06/2020    LABVLDL 37 12/06/2020     TSH:    Lab Results   Component Value Date    TSH 1.320 12/03/2020     XR CHEST PORTABLE   Final Result   No pulmonary infiltrates are identified. XR CHEST PORTABLE   Final Result   Patchy left lung base airspace infiltrate and/or pleural effusion. XR ABDOMEN FOR NG/OG/NE TUBE PLACEMENT   Final Result   Satisfactory position of enteric tube. XR CHEST PORTABLE   Final Result   1. Hypoinflation with decreased patchy bibasilar opacities. 2.  Lines and tubes are unchanged in position. 3.  Mildly prominent pulmonary vasculature may be related to technical   factors versus pulmonary vascular congestion.       XR CHEST PORTABLE   Final Result Development of subsegmental areas of atelectasis in the left base. XR CHEST PORTABLE   Final Result   1. Increased bibasilar airspace opacities more prominent on the left most   likely due to atelectasis. Superimposed aspiration and pneumonia are not   excluded. 2. Persistent possible pleural line from pneumothorax in the apical left   hemithorax. However, an overlying skin fold could result in a similar   appearance. 3. Pulmonary vascular congestion and borderline cardiomegaly. 4. Suggestion of trace right pleural effusion. XR CHEST PORTABLE   Final Result   1. Partial interval clearing of the small patchy infiltrate within the right   lung base. 2. There is no interval change in the position of support lines and tubes. XR CHEST ABDOMEN NG PLACEMENT   Final Result   Endotracheal tube in lower borderline position above the sofy at the level   of the lower margin of the arch of the aorta. Right-sided PICC line tip in the SVC right atrial junction. No pneumothorax on the right on the left. Expiratory study causing crowding of the bronchovascular markings at the   bases. XR CHEST PORTABLE   Final Result   Patchy bilateral lower lobe pulmonary infiltrates. XR CHEST PORTABLE   Final Result   Dense left perihilar and left lower lobe infiltrate. Stable position of the support lines and tubes. MRI BRAIN W WO CONTRAST   Final Result   1. No acute intracranial abnormality. 2. No mass, mass effect, edema or hemorrhage is seen. 3. Prominent inflammatory changes in the paranasal sinuses with air-fluid   levels, which may signify acute sinusitis. 4. Bilateral mastoid effusions. XR CHEST PORTABLE   Final Result   1. Multifocal bilateral airspace disease slightly more prominent within the   left lung when compared with the prior study of one day earlier.       XR CHEST PORTABLE   Final Result   Continued left lung base atelectasis and/or infiltrate and possible left pleural effusion      XR CHEST PORTABLE   Final Result   Focal infiltrates, left lung base. Cardiomegaly. XR CHEST PORTABLE   Final Result   New left basilar airspace opacities which may be on the basis of atelectasis   or pneumonia. Suspect small left pleural effusion. CTA CHEST W CONTRAST   Final Result   1. No pulmonary embolism. 2.  No pneumonia or pleural effusion. 3.  Mild pulmonary vascular congestion. 4.  Endotracheal tube is just above level of the sofy and can be retracted   approximately 3 cm. CT ABDOMEN PELVIS W IV CONTRAST Additional Contrast? None   Final Result   Diverticulosis without definite CT evidence of diverticulitis   Minimal pericardial effusion without gross cardiomegaly. Suggestion of   coronary artery calcification   Bilateral lung base slight atelectasis may be physiologic   Grade 1 anterolisthesis and degenerative disc disease at L4-5 without   evidence of spondylolysis      XR CHEST PORTABLE   Final Result   1. Endotracheal tube is just above level of the sofy. This tube could be   retracted approximately 3 centimetres. 2.  Stable mild pulmonary vascular congestion. No focal airspace opacity or   pleural effusion. CT HEAD WO CONTRAST   Final Result   1. There is no acute intracranial abnormality. Specifically, there is no   intracranial hemorrhage. 2. Atrophy and periventricular leukomalacia,      XR ABDOMEN FOR NG/OG/NE TUBE PLACEMENT   Final Result   Satisfactory position of nasogastric tube. XR CHEST PORTABLE   Final Result   The tip of the endotracheal tube is seen within the right mainstem bronchus   and should be pulled back by approximately 3 cm. Complete echo 12/7/2020:   Left ventricular internal dimensions were normal in diastole and systole. Mild left ventricular concentric hypertrophy noted. No regional wall motion abnormalities seen. Normal left ventricular ejection fraction.      Limited echo 10/20/2020:  Normal left ventricle size and systolic function. Ejection fraction is visually estimated at 60-65%. No regional wall motion abnormalities seen. Mild concentric left ventricular hypertrophy. Indeterminate diastolic function. The left atrium is mildly dilated. Normal right ventricular size and function. Physiologic and/or trace mitral regurgitation is present. No hemodynamically significant aortic stenosis is present. Physiologic and/or trace tricuspid regurgitation. RVSP is 29 mmHg. Normal estimated PA systolic pressure. No evidence for hemodynamically significant pericardial effusion. Compared to prior echo of 8/23/2020, changes noted. LVEF has improved from 40-45% to 60-65%. EEG 12/8/2020: Abnormal EEG with generalized slowing with increased amplitude activity with frontocentral sharps indicative of a moderate nonspecific encephalopathy. Consideration of toxic or metabolic encephalopathy vs sedation effect vs less likely structural abnormality. The absence of epileptiform activity during EEG study does not exclude the possibility of seizures or epilepsy given limited duration of study and lack of epileptic type activity during study. Clinical correlation is indicated. All labs and images personally reviewed today    Assessment:     Acute confusional state with speech disturbances   Differentials include postictal state from unwitnessed seizure; medication intoxication or primary psych event are less likely; acute stroke ruled out with negative MRI   MRI brain 12/7 showed no acute intracranial pathology   Complete echo unrevealing for acute changes   EEG consistent with moderate encephalopathy    UTI    On antibiotics per ID--urine cultures positive for staph aureus and E faecalis    ID following    Acute hypoxic respiratory failure    Secondary to altered mental status and pneumonia     respiratory cultures positive for staph aureus   Patient remains on Unasyn---  total of 10 days:  Today is day

## 2020-12-16 NOTE — CARE COORDINATION
Call placed to the patient's son Lilly Kelley to discuss transition of care planning. Patient lives with her son Lilly Kelley and her daughter-in-law Urmila Gilliland. They live in a one story home with two step entry. Discussed the patient's PT evaluations and explained that the patient may be able to transition to home if the family would be able to meet her needs. Per Lilly Kelley, his wife is working from home and would be able to assist the patient during the day when he is not there. Explained that the patient would need and walker and assistance to ambulate to the bathroom and to get up out of bed and/or chairs. Lilly Kelley expressed understanding and stated they would be able to assist her. Patient has no DME at home. Discussed [facility] and [facility] for discharge and Lilly Kelley is agreeable. Discussed home health care for nursing and therapy at discharge. Lilly Kelley is agreeable. Lilly Kelley has no preference for provider, so long as they accept the patient's insurance. HC orders and DME orders received. Call placed to [place] liaison with [facility]. with referral for Freeman Preciado. She will review the case and follow for discharge. Call placed to [employer] liaison with [employer] [facility] for [employer] and [facility]. They can provide both and deliver to the house. Per patient's son, either himself or his wife will provide discharge to home when patient is medically stable to discharge. Will continue to follow.      Clara Ibanez.  P:  [phone]

## 2020-12-16 NOTE — PROGRESS NOTES
Physical Therapy    Physical Therapy Initial Assessment     Name: Kimberley Wagner  : 1955  MRN: [id number]    Referring Provider:  Jean-Paul Medel MD    Date of Service: 2020    Evaluating PT:  Mary Jo Hernandez PT, DPT [id number]     Room #:  5746/9663-J  Diagnosis:  Acute delirium   PMHx/PSHx:  CAD, degenerative disorder of bone, [place], HTN, MI   Procedure/Surgery:  Intubated 12/3; Extubated 12/15  Precautions:  Falls, [place], Bed/chair alarm     Equipment Needs:  [employer]    SUBJECTIVE:    Pt lives with son and DIL in a 1 story home with no stairs to enter. Bedroom and bathroom are on the 1st level. Pt ambulated with no AD PTA. OBJECTIVE:   Initial Evaluation  Date: 20 Treatment Short Term/ Long Term   Goals   AM-PAC 6 Clicks 41/02     Was pt agreeable to Eval/treatment? Yes     Does pt have pain? No c/o pain      Bed Mobility  Rolling: SBA  Supine to sit: SBA  Sit to supine: Min A  Scooting: SBA  Rolling: Independent   Supine to sit: Independent   Sit to supine: Independent   Scooting: Independent    Transfers Sit to stand: Min A  Stand to sit: Min A  Stand pivot: Min A with [employer]   Sit to stand: Independent   Stand to sit:  Independent   Stand pivot: Modified Independent with [employer]   Ambulation    20 feet with [employer] Min A  >150 feet with [employer] Modified Independent     Stair negotiation: ascended and descended  NT  >4 steps with 1 rail Modified Independent     ROM BUE:  Per OT  BLE:  WFL     Strength BUE:  Per OT  BLE:  Grossly 3+/5     Balance Sitting EOB:  SBA  Dynamic Standing:  Min A  Sitting EOB:  Independent   Dynamic Standing:  Independent      Pt is A & O x 4  Sensation:  Pt denies numbness and tingling to extremities  Edema:  Unremarkable     Therapeutic Exercises:     BLE ROM    Patient education  Pt educated on safety during functional mobility, [employer] usage, fall prevention, safety and pacing with positional changes    Patient response to education:   Pt verbalized understanding Pt demonstrated skill Pt requires further education in this area   Yes  Yes  [employer]      ASSESSMENT:    Comments:  Pt received supine and agreeable to PT evaluation. Vitals monitored during session. Pt reports dizziness with positional changes. Educated on taking time with supine>sit, STS, and ambulation. Pt given [employer] for safety. Educated on usage. Pt requires hands on assistance for ambulation and states she is only able to go short distance d/t continued dizziness. Pt assisted back to bed at end of session stating she hasn't slept in a couple of days and had sat up in chair earlier after OT session. Pt left in bed with call button in reach, lines attached, and needs met. Pt would benefit from [employer] at discharge to improve safety and gait mechanics. Treatment:  Patient practiced and was instructed in the following treatment:     Bed mobility training - pt given verbal and tactile cues to facilitate proper sequencing and safety during supine<>sit as well as provided with physical assistance to complete task     STS and pivot transfer training - pt educated on proper hand and foot placement, safety and sequencing, and use of WW to safely complete sit<>stand and pivot transfers with hands on assistance to complete task safely     Gait training- pt was given verbal and tactile cues to facilitate safety/balance and [employer] usage during ambulation as well as provided with physical assistance to complete task.  Fall prevention education and assistive device usage    Pt's/ family goals   1. Home with son and DIL (both available to assist)    Patient and or family understand(s) diagnosis, prognosis, and plan of care.   Yes     PLAN:    Current Treatment Recommendations     [] Strengthening     [] ROM   [x] Balance Training   [x] Endurance Training   [x] Transfer Training   [x] Gait Training   [x] Stair Training   [x] Positioning   [x] Safety and Education Training   [x] Patient/Caregiver Education   [x] HEP  [] Other     Frequency

## 2020-12-17 VITALS
TEMPERATURE: 97.8 F | DIASTOLIC BLOOD PRESSURE: 81 MMHG | WEIGHT: 166 LBS | SYSTOLIC BLOOD PRESSURE: 163 MMHG | RESPIRATION RATE: 16 BRPM | HEIGHT: 65 IN | OXYGEN SATURATION: 98 % | BODY MASS INDEX: 27.66 KG/M2 | HEART RATE: 67 BPM

## 2020-12-17 LAB
ALBUMIN SERPL-MCNC: 3.8 G/DL (ref 3.5–5.2)
ALP BLD-CCNC: 81 U/L (ref 35–104)
ALT SERPL-CCNC: 14 U/L (ref 0–32)
ANION GAP SERPL CALCULATED.3IONS-SCNC: 9 MMOL/L (ref 7–16)
AST SERPL-CCNC: 12 U/L (ref 0–31)
BASOPHILS ABSOLUTE: 0.04 E9/L (ref 0–0.2)
BASOPHILS RELATIVE PERCENT: 0.5 % (ref 0–2)
BILIRUB SERPL-MCNC: 0.4 MG/DL (ref 0–1.2)
BUN BLDV-MCNC: 10 MG/DL (ref 8–23)
CALCIUM SERPL-MCNC: 9 MG/DL (ref 8.6–10.2)
CHLORIDE BLD-SCNC: 110 MMOL/L (ref 98–107)
CO2: 23 MMOL/L (ref 22–29)
CREAT SERPL-MCNC: 0.7 MG/DL (ref 0.5–1)
EOSINOPHILS ABSOLUTE: 0.23 E9/L (ref 0.05–0.5)
EOSINOPHILS RELATIVE PERCENT: 2.7 % (ref 0–6)
GFR AFRICAN AMERICAN: >60
GFR NON-AFRICAN AMERICAN: >60 ML/MIN/1.73
GLUCOSE BLD-MCNC: 109 MG/DL (ref 74–99)
HCT VFR BLD CALC: 30.9 % (ref 34–48)
HEMOGLOBIN: 9.9 G/DL (ref 11.5–15.5)
IMMATURE GRANULOCYTES #: 0.12 E9/L
IMMATURE GRANULOCYTES %: 1.4 % (ref 0–5)
LYMPHOCYTES ABSOLUTE: 1.56 E9/L (ref 1.5–4)
LYMPHOCYTES RELATIVE PERCENT: 18.6 % (ref 20–42)
MAGNESIUM: 1.8 MG/DL (ref 1.6–2.6)
MCH RBC QN AUTO: 31.1 PG (ref 26–35)
MCHC RBC AUTO-ENTMCNC: 32 % (ref 32–34.5)
MCV RBC AUTO: 97.2 FL (ref 80–99.9)
MONOCYTES ABSOLUTE: 0.7 E9/L (ref 0.1–0.95)
MONOCYTES RELATIVE PERCENT: 8.3 % (ref 2–12)
NEUTROPHILS ABSOLUTE: 5.75 E9/L (ref 1.8–7.3)
NEUTROPHILS RELATIVE PERCENT: 68.5 % (ref 43–80)
PDW BLD-RTO: 13.2 FL (ref 11.5–15)
PHOSPHORUS: 2.7 MG/DL (ref 2.5–4.5)
PLATELET # BLD: 354 E9/L (ref 130–450)
PMV BLD AUTO: 9.7 FL (ref 7–12)
POTASSIUM SERPL-SCNC: 3.4 MMOL/L (ref 3.5–5)
RBC # BLD: 3.18 E12/L (ref 3.5–5.5)
SODIUM BLD-SCNC: 142 MMOL/L (ref 132–146)
TOTAL PROTEIN: 6.5 G/DL (ref 6.4–8.3)
WBC # BLD: 8.4 E9/L (ref 4.5–11.5)

## 2020-12-17 PROCEDURE — 94640 AIRWAY INHALATION TREATMENT: CPT

## 2020-12-17 PROCEDURE — 97166 OT EVAL MOD COMPLEX 45 MIN: CPT

## 2020-12-17 PROCEDURE — 6370000000 HC RX 637 (ALT 250 FOR IP): Performed by: INTERNAL MEDICINE

## 2020-12-17 PROCEDURE — 6360000002 HC RX W HCPCS: Performed by: INTERNAL MEDICINE

## 2020-12-17 PROCEDURE — 97530 THERAPEUTIC ACTIVITIES: CPT

## 2020-12-17 PROCEDURE — 36415 COLL VENOUS BLD VENIPUNCTURE: CPT

## 2020-12-17 PROCEDURE — 85025 COMPLETE CBC W/AUTO DIFF WBC: CPT

## 2020-12-17 PROCEDURE — 80053 COMPREHEN METABOLIC PANEL: CPT

## 2020-12-17 PROCEDURE — 97535 SELF CARE MNGMENT TRAINING: CPT

## 2020-12-17 PROCEDURE — 83735 ASSAY OF MAGNESIUM: CPT

## 2020-12-17 PROCEDURE — 2580000003 HC RX 258: Performed by: INTERNAL MEDICINE

## 2020-12-17 PROCEDURE — 6370000000 HC RX 637 (ALT 250 FOR IP): Performed by: GENERAL PRACTICE

## 2020-12-17 PROCEDURE — 84100 ASSAY OF PHOSPHORUS: CPT

## 2020-12-17 RX ORDER — FOLIC ACID 1 MG/1
1 TABLET ORAL DAILY
Qty: 30 TABLET | Refills: 3 | Status: SHIPPED | OUTPATIENT
Start: 2020-12-18

## 2020-12-17 RX ORDER — ARFORMOTEROL TARTRATE 15 UG/2ML
15 SOLUTION RESPIRATORY (INHALATION) 2 TIMES DAILY
Qty: 120 ML | Refills: 3 | Status: SHIPPED | OUTPATIENT
Start: 2020-12-17 | End: 2021-05-22

## 2020-12-17 RX ORDER — POTASSIUM CHLORIDE 20 MEQ/1
40 TABLET, EXTENDED RELEASE ORAL 2 TIMES DAILY WITH MEALS
Qty: 60 TABLET | Refills: 3 | Status: SHIPPED | OUTPATIENT
Start: 2020-12-17 | End: 2021-02-27

## 2020-12-17 RX ORDER — NITROGLYCERIN 0.4 MG/1
TABLET SUBLINGUAL
Qty: 25 TABLET | Refills: 3 | Status: ON HOLD | OUTPATIENT
Start: 2020-12-17 | End: 2021-01-14 | Stop reason: HOSPADM

## 2020-12-17 RX ORDER — IBUPROFEN 600 MG/1
600 TABLET ORAL 3 TIMES DAILY PRN
Qty: 21 TABLET | Refills: 0 | Status: SHIPPED | OUTPATIENT
Start: 2020-12-17 | End: 2021-05-22

## 2020-12-17 RX ORDER — IPRATROPIUM BROMIDE AND ALBUTEROL SULFATE 2.5; .5 MG/3ML; MG/3ML
3 SOLUTION RESPIRATORY (INHALATION) EVERY 4 HOURS PRN
Qty: 360 ML | Refills: 2 | Status: SHIPPED | OUTPATIENT
Start: 2020-12-17 | End: 2021-05-22

## 2020-12-17 RX ORDER — GABAPENTIN 100 MG/1
100 CAPSULE ORAL 3 TIMES DAILY
Qty: 90 CAPSULE | Refills: 3 | Status: ON HOLD | OUTPATIENT
Start: 2020-12-17 | End: 2021-01-14 | Stop reason: HOSPADM

## 2020-12-17 RX ADMIN — FOLIC ACID 1 MG: 1 TABLET ORAL at 08:36

## 2020-12-17 RX ADMIN — GABAPENTIN 100 MG: 100 CAPSULE ORAL at 08:42

## 2020-12-17 RX ADMIN — METOPROLOL SUCCINATE 25 MG: 50 TABLET, EXTENDED RELEASE ORAL at 08:35

## 2020-12-17 RX ADMIN — Medication 10 ML: at 08:35

## 2020-12-17 RX ADMIN — ASPIRIN 81 MG CHEWABLE TABLET 81 MG: 81 TABLET CHEWABLE at 08:35

## 2020-12-17 RX ADMIN — Medication 300 UNITS: at 08:36

## 2020-12-17 RX ADMIN — POTASSIUM CHLORIDE 40 MEQ: 1500 TABLET, EXTENDED RELEASE ORAL at 08:35

## 2020-12-17 RX ADMIN — LOSARTAN POTASSIUM 25 MG: 25 TABLET, FILM COATED ORAL at 08:35

## 2020-12-17 RX ADMIN — ESCITALOPRAM 20 MG: 10 TABLET, FILM COATED ORAL at 08:35

## 2020-12-17 RX ADMIN — ARFORMOTEROL TARTRATE 15 MCG: 15 SOLUTION RESPIRATORY (INHALATION) at 08:32

## 2020-12-17 RX ADMIN — QUETIAPINE FUMARATE 25 MG: 25 TABLET ORAL at 08:35

## 2020-12-17 RX ADMIN — PANTOPRAZOLE SODIUM 40 MG: 40 TABLET, DELAYED RELEASE ORAL at 05:17

## 2020-12-17 RX ADMIN — ENOXAPARIN SODIUM 40 MG: 40 INJECTION SUBCUTANEOUS at 08:35

## 2020-12-17 RX ADMIN — TICAGRELOR 90 MG: 90 TABLET ORAL at 08:35

## 2020-12-17 ASSESSMENT — PAIN SCALES - GENERAL
PAINLEVEL_OUTOF10: 0
PAINLEVEL_OUTOF10: 0

## 2020-12-17 NOTE — PROGRESS NOTES
[address] [place] [address] [facility]  Progress Note      C/C : Aspiration pneumonia, respiratory failure     No nausea, vomiting. Had 5-6 episodes of loose stools over night. Stool for cdiff neg. Room air. 98% SpO2. Afebrile. Medications:  Scheduled Meds:   potassium chloride  40 mEq Oral BID WC    pantoprazole  40 mg Oral QAM AC    folic acid  1 mg Oral Daily    metoprolol succinate  25 mg Oral Daily    losartan  25 mg Oral Daily    QUEtiapine  25 mg Oral BID    escitalopram  20 mg Oral Daily    gabapentin  100 mg Oral TID    enoxaparin  40 mg Subcutaneous Daily    heparin flush  3 mL Intravenous 2 times per day    Arformoterol Tartrate  15 mcg Nebulization BID    aspirin  81 mg Oral Daily    atorvastatin  80 mg Oral Nightly    sodium chloride flush  10 mL Intravenous 2 times per day    ticagrelor  90 mg Oral BID     Continuous Infusions:    PRN Meds:loperamide, ipratropium-albuterol, trimethobenzamide, perflutren lipid microspheres, sodium chloride flush, heparin flush, hydrALAZINE, sodium chloride flush, acetaminophen **OR** acetaminophen, polyethylene glycol    OBJECTIVE:  BP (!) 163/81   Pulse 67   Temp 97.8 °F (36.6 °C) (Oral)   Resp 16   Ht 5' 5\" (1.651 m)   Wt 166 lb (75.3 kg)   SpO2 98%   BMI 27.62 kg/m²   Temp  Av.6 °F (36.4 °C)  Min: 97.3 °F (36.3 °C)  Max: 97.8 °F (36.6 °C)     Constitutional: awake, alert, oriented. NAD. Room air. Skin: Warm and dry. No rashes were noted. HEENT: Round and reactive pupils. Moist mucous membranes. No ulcerations or thrush. Neck: no nuchal rigidity,no LN    Chest: clear to auscultation   Cardiovascular: S1 and S2 are rhythmic and regular. No murmurs appreciated. Abdomen: Soft, bowel sound +, non distended    Extremities: No clubbing, no cyanosis, no edema.     PICC line 2020 right brachial      Laboratory and Tests Review:  Lab Results   Component Value Date    WBC 8.4 2020    WBC 9.3 2020    WBC 6.9 12/15/2020    HGB 9.9 (L) 12/17/2020    HCT 30.9 (L) 12/17/2020    MCV 97.2 12/17/2020     12/17/2020     Lab Results   Component Value Date    NEUTROABS 5.75 12/17/2020    NEUTROABS 7.29 12/16/2020    NEUTROABS 4.98 12/15/2020     No results found for: CRPHS  Lab Results   Component Value Date    ALT 14 12/17/2020    AST 12 12/17/2020    ALKPHOS 81 12/17/2020    BILITOT 0.4 12/17/2020     Lab Results   Component Value Date     12/17/2020    K 3.4 12/17/2020    K 4.0 10/19/2020     12/17/2020    CO2 23 12/17/2020    BUN 10 12/17/2020    CREATININE 0.7 12/17/2020    CREATININE 0.6 12/16/2020    CREATININE 0.6 12/15/2020    GFRAA >60 12/17/2020    LABGLOM >60 12/17/2020    GLUCOSE 109 12/17/2020    PROT 6.5 12/17/2020    LABALBU 3.8 12/17/2020    CALCIUM 9.0 12/17/2020    BILITOT 0.4 12/17/2020    ALKPHOS 81 12/17/2020    AST 12 12/17/2020    ALT 14 12/17/2020     Lab Results   Component Value Date    CRP 1.9 (H) 12/05/2020     Lab Results   Component Value Date    SEDRATE 6 12/05/2020     Radiology:  Reviewed    12/15/2020 CXR - no infiltrates seen    Microbiology:     Sputum cx -    Susceptibility    Staphylococcus aureus (1)    Antibiotic Interpretation ZHOU Status    clindamycin Sensitive ^0. 25 mcg/mL     doxycycline Sensitive <=^0.5 mcg/mL     erythromycin Resistant >=^8 mcg/mL     gentamicin Sensitive <=^0.5 mcg/mL     oxacillin Sensitive ^0.5 mcg/mL     trimethoprim-sulfamethoxazole Sensitive <=^10 mcg/mL     vancomycin Sensitive ^1 mcg/mL         Culture, Urine [[id number]] (Abnormal)  Collected: 12/05/20 1240   Order Status: Completed Specimen: Urine, clean catch Updated: 12/08/20 0814    Organism Staphylococcus aureusAbnormal     Urine Culture, Routine >100,000 CFU/ml    Organism Enterococcus faecalisAbnormal     Urine Culture, Routine >100,000 CFU/ml   Narrative:     Source: URINE       Site: Urine Catheter                 Chest x ray - bibasilar airspace opacities       Echo-     Left

## 2020-12-17 NOTE — CARE COORDINATION
Plan home today. Have notified [place] at [facility] of discharge home today and orders for Freeman Preciado. They will follow up after discharge. Have also notified Kamaljit Mendez at [facility] of discharge home and need for delivery to home for bedside commode and walker.

## 2020-12-18 LAB
HERPES SIMPLEX VIRUS BY PCR: NOT DETECTED
HSV SOURCE: NORMAL

## 2020-12-18 NOTE — DISCHARGE SUMMARY
[address]                  [address] [facility],  [facility]                               DISCHARGE SUMMARY    PATIENT NAME: Abraham Martin                   :        1955  MED REC NO:   [id number]                            ROOM:       4135  ACCOUNT NO:   [de-identified]                           ADMIT DATE: 2020  PROVIDER:     Yoav Craven DO                  DISCHARGE DATE:  2020    FINAL DIAGNOSES:  1. Acute encephalopathy likely secondary to urinary tract infection and  aspiration pneumonia with hypoxia. 2.  Acute delirium. 3.  Aspiration pneumonia. 4.  Acute respiratory failure. 5.  Respiratory cultures with staph aureus. 6.  Urinary tract infection with staph aureus and Enterococcus faecalis. 7.  Hypokalemia. 8.  Intractable diarrhea. 9.  Coronary artery disease, status post myocardial infarction. 10.  Hypertension. 11.  Previous history of alcohol abuse and drug abuse. HOSPITAL COURSE:  Basically, the patient is a 58-year-old female,  previous history of alcoholism, a year of sobriety, was not feeling very  well at home. She got some back and some leg pain and then started  acting a little funny and started to experience some mental status  changes, sent her over to an urgent care center where she was kind of  acting out a little bit and is not making any sense or speaking  appropriately, becoming more confused, repetitive speech. She was  vomiting quite heavily. She needed to be sedated and eventually  required intubation to protect her airway. She had a CT of the head  that was negative and she did have evidence of urinary tract infection  with staph aureus and Enterococcus faecalis. She remained intubated in  the intensive care unit for quite some time, undergoing a workup  basically to determine the cause of her encephalopathy. She had a  slightly low potassium on admission, slightly elevated blood sugar. Her  drug screen was positive for benzodiazepines and fentanyl, but this was  given to her in the emergency department. She did have an elevation of  her white count at 13,000. Respiratory panel failed to demonstrate any  viral type of infections or COVID. Again, she had a CT of her head with  and without contrast and no acute intracranial abnormality and  specifically no hemorrhage was noted. EKG showed atrial rate of 84,  ventricular rate of 84, QT interval was okay. She had x-rays of her  chest.  We will follow along daily while she was intubated. No specific  abnormality was noted. Some mild pulmonary vascular congestion. No PE. No effusion. She did have a CT of her abdomen and pelvis. There was  diverticulosis, but no diverticulitis; bilateral lung atelectasis,  anterolisthesis of L4-L5 of the lumbar spine. Again, chest x-rays  pretty much remained stable. Eventually, she had an MRI that showed no  intracranial abnormality. No mass effect, edema or hemorrhage; maybe  some sinusitis. She was able eventually to be weaned off of sedation  according to her blood gases and her response and she was tolerating  room air with good O2 saturations. She did have a lumbar spine tap  which did not demonstrate any growth. Echocardiogram failed to  demonstrate any vegetations. She was treated with Unasyn for a total of  10 days, aerosol treatments, intermittent diuretics and did well. She  did develop some diarrhea post antibiotics, negative for C. diff,  treated with Imodium and now having normal bowel movements. So, she is  up out of bed, ambulating, eating normally, drinking normally, speaking  coherently, anxious to go home. She subsequently will be discharged.    We are going to cut down on some of her medications including muscle  relaxers and baclofen to avoid any encephalopathic insult already  present and she is instructed that were going to do this and we will  follow her up in the office in approximately one week. She is going to  go home to the care of her son and his wife. At the time of discharge,  her condition was improved. Her prognosis is good.         Yumiko Agustin DO    D: 12/17/2020 13:13:13       T: 12/17/2020 13:22:22     [id number]  Job#: [id number]     Doc#: [id number]    CC:

## 2021-01-12 ENCOUNTER — HOSPITAL ENCOUNTER (INPATIENT)
Age: 66
LOS: 1 days | Discharge: HOME | DRG: 312 | End: 2021-01-14
Attending: EMERGENCY MEDICINE | Admitting: GENERAL PRACTICE
Payer: MEDICARE

## 2021-01-12 ENCOUNTER — APPOINTMENT (OUTPATIENT)
Dept: GENERAL RADIOLOGY | Age: 66
DRG: 312 | End: 2021-01-12
Payer: MEDICARE

## 2021-01-12 ENCOUNTER — APPOINTMENT (OUTPATIENT)
Dept: CT IMAGING | Age: 66
DRG: 312 | End: 2021-01-12
Payer: MEDICARE

## 2021-01-12 DIAGNOSIS — I95.9 HYPOTENSION, UNSPECIFIED HYPOTENSION TYPE: ICD-10-CM

## 2021-01-12 DIAGNOSIS — R55 SYNCOPE AND COLLAPSE: Primary | ICD-10-CM

## 2021-01-12 LAB
ALBUMIN SERPL-MCNC: 3.9 G/DL (ref 3.5–5.2)
ALP BLD-CCNC: 91 U/L (ref 35–104)
ALT SERPL-CCNC: 7 U/L (ref 0–32)
ANION GAP SERPL CALCULATED.3IONS-SCNC: 11 MMOL/L (ref 7–16)
APTT: 34.5 SEC (ref 24.5–35.1)
AST SERPL-CCNC: 13 U/L (ref 0–31)
BASOPHILS ABSOLUTE: 0.02 E9/L (ref 0–0.2)
BASOPHILS RELATIVE PERCENT: 0.3 % (ref 0–2)
BILIRUB SERPL-MCNC: <0.2 MG/DL (ref 0–1.2)
BUN BLDV-MCNC: 9 MG/DL (ref 8–23)
CALCIUM SERPL-MCNC: 8.6 MG/DL (ref 8.6–10.2)
CHLORIDE BLD-SCNC: 109 MMOL/L (ref 98–107)
CO2: 20 MMOL/L (ref 22–29)
CREAT SERPL-MCNC: 1.3 MG/DL (ref 0.5–1)
EOSINOPHILS ABSOLUTE: 0.39 E9/L (ref 0.05–0.5)
EOSINOPHILS RELATIVE PERCENT: 6.1 % (ref 0–6)
GFR AFRICAN AMERICAN: 49
GFR NON-AFRICAN AMERICAN: 41 ML/MIN/1.73
GLUCOSE BLD-MCNC: 232 MG/DL (ref 74–99)
HCT VFR BLD CALC: 33.9 % (ref 34–48)
HEMOGLOBIN: 10.7 G/DL (ref 11.5–15.5)
IMMATURE GRANULOCYTES #: 0.01 E9/L
IMMATURE GRANULOCYTES %: 0.2 % (ref 0–5)
INR BLD: 0.9
LYMPHOCYTES ABSOLUTE: 2.11 E9/L (ref 1.5–4)
LYMPHOCYTES RELATIVE PERCENT: 32.8 % (ref 20–42)
MCH RBC QN AUTO: 30.3 PG (ref 26–35)
MCHC RBC AUTO-ENTMCNC: 31.6 % (ref 32–34.5)
MCV RBC AUTO: 96 FL (ref 80–99.9)
MONOCYTES ABSOLUTE: 0.35 E9/L (ref 0.1–0.95)
MONOCYTES RELATIVE PERCENT: 5.4 % (ref 2–12)
NEUTROPHILS ABSOLUTE: 3.55 E9/L (ref 1.8–7.3)
NEUTROPHILS RELATIVE PERCENT: 55.2 % (ref 43–80)
PDW BLD-RTO: 12.5 FL (ref 11.5–15)
PLATELET # BLD: 201 E9/L (ref 130–450)
PMV BLD AUTO: 10.8 FL (ref 7–12)
POTASSIUM REFLEX MAGNESIUM: 4.1 MMOL/L (ref 3.5–5)
PROTHROMBIN TIME: 10.2 SEC (ref 9.3–12.4)
RBC # BLD: 3.53 E12/L (ref 3.5–5.5)
SODIUM BLD-SCNC: 140 MMOL/L (ref 132–146)
TOTAL PROTEIN: 5.8 G/DL (ref 6.4–8.3)
TROPONIN: <0.01 NG/ML (ref 0–0.03)
WBC # BLD: 6.4 E9/L (ref 4.5–11.5)

## 2021-01-12 PROCEDURE — 70450 CT HEAD/BRAIN W/O DYE: CPT

## 2021-01-12 PROCEDURE — 80053 COMPREHEN METABOLIC PANEL: CPT

## 2021-01-12 PROCEDURE — 86900 BLOOD TYPING SEROLOGIC ABO: CPT

## 2021-01-12 PROCEDURE — 6360000004 HC RX CONTRAST MEDICATION: Performed by: RADIOLOGY

## 2021-01-12 PROCEDURE — 86850 RBC ANTIBODY SCREEN: CPT

## 2021-01-12 PROCEDURE — 99284 EMERGENCY DEPT VISIT MOD MDM: CPT

## 2021-01-12 PROCEDURE — 93005 ELECTROCARDIOGRAM TRACING: CPT | Performed by: EMERGENCY MEDICINE

## 2021-01-12 PROCEDURE — 85025 COMPLETE CBC W/AUTO DIFF WBC: CPT

## 2021-01-12 PROCEDURE — 2580000003 HC RX 258: Performed by: EMERGENCY MEDICINE

## 2021-01-12 PROCEDURE — 85610 PROTHROMBIN TIME: CPT

## 2021-01-12 PROCEDURE — 74177 CT ABD & PELVIS W/CONTRAST: CPT

## 2021-01-12 PROCEDURE — 85730 THROMBOPLASTIN TIME PARTIAL: CPT

## 2021-01-12 PROCEDURE — 71275 CT ANGIOGRAPHY CHEST: CPT

## 2021-01-12 PROCEDURE — 86901 BLOOD TYPING SEROLOGIC RH(D): CPT

## 2021-01-12 PROCEDURE — 71045 X-RAY EXAM CHEST 1 VIEW: CPT

## 2021-01-12 PROCEDURE — 84484 ASSAY OF TROPONIN QUANT: CPT

## 2021-01-12 RX ORDER — 0.9 % SODIUM CHLORIDE 0.9 %
1000 INTRAVENOUS SOLUTION INTRAVENOUS ONCE
Status: COMPLETED | OUTPATIENT
Start: 2021-01-12 | End: 2021-01-13

## 2021-01-12 RX ADMIN — IOPAMIDOL 90 ML: 755 INJECTION, SOLUTION INTRAVENOUS at 23:22

## 2021-01-12 RX ADMIN — SODIUM CHLORIDE 1000 ML: 9 INJECTION, SOLUTION INTRAVENOUS at 23:30

## 2021-01-12 ASSESSMENT — PAIN DESCRIPTION - ONSET: ONSET: ON-GOING

## 2021-01-12 ASSESSMENT — PAIN DESCRIPTION - PAIN TYPE: TYPE: ACUTE PAIN

## 2021-01-12 ASSESSMENT — PAIN SCALES - GENERAL: PAINLEVEL_OUTOF10: 8

## 2021-01-12 ASSESSMENT — PAIN DESCRIPTION - DESCRIPTORS: DESCRIPTORS: ACHING

## 2021-01-13 PROBLEM — R55 SYNCOPE AND COLLAPSE: Status: ACTIVE | Noted: 2021-01-13

## 2021-01-13 LAB
ABO/RH: NORMAL
ANION GAP SERPL CALCULATED.3IONS-SCNC: 6 MMOL/L (ref 7–16)
ANTIBODY SCREEN: NORMAL
BUN BLDV-MCNC: 10 MG/DL (ref 8–23)
CALCIUM SERPL-MCNC: 8.3 MG/DL (ref 8.6–10.2)
CHLORIDE BLD-SCNC: 115 MMOL/L (ref 98–107)
CO2: 24 MMOL/L (ref 22–29)
CREAT SERPL-MCNC: 0.9 MG/DL (ref 0.5–1)
EKG ATRIAL RATE: 62 BPM
EKG P AXIS: 77 DEGREES
EKG P-R INTERVAL: 184 MS
EKG Q-T INTERVAL: 476 MS
EKG QRS DURATION: 88 MS
EKG QTC CALCULATION (BAZETT): 483 MS
EKG R AXIS: 24 DEGREES
EKG T AXIS: 58 DEGREES
EKG VENTRICULAR RATE: 62 BPM
GFR AFRICAN AMERICAN: >60
GFR NON-AFRICAN AMERICAN: >60 ML/MIN/1.73
GLUCOSE BLD-MCNC: 122 MG/DL (ref 74–99)
LACTIC ACID: 1.6 MMOL/L (ref 0.5–2.2)
POTASSIUM SERPL-SCNC: 4.3 MMOL/L (ref 3.5–5)
SODIUM BLD-SCNC: 145 MMOL/L (ref 132–146)

## 2021-01-13 PROCEDURE — 99223 1ST HOSP IP/OBS HIGH 75: CPT | Performed by: INTERNAL MEDICINE

## 2021-01-13 PROCEDURE — 36415 COLL VENOUS BLD VENIPUNCTURE: CPT

## 2021-01-13 PROCEDURE — 80048 BASIC METABOLIC PNL TOTAL CA: CPT

## 2021-01-13 PROCEDURE — 2580000003 HC RX 258: Performed by: GENERAL PRACTICE

## 2021-01-13 PROCEDURE — 83605 ASSAY OF LACTIC ACID: CPT

## 2021-01-13 PROCEDURE — 2060000000 HC ICU INTERMEDIATE R&B

## 2021-01-13 PROCEDURE — 6370000000 HC RX 637 (ALT 250 FOR IP): Performed by: GENERAL PRACTICE

## 2021-01-13 PROCEDURE — 6360000002 HC RX W HCPCS: Performed by: EMERGENCY MEDICINE

## 2021-01-13 RX ORDER — METOPROLOL SUCCINATE 100 MG/1
100 TABLET, EXTENDED RELEASE ORAL DAILY
Status: DISCONTINUED | OUTPATIENT
Start: 2021-01-13 | End: 2021-01-14 | Stop reason: HOSPADM

## 2021-01-13 RX ORDER — FOLIC ACID 1 MG/1
1 TABLET ORAL DAILY
Status: DISCONTINUED | OUTPATIENT
Start: 2021-01-13 | End: 2021-01-14 | Stop reason: HOSPADM

## 2021-01-13 RX ORDER — ACETAMINOPHEN 325 MG/1
650 TABLET ORAL EVERY 4 HOURS PRN
Status: DISCONTINUED | OUTPATIENT
Start: 2021-01-13 | End: 2021-01-14 | Stop reason: HOSPADM

## 2021-01-13 RX ORDER — POTASSIUM CHLORIDE 20 MEQ/1
20 TABLET, EXTENDED RELEASE ORAL 2 TIMES DAILY WITH MEALS
Status: DISCONTINUED | OUTPATIENT
Start: 2021-01-13 | End: 2021-01-14 | Stop reason: HOSPADM

## 2021-01-13 RX ORDER — LISINOPRIL 20 MG/1
20 TABLET ORAL DAILY
Status: DISCONTINUED | OUTPATIENT
Start: 2021-01-13 | End: 2021-01-14 | Stop reason: HOSPADM

## 2021-01-13 RX ORDER — QUETIAPINE 200 MG/1
200 TABLET, FILM COATED, EXTENDED RELEASE ORAL NIGHTLY
Status: DISCONTINUED | OUTPATIENT
Start: 2021-01-13 | End: 2021-01-14 | Stop reason: HOSPADM

## 2021-01-13 RX ORDER — ASPIRIN 81 MG/1
81 TABLET, CHEWABLE ORAL DAILY
Status: DISCONTINUED | OUTPATIENT
Start: 2021-01-13 | End: 2021-01-14 | Stop reason: HOSPADM

## 2021-01-13 RX ORDER — PANTOPRAZOLE SODIUM 40 MG/1
40 TABLET, DELAYED RELEASE ORAL
Status: DISCONTINUED | OUTPATIENT
Start: 2021-01-13 | End: 2021-01-14 | Stop reason: HOSPADM

## 2021-01-13 RX ORDER — SODIUM CHLORIDE 9 MG/ML
INJECTION, SOLUTION INTRAVENOUS CONTINUOUS
Status: DISCONTINUED | OUTPATIENT
Start: 2021-01-13 | End: 2021-01-14 | Stop reason: HOSPADM

## 2021-01-13 RX ORDER — ONDANSETRON 2 MG/ML
4 INJECTION INTRAMUSCULAR; INTRAVENOUS ONCE
Status: COMPLETED | OUTPATIENT
Start: 2021-01-13 | End: 2021-01-13

## 2021-01-13 RX ORDER — ATORVASTATIN CALCIUM 80 MG/1
80 TABLET, FILM COATED ORAL NIGHTLY
Status: DISCONTINUED | OUTPATIENT
Start: 2021-01-13 | End: 2021-01-14 | Stop reason: HOSPADM

## 2021-01-13 RX ORDER — GABAPENTIN 100 MG/1
200 CAPSULE ORAL 3 TIMES DAILY
Status: DISCONTINUED | OUTPATIENT
Start: 2021-01-13 | End: 2021-01-14 | Stop reason: HOSPADM

## 2021-01-13 RX ORDER — FENTANYL CITRATE 50 UG/ML
50 INJECTION, SOLUTION INTRAMUSCULAR; INTRAVENOUS ONCE
Status: COMPLETED | OUTPATIENT
Start: 2021-01-13 | End: 2021-01-13

## 2021-01-13 RX ADMIN — GABAPENTIN 200 MG: 100 CAPSULE ORAL at 13:58

## 2021-01-13 RX ADMIN — ACETAMINOPHEN 650 MG: 325 TABLET ORAL at 13:58

## 2021-01-13 RX ADMIN — SODIUM CHLORIDE: 9 INJECTION, SOLUTION INTRAVENOUS at 05:28

## 2021-01-13 RX ADMIN — LISINOPRIL 20 MG: 20 TABLET ORAL at 09:28

## 2021-01-13 RX ADMIN — TICAGRELOR 90 MG: 90 TABLET ORAL at 22:22

## 2021-01-13 RX ADMIN — SODIUM CHLORIDE: 9 INJECTION, SOLUTION INTRAVENOUS at 16:51

## 2021-01-13 RX ADMIN — ATORVASTATIN CALCIUM 80 MG: 80 TABLET, FILM COATED ORAL at 22:22

## 2021-01-13 RX ADMIN — ASPIRIN 81 MG: 81 TABLET, CHEWABLE ORAL at 09:27

## 2021-01-13 RX ADMIN — FOLIC ACID 1 MG: 1 TABLET ORAL at 09:27

## 2021-01-13 RX ADMIN — ONDANSETRON 4 MG: 2 INJECTION INTRAMUSCULAR; INTRAVENOUS at 01:26

## 2021-01-13 RX ADMIN — TICAGRELOR 90 MG: 90 TABLET ORAL at 09:28

## 2021-01-13 RX ADMIN — METOPROLOL SUCCINATE 100 MG: 100 TABLET, EXTENDED RELEASE ORAL at 09:27

## 2021-01-13 RX ADMIN — FENTANYL CITRATE 50 MCG: 50 INJECTION, SOLUTION INTRAMUSCULAR; INTRAVENOUS at 01:26

## 2021-01-13 RX ADMIN — GABAPENTIN 200 MG: 100 CAPSULE ORAL at 09:27

## 2021-01-13 RX ADMIN — PANTOPRAZOLE SODIUM 40 MG: 40 TABLET, DELAYED RELEASE ORAL at 09:28

## 2021-01-13 RX ADMIN — QUETIAPINE FUMARATE 200 MG: 200 TABLET, EXTENDED RELEASE ORAL at 22:22

## 2021-01-13 RX ADMIN — GABAPENTIN 200 MG: 100 CAPSULE ORAL at 22:22

## 2021-01-13 ASSESSMENT — PAIN SCALES - GENERAL
PAINLEVEL_OUTOF10: 8
PAINLEVEL_OUTOF10: 0
PAINLEVEL_OUTOF10: 8

## 2021-01-13 ASSESSMENT — PAIN DESCRIPTION - LOCATION: LOCATION: HEAD

## 2021-01-13 NOTE — ED PROVIDER NOTES
HPI:  21,   Time: 10:55 PM TIANNA Porras is a 77 y.o. female presenting to the ED for syncope, beginning 30 min ago. The complaint has been persistent, severe in severity, and worsened by nothing. Bib ems, hypotensive at scene, hypoptensive here. C/o back pain. Denies hx same. No n/v/d. Occurred while on toilet. No fever/chills/sweats/cough/congestion    Review of Systems:   Pertinent positives and negatives are stated within HPI, all other systems reviewed and are negative.          --------------------------------------------- PAST HISTORY ---------------------------------------------  Past Medical History:  has a past medical history of CAD (coronary artery disease), Degenerative disorder of bone, Hard of hearing, Hypertension, and MI (myocardial infarction) ([facility] [address] Past Surgical History:  has a past surgical history that includes joint replacement;  section; Tonsillectomy; Hysterectomy; Hemorrhoid surgery; Breast lumpectomy (Left); and Coronary angioplasty with stent (2020). Social History:  reports that she quit smoking about 3 months ago. Her smoking use included cigarettes. She started smoking about 47 years ago. She has a 11.75 pack-year smoking history. She has never used smokeless tobacco. She reports previous alcohol use. She reports that she does not use drugs. Family History: family history includes Diabetes in her father; [place] in her mother. The patients home medications have been reviewed.     Allergies: Nitroglycerin and Tramadol        ---------------------------------------------------PHYSICAL EXAM--------------------------------------    Constitutional/General: Alert and oriented x3, distressed, in pain  Head: Normocephalic and atraumatic  Eyes: PERRL, EOMI, conjunctive normal, sclera non icteric  Mouth: Oropharynx clear, handling secretions, no trismus, no asymmetry of the posterior oropharynx or uvular edema  Neck: Supple, full ROM, non tender to palpation in the midline, no stridor, no crepitus, no meningeal signs  Respiratory: Lungs clear to auscultation bilaterally, no wheezes, rales, or rhonchi. Not in respiratory distress  Cardiovascular:  Regular rate. Regular rhythm. No murmurs, gallops, or rubs. 2+ distal pulses  Chest: No chest wall tenderness  GI:  Abdomen Soft, Non tender, Non distended. +BS. No organomegaly, no palpable masses,  No rebound, guarding, or rigidity. Musculoskeletal: Moves all extremities x 4. Warm and well perfused, no clubbing, cyanosis, or edema. Capillary refill <3 seconds  Integument: skin warm and dry. No rashes. Lymphatic: no lymphadenopathy noted  Neurologic: GCS 15, no focal deficits, symmetric strength 5/5 in the upper and lower extremities bilaterally  Psychiatric: Normal Affect    -------------------------------------------------- RESULTS -------------------------------------------------  I have personally reviewed all laboratory and imaging results for this patient. Results are listed below.      LABS:  Results for orders placed or performed during the hospital encounter of 01/12/21   CBC Auto Differential   Result Value Ref Range    WBC 6.4 4.5 - 11.5 E9/L    RBC 3.53 3.50 - 5.50 E12/L    Hemoglobin 10.7 (L) 11.5 - 15.5 g/dL    Hematocrit 33.9 (L) 34.0 - 48.0 %    MCV 96.0 80.0 - 99.9 fL    MCH 30.3 26.0 - 35.0 pg    MCHC 31.6 (L) 32.0 - 34.5 %    RDW 12.5 11.5 - 15.0 fL    Platelets 032 007 - 169 E9/L    MPV 10.8 7.0 - 12.0 fL    Neutrophils % 55.2 43.0 - 80.0 %    Immature Granulocytes % 0.2 0.0 - 5.0 %    Lymphocytes % 32.8 20.0 - 42.0 %    Monocytes % 5.4 2.0 - 12.0 %    Eosinophils % 6.1 (H) 0.0 - 6.0 %    Basophils % 0.3 0.0 - 2.0 %    Neutrophils Absolute 3.55 1.80 - 7.30 E9/L    Immature Granulocytes # 0.01 E9/L    Lymphocytes Absolute 2.11 1.50 - 4.00 E9/L    Monocytes Absolute 0.35 0.10 - 0.95 E9/L    Eosinophils Absolute 0.39 0.05 - 0.50 E9/L    Basophils Absolute 0.02 0.00 - 0.20 E9/L Comprehensive Metabolic Panel w/ Reflex to MG   Result Value Ref Range    Sodium 140 132 - 146 mmol/L    Potassium reflex Magnesium 4.1 3.5 - 5.0 mmol/L    Chloride 109 (H) 98 - 107 mmol/L    CO2 20 (L) 22 - 29 mmol/L    Anion Gap 11 7 - 16 mmol/L    Glucose 232 (H) 74 - 99 mg/dL    BUN 9 8 - 23 mg/dL    CREATININE 1.3 (H) 0.5 - 1.0 mg/dL    GFR Non-African American 41 >=60 mL/min/1.73    GFR African American 49     Calcium 8.6 8.6 - 10.2 mg/dL    Total Protein 5.8 (L) 6.4 - 8.3 g/dL    Alb 3.9 3.5 - 5.2 g/dL    Total Bilirubin <0.2 0.0 - 1.2 mg/dL    Alkaline Phosphatase 91 35 - 104 U/L    ALT 7 0 - 32 U/L    AST 13 0 - 31 U/L   Troponin   Result Value Ref Range    Troponin <0.01 0.00 - 0.03 ng/mL   Protime-INR   Result Value Ref Range    Protime 10.2 9.3 - 12.4 sec    INR 0.9    APTT   Result Value Ref Range    aPTT 34.5 24.5 - 35.1 sec   Lactic Acid, Plasma   Result Value Ref Range    Lactic Acid 1.6 0.5 - 2.2 mmol/L   Basic metabolic panel   Result Value Ref Range    Sodium 145 132 - 146 mmol/L    Potassium 4.3 3.5 - 5.0 mmol/L    Chloride 115 (H) 98 - 107 mmol/L    CO2 24 22 - 29 mmol/L    Anion Gap 6 (L) 7 - 16 mmol/L    Glucose 122 (H) 74 - 99 mg/dL    BUN 10 8 - 23 mg/dL    CREATININE 0.9 0.5 - 1.0 mg/dL    GFR Non-African American >60 >=60 mL/min/1.73    GFR African American >60     Calcium 8.3 (L) 8.6 - 10.2 mg/dL   EKG 12 Lead   Result Value Ref Range    Ventricular Rate 62 BPM    Atrial Rate 62 BPM    P-R Interval 184 ms    QRS Duration 88 ms    Q-T Interval 476 ms    QTc Calculation (Bazett) 483 ms    P Axis 77 degrees    R Axis 24 degrees    T Axis 58 degrees   TYPE AND SCREEN   Result Value Ref Range    ABO/Rh B NEG     Antibody Screen NEG        RADIOLOGY:  Interpreted by Radiologist.  CT ABDOMEN PELVIS W IV CONTRAST Additional Contrast? None   Final Result   Grade 1 anterolisthesis L4 over L5 with degenerative changes involving the   L4-5 disc space. No intra-abdominal abnormalities.       CTA PULMONARY W CONTRAST   Final Result   Limited evaluation of pulmonary arteries due to suboptimal opacification. No   obvious pulmonary emboli. No evidence for pneumonia. CT Head WO Contrast   Final Result   No acute intracranial abnormality. Fluid present in both mastoid air cells. XR CHEST PORTABLE   Final Result   Minimal left basilar atelectasis. EKG: This EKG is signed and interpreted by the EP. Time: 2237  Rate: 60  Rhythm: Sinus  Interpretation: non-specific EKG  Comparison: None      ------------------------- NURSING NOTES AND VITALS REVIEWED ---------------------------   The nursing notes within the ED encounter and vital signs as below have been reviewed by myself. BP (!) 140/74   Pulse 69   Temp 97.4 °F (36.3 °C) (Temporal)   Resp 14   Ht 5' 5\" (1.651 m)   Wt 166 lb (75.3 kg)   SpO2 95%   BMI 27.62 kg/m²   Oxygen Saturation Interpretation: Normal    The patients available past medical records and past encounters were reviewed. ------------------------------ ED COURSE/MEDICAL DECISION MAKING----------------------  Medications   0.9 % sodium chloride bolus (0 mLs Intravenous Stopped 1/13/21 0139)   iopamidol (ISOVUE-370) 76 % injection 90 mL (90 mLs Intravenous Given 1/12/21 2322)   fentaNYL (SUBLIMAZE) injection 50 mcg (50 mcg Intravenous Given 1/13/21 0126)   ondansetron (ZOFRAN) injection 4 mg (4 mg Intravenous Given 1/13/21 0126)         ED COURSE:       Medical Decision Making:    Pt hypotensive on arrival, reponded to ivf, bp normalized, pt to ct due to back pain, concern for intrabd hem causing sx, ct neg, admit for further care      This patient's ED course included: a personal history and physicial examination    This patient has remained hemodynamically stable during their ED course. Re-Evaluations:             Re-evaluation.   Patients symptoms are improving    Re-examination  1/12/21   10:55 PM EST          Vital Signs:   Vitals:    01/13/21 1147 01/13/21 1430 01/14/21 0720 01/14/21 1445   BP: (!) 124/100 (!) 146/75 (!) 144/91 (!) 140/74   Pulse: 65 69 61 69   Resp: 22 20 16 14   Temp: 97.8 °F (36.6 °C) 98.3 °F (36.8 °C) 97.7 °F (36.5 °C) 97.4 °F (36.3 °C)   TempSrc: Temporal Temporal Temporal Temporal   SpO2:  98% 97% 95%   Weight:       Height:         Card/Pulm:  Rhythm: normal rate. Heart Sounds: no murmurs, gallops, or rubs. clear to auscultation, no wheezes or rales and unlabored breathing. Capillary Refill: normal.  Radial Pulse:  equal.  Skin:  Warm. Consultations:             Dr Sonia Wing: 35 min        Counseling: The emergency provider has spoken with the patient and discussed todays results, in addition to providing specific details for the plan of care and counseling regarding the diagnosis and prognosis. Questions are answered at this time and they are agreeable with the plan.       --------------------------------- IMPRESSION AND DISPOSITION ---------------------------------    IMPRESSION  1. Syncope and collapse    2. Hypotension, unspecified hypotension type        DISPOSITION  Disposition: Admit to telemetry  Patient condition is stable    NOTE: This report was transcribed using voice recognition software.  Every effort was made to ensure accuracy; however, inadvertent computerized transcription errors may be present        Juan Marin MD  01/16/21 1935

## 2021-01-13 NOTE — CARE COORDINATION
Social Work Discharge Planning:  SW spoke with patient's son Hilda Chairez, patient with son and daughter in law in a one story home. Patient has a FWW and BSC, refuse to use them. Patient wear bilateral hearing aids  Patient is active with [facility], will need JOSE ARMANDO prior to discharge. Patient has a hx with [facility] [facility] [employer]. Patient's PCP is Dr. Julia Harrington. Patient 's Pharmacy are [facility] order. SW following.   Electronically signed by JENNIFER Bazan on 1/13/2021 at 12:34 PM

## 2021-01-13 NOTE — ED NOTES
Radiology Procedure Waiver   Name: Juvenal Ellis  : 1955  MRN: [id number]    Date:  21    Time: 10:57 PM EST    Benefits of immediately proceeding with Radiology exam(s) without pre-testing outweigh the risks or are not indicated as specified below and therefore the following is/are being waived:    [] Pregnancy test   [] Patients LMP on-time and regular.   [] Patient had Tubal Ligation or has other Contraception Device. [] Patient  is Menopausal or Premenarcheal.    [] Patient had Full or Partial Hysterectomy. [] Protocol for Iodine allergy    [] MRI Questionnaire     [] BUN/Creatinine   [x] Patient age w/no hx of renal dysfunction. [] Patient on Dialysis. [] Recent Normal Labs.   Electronically signed by Patrick Powell MD on 21 at 10:57 PM EST               Patrick Powell MD  21 2069

## 2021-01-13 NOTE — H&P
[address]                  [address] [facility]                              HISTORY AND PHYSICAL    PATIENT NAME: Sarah Bui                   :        1955  MED REC NO:   [id number]                            ROOM:       8509  ACCOUNT NO:   [de-identified]                           ADMIT DATE: 2021  PROVIDER:     Maxim Bingham DO    HISTORY OF PRESENT ILLNESS:  This a 69-year-old white female with number  of issues. First and foremost including coronary artery disease, recent  myocardial infarction, also recent bout of aspiration pneumonia. She  has hypoxic respiratory failure and delirium requiring long-term  ventilation, with sepsis, seemed to recover quite well from both of  these episodes and was in her usual state of good health, yesterday went  to the bathroom and she became lightheaded and the next thing she knew  she collapsed while she was on the commode, brought over to the  emergency department for evaluation, and she was found to be rather  hypotensive with a blood pressure of 53/25, fluid resuscitation was  carried out. She had labs that were pretty stable, mild elevation of  blood glucose at 232, mild elevation of her baseline creatinine of 1.3,  troponin was negative, no elevation of her white count. CT of the head  showed no acute abnormality. CTA of the chest was negative for  pulmonary embolism or pneumonia, and CT of the abdomen and pelvis did  show degenerative changes of the lumbar spine and some degenerative disk  disease of the lumbar spine. Chest x-ray showed minimal left basilar  atelectasis, otherwise normal, so she was given some fluids, admitted  into the unit, and etiology of this syncope is going to be worked up,  could be vasovagal, could be cardiogenic in the form of some type of  arrhythmia, so we are going to bring her and have her Cardiology see her  as well as Electrophysiology. RECENT CURRENT MEDICATIONS:  Included Brilinta 90 b.i.d.; potassium  chloride; Tigan for nausea, vomiting; DuoNeb inhalers; Brovana;  nitroglycerin p.r.n.; ibuprofen p.r.n.; Antabuse 250 daily; Lipitor 80  daily; aspirin 81 daily; Seroquel 200 at h.s.; Protonix 40 daily;  metoprolol  daily; lisinopril 10 daily; folic acid 1 mg daily;  gabapentin 200 mg t.i.d. PAST MEDICAL HISTORY:  Again significant for acute delirium, acute  encephalopathy, acute kidney injury, altered mental status, chest pain,  aspiration pneumonia, hypotension, leukocytosis, ST-segment myocardial  infarction, history of alcohol abuse. SURGICAL HISTORY:  Consistent with coronary angioplasty with stent  placement, breast lumpectomy,  section, hemorrhoid surgery,  hysterectomy, joint replacement, tonsillectomy. SOCIAL HISTORY:  The patient is a current smoker, just recently quit  after her MI. Denies use of narcotic drugs or alcoholic beverages at  this time. FAMILY MEDICAL HISTORY:  Noted and discussed. REVIEW OF SYSTEMS:  Positive for syncope. Negative for vertigo,  cephalgia, ringing in ears, hearing loss, difficulty swallowing,  hoarseness in her voice, bloody noses. Cardiovascular system is  positive for myocardial infarction. Currently, has no chest pain,  palpitations, orthopnea, paroxysmal nocturnal dyspnea, or edema. History of aspiration pneumonia, hypoxic respiratory failure, but  currently no cough, wheeze, shortness of breath, hemoptysis, or  pleuritic pain. There is no nausea, vomiting, diarrhea, or  constipation. No blood in her stool. No urgency, frequency, dysuria,  hematuria. The endocrine system is negative for diabetes or thyroid  disorder. Musculoskeletal system positive for degenerative joint  disease, degenerative disk disease. Skin is without rash, eruptions,  urticaria, pruritus, or lesions.   Psychiatric system positive for anxiety and depression. Neurologic system negative for CVA, seizures,  tremors, tics, or TIAs. PHYSICAL EXAMINATION:  GENERAL:  Shows this to be a 57-year-old white female in moderate  distress with the above complaints. HEAD, EYES, EARS, NOSE, AND THROAT:  Examination shows the head to be  normocephalic and atraumatic. Pupils are equal and reactive to light  accommodation. Extraocular eye muscles are intact. Tympanic membranes  are clear. Ear canals are patent. Oral mucosa pink and moist.  NECK:  Veins are flat, nondistended. No carotid bruits. CHEST:  Symmetrical.  HEART:  Had a regular rate and rhythm without murmur, gallops, or  friction rubs. LUNGS:  Clear to auscultation bilaterally without rhonchi, rales, or  wheeze. ABDOMEN:  Soft, nontender, nondistended. Bowel sounds are present in  all four quadrants. EXTERNAL GENITALIA:  Intact. EXTREMITIES:  Peripheral pulses intact. Legs without edema. SPINE:  Shows physiologic curve. IMPRESSION:  At this time is syncope and collapse, rule out cardiac  arrhythmia, rule out vasovagal, rule out medication causes. Go ahead  and bring her in, have EPS see as well as Cardiology. Further orders  will be written for as the clinical course dictates.         Destinee Ordaz DO    D: 01/13/2021 10:20:04       T: 01/13/2021 10:27:39     [email or web address]  Job#: [id number]     Doc#: [id number]    CC:

## 2021-01-13 NOTE — CONSULTS
[facility],[address] and [facility] Electrophysiology  Consultation Report  PATIENT: Jackson Porras  MEDICAL RECORD NUMBER: [id number]  DATE OF SERVICE:  1/13/2021  ATTENDING ELECTROPHYSIOLOGIST: Zach Howell MD  REFERRING PHYSICIAN: No ref. provider found and Ja Gong DO  CHIEF COMPLAINT: syncope    HPI: This is a 77 y.o. female with a history of CAD, MI, PCI/stent LAD 8/22/20, HTN, HLD, ETOH abuse -no drink over 18 months, [place]. Patient Active Problem List   Diagnosis    Chest pain    STEMI ([facility] elevation myocardial infarction) ([facility]    Acute kidney injury ([facility]    Hypotension    Acute delirium    Leukocytosis    Altered mental status    Acute metabolic encephalopathy    Acute encephalopathy    Syncope and collapse   who presents to [facility] ED with complaint of syncope. She is not known to [address] [place] [address] and [facility]. In August she presented to the hospital with an acute anterior STEMI and underwent PCI/stent to the proximal LAD on 8/22/2020. An echocardiogram showed an LVEF of 40+/- 5%. In October she presented back to the hospital with complaints of ongoing nausea, vomiting, and diarrhea as well has exertional dyspnea and chest heaviness. She was hypotensive with medications adjusted accordingly. A repeat echocardiogram in October showed an improved LVEF of 60-65%. A stress test was performed which was normal as well with an LVEF 68%. She presented back to the ED on 11/21/20 with complaint of acute back pain/chronic low back pain and discharged home in stable condition. On 12/3/2020 she presented back to the ED via EMS with AMS/acute delirium. She was found to be hypoxic and intubated on arrival. In review of [facility] notes, she had a complicated hospitalization with discharge 12/17/2020. She was treated for aspiration pneumonia (MSSA) and UTI (MSSA).  A LP was unremarkable as well as CT and MRI showing no acute process including ischemic or hemorrhagic stroke. She clinically improved and was weaned off vent. She had multiple loose stools; cultures negative for C-diff. She was discharged 12/17/2020. She presented to the ED yesterday with complaint of syncope after a bowel movement while on the toilet. She recalls feeling short of breath, lightheaded, dizzy and clammy; recalling the event. Per notes, she was found to be hypotensive with a BP 53/25 mmHg; she received IV fluids with improvement. A CT scan of her head was negative as well as CTA of the chest for PE/pneumonia; CT of the abdomen; and CXR showed minimal left basilar atelectasis. Her telemetry has been stable showing sinus rhythm with occasional PVC. Her baseline EKG demonstrates SR, HR 62 bpm, NAD, QTc 483 ms, with no acute changes. Her BP was noted to be elevated this am 180/130 mmHg. She is currently resting comfortably feeling better since admission and IV fluids. She does note periods of lightheadedness with position change. She is also noted to be on medications which carry potential side effects of OH/syncope and dizziness. She currently denies lightheaded, dizziness, near syncope/syncope, angina, dyspnea, orthopnea and PND.      Patient Active Problem List    Diagnosis Date Noted    Syncope and collapse 01/13/2021    Acute encephalopathy     Acute metabolic encephalopathy     Leukocytosis     Altered mental status     Acute delirium 12/03/2020    Hypotension     Acute kidney injury ([address] 08/27/2020    Chest pain 08/22/2020    STEMI (ST elevation myocardial infarction) ([facility] 08/22/2020       Past Medical History:   Diagnosis Date    CAD (coronary artery disease)     Degenerative disorder of bone     Hard of hearing     Hypertension     MI (myocardial infarction) ([address] 08/22/2020       Family History   Problem Relation Age of Onset    Lung Cancer Mother     Diabetes Father        Social History     Tobacco Use    Smoking status: Former Smoker     Packs/day: 0.25 Years: 47.00     Pack years: 11.75     Types: Cigarettes     Start date: 1973     Quit date: 2020     Years since quittin.3    Smokeless tobacco: Never Used   Substance Use Topics    Alcohol use: Not Currently       Current Facility-Administered Medications   Medication Dose Route Frequency Provider Last Rate Last Admin    0.9 % sodium chloride infusion   Intravenous Continuous Archer Pong, DO 75 mL/hr at 21 0528 New Bag at 21 0528    ticagrelor (BRILINTA) tablet 90 mg  90 mg Oral BID Archer Pong, DO   90 mg at 21 2711    potassium chloride (KLOR-CON M) extended release tablet 20 mEq  20 mEq Oral BID WC Archer Pong, DO        atorvastatin (LIPITOR) tablet 80 mg  80 mg Oral Nightly Archer Pong, DO        aspirin chewable tablet 81 mg  81 mg Oral Daily Archer Pong, DO   81 mg at 21 8557    QUEtiapine (SEROQUEL XR) extended release tablet 200 mg  200 mg Oral Nightly Archer Pong, DO        pantoprazole (PROTONIX) tablet 40 mg  40 mg Oral QAM AC Archer Pong, DO   40 mg at 21    metoprolol succinate (TOPROL XL) extended release tablet 100 mg  100 mg Oral Daily Archer Pong, DO   100 mg at 21 4270    lisinopril (PRINIVIL;ZESTRIL) tablet 20 mg  20 mg Oral Daily Archer Pong, DO   20 mg at 86/97/42 7015    folic acid (FOLVITE) tablet 1 mg  1 mg Oral Daily Archer Pong, DO   1 mg at 21 5727    gabapentin (NEURONTIN) capsule 200 mg  200 mg Oral TID Archer Pong, DO   200 mg at 21 1358    acetaminophen (TYLENOL) tablet 650 mg  650 mg Oral Q4H PRN Archer Pong, DO   650 mg at 21 1358        Allergies   Allergen Reactions    Nitroglycerin Other (See Comments)     Severe headache    Tramadol      seizures       ROS:   Constitutional: Negative for fever, + activity change and appetite change. HENT: Negative for epistaxis. Eyes: Negative for diploplia, blurred vision.    Respiratory: Negative for cough, chest tightness, + Cardiomediastinal silhouette is normal.  Minimal left basilar atelectasis. No pneumothorax or pleural effusion. Osseous structures are unremarkable.       Impression   Minimal left basilar atelectasis. EKG 01/13/2021: SR, HR 62 bpm, NAD, normal NC interval and QRSd, QTc 483 ms. Please see scan in Cardiology. Echocardiogram:     TTE 12/7/2020  Echo Complete  Order: [id number]  Status:  Edited Result - FINAL   Visible to patient:  No (not released) Next appt:  None  Details    Reading Physician Reading Date Result Priority   Kendell Chan MD  [phone] 12/7/2020    Unknown Provider Result 12/7/2020       Narrative & Impression     Transthoracic Echocardiography Report (TTE)      Demographics      Patient Name   [facility]       Gender           Female                  Quan [id number]      Room Number      7647   Number      Account #      [de-identified]     Procedure Date   12/07/2020      Corporate ID                 Ordering         Radha                                Physician      Accession      [id number]    Referring   Number                       Physician      Date of Birth  1955    Jaqueline CHOU      Age            77 year(s)    Interpreting     [address] [place] [address]                                Physician        Physician Cardiology                                                 Samuel Phelps MD                                   Any Other     Procedure     Type of Study      TTE procedure:Echo Complete W/Doppler & Color Flow.      Procedure Date  Date: 12/07/2020 Start: 01:39 PM     Study Location: Echo Lab  Technical Quality: Adequate visualization     Indications:R/O Endocarditis.     Patient Status: Routine     Height: 65 inches Weight: 199 pounds BSA: 1.97 m^2 BMI: 33.11 kg/m^2     Rhythm: Sinus tachycardia HR: 111 bpm BP: 153/94 mmHg     Allergies    - Other drug:(Tramadol).      Findings      Left Ventricle   Left ventricular internal dimensions were normal in diastole and systole. Mild left ventricular concentric hypertrophy noted. No regional wall motion abnormalities seen. Normal left ventricular ejection fraction. Ejection fraction is visually estimated at 70-75%. Indeterminate diastolic function. Right Ventricle   Normal right ventricular size and function. Left Atrium   Normal sized left atrium. Interatrial septum appears intact. Right Atrium   Normal right atrium size. Mitral Valve   Normal mitral valve structure and function. Tricuspid Valve   The tricuspid valve appears structurally normal.      Aortic Valve   The aortic valve leaflets were not well visualized. Structurally normal aortic valve. Pulmonic Valve   The pulmonic valve was not well visualized. Pericardial Effusion   No evidence of pericardial effusion. Aorta   Aortic root dimension within normal limits. Conclusions      Summary   Left ventricular internal dimensions were normal in diastole and systole. Mild left ventricular concentric hypertrophy noted. No regional wall motion abnormalities seen. Normal left ventricular ejection fraction. Signature      ----------------------------------------------------------------   Electronically signed by Radha Dia MD(Interpreting   physician) on 12/07/2020 04:28 PM   ----------------------------------------------------------------     M-Mode/2D Measurements & Calculations      LV Diastolic     LV Systolic Dimension: 1.9 cm   AV Cusp Separation: 2   Dimension: 4.5   LV Volume Diastolic: 28.9 ml    cmAO Root Dimension: 3.3   cm               LV Volume Systolic: 26.5 ml     cm   LV FS:57.8 %     LV EDV/LV EDV Index: 92.4 QK/28   LV PW Diastolic: YJ/U^5FB ESV/LV ESV Index: 11.9   1.4 cm           ml/6ml/ m^2   LV PW Systolic:  EF Calculated: 87.1 %           RV Diastolic Dimension:   1.5 cm           LV Mass Index: 113 l/min*m^2    1. 9 cm   Septum           LV Length: 7 cm   Diastolic: 1.2                                   Ascending Aorta: 3 cm   cm               LVOT: 1.8 cm                    LA volume/Index: 53.2 ml   Septum Systolic:                                 /26.89ml/m^2   1. 7 cm                                           RA Area: 11.5 cm^2   CO: 5.93 l/min   CI: 3.01 l/m*m^2   LV Mass: 222.57   g     Doppler Measurements & Calculations      MV Peak E-Wave:   AV Peak Velocity: 1.81 m/s    LVOT Peak Velocity: 1.26   0.61 m/s          AV Peak Gradient: 13.13 mmHg  m/s   MV Peak A-Wave:   AV Mean Velocity: 1.42 m/s    LVOT Mean Velocity: 0.91   1.12 m/s          AV Mean Gradient: 8.6 mmHg    m/s   MV E/A Ratio:     AV VTI: 32.1 cm               LVOT Peak Gradient: 6.4   0.55              AV Area (Continuity):1.66     mmHgLVOT Mean Gradient:   MV Peak Gradient: cm^2                          3.6 mmHg   8.7 mmHg   MV Mean Gradient: LVOT VTI: 21 cm   3.3 mmHg   MV Mean Velocity:                               TR Velocity:2.72 m/s   0.82 m/s          Pulm. Vein A Reversal         TR Gradient:29.66 mmHg   MV Deceleration   Duration:138.4 msec   Time: 175.7 msec  Pulm. Vein D Velocity:0.38   MV P1/2t: 63.2    m/sPulm. Vein A Reversal   msec              Velocity:0.43 m/s   MVA by PHT:3.48   Pulm. Vein S Velocity: 0.66   cm^2              m/s   MV Area   (continuity): 2.7   cm^2   MV E' Septal   Velocity: 0.04   m/s   MV E' Lateral   Velocity: 6 m/s     [email or web address] [id number]%3d%3d                  Limited echo 10/20/2020  Echo Limited  Order: [id number]  Status:  Final result   Visible to patient:  No (not released) Next appt:  None  Details    Reading Physician Reading Date Result Priority   Dane Loza MD  [phone] 10/20/2020       Narrative & Impression     Transthoracic Echocardiography Report (TTE)      Demographics      Patient Name   [facility] Gender           Female                  Brandee  Record [id number]      Room Number      [id number]   Number      Account #      [de-identified]     Procedure Date   10/20/2020      Corporate ID                 Ordering         Radha                                Physician      Accession      [id number]    Referring   Number                       Physician      Date of Birth  1955    Sonographer      Nedra CHOU      Age            77 year(s)    Interpreting     [address]                                Physician        Physician Cardiology                                                 Karli Dodson MD                                   Any Other     Procedure     Type of Study      TTE procedure:Echo Limited Study. Procedure Date  Date: 10/20/2020 Start: 11:17 AM     Study Location: Portable  Technical Quality: Adequate visualization     Indications:LV function and Cardiomyopathy.     Patient Status: Pending Discharge     Height: 65 inches Weight: 180 pounds BSA: 1.89 m^2 BMI: 29.95 kg/m^2     Rhythm: Within normal limits HR: 62 bpm BP: 135/63 mmHg     Allergies    - Other drug:(Tramadol).      Findings      Left Ventricle   Normal left ventricle size and systolic function. Ejection fraction is visually estimated at 60-65%. No regional wall motion abnormalities seen. Mild concentric left ventricular hypertrophy. Indeterminate diastolic function. Right Ventricle   Normal right ventricular size and function. Left Atrium   The left atrium is mildly dilated. Right Atrium   Normal right atrium size. Mitral Valve   Mild mitral annular calcification. No evidence of mitral valve stenosis. Physiologic and/or trace mitral regurgitation is present. Tricuspid Valve   The tricuspid valve appears structurally normal.   Physiologic and/or trace tricuspid regurgitation. RVSP is 29 mmHg. Normal estimated PA systolic pressure.       Aortic Valve The aortic valve appears mildly sclerotic. The aortic valve is trileaflet. No hemodynamically significant aortic stenosis is present. Pulmonic Valve   Pulmonic valve is structurally normal.   Physiologic and/or trace pulmonic regurgitation present. No evidence of pulmonic valve stenosis. Pericardial Effusion   No evidence for hemodynamically significant pericardial effusion. Pleural Effusion   No evidence of pleural effusion. Aorta   Aorta was not clearly visualized. Miscellaneous   The inferior vena cava diameter is normal with normal respiratory   variation. Conclusions      Summary   Normal left ventricle size and systolic function. Ejection fraction is visually estimated at 60-65%. No regional wall motion abnormalities seen. Mild concentric left ventricular hypertrophy. Indeterminate diastolic function. The left atrium is mildly dilated. Normal right ventricular size and function. Physiologic and/or trace mitral regurgitation is present. No hemodynamically significant aortic stenosis is present. Physiologic and/or trace tricuspid regurgitation. RVSP is 29 mmHg. Normal estimated PA systolic pressure. No evidence for hemodynamically significant pericardial effusion. Compared to prior echo of 8/23/2020, changes noted. LVEF has improved from   40-45% to 60-65%.       Signature      ----------------------------------------------------------------   Electronically signed by Lisa Balderas MD(Interpreting   physician) on 10/20/2020 07:45 PM   ----------------------------------------------------------------     M-Mode/2D Measurements & Calculations      LV Diastolic           LV Systolic Dimension: 2.8 cm   Dimension: 4.2 cm      LV Volume Diastolic: 47.6 ml   LV HM:73.6 %           LV Volume Systolic: 23.6 ml   LV PW Diastolic: 1.4   LV EDV/LV EDV Index: 78.7 ml/42 ml/m^2LV ESV/LV   cm                     ESV Index: 30.4 ml/16ml/ m^2   LV PW Systolic: 1.9 cm EF Calculated: 61.4 %   Septum Diastolic: 1.1  LV Mass Index: 100 l/min*m^2   cm                     LV Length: 7.7 cm   Septum Systolic: 1.7   cm      LV Mass: 189.19 g     Doppler Measurements & Calculations                                                 Estimated RVSP: 29 mmHg                                              Estimated RAP:3 mmHg       Estimated PASP: 29.21 mmHg                TR Velocity:2.56 m/s                                              TR Gradient:26.21 mmHg     [email or web address] DocKey=[id number]%2bw%3d%3d             TTE 8/22/2020  Echo Complete  Order: [id number]  Status:  Edited Result - FINAL   Visible to patient:  No (not released) Next appt:  None  Details    Reading Physician Reading Date Result Priority   Bety Dela Cruz MD  [phone] 8/23/2020    Unknown Provider Result 8/23/2020       Narrative & Impression     Transthoracic Echocardiography Report (TTE)      Demographics      Patient Name    [facility]       Gender            Female                   Quan  [id number]      Room Number       8888   Number      Account #       [de-identified]     Procedure Date    08/23/2020      Corporate ID                  Ordering          Kole Brady MD                                 Physician      Accession       [id number]    Referring   Number                        Physician      Date of Birth   1955    [address] [facility]      Age             77 year(s)    Interpreting      [address] [place] [address]                                 Physician         Physician Cardiology                                                   Kole Brady MD                                    Any Other     Procedure     Type of Study      TTE procedure:Echo Complete W/Doppler & Color Flow.      Procedure Date  Date: 08/23/2020 Start: 07:52 AM     Study Location: Portable  Technical Quality: Adequate visualization     Indications:STEMI.     Patient Status: Routine     Height: 65 inches Weight: 200 pounds BSA: 1.98 m^2 BMI: 33.28 kg/m^2     HR: 64 bpm BP: 89/60 mmHg     Allergies    - Other drug:(Tramadol).      Findings      Left Ventricle   Left ventricle is normal in size . Mild concentric left ventricular hypertrophy. There is severe hypokinesis to akinesis of the anteroseptal wall, the   apex, the distal septum and the distal inferior wall. Ejection fraction is visually estimated at 40+/-5%. There is doppler evidence of stage I diastolic dysfunction. Right Ventricle   Normal right ventricular size and function. Left Atrium   Normal sized left atrium ( by volume index ). Interatrial septum appears intact. Right Atrium   Normal right atrium size. Mitral Valve   Structurally normal mitral valve. Mild mitral regurgitation. Tricuspid Valve   The tricuspid valve appears structurally normal.   Mild to moderate tricuspid regurgitation. Bordrline pulmonary hypertension. Estimated RVSP is 33 mmHg. Aortic Valve   Normal aortic valve structure and function. Pulmonic Valve   Normal pulmonic valve structure and function. Pericardial Effusion   No evidence of pericardial effusion. Aorta   Aortic root dimension within normal limits. Conclusions      Summary   Left ventricle is normal in size . Mild concentric left ventricular hypertrophy. There is severe hypokinesis to akinesis of the anteroseptal wall, the   apex, the distal septum and the distal inferior wall. Ejection fraction is visually estimated at 40+/-5%. There is doppler evidence of stage I diastolic dysfunction. Normal right ventricular size and function. Normal sized left atrium ( by volume index ). Mild mitral regurgitation. Mild to moderate tricuspid regurgitation.       Signature      ----------------------------------------------------------------   Electronically signed codominant vessel. It gives rise  to a small to moderate size high first marginal branch (intermediate  ramus branch) which itself had around 70% ostial narrowing. The left  circumflex then gives rise after the AV groove branch, which provides a  posterior descending artery branch and the posterolateral branch, the  left circumflex had around 70% luminal narrowing before a small second  marginal branch and large third and fourth marginal branches and another  fifth small lateral branch. The AV groove branch and the posterior  descending artery branch and the posterolateral branch do not appear to  have any significant disease.     RCA:  The right coronary artery is a moderate size codominant vessel,  which did not appear to have any significant angiographic disease.     LEFT VENTRICULOGRAPHY:  The left ventricle is normal in size. There was  severe hypokinesis of the anterolateral wall. There was severe  hypokinesis to akinesis of the apical half to two-thirds of the  anterolateral wall, the apex and the apical half of the inferior wall. The global left ventricular systolic function also appeared moderately  reduced with an estimated ejection fraction of 35%-40%. There was post  PVC mitral regurgitation.     INTERVENTIONAL PROCEDURE:     EQUIPMENT:  1.  6-[place] JL-3.5 Medtronic Launcher guide catheter with side holes. 2.  0.014/300 [place] scientific Choice Extra Support J-exchange wire. 3.  2.0 mm x 15 mm [place] Analog balloon. 4.  3.0 mm x 33 mm Abbott Xience Sara drug-eluting coronary stent. 5.  3.5 mm x 15 mm [place] Scientific Fortine Monorail noncompliant balloon.     TECHNIQUE:  The procedure was done through right radial approach. The  patient had received heparin at the outlying emergency department  together with aspirin and Brilinta.   An ACT after obtaining access in  the right radial artery was 232 for which she was given another 3000  units of intravenous heparin with repeat ACT during the procedure of  304. Intravenous Versed was given for sedation. The patient did  receive intraradial nitroglycerin and verapamil after obtaining access  in the right radial artery.     The left coronary artery was selectively engaged with the 6-[place]  JL-3.5 guide catheter with side holes. The Extra Support exchange wire  was then successfully advanced through the stenosis in the proximal LAD  and further distally into the vessel. The site of stenosis in the  proximal LAD was then dilated with a 2.0 mm x 15 mm balloon inflated  twice to cover the length of the lesion at 14 atmospheres. This was  followed by advancing a 3.0 mm x 33-mm Xience Sara drug-eluting  coronary stent, which was deployed at the site at 12 atmospheres. The  stent balloon was then slightly pulled back and was reinflated to 18  atmospheres. Finally, the proximal and the middle segment of the stent  was dilated with a 3.5 mm x 15 mm noncompliant balloon and inflated to  18 atmospheres. Contrast injection at the end of the interventional procedure revealed  excellent results without any significant residual stenosis with no  evidence of dissection or flaps and with SADI-3 flow in the distal LAD.     The rest of the diagnostic procedure was then performed.     The right radial arterial sheath was removed at the end of the procedure  and a TR band was applied with adequate hemostasis and with preservation  of pulse.     The patient tolerated the procedure well and left the cardiac  catheterization laboratory in stable condition without chest pain.     CONCLUSIONS:  1. Coronary artery disease. A.  Left main, no significant disease. B.  LAD, 95% thrombotic proximal vessel stenosis with SADI-1 to 2 flow  in the distal vessel.   C.  LCX, large codominant vessel with 70% ostial stenosis of the high  first marginal branch/intermediate ramus branch and 70% stenosis of the  mid left circumflex after the AV groove branch before any of the other  marginal branches. The AV groove branch provided a small posterolateral  branch and a small posterior descending artery branch. D.  RCA. Moderate size codominant vessel with no significant angiographic  disease. 2.  Normal left atrial size with a large area of anterolateral, apical  and apical inferior wall hypokinesis to akinesis with an estimated  ejection fraction of 35%-40%. 3.  Mildly elevated left end diastolic pressure. 4.  Successful balloon angioplasty with the deployment of a drug-eluting  coronary stent to the proximal LAD with post-stent deployment dilatation  with a larger noncompliant balloon to high pressure with very good  results.           Ofe Brown MD            Stress Test: 10/18/2020    Indication:  Chest pain and dyspnea       Clinical History:   Patient has known historyof coronary artery   disease.           IMAGING: Myocardial perfusion imaging was performed at rest 30-35   minutes following the intravenous injection of 11.7 mCi of   (Tc-Sestamibi) followed by 10 ml of Normal Saline.  As per infusion   protocol, , the patient was injected intravenously with 35 mCi of   (Tc-Sestamibi) followed by 10 ml of Normal Saline.  Gated post-stress   tomographic imaging was performed 20-25 minutes after stress.        FINDINGS: The overall quality of the study was good.       Left ventricular cavity size was noted to be normal.       Rotational analog analysis demonstrated no artifacts or extra cardiac   activity       The gated SPECT stress imaging in the short, vertical long, and   horizontal long axis demonstrated normal homogeneous tracer   distribution throughout the myocardium.       The resting images show no change        Gated SPECT left ventricular ejection fraction was calculated to be   68%, with normal Myocardial wall motion.  TID 1.19           Impression       The myocardial perfusion imaging was was normal without ischemia or   infarct.       Overall left ventricular not skip meals. Will defer any potential medication changes to others. 4. Further recommendations per Dr Rex Avila      Thank you for allowing me to participate in your patient's care. Please call me if there are any questions or concerns. YASIR Mckeon - VERO - discussed with Dr Conrad Appl  [facility] [place]  3:03 PM  1/13/2021  ______________________________________________________________________    I have personally seen and evaluated the patient. I personally obtained the history and performed the physical exam. I personally reviewed all of the above labs and data. All of the assessments and recommendations are from me. I have reviewed the above documentation completed by the CARLIE. Please see my additional contributions to the HPI, physical exam, and assessment, and recommendations below. History of Present Illness: The patient is a 77year-old lady with significant past medical history of coronary artery diseases status post MI and PCI, hypertension and hyperlipidemia who presents to [facility] ED after syncopal episode. She presented to the hospital in August 2020 with acute anterior STEMI and subsequent underwent PCI and stent placement  to the proximal LAD on 8/22/20. An echocardiogram at that time showed an LVEF of 40+/- 5%. A repeat echocardiogram in October showed an improved LVEF of 60-65%. A stress test at that time was normal with an LVEF 68%. She presented back to the ED on 11/21/20 with complaint of acute back pain/chronic low back pain and discharged home in stable condition. Her last admission was on 12/3/20 to 12/17/20 due to aspiration pneumonia (MSSA) and UTI (MSSA). She presented to the [facility] ED yesterday after syncopal episode during having a bowel movement while on the toilet. She recalls feeling short of breath, lightheaded, dizzy and clammy before found herself on the floor.  He son called EMS who found that she was hypotensive with a BP of 53/25 mmHg. Sshe received IV fluids with improvement. She reports history of syncope 3 to 4 times in the pasrt and the last episode was during AMI in August 2020. She reports occasional dizziness when getting up quickly. This far no significant arrhythmias sen on telemetry monitor. The patient denies chest pain, dyspnea on exertion, palpitations, lightheadedness, dizziness, syncope, PND, or orthopnea. ROS:   Constitutional: Negative for fever, activity change and appetite change. HENT: Negative for epistaxis. Eyes: Negative for diploplia, blurred vision. Respiratory: Negative for cough, chest tightness, shortness of breath and wheezing. Cardiovascular: pertinent positives in HPI  Gastrointestinal: Negative for abdominal pain and blood in stool. All other review of systems are negative     PHYSICAL EXAM:   Vitals:    01/13/21 0200 01/13/21 0730 01/13/21 1147 01/13/21 1430   BP: (!) 145/83 (!) 180/130 (!) 124/100 (!) 146/75   Pulse: 67 67 65 69   Resp: 20 20 22 20   Temp: 98.5 °F (36.9 °C) 97.7 °F (36.5 °C) 97.8 °F (36.6 °C) 98.3 °F (36.8 °C)   TempSrc: Temporal Temporal Temporal Temporal   SpO2: 100% 97%  98%   Weight: 166 lb (75.3 kg)      Height: 5' 5\" (1.651 m)         Constitutional: Well-developed, no acute distress  Eyes: conjunctivae normal, no xanthelasma   Ears, Nose, Throat: oral mucosa moist, no cyanosis   CV: no JVD. Regular rate and rhythm. Normal S1S2 and no S3. No murmurs, rubs, or gallops. PMI is nondisplaced  Lungs: clear to auscultation bilaterally, normal respiratory effort without used of accessory muscles  Abdomen: soft, non-tender, bowel sounds present, no masses or hepatomegaly   Musculoskeletal: no digital clubbing, no edema   Skin: warm, no rashes     EKG 1/13/21: Normal sinus rhythm 62 bpm. QTc 483 msec.     Echocardiogram 12/7/20:  Findings      Left Ventricle   Left ventricular internal dimensions were normal in diastole and systole. Mild left ventricular concentric hypertrophy noted. No regional wall motion abnormalities seen. Normal left ventricular ejection fraction. Ejection fraction is visually estimated at 70-75%. Indeterminate diastolic function. Right Ventricle   Normal right ventricular size and function. Left Atrium   Normal sized left atrium. Interatrial septum appears intact. Right Atrium   Normal right atrium size. Mitral Valve   Normal mitral valve structure and function. Tricuspid Valve   The tricuspid valve appears structurally normal.      Aortic Valve   The aortic valve leaflets were not well visualized. Structurally normal aortic valve. Pulmonic Valve   The pulmonic valve was not well visualized. Pericardial Effusion   No evidence of pericardial effusion. Aorta   Aortic root dimension within normal limits. Conclusions      Summary   Left ventricular internal dimensions were normal in diastole and systole. Mild left ventricular concentric hypertrophy noted. No regional wall motion abnormalities seen. Normal left ventricular ejection fraction. Signature      ----------------------------------------------------------------   Electronically signed by Oneal Duane MD(Interpreting   physician) on 12/07/2020 04:28 PM   ----------------------------------------------------------------     M-Mode/2D Measurements & Calculations      LV Diastolic     LV Systolic Dimension: 1.9 cm   AV Cusp Separation: 2   Dimension: 4.5   LV Volume Diastolic: 70.9 ml    cmAO Root Dimension: 3.3   cm               LV Volume Systolic: 95.5 ml     cm   LV FS:57.8 %     LV EDV/LV EDV Index: 92.4 JN/51   LV PW Diastolic: CA/W^9OX ESV/LV ESV Index: 11.9   1.4 cm           ml/6ml/ m^2   LV PW Systolic:  EF Calculated: 87.1 %           RV Diastolic Dimension:   1.5 cm           LV Mass Index: 113 l/min*m^2    1. 9 cm   Septum           LV Length: 7 cm   Diastolic: 1.2 high  pressure.     The procedure was done through right radial approach.     The patient received intravenous Versed for sedation.     INDICATION:  Acute ST-elevation anterior wall myocardial infarction. Cardiogenic shock.     PRESSURES:  1. Aorta 62/41 with a mean of 49.  2.  Left ventricular systolic pressure is 69, left end-diastolic  pressure of 14.  3.  There was no significant gradient across the aortic valve.     CORONARY ANGIOGRAPHY:  Left main. The left main artery did not appear to have any significant  angiographic disease.     LAD. The left anterior descending artery had around 50% ostial  narrowing. There was 90%-95% stenosis in the proximal LAD with a  filling defect consistent with thrombus. There was SADI 1 to 2 flow in  the distal vessel.     Circ: The left circumflex is a large codominant vessel. It gives rise  to a small to moderate size high first marginal branch (intermediate  ramus branch) which itself had around 70% ostial narrowing. The left  circumflex then gives rise after the AV groove branch, which provides a  posterior descending artery branch and the posterolateral branch, the  left circumflex had around 70% luminal narrowing before a small second  marginal branch and large third and fourth marginal branches and another  fifth small lateral branch. The AV groove branch and the posterior  descending artery branch and the posterolateral branch do not appear to  have any significant disease.     RCA:  The right coronary artery is a moderate size codominant vessel,  which did not appear to have any significant angiographic disease.     LEFT VENTRICULOGRAPHY:  The left ventricle is normal in size. There was  severe hypokinesis of the anterolateral wall. There was severe  hypokinesis to akinesis of the apical half to two-thirds of the  anterolateral wall, the apex and the apical half of the inferior wall.    The global left ventricular systolic function also appeared moderately  reduced with an estimated ejection fraction of 35%-40%. There was post  PVC mitral regurgitation.     INTERVENTIONAL PROCEDURE:     EQUIPMENT:  1.  6-[place] JL-3.5 Medtronic Launcher guide catheter with side holes. 2.  0.014/300 Gibson scientific Choice Extra Support J-exchange wire. 3.  2.0 mm x 15 mm [employer] balloon. 4.  3.0 mm x 33 mm Abbott Xience Sara drug-eluting coronary stent. 5.  3.5 mm x 15 mm [employer] [place] Monorail noncompliant balloon.     TECHNIQUE:  The procedure was done through right radial approach. The  patient had received heparin at the [facility] emergency department  together with aspirin and Brilinta. An ACT after obtaining access in  the right radial artery was 232 for which she was given another 3000  units of intravenous heparin with repeat ACT during the procedure of  304. Intravenous Versed was given for sedation. The patient did  receive intraradial nitroglycerin and verapamil after obtaining access  in the right radial artery.     The left coronary artery was selectively engaged with the 6-Mont[place]in  JL-3.5 guide catheter with side holes. The Extra Support exchange wire  was then successfully advanced through the stenosis in the proximal LAD  and further distally into the vessel. The site of stenosis in the  proximal LAD was then dilated with a 2.0 mm x 15 mm balloon inflated  twice to cover the length of the lesion at 14 atmospheres. This was  followed by advancing a 3.0 mm x 33-mm Xience Sara drug-eluting  coronary stent, which was deployed at the site at 12 atmospheres. The  stent balloon was then slightly pulled back and was reinflated to 18  atmospheres. Finally, the proximal and the middle segment of the stent  was dilated with a 3.5 mm x 15 mm noncompliant balloon and inflated to  18 atmospheres.   Contrast injection at the end of the interventional procedure revealed  excellent results without any significant residual stenosis with no  evidence of dissection or flaps and with SADI-3 flow in the distal LAD.     The rest of the diagnostic procedure was then performed.     The right radial arterial sheath was removed at the end of the procedure  and a TR band was applied with adequate hemostasis and with preservation  of pulse.     The patient tolerated the procedure well and left the cardiac  catheterization laboratory in stable condition without chest pain.     CONCLUSIONS:  1. Coronary artery disease. A.  Left main, no significant disease. B.  LAD, 95% thrombotic proximal vessel stenosis with SADI-1 to 2 flow  in the distal vessel. C.  LCX, large codominant vessel with 70% ostial stenosis of the high  first marginal branch/intermediate ramus branch and 70% stenosis of the  mid left circumflex after the AV groove branch before any of the other  marginal branches. The AV groove branch provided a small posterolateral  branch and a small posterior descending artery branch. D.  RCA. Moderate size codominant vessel with no significant angiographic  disease. 2.  Normal left atrial size with a large area of anterolateral, apical  and apical inferior wall hypokinesis to akinesis with an estimated  ejection fraction of 35%-40%. 3.  Mildly elevated left end diastolic pressure. 4.  Successful balloon angioplasty with the deployment of a drug-eluting  coronary stent to the proximal LAD with post-stent deployment dilatation  with a larger noncompliant balloon to high pressure with very good  results.           Rakesh Del Cid MD    Telemetry 1/13/21: NSR, no significant arrhythmias     Assessment/Plan:    1. Syncope   - Based on history and physical this is most consistent with vasovagal syncope. - There are no clear red flag symptoms consistent with cardiac syncope.   - EKG has not shown any other clear arrhythmogenic cause (WPW, HOCM, LQT, Brugada, ARVC)  - Other cardiac workup thus far has shown normal recent echo and nuclear stress test.  - I have recommended increasing oral fluid intake to a minimum of approximately 2-3L daily, increased sodium intake, using compression stockings, using counterpulsation maneuvers and increase in interval exercise. - We discussed avoidance manuvers including counterpulsation and recognition of symptoms so to perform these avoidance maneuvers as discussed  - Removal of any medications which can exacerbate dizziness were also discussed and I have recommended evaluating the possibility of eliminating/finding an alternative for Seroquel and Lexapro if ok with her prescribing HCP  - Offered ambulatory monitoring to look for paroxysmal arrhythmias. - We discussed option of tilt table testing, however would also be reasonable to attempt these preventative measures first prior to testing. 2. Coronary artery disease  -10/21/20: stress test: no ischemia, LVEF 68%  - 8/23/20: acute anterior STEMI,  - 8/23/20 LHC/PCI:  proximal LAD stent   - Follows with Dr Anni Jim    3. History of acute delirium and sepsis   -  hospitalization 12/3/20-12/17/20:  She was treated for aspiration pneumonia (MSSA) and UTI (MSSA). - LP was unremarkable as well as CT and MRI showing no acute process including ischemic or hemorrhagic stroke. - Cultures negative for C-diff. 4. History of substance abuse  - ETOH: no alcohol ~18 months  - Antabuse 250 mg QD  - Drug abuse: remote history    5. Hypertension  - On Lisinopril and Metoprolol    6. LUANNE  - Resolved. 7. Chronic back pain    8. Hyperlipidemia  - On Lipitor    Recommendations:  1. Will repeat limited echocardiogram to evaluate LV function. 2. Will schedule 30 day cardiac monitor to exclude paroxysmal arrhythmias. 3. Continue life style modifications as above. 4. Consider Tilt table test and possible implantable loop recorder insertion if syncope continues to recur. 5. EP will sign off. Please call for any questions or concerns.     I have spent a total of 90 minutes with the patient and the family reviewing the above stated recommendations. And a total of >50% of that time involved face-to-face time providing counseling and or coordination of care with the other providers. Thank you for allowing me to participate in your patient's care. Please call me if there are any questions or concerns.      Esme Silver MD  Cardiac Electrophysiology  [facility]  [facility] [address]: [facility]  6:21 PM  1/13/2021

## 2021-01-13 NOTE — PROGRESS NOTES
Recent stress and TTE will defer consult of syncope to EP. Electronically signed by LIZANDRO Aguillon on 1/13/2021 at 8:47 AM

## 2021-01-14 ENCOUNTER — TELEPHONE (OUTPATIENT)
Dept: NON INVASIVE DIAGNOSTICS | Age: 66
End: 2021-01-14

## 2021-01-14 VITALS
SYSTOLIC BLOOD PRESSURE: 140 MMHG | HEIGHT: 65 IN | RESPIRATION RATE: 14 BRPM | DIASTOLIC BLOOD PRESSURE: 74 MMHG | HEART RATE: 69 BPM | OXYGEN SATURATION: 95 % | BODY MASS INDEX: 27.66 KG/M2 | TEMPERATURE: 97.4 F | WEIGHT: 166 LBS

## 2021-01-14 PROCEDURE — 6370000000 HC RX 637 (ALT 250 FOR IP): Performed by: GENERAL PRACTICE

## 2021-01-14 PROCEDURE — 2580000003 HC RX 258: Performed by: GENERAL PRACTICE

## 2021-01-14 PROCEDURE — 93308 TTE F-UP OR LMTD: CPT

## 2021-01-14 RX ORDER — ESCITALOPRAM OXALATE 20 MG/1
10 TABLET ORAL DAILY
Qty: 30 TABLET | Refills: 3 | Status: SHIPPED | OUTPATIENT
Start: 2021-01-14 | End: 2021-05-22

## 2021-01-14 RX ORDER — QUETIAPINE FUMARATE 100 MG/1
100 TABLET, FILM COATED ORAL DAILY
Qty: 60 TABLET | Refills: 3 | Status: SHIPPED | OUTPATIENT
Start: 2021-01-14 | End: 2021-05-22

## 2021-01-14 RX ORDER — GABAPENTIN 100 MG/1
200 CAPSULE ORAL 3 TIMES DAILY
Qty: 90 CAPSULE | Refills: 3 | Status: SHIPPED | OUTPATIENT
Start: 2021-01-14 | End: 2021-03-10

## 2021-01-14 RX ADMIN — LISINOPRIL 20 MG: 20 TABLET ORAL at 09:10

## 2021-01-14 RX ADMIN — TICAGRELOR 90 MG: 90 TABLET ORAL at 09:10

## 2021-01-14 RX ADMIN — METOPROLOL SUCCINATE 100 MG: 100 TABLET, EXTENDED RELEASE ORAL at 09:09

## 2021-01-14 RX ADMIN — ACETAMINOPHEN 650 MG: 325 TABLET ORAL at 09:16

## 2021-01-14 RX ADMIN — SODIUM CHLORIDE: 9 INJECTION, SOLUTION INTRAVENOUS at 05:50

## 2021-01-14 RX ADMIN — GABAPENTIN 200 MG: 100 CAPSULE ORAL at 14:32

## 2021-01-14 RX ADMIN — GABAPENTIN 200 MG: 100 CAPSULE ORAL at 09:12

## 2021-01-14 RX ADMIN — ASPIRIN 81 MG: 81 TABLET, CHEWABLE ORAL at 09:10

## 2021-01-14 RX ADMIN — PANTOPRAZOLE SODIUM 40 MG: 40 TABLET, DELAYED RELEASE ORAL at 05:50

## 2021-01-14 RX ADMIN — FOLIC ACID 1 MG: 1 TABLET ORAL at 09:10

## 2021-01-14 ASSESSMENT — PAIN SCALES - GENERAL
PAINLEVEL_OUTOF10: 0
PAINLEVEL_OUTOF10: 0

## 2021-01-14 NOTE — TELEPHONE ENCOUNTER
----- Message from Rehan Vazquez 1634 sent at 1/14/2021 10:53 AM EST -----    ----- Message -----  From: YASIR Han CNP  Sent: 1/14/2021  10:40 AM EST  To: Ravinder Salazar was seen inpatient she will need a 30 day monitor post discharge, she is a patient of Dr. Leslie Maxwell. Thanks.

## 2021-01-14 NOTE — CARE COORDINATION
Discharge order noted. SW notified [place] at [facility] of discharge home today and need for resumption of care. They will follow up with her after discharge.

## 2021-01-14 NOTE — PROGRESS NOTES
CLINICAL PHARMACY NOTE: MEDS TO [address] Select Patient?: No  Total # of Prescriptions Filled: 2   The following medications were delivered to the patient:  · lexapro 20 mg  · Quetiapine 100 mg  Total # of Interventions Completed: 3  Time Spent (min): 30    Additional Documentation:

## 2021-01-14 NOTE — DISCHARGE SUMMARY
[address]                  [address] [facility]  [address]                               DISCHARGE SUMMARY    PATIENT NAME: Pratima James                   :        1955  MED REC NO:   [id number]                            ROOM:       8509  ACCOUNT NO:   [de-identified]                           ADMIT DATE: 2021  PROVIDER:     Hansel Lundberg DO                  DISCHARGE DATE:    FINAL DIAGNOSES:  1. Syncope and collapse. 2.  Vasovagal orthostasis. 3.  Coronary artery disease, status post recent myocardial infarction. 4.  Hypertension. 5.  Acute kidney injury. 6.  Chronic kidney disease. HOSPITAL COURSE:  The patient is a 53-year-old female who was in her  usual state of health, was on the toilet having a bowel movement, became  a little clammy, a little sweaty, a little lightheaded, the next thing  she knows, she passed out and had a syncopal episode. She was brought  into the emergency department and was found to be extremely hypotensive. Fluid resuscitation was administered and blood pressure responded  nicely, and symptomatology disappeared, so it was questionable as to  what can cause this etiology, so we had consulted her cardiologist who  did not get in to see her for a couple of days since she was there, but  we did consult electrophysiology who was prompt in the response and seen  her and felt that this was most likely on the basis of orthostasis and  vasovagal in nature, could not be absolutely certain, but in view of her  recent cardiology workup, it does not appear to be any type of  arrhythmia. Seroquel was associated with some syncope and we are going  to try to get rid of this so I just cut the dose in half.   We also  halved her Lexapro to see if this would be of some benefit to try to cut  down on some of her medications, some of these may be a little proarrhythmic in forming a prolonged QT interval so we are going to just  kind of like keep an eye on that, although everything is pretty much  normal.  She has also been recommended to see electrophysiology as an  outpatient and have maybe like a 24-hour or 30-day monitor or a LEEP  procedure just to make sure we are not missing any type of arrhythmia,  but she is feeling pretty well and is requesting to go home, so we are  going to go ahead and let her get out of here. She had an initial BUN  of 9 and creatinine 1.3, which improved to 10 and 0.9. Blood sugar was  up to 232, normalized to 122, we will keep an eye on that. White count  was normal.  She had a CT of the head, no intracranial pathology. CT  pulmonary, no chest abnormality or PE.  CT abdomen and pelvis,  noncontributory, _____ pretty much the same. DISCHARGE MEDICATIONS:  So, she will go home on,  1. Gabapentin 200 three times a day. 2.  Lisinopril 20 daily. 3.  Metoprolol  daily. 4.  Protonix 40 daily. 5.  Aspirin daily. 6.  Potassium chloride replacement. 7.  Brilinta. 8.  Tylenol as needed. 9.  Some aerosols that she would get as needed. 10.  Lipitor 80 daily. DISPOSITION:  She will be discharged to home. At the time of discharge,  her condition is good. Her prognosis is good. I will see her in the  office in approximately 1 week.         Destinee Ordaz DO    D: 01/14/2021 12:48:14       T: 01/14/2021 12:56:16     [email or web address]  Job#: [id number]     Doc#: [id number]    CC:

## 2021-02-02 ENCOUNTER — TELEPHONE (OUTPATIENT)
Dept: ADMINISTRATIVE | Age: 66
End: 2021-02-02

## 2021-02-19 DIAGNOSIS — R55 SYNCOPE AND COLLAPSE: Primary | ICD-10-CM

## 2021-02-23 ENCOUNTER — HOSPITAL ENCOUNTER (OUTPATIENT)
Dept: MRI IMAGING | Age: 66
Discharge: HOME | End: 2021-02-25
Payer: MEDICARE

## 2021-02-23 DIAGNOSIS — M54.16 LUMBAR RADICULOPATHY: ICD-10-CM

## 2021-02-23 PROCEDURE — 72148 MRI LUMBAR SPINE W/O DYE: CPT

## 2021-02-27 ENCOUNTER — APPOINTMENT (OUTPATIENT)
Dept: CT IMAGING | Age: 66
DRG: 101 | End: 2021-02-27
Payer: MEDICARE

## 2021-02-27 ENCOUNTER — APPOINTMENT (OUTPATIENT)
Dept: GENERAL RADIOLOGY | Age: 66
DRG: 101 | End: 2021-02-27
Payer: MEDICARE

## 2021-02-27 ENCOUNTER — HOSPITAL ENCOUNTER (INPATIENT)
Age: 66
LOS: 4 days | Discharge: HOME | DRG: 101 | End: 2021-03-03
Attending: EMERGENCY MEDICINE | Admitting: GENERAL PRACTICE
Payer: MEDICARE

## 2021-02-27 DIAGNOSIS — R56.9 NEW ONSET SEIZURE (HCC): Primary | ICD-10-CM

## 2021-02-27 LAB
ALBUMIN SERPL-MCNC: 4.3 G/DL (ref 3.5–5.2)
ALP BLD-CCNC: 109 U/L (ref 35–104)
ALT SERPL-CCNC: 8 U/L (ref 0–32)
AMMONIA: 13 UMOL/L (ref 11–51)
AMPHETAMINE SCREEN, URINE: NOT DETECTED
ANION GAP SERPL CALCULATED.3IONS-SCNC: 11 MMOL/L (ref 7–16)
AST SERPL-CCNC: 12 U/L (ref 0–31)
B.E.: -5.9 MMOL/L (ref -3–3)
BACTERIA: NORMAL /HPF
BARBITURATE SCREEN URINE: NOT DETECTED
BASOPHILS ABSOLUTE: 0.02 E9/L (ref 0–0.2)
BASOPHILS RELATIVE PERCENT: 0.3 % (ref 0–2)
BENZODIAZEPINE SCREEN, URINE: NOT DETECTED
BILIRUB SERPL-MCNC: <0.2 MG/DL (ref 0–1.2)
BILIRUBIN URINE: NEGATIVE
BLOOD, URINE: NEGATIVE
BUN BLDV-MCNC: 11 MG/DL (ref 8–23)
CALCIUM SERPL-MCNC: 9.6 MG/DL (ref 8.6–10.2)
CANNABINOID SCREEN URINE: POSITIVE
CHLORIDE BLD-SCNC: 109 MMOL/L (ref 98–107)
CLARITY: CLEAR
CO2: 23 MMOL/L (ref 22–29)
COCAINE METABOLITE SCREEN URINE: NOT DETECTED
COHB: 0.5 % (ref 0–1.5)
COLOR: YELLOW
CREAT SERPL-MCNC: 0.9 MG/DL (ref 0.5–1)
CRITICAL: ABNORMAL
DATE ANALYZED: ABNORMAL
DATE OF COLLECTION: ABNORMAL
EOSINOPHILS ABSOLUTE: 0.23 E9/L (ref 0.05–0.5)
EOSINOPHILS RELATIVE PERCENT: 3 % (ref 0–6)
FENTANYL SCREEN, URINE: NOT DETECTED
GFR AFRICAN AMERICAN: >60
GFR NON-AFRICAN AMERICAN: >60 ML/MIN/1.73
GLUCOSE BLD-MCNC: 119 MG/DL (ref 74–99)
GLUCOSE URINE: NEGATIVE MG/DL
HCO3: 18.3 MMOL/L (ref 22–26)
HCT VFR BLD CALC: 37.2 % (ref 34–48)
HEMOGLOBIN: 11.7 G/DL (ref 11.5–15.5)
HHB: 3.2 % (ref 0–5)
IMMATURE GRANULOCYTES #: 0.01 E9/L
IMMATURE GRANULOCYTES %: 0.1 % (ref 0–5)
KETONES, URINE: NEGATIVE MG/DL
LAB: ABNORMAL
LACTIC ACID, SEPSIS: 2 MMOL/L (ref 0.5–1.9)
LEUKOCYTE ESTERASE, URINE: NEGATIVE
LYMPHOCYTES ABSOLUTE: 1.46 E9/L (ref 1.5–4)
LYMPHOCYTES RELATIVE PERCENT: 19.1 % (ref 20–42)
Lab: ABNORMAL
Lab: ABNORMAL
MAGNESIUM: 1.9 MG/DL (ref 1.6–2.6)
MCH RBC QN AUTO: 29 PG (ref 26–35)
MCHC RBC AUTO-ENTMCNC: 31.5 % (ref 32–34.5)
MCV RBC AUTO: 92.1 FL (ref 80–99.9)
METHADONE SCREEN, URINE: NOT DETECTED
METHB: 0.3 % (ref 0–1.5)
MODE: ABNORMAL
MONOCYTES ABSOLUTE: 0.45 E9/L (ref 0.1–0.95)
MONOCYTES RELATIVE PERCENT: 5.9 % (ref 2–12)
NEUTROPHILS ABSOLUTE: 5.47 E9/L (ref 1.8–7.3)
NEUTROPHILS RELATIVE PERCENT: 71.6 % (ref 43–80)
NITRITE, URINE: NEGATIVE
O2 CONTENT: 17 ML/DL
O2 SATURATION: 96.8 % (ref 92–98.5)
O2HB: 96 % (ref 94–97)
OPERATOR ID: 1394
OPIATE SCREEN URINE: NOT DETECTED
OXYCODONE URINE: NOT DETECTED
PATIENT TEMP: 37 C
PCO2: 31.9 MMHG (ref 35–45)
PDW BLD-RTO: 13 FL (ref 11.5–15)
PH BLOOD GAS: 7.38 (ref 7.35–7.45)
PH UA: 5.5 (ref 5–9)
PHENCYCLIDINE SCREEN URINE: NOT DETECTED
PLATELET # BLD: 231 E9/L (ref 130–450)
PMV BLD AUTO: 11.3 FL (ref 7–12)
PO2: 93.2 MMHG (ref 75–100)
POTASSIUM REFLEX MAGNESIUM: 4.4 MMOL/L (ref 3.5–5)
PROTEIN UA: NEGATIVE MG/DL
RBC # BLD: 4.04 E12/L (ref 3.5–5.5)
RBC UA: NORMAL /HPF (ref 0–2)
REASON FOR REJECTION: NORMAL
REJECTED TEST: NORMAL
SODIUM BLD-SCNC: 143 MMOL/L (ref 132–146)
SOURCE, BLOOD GAS: ABNORMAL
SPECIFIC GRAVITY UA: 1.01 (ref 1–1.03)
THB: 12.5 G/DL (ref 11.5–16.5)
TIME ANALYZED: 1128
TOTAL CK: 70 U/L (ref 20–180)
TOTAL PROTEIN: 6.8 G/DL (ref 6.4–8.3)
TROPONIN: <0.01 NG/ML (ref 0–0.03)
UROBILINOGEN, URINE: 0.2 E.U./DL
WBC # BLD: 7.6 E9/L (ref 4.5–11.5)
WBC UA: NORMAL /HPF (ref 0–5)

## 2021-02-27 PROCEDURE — 93005 ELECTROCARDIOGRAM TRACING: CPT | Performed by: STUDENT IN AN ORGANIZED HEALTH CARE EDUCATION/TRAINING PROGRAM

## 2021-02-27 PROCEDURE — 70450 CT HEAD/BRAIN W/O DYE: CPT

## 2021-02-27 PROCEDURE — 80307 DRUG TEST PRSMV CHEM ANLYZR: CPT

## 2021-02-27 PROCEDURE — 96375 TX/PRO/DX INJ NEW DRUG ADDON: CPT

## 2021-02-27 PROCEDURE — 87088 URINE BACTERIA CULTURE: CPT

## 2021-02-27 PROCEDURE — 6360000002 HC RX W HCPCS: Performed by: STUDENT IN AN ORGANIZED HEALTH CARE EDUCATION/TRAINING PROGRAM

## 2021-02-27 PROCEDURE — 83605 ASSAY OF LACTIC ACID: CPT

## 2021-02-27 PROCEDURE — 83735 ASSAY OF MAGNESIUM: CPT

## 2021-02-27 PROCEDURE — 72125 CT NECK SPINE W/O DYE: CPT

## 2021-02-27 PROCEDURE — 99285 EMERGENCY DEPT VISIT HI MDM: CPT

## 2021-02-27 PROCEDURE — 96372 THER/PROPH/DIAG INJ SC/IM: CPT

## 2021-02-27 PROCEDURE — 2140000000 HC CCU INTERMEDIATE R&B

## 2021-02-27 PROCEDURE — 71045 X-RAY EXAM CHEST 1 VIEW: CPT

## 2021-02-27 PROCEDURE — 2500000003 HC RX 250 WO HCPCS: Performed by: EMERGENCY MEDICINE

## 2021-02-27 PROCEDURE — 96365 THER/PROPH/DIAG IV INF INIT: CPT

## 2021-02-27 PROCEDURE — 84484 ASSAY OF TROPONIN QUANT: CPT

## 2021-02-27 PROCEDURE — 36415 COLL VENOUS BLD VENIPUNCTURE: CPT

## 2021-02-27 PROCEDURE — 82140 ASSAY OF AMMONIA: CPT

## 2021-02-27 PROCEDURE — 87040 BLOOD CULTURE FOR BACTERIA: CPT

## 2021-02-27 PROCEDURE — 85025 COMPLETE CBC W/AUTO DIFF WBC: CPT

## 2021-02-27 PROCEDURE — 82550 ASSAY OF CK (CPK): CPT

## 2021-02-27 PROCEDURE — 82805 BLOOD GASES W/O2 SATURATION: CPT

## 2021-02-27 PROCEDURE — 81001 URINALYSIS AUTO W/SCOPE: CPT

## 2021-02-27 PROCEDURE — 80053 COMPREHEN METABOLIC PANEL: CPT

## 2021-02-27 RX ORDER — KETAMINE HYDROCHLORIDE 100 MG/ML
3 INJECTION, SOLUTION INTRAMUSCULAR; INTRAVENOUS ONCE
Status: COMPLETED | OUTPATIENT
Start: 2021-02-27 | End: 2021-02-27

## 2021-02-27 RX ORDER — LORAZEPAM 2 MG/ML
1 INJECTION INTRAMUSCULAR ONCE
Status: COMPLETED | OUTPATIENT
Start: 2021-02-27 | End: 2021-02-27

## 2021-02-27 RX ORDER — LEVETIRACETAM 10 MG/ML
1000 INJECTION INTRAVASCULAR ONCE
Status: COMPLETED | OUTPATIENT
Start: 2021-02-27 | End: 2021-02-27

## 2021-02-27 RX ADMIN — LORAZEPAM 1 MG: 2 INJECTION INTRAMUSCULAR; INTRAVENOUS at 11:33

## 2021-02-27 RX ADMIN — LEVETIRACETAM 1000 MG: 10 INJECTION INTRAVENOUS at 11:32

## 2021-02-27 RX ADMIN — KETAMINE HYDROCHLORIDE 220 MG: 100 INJECTION, SOLUTION, CONCENTRATE INTRAMUSCULAR; INTRAVENOUS at 09:53

## 2021-02-27 ASSESSMENT — PAIN SCALES - GENERAL: PAINLEVEL_OUTOF10: 0

## 2021-02-27 NOTE — Clinical Note
Patient Class: Inpatient [101]   REQUIRED: Diagnosis: New onset seizure ([facility] [address] [[id number]]   Estimated Length of Stay: Estimated stay of more than 2 midnights   Admitting Provider: Ada Spencer [id number]   Telemetry Bed Required?: Yes

## 2021-02-27 NOTE — ED NOTES
Pt continues to be restless in bed, not following commands, denies pain, opens eyes to name, vss, call light in reach, safety maintained, will continue to monitor     Adalid Metz RN  02/27/21 3148

## 2021-02-27 NOTE — ED PROVIDER NOTES
HPI:     Cyndra Kocher is a 77 y.o. female presenting to the ED for altered mental status, beginning last night. The complaint has been persistent, moderate in severity, and worsened by nothing. Patient self is altered in the emergency department unable to provide any history. Per EMS, daughter said that she was behaving slightly altered since last night and earlier today she fell down and has been increasingly altered ever since. Unable to obtain any history from patient secondary to altered mental status. Daughters do think that she has UTI as this is happened to her in the past and she has had a urinary tract infection. Review of Systems:   Unable to obtain review of systems secondary to altered mental status.        --------------------------------------------- PAST HISTORY ---------------------------------------------  Past Medical History:  has a past medical history of CAD (coronary artery disease), Degenerative disorder of bone, Hard of hearing, Hypertension, and MI (myocardial infarction) ([facility] [address] Past Surgical History:  has a past surgical history that includes joint replacement;  section; Tonsillectomy; Hysterectomy; Hemorrhoid surgery; Breast lumpectomy (Left); and Coronary angioplasty with stent (2020). Social History:  reports that she quit smoking about 5 months ago. Her smoking use included cigarettes. She started smoking about 47 years ago. She has a 11.75 pack-year smoking history. She has never used smokeless tobacco. She reports previous alcohol use. She reports that she does not use drugs. Family History: family history includes Diabetes in her father; Clarita Sandifer in her mother. The patients home medications have been reviewed.     Allergies: Nitroglycerin and Tramadol    -------------------------------------------------- RESULTS -------------------------------------------------  All laboratory and radiology results have been personally reviewed by myself LABS:  Results for orders placed or performed during the hospital encounter of 02/27/21   CBC Auto Differential   Result Value Ref Range    WBC 7.6 4.5 - 11.5 E9/L    RBC 4.04 3.50 - 5.50 E12/L    Hemoglobin 11.7 11.5 - 15.5 g/dL    Hematocrit 37.2 34.0 - 48.0 %    MCV 92.1 80.0 - 99.9 fL    MCH 29.0 26.0 - 35.0 pg    MCHC 31.5 (L) 32.0 - 34.5 %    RDW 13.0 11.5 - 15.0 fL    Platelets 647 424 - 518 E9/L    MPV 11.3 7.0 - 12.0 fL    Neutrophils % 71.6 43.0 - 80.0 %    Immature Granulocytes % 0.1 0.0 - 5.0 %    Lymphocytes % 19.1 (L) 20.0 - 42.0 %    Monocytes % 5.9 2.0 - 12.0 %    Eosinophils % 3.0 0.0 - 6.0 %    Basophils % 0.3 0.0 - 2.0 %    Neutrophils Absolute 5.47 1.80 - 7.30 E9/L    Immature Granulocytes # 0.01 E9/L    Lymphocytes Absolute 1.46 (L) 1.50 - 4.00 E9/L    Monocytes Absolute 0.45 0.10 - 0.95 E9/L    Eosinophils Absolute 0.23 0.05 - 0.50 E9/L    Basophils Absolute 0.02 0.00 - 0.20 E9/L   Urinalysis with Microscopic   Result Value Ref Range    Color, UA Yellow Straw/Yellow    Clarity, UA Clear Clear    Glucose, Ur Negative Negative mg/dL    Bilirubin Urine Negative Negative    Ketones, Urine Negative Negative mg/dL    Specific Gravity, UA 1.015 1.005 - 1.030    Blood, Urine Negative Negative    pH, UA 5.5 5.0 - 9.0    Protein, UA Negative Negative mg/dL    Urobilinogen, Urine 0.2 <2.0 E.U./dL    Nitrite, Urine Negative Negative    Leukocyte Esterase, Urine Negative Negative    WBC, UA 0-1 0 - 5 /HPF    RBC, UA NONE 0 - 2 /HPF    Bacteria, UA NONE SEEN None Seen /HPF   Lactate, Sepsis   Result Value Ref Range    Lactic Acid, Sepsis 2.0 (H) 0.5 - 1.9 mmol/L   Ammonia   Result Value Ref Range    Ammonia 13.0 11.0 - 51.0 umol/L   Comprehensive Metabolic Panel w/ Reflex to MG   Result Value Ref Range    Sodium 143 132 - 146 mmol/L    Potassium reflex Magnesium 4.4 3.5 - 5.0 mmol/L    Chloride 109 (H) 98 - 107 mmol/L    CO2 23 22 - 29 mmol/L    Anion Gap 11 7 - 16 mmol/L    Glucose 119 (H) 74 - 99 mg/dL BUN 11 8 - 23 mg/dL    CREATININE 0.9 0.5 - 1.0 mg/dL    GFR Non-African American >60 >=60 mL/min/1.73    GFR African American >60     Calcium 9.6 8.6 - 10.2 mg/dL    Total Protein 6.8 6.4 - 8.3 g/dL    Albumin 4.3 3.5 - 5.2 g/dL    Total Bilirubin <0.2 0.0 - 1.2 mg/dL    Alkaline Phosphatase 109 (H) 35 - 104 U/L    ALT 8 0 - 32 U/L    AST 12 0 - 31 U/L   Troponin   Result Value Ref Range    Troponin <0.01 0.00 - 0.03 ng/mL   Magnesium   Result Value Ref Range    Magnesium 1.9 1.6 - 2.6 mg/dL   CK   Result Value Ref Range    Total CK 70 20 - 180 U/L   SPECIMEN REJECTION   Result Value Ref Range    Rejected Test cmp,trop,mg,cpk     Reason for Rejection see below    Blood Gas, Arterial   Result Value Ref Range    Date Analyzed 20210227     Time Analyzed 1128     Source: Blood Arterial     pH, Blood Gas 7.376 7.350 - 7.450    PCO2 31.9 (L) 35.0 - 45.0 mmHg    PO2 93.2 75.0 - 100.0 mmHg    HCO3 18.3 (L) 22.0 - 26.0 mmol/L    B.E. -5.9 (L) -3.0 - 3.0 mmol/L    O2 Sat 96.8 92.0 - 98.5 %    O2Hb 96.0 94.0 - 97.0 %    COHb 0.5 0.0 - 1.5 %    MetHb 0.3 0.0 - 1.5 %    O2 Content 17.0 mL/dL    HHb 3.2 0.0 - 5.0 %    tHb (est) 12.5 11.5 - 16.5 g/dL    Mode RA     Date Of Collection      Time Collected      Pt Temp 37.0 C     ID [id number]     Lab [id number]     Critical(s) Notified .  No Critical Values    URINE DRUG SCREEN   Result Value Ref Range    Amphetamine Screen, Urine NOT DETECTED Negative <1000 ng/mL    Barbiturate Screen, Ur NOT DETECTED Negative < 200 ng/mL    Benzodiazepine Screen, Urine NOT DETECTED Negative < 200 ng/mL    Cannabinoid Scrn, Ur POSITIVE (A) Negative < 50ng/mL    Cocaine Metabolite Screen, Urine NOT DETECTED Negative < 300 ng/mL    Opiate Scrn, Ur NOT DETECTED Negative < 300ng/mL    PCP Screen, Urine NOT DETECTED Negative < 25 ng/mL    Methadone Screen, Urine NOT DETECTED Negative <300 ng/mL    Oxycodone Urine NOT DETECTED Negative <100 ng/mL    FENTANYL SCREEN, URINE NOT DETECTED Negative <1 ng/mL Drug Screen Comment: see below        RADIOLOGY:  Interpreted by RadiologistTalia Moore   Final Result   1. There is no acute intracranial abnormality. Specifically, there is no   intracranial hemorrhage. 2. Atrophy and periventricular leukomalacia,   3. There is no change when compared to patient's prior study obtained 2 hours   earlier or the prior study of 01/12/2021. CT HEAD WO CONTRAST   Final Result   1. There is no acute intracranial abnormality. Specifically, there is no   intracranial hemorrhage. 2. Atrophy and periventricular leukomalacia,      CT CERVICAL SPINE WO CONTRAST   Final Result   1. There is no acute compression fracture or subluxation of the cervical   spine. 2. Multilevel degenerative disc and degenerative joint disease. 3. Straightening and reversal of the normal cervical lordosis likely   secondary to muscular spasm and the chronic degenerative changes. XR CHEST PORTABLE   Final Result   No radiographic evidence of acute abnormality          ------------------------- NURSING NOTES AND VITALS REVIEWED ---------------------------   The nursing notes within the ED encounter and vital signs as below have been reviewed. BP (!) 142/63   Pulse 101   Temp 98.7 °F (37.1 °C) (Temporal)   Resp 20   Ht 5' 5\" (1.651 m)   Wt 160 lb (72.6 kg)   SpO2 98%   BMI 26.63 kg/m²   Oxygen Saturation Interpretation: Normal      ---------------------------------------------------PHYSICAL EXAM--------------------------------------      Constitutional/General: Alert and oriented x1, ill appearing, non toxic in NAD  Head: Normocephalic and atraumatic  Eyes: PERRL, EOMI  Mouth: Oropharynx clear, handling secretions, no trismus  Neck: Supple, full ROM, phonation normal  Pulmonary: Lungs clear to auscultation bilaterally, no wheezes, rales, or rhonchi. Not in respiratory distress  Cardiovascular:  Regular rate and rhythm, no murmurs, gallops, or rubs.  2+ distal pulses  Abdomen: Soft, non tender, non distended,   Extremities: Moves all extremities x 4. Warm and well perfused  Skin: warm and dry without rash  Neurologic: No focal deficit noted  Glascow Coma Scale:  Best Eye Response 4 - Opens eyes on own   Best Verbal Response 4 - Seems confused, disoriented   Best Motor Response 5 - Pushes away noxious stimulus   Total Score 13   Psych: Normal Affect      EKG: This EKG is signed and interpreted by me. Rate: 70  Rhythm: Sinus  Interpretation: Normal sinus rhythm, no acute changes noted, left axis deviation, QTC 4 6 4 ms, inverted T waves noted in lead V1  Comparison: stable as compared to patient's most recent EKG    ------------------------------ ED COURSE/MEDICAL DECISION MAKING----------------------  Medications   ketamine (KETALAR) injection 220 mg (220 mg Intramuscular Given 2/27/21 0993)   levetiracetam (KEPPRA) 1000 mg/100 mL IVPB (0 mg Intravenous Stopped 2/27/21 1209)   LORazepam (ATIVAN) injection 1 mg (1 mg Intravenous Given 2/27/21 1133)         ED COURSE:       Medical Decision Making:    Patient presented to the ER today with altered mental status. Labs are largely unremarkable, ABG is normal, CT of the head is negative as well. While here, patient had seizure-like activity and was given Ativan and loaded with Keppra. Patient still altered here in the emergency department. Drug screen is significant only for positive cannabis use. Unsure if patient received a bad batch. Vitals are stable. As of this time, I do suspect new onset seizure. Did speak with Dr. Malachi Dennison agreeable to admission. Counseling: The emergency provider has spoken with the patient and discussed todays results, in addition to providing specific details for the plan of care and counseling regarding the diagnosis and prognosis.   Questions are answered at this time and they are agreeable with the plan.      --------------------------------- IMPRESSION AND DISPOSITION ---------------------------------    IMPRESSION  1. New onset seizure [facility])        New Prescriptions    No medications on file       DISPOSITION  Disposition: Admit to telemetry  Patient condition is serious      NOTE: This report was transcribed using voice recognition software.  Every effort was made to ensure accuracy; however, inadvertent computerized transcription errors may be present       Julisa Dwyer DO  Resident  02/27/21 6784

## 2021-02-28 PROBLEM — R56.9 SEIZURE (HCC): Status: ACTIVE | Noted: 2021-02-28

## 2021-02-28 PROCEDURE — 6370000000 HC RX 637 (ALT 250 FOR IP): Performed by: FAMILY MEDICINE

## 2021-02-28 PROCEDURE — 94640 AIRWAY INHALATION TREATMENT: CPT

## 2021-02-28 PROCEDURE — 2140000000 HC CCU INTERMEDIATE R&B

## 2021-02-28 PROCEDURE — 2580000003 HC RX 258: Performed by: FAMILY MEDICINE

## 2021-02-28 PROCEDURE — 6360000002 HC RX W HCPCS: Performed by: PSYCHIATRY & NEUROLOGY

## 2021-02-28 PROCEDURE — 99222 1ST HOSP IP/OBS MODERATE 55: CPT | Performed by: PSYCHIATRY & NEUROLOGY

## 2021-02-28 PROCEDURE — 6360000002 HC RX W HCPCS: Performed by: FAMILY MEDICINE

## 2021-02-28 RX ORDER — LISINOPRIL 20 MG/1
20 TABLET ORAL DAILY
Status: DISCONTINUED | OUTPATIENT
Start: 2021-02-28 | End: 2021-03-03 | Stop reason: HOSPADM

## 2021-02-28 RX ORDER — GABAPENTIN 100 MG/1
200 CAPSULE ORAL 3 TIMES DAILY
Status: DISCONTINUED | OUTPATIENT
Start: 2021-02-28 | End: 2021-03-03 | Stop reason: HOSPADM

## 2021-02-28 RX ORDER — METOPROLOL SUCCINATE 100 MG/1
100 TABLET, EXTENDED RELEASE ORAL DAILY
Status: DISCONTINUED | OUTPATIENT
Start: 2021-02-28 | End: 2021-03-03 | Stop reason: HOSPADM

## 2021-02-28 RX ORDER — QUETIAPINE FUMARATE 200 MG/1
200 TABLET, FILM COATED ORAL DAILY
Status: DISCONTINUED | OUTPATIENT
Start: 2021-02-28 | End: 2021-03-03 | Stop reason: HOSPADM

## 2021-02-28 RX ORDER — ATORVASTATIN CALCIUM 80 MG/1
80 TABLET, FILM COATED ORAL NIGHTLY
Status: DISCONTINUED | OUTPATIENT
Start: 2021-02-28 | End: 2021-03-03 | Stop reason: HOSPADM

## 2021-02-28 RX ORDER — DIPHENHYDRAMINE HYDROCHLORIDE 50 MG/ML
25 INJECTION INTRAMUSCULAR; INTRAVENOUS EVERY 6 HOURS PRN
Status: DISCONTINUED | OUTPATIENT
Start: 2021-02-28 | End: 2021-03-03 | Stop reason: HOSPADM

## 2021-02-28 RX ORDER — IPRATROPIUM BROMIDE AND ALBUTEROL SULFATE 2.5; .5 MG/3ML; MG/3ML
3 SOLUTION RESPIRATORY (INHALATION) EVERY 4 HOURS PRN
Status: DISCONTINUED | OUTPATIENT
Start: 2021-02-28 | End: 2021-03-03 | Stop reason: HOSPADM

## 2021-02-28 RX ORDER — PROCHLORPERAZINE EDISYLATE 5 MG/ML
10 INJECTION INTRAMUSCULAR; INTRAVENOUS ONCE
Status: COMPLETED | OUTPATIENT
Start: 2021-02-28 | End: 2021-02-28

## 2021-02-28 RX ORDER — DISULFIRAM 250 MG/1
250 TABLET ORAL DAILY
Status: DISCONTINUED | OUTPATIENT
Start: 2021-02-28 | End: 2021-03-03 | Stop reason: HOSPADM

## 2021-02-28 RX ORDER — PANTOPRAZOLE SODIUM 40 MG/1
40 TABLET, DELAYED RELEASE ORAL
Status: DISCONTINUED | OUTPATIENT
Start: 2021-02-28 | End: 2021-03-03 | Stop reason: HOSPADM

## 2021-02-28 RX ORDER — FOLIC ACID 1 MG/1
1 TABLET ORAL DAILY
Status: DISCONTINUED | OUTPATIENT
Start: 2021-02-28 | End: 2021-03-03 | Stop reason: HOSPADM

## 2021-02-28 RX ORDER — ASPIRIN 81 MG/1
81 TABLET, CHEWABLE ORAL DAILY
Status: DISCONTINUED | OUTPATIENT
Start: 2021-02-28 | End: 2021-03-03 | Stop reason: HOSPADM

## 2021-02-28 RX ORDER — ACETAMINOPHEN 325 MG/1
650 TABLET ORAL EVERY 6 HOURS PRN
Status: DISCONTINUED | OUTPATIENT
Start: 2021-02-28 | End: 2021-03-03 | Stop reason: HOSPADM

## 2021-02-28 RX ORDER — POLYETHYLENE GLYCOL 3350 17 G/17G
17 POWDER, FOR SOLUTION ORAL DAILY PRN
Status: DISCONTINUED | OUTPATIENT
Start: 2021-02-28 | End: 2021-03-03 | Stop reason: HOSPADM

## 2021-02-28 RX ORDER — ACETAMINOPHEN 650 MG/1
650 SUPPOSITORY RECTAL EVERY 6 HOURS PRN
Status: DISCONTINUED | OUTPATIENT
Start: 2021-02-28 | End: 2021-03-03 | Stop reason: HOSPADM

## 2021-02-28 RX ORDER — ESCITALOPRAM OXALATE 10 MG/1
10 TABLET ORAL DAILY
Status: DISCONTINUED | OUTPATIENT
Start: 2021-02-28 | End: 2021-03-03 | Stop reason: HOSPADM

## 2021-02-28 RX ORDER — KETOROLAC TROMETHAMINE 30 MG/ML
30 INJECTION, SOLUTION INTRAMUSCULAR; INTRAVENOUS EVERY 6 HOURS PRN
Status: DISCONTINUED | OUTPATIENT
Start: 2021-02-28 | End: 2021-03-03 | Stop reason: HOSPADM

## 2021-02-28 RX ORDER — PROMETHAZINE HYDROCHLORIDE 25 MG/1
12.5 TABLET ORAL EVERY 6 HOURS PRN
Status: DISCONTINUED | OUTPATIENT
Start: 2021-02-28 | End: 2021-03-03 | Stop reason: HOSPADM

## 2021-02-28 RX ORDER — SODIUM CHLORIDE 9 MG/ML
INJECTION, SOLUTION INTRAVENOUS CONTINUOUS
Status: DISCONTINUED | OUTPATIENT
Start: 2021-02-28 | End: 2021-03-03 | Stop reason: HOSPADM

## 2021-02-28 RX ORDER — ARFORMOTEROL TARTRATE 15 UG/2ML
15 SOLUTION RESPIRATORY (INHALATION) 2 TIMES DAILY
Status: DISCONTINUED | OUTPATIENT
Start: 2021-02-28 | End: 2021-03-03 | Stop reason: HOSPADM

## 2021-02-28 RX ORDER — ONDANSETRON 2 MG/ML
4 INJECTION INTRAMUSCULAR; INTRAVENOUS EVERY 6 HOURS PRN
Status: DISCONTINUED | OUTPATIENT
Start: 2021-02-28 | End: 2021-03-03 | Stop reason: HOSPADM

## 2021-02-28 RX ORDER — MAGNESIUM SULFATE IN WATER 40 MG/ML
4000 INJECTION, SOLUTION INTRAVENOUS ONCE
Status: COMPLETED | OUTPATIENT
Start: 2021-02-28 | End: 2021-02-28

## 2021-02-28 RX ADMIN — ARFORMOTEROL TARTRATE 15 MCG: 15 SOLUTION RESPIRATORY (INHALATION) at 20:02

## 2021-02-28 RX ADMIN — QUETIAPINE FUMARATE 200 MG: 200 TABLET ORAL at 22:06

## 2021-02-28 RX ADMIN — QUETIAPINE FUMARATE 200 MG: 200 TABLET ORAL at 09:53

## 2021-02-28 RX ADMIN — ATORVASTATIN CALCIUM 80 MG: 80 TABLET, FILM COATED ORAL at 22:06

## 2021-02-28 RX ADMIN — DIPHENHYDRAMINE HYDROCHLORIDE 25 MG: 50 INJECTION, SOLUTION INTRAMUSCULAR; INTRAVENOUS at 17:23

## 2021-02-28 RX ADMIN — GABAPENTIN 200 MG: 100 CAPSULE ORAL at 14:39

## 2021-02-28 RX ADMIN — TICAGRELOR 90 MG: 90 TABLET ORAL at 22:07

## 2021-02-28 RX ADMIN — KETOROLAC TROMETHAMINE 30 MG: 30 INJECTION, SOLUTION INTRAMUSCULAR; INTRAVENOUS at 12:51

## 2021-02-28 RX ADMIN — IBUPROFEN 600 MG: 400 TABLET ORAL at 09:53

## 2021-02-28 RX ADMIN — METOPROLOL SUCCINATE 100 MG: 100 TABLET, EXTENDED RELEASE ORAL at 09:52

## 2021-02-28 RX ADMIN — MAGNESIUM SULFATE HEPTAHYDRATE 4000 MG: 4 INJECTION, SOLUTION INTRAVENOUS at 17:28

## 2021-02-28 RX ADMIN — ENOXAPARIN SODIUM 40 MG: 40 INJECTION SUBCUTANEOUS at 09:54

## 2021-02-28 RX ADMIN — ESCITALOPRAM 10 MG: 10 TABLET, FILM COATED ORAL at 09:53

## 2021-02-28 RX ADMIN — SODIUM CHLORIDE: 9 INJECTION, SOLUTION INTRAVENOUS at 10:01

## 2021-02-28 RX ADMIN — GABAPENTIN 200 MG: 100 CAPSULE ORAL at 22:06

## 2021-02-28 RX ADMIN — PANTOPRAZOLE SODIUM 40 MG: 40 TABLET, DELAYED RELEASE ORAL at 09:53

## 2021-02-28 RX ADMIN — TICAGRELOR 90 MG: 90 TABLET ORAL at 09:53

## 2021-02-28 RX ADMIN — GABAPENTIN 200 MG: 100 CAPSULE ORAL at 09:53

## 2021-02-28 RX ADMIN — FOLIC ACID 1 MG: 1 TABLET ORAL at 09:54

## 2021-02-28 RX ADMIN — LISINOPRIL 20 MG: 20 TABLET ORAL at 09:52

## 2021-02-28 RX ADMIN — PROCHLORPERAZINE EDISYLATE 10 MG: 5 INJECTION INTRAMUSCULAR; INTRAVENOUS at 17:22

## 2021-02-28 RX ADMIN — ASPIRIN 81 MG: 81 TABLET, CHEWABLE ORAL at 09:53

## 2021-02-28 ASSESSMENT — PAIN DESCRIPTION - FREQUENCY: FREQUENCY: CONTINUOUS

## 2021-02-28 ASSESSMENT — PAIN SCALES - GENERAL
PAINLEVEL_OUTOF10: 7
PAINLEVEL_OUTOF10: 8
PAINLEVEL_OUTOF10: 7
PAINLEVEL_OUTOF10: 0
PAINLEVEL_OUTOF10: 8

## 2021-02-28 ASSESSMENT — PAIN DESCRIPTION - ORIENTATION
ORIENTATION: RIGHT;LEFT
ORIENTATION: RIGHT

## 2021-02-28 ASSESSMENT — PAIN DESCRIPTION - ONSET: ONSET: ON-GOING

## 2021-02-28 NOTE — CONSULTS
Quique Live is a 77 y.o. female       Chief Complaint   Patient presents with   Goran Aures     had fall this am landed on jacket so states no injury EMS reports AMS since last evening daughter thinks she might have UTI       HPI:  68-year-old woman with history of alcoholism on disulfiram and remote seizure disorder in the setting of alcoholism is presenting now with altered mental status and a fall. She seems back to her baseline now does not remember the details of the last few days other than vague memory of going with EMS to the hospital.  She does not have a tongue bite but does report that she bit the inside of her cheek which she does do from time to time. HPI mentions that she had a seizure although I do not have any further information regarding this and nursing this morning is unaware of any seizures. She was given Keppra in the ER. She currently states that she has a headache which she reports she has had previously after her seizures. She denies any recent alcohol or drug use. HPI  Prior to Visit Medications    Medication Sig Taking? Authorizing Provider   gabapentin (NEURONTIN) 100 MG capsule Take 2 capsules by mouth 3 times daily for 90 days.  Yes Lexi Scales, DO   escitalopram (LEXAPRO) 20 MG tablet Take 0.5 tablets by mouth daily Yes Lexi Scales, DO   QUEtiapine (SEROQUEL) 100 MG tablet Take 1 tablet by mouth daily  Patient taking differently: Take 200 mg by mouth daily  Yes Lexi Ford, DO   ibuprofen (ADVIL;MOTRIN) 600 MG tablet Take 1 tablet by mouth 3 times daily as needed for Pain or Fever Yes Lexi Scales, DO   Arformoterol Tartrate (BROVANA) 15 MCG/2ML NEBU Take 2 mLs by nebulization 2 times daily Yes Juma Haider, DO   ipratropium-albuterol (DUONEB) 0.5-2.5 (3) MG/3ML SOLN nebulizer solution Inhale 3 mLs into the lungs every 4 hours as needed for Shortness of Breath Yes Lexi Scales, DO   folic acid (FOLVITE) 1 MG tablet Take 1 tablet by mouth daily Yes Lexi Scales, DO Respiratory Therapy Supplies (FULL KIT NEBULIZER SET) MISC Use as directed with nebulized medication.  Yes Ariadne Grace DO   lisinopril (PRINIVIL;ZESTRIL) 20 MG tablet Take 20 mg by mouth daily Yes Historical Provider, MD   metoprolol succinate (TOPROL XL) 100 MG extended release tablet Take 100 mg by mouth daily Yes Historical Provider, MD   pantoprazole (PROTONIX) 40 MG tablet Take 1 tablet by mouth every morning (before breakfast) Yes Ariadne Grace DO   aspirin 81 MG chewable tablet Take 1 tablet by mouth daily Yes Ariadne Grace DO   atorvastatin (LIPITOR) 80 MG tablet Take 1 tablet by mouth nightly Yes Ariadne Grace DO   ticagrelor (BRILINTA) 90 MG TABS tablet Take 1 tablet by mouth 2 times daily Yes Ariadne Grace DO   disulfiram (ANTABUSE) 250 MG tablet Take 250 mg by mouth daily Yes Historical Provider, MD   nitroGLYCERIN (NITROSTAT) 0.4 MG SL tablet Place 0.4 mg under the tongue every 5 minutes as needed for Chest pain  Historical Provider, MD     Social History     Tobacco Use    Smoking status: Former Smoker     Packs/day: 0.25     Years: 47.00     Pack years: 11.75     Types: Cigarettes     Start date: 1973     Quit date: 2020     Years since quittin.4    Smokeless tobacco: Never Used   Substance Use Topics    Alcohol use: Not Currently    Drug use: Never     Family History   Problem Relation Age of Onset    Lung Cancer Mother     Diabetes Father      Past Surgical History:   Procedure Laterality Date    BREAST LUMPECTOMY Left      SECTION      CORONARY ANGIOPLASTY WITH STENT PLACEMENT  2020    3.0 x 33mm Domenico Ruiz to Prox LAD, EF35%, Dr. Ayesha Lai       Past Medical History:   Diagnosis Date    CAD (coronary artery disease)     Degenerative disorder of bone     Hard of hearing     Hypertension     MI (myocardial infarction) ([facility] [address] 2020     Review of Systems    Headache, all others negative. Objective:   BP (!) 155/74   Pulse 78   Temp 97.7 °F (36.5 °C) (Temporal)   Resp 18   Ht 5' 5\" (1.651 m)   Wt 187 lb 12.8 oz (85.2 kg)   SpO2 97%   BMI 31.25 kg/m²     Physical Exam  HENT:      Head: Normocephalic and atraumatic. Mouth/Throat:      Mouth: Mucous membranes are moist.      Pharynx: Oropharynx is clear. Eyes:      General: Lids are normal.      Extraocular Movements: Extraocular movements intact. Conjunctiva/sclera: Conjunctivae normal.      Pupils: Pupils are equal, round, and reactive to light. Neck:      Musculoskeletal: Neck supple. Cardiovascular:      Rate and Rhythm: Normal rate and regular rhythm. Skin:     General: Skin is warm and dry. Neurological:      General: No focal deficit present. Mental Status: She is alert. Deep Tendon Reflexes: Strength normal.   Psychiatric:         Mood and Affect: Mood normal.         Speech: Speech normal.         Behavior: Behavior normal.                 Neurological Exam  Mental Status  Alert. Oriented to person, place, time and situation. Speech is normal. Language is fluent with no aphasia. Cranial Nerves  CN II: Visual fields full to confrontation. CN III, IV, VI: Extraocular movements intact bilaterally. Normal lids and orbits bilaterally. Pupils equal round and reactive to light bilaterally. CN V: Facial sensation is normal.  CN VII: Full and symmetric facial movement. CN VIII: Hearing is normal.  CN IX, X: Palate elevates symmetrically  CN XI: Shoulder shrug strength is normal.  CN XII: Tongue midline without atrophy or fasciculations. Motor  Normal muscle bulk throughout. Normal muscle tone. No abnormal involuntary movements. Strength is 5/5 throughout all four extremities. Sensory  Light touch is normal in upper and lower extremities. Pinprick is normal in upper and lower extremities.      Coordination  Right: Finger-to-nose normal.  Left: Finger-to-nose normal.      Laboratory/Radiology:     CBC with Differential:    Lab Results   Component Value Date    WBC 7.6 02/27/2021    RBC 4.04 02/27/2021    HGB 11.7 02/27/2021    HCT 37.2 02/27/2021     02/27/2021    MCV 92.1 02/27/2021    MCH 29.0 02/27/2021    MCHC 31.5 02/27/2021    RDW 13.0 02/27/2021    LYMPHOPCT 19.1 02/27/2021    MONOPCT 5.9 02/27/2021    BASOPCT 0.3 02/27/2021    MONOSABS 0.45 02/27/2021    LYMPHSABS 1.46 02/27/2021    EOSABS 0.23 02/27/2021    BASOSABS 0.02 02/27/2021     CMP:    Lab Results   Component Value Date     02/27/2021    K 4.4 02/27/2021     02/27/2021    CO2 23 02/27/2021    BUN 11 02/27/2021    CREATININE 0.9 02/27/2021    GFRAA >60 02/27/2021    LABGLOM >60 02/27/2021    GLUCOSE 119 02/27/2021    PROT 6.8 02/27/2021    LABALBU 4.3 02/27/2021    CALCIUM 9.6 02/27/2021    BILITOT <0.2 02/27/2021    ALKPHOS 109 02/27/2021    AST 12 02/27/2021    ALT 8 02/27/2021     Warfarin PT/INR:  No components found for: PTPATWAR, PTINRWAR    CT head:  Negative   I independently reviewed the labs and imaging studies at today's appointment. Assessment:     Transient confusional state is now resolved. Question of new onset seizures were return of previous seizures. Plan:     Follow-up MRI of the brain with and without contrast and EEG. Monitor for further seizure activity if there is any would load with Keppra at that time.     Ascencion Amin  12:49 PM  2/28/2021

## 2021-02-28 NOTE — PROGRESS NOTES
Per Theta Solo in ED, neuro consult was placed in ED yesterday afternoon to Dr Riki Walton. Dr Jennifer Blackwell via answering service re: new admit/orders. Per answering service. Dr Lopez Confer to place orders shortly. Ky Cole RN

## 2021-02-28 NOTE — H&P
[address]                  [address] [facility],  [facility]                              HISTORY AND PHYSICAL    PATIENT NAME: Laci Trejo                   :        1955  MED REC NO:   [id number]                            ROOM:       5334  ACCOUNT NO:   [de-identified]                           ADMIT DATE: 2021  PROVIDER:     Barbara Rodrigues MD    CHIEF COMPLAINT:  Initially altered mental status and then seizure  activity. HISTORY of PRESENTING ILLNESS:  This is a 66-year-old with recent STEMI,  hypertension, previous history of encephalopathy and delirium last  admission. They felt it was due to UTI. Also has significant alcohol  history. She is on disulfiram.  She presented to the emergency room,  brought in I think by her family after being confused at home. During  the workup, CAT scan being normal.  Blood work being normal.  Urinalysis  normal.  She had a seizure, was placed on Keppra and is now admitted. At present, she appears to be back to her baseline. She says she is no  longer confused. Denies any headache. She does mention a remote  history of seizure activity, but that has been years ago, but nothing  since then. She has no real recollection of what happened yesterday or  what brought her to the hospital.    RECENT PRESENT MEDICATION:  See med rec in the chart. PAST MEDICAL HISTORY:  As described above. She had angioplasty and  stent placement in August, history of lumpectomy,  section,  hemorrhoid surgery, hysterectomy, joint replacement, tonsillectomy. SOCIAL HISTORY:  Quit smoking in 2020. States she is not currently  drinking alcohol. FAMILY MEDICAL HISTORY:  Noncontributory. ALLERGIES:  To NITROGLYCERIN and TRAMADOL. REVIEW OF SYSTEMS:  SKIN/LYMPHATICS:  Negative. CENTRAL NERVOUS SYSTEM:  See HPI. At present, she states she knows  where she is at and she is back to her baseline. CIRCULATORY:  Denies chest pain, orthopnea, PND.  DIGESTIVE:  Denies nausea, vomiting, diarrhea, melena, hematochezia. GENITOURINARY:  Denies dysuria, frequency, hematuria. MUSCULOSKELETAL:  Does complain of leg pain. PHYSICAL EXAMINATION:  GENERAL:  She is sitting up in bed. She is alert and oriented, in no  acute distress. VITAL SIGNS:  Temperature 97.7, pulse 78, respirations 18, blood  pressure 155/74, O2 sats 97% on room air. SKIN:  Warm and dry. No pallor, no jaundice. EYES:  Reveal no icterus. CHEST:  Clear to auscultation. HEART:  Regular rate and rhythm without murmur, gallop, or rub. NECK:  Supple without bruits or mass. ABDOMEN:  Soft, nontender. EXTREMITIES:  Reveal no cyanosis, clubbing, or edema. No calf  tenderness. NEUROLOGIC:  She is alert, oriented x3 with no focal neurological  deficits at this time. LABORATORY DATA:  Yesterday, her CMP was basically unremarkable. Troponin was normal.  Liver tests were normal.  Urine tox screen did  detect cannabis, otherwise was negative. Her urinalysis as stated  looked fine. DIAGNOSTIC DATA:  CT of the head showed no acute intracranial  abnormalities. There was atrophy and periventricular leukomalacia  however. DIAGNOSTIC IMPRESSION:  Seizure, also prior to that altered mental  status. PLAN:  The patient will be admitted to telemetry. Neurology has been  consulted at this time. We will await their opinion. Discharge plan  home when stable.         Alfredo Olivares MD    D: 02/28/2021 8:32:36       T: 02/28/2021 8:35:03     [email or web address]  Job#: [id number]     Doc#: [id number]    CC:

## 2021-02-28 NOTE — PROGRESS NOTES
Dr. Richa Fletcher notified of headache still 9/10 after Toradol. Advised to reach out to Dr. Lilliana Millard Awaiting new orders.

## 2021-03-01 ENCOUNTER — APPOINTMENT (OUTPATIENT)
Dept: NEUROLOGY | Age: 66
DRG: 101 | End: 2021-03-01
Payer: MEDICARE

## 2021-03-01 LAB
BASOPHILS ABSOLUTE: 0.03 E9/L (ref 0–0.2)
BASOPHILS RELATIVE PERCENT: 0.5 % (ref 0–2)
EKG ATRIAL RATE: 70 BPM
EKG P AXIS: 48 DEGREES
EKG P-R INTERVAL: 158 MS
EKG Q-T INTERVAL: 430 MS
EKG QRS DURATION: 78 MS
EKG QTC CALCULATION (BAZETT): 464 MS
EKG R AXIS: -4 DEGREES
EKG T AXIS: 36 DEGREES
EKG VENTRICULAR RATE: 70 BPM
EOSINOPHILS ABSOLUTE: 0.13 E9/L (ref 0.05–0.5)
EOSINOPHILS RELATIVE PERCENT: 2.2 % (ref 0–6)
HCT VFR BLD CALC: 35.9 % (ref 34–48)
HEMOGLOBIN: 11.1 G/DL (ref 11.5–15.5)
IMMATURE GRANULOCYTES #: 0.01 E9/L
IMMATURE GRANULOCYTES %: 0.2 % (ref 0–5)
LYMPHOCYTES ABSOLUTE: 1.87 E9/L (ref 1.5–4)
LYMPHOCYTES RELATIVE PERCENT: 31.5 % (ref 20–42)
MCH RBC QN AUTO: 28.9 PG (ref 26–35)
MCHC RBC AUTO-ENTMCNC: 30.9 % (ref 32–34.5)
MCV RBC AUTO: 93.5 FL (ref 80–99.9)
MONOCYTES ABSOLUTE: 0.47 E9/L (ref 0.1–0.95)
MONOCYTES RELATIVE PERCENT: 7.9 % (ref 2–12)
NEUTROPHILS ABSOLUTE: 3.42 E9/L (ref 1.8–7.3)
NEUTROPHILS RELATIVE PERCENT: 57.7 % (ref 43–80)
PDW BLD-RTO: 13.4 FL (ref 11.5–15)
PLATELET # BLD: 212 E9/L (ref 130–450)
PMV BLD AUTO: 11.2 FL (ref 7–12)
RBC # BLD: 3.84 E12/L (ref 3.5–5.5)
URINE CULTURE, ROUTINE: NORMAL
WBC # BLD: 5.9 E9/L (ref 4.5–11.5)

## 2021-03-01 PROCEDURE — 6360000002 HC RX W HCPCS: Performed by: FAMILY MEDICINE

## 2021-03-01 PROCEDURE — 99232 SBSQ HOSP IP/OBS MODERATE 35: CPT | Performed by: NURSE PRACTITIONER

## 2021-03-01 PROCEDURE — 1200000000 HC SEMI PRIVATE

## 2021-03-01 PROCEDURE — 6370000000 HC RX 637 (ALT 250 FOR IP): Performed by: GENERAL PRACTICE

## 2021-03-01 PROCEDURE — 6370000000 HC RX 637 (ALT 250 FOR IP): Performed by: FAMILY MEDICINE

## 2021-03-01 PROCEDURE — 36415 COLL VENOUS BLD VENIPUNCTURE: CPT

## 2021-03-01 PROCEDURE — 95816 EEG AWAKE AND DROWSY: CPT

## 2021-03-01 PROCEDURE — 85025 COMPLETE CBC W/AUTO DIFF WBC: CPT

## 2021-03-01 PROCEDURE — 2580000003 HC RX 258: Performed by: FAMILY MEDICINE

## 2021-03-01 PROCEDURE — 95816 EEG AWAKE AND DROWSY: CPT | Performed by: PSYCHIATRY & NEUROLOGY

## 2021-03-01 PROCEDURE — 93010 ELECTROCARDIOGRAM REPORT: CPT | Performed by: INTERNAL MEDICINE

## 2021-03-01 RX ORDER — NYSTATIN 100000 U/G
OINTMENT TOPICAL 2 TIMES DAILY
Status: DISCONTINUED | OUTPATIENT
Start: 2021-03-01 | End: 2021-03-03 | Stop reason: HOSPADM

## 2021-03-01 RX ADMIN — KETOROLAC TROMETHAMINE 30 MG: 30 INJECTION, SOLUTION INTRAMUSCULAR; INTRAVENOUS at 18:20

## 2021-03-01 RX ADMIN — TICAGRELOR 90 MG: 90 TABLET ORAL at 08:15

## 2021-03-01 RX ADMIN — SODIUM CHLORIDE: 9 INJECTION, SOLUTION INTRAVENOUS at 00:26

## 2021-03-01 RX ADMIN — LISINOPRIL 20 MG: 20 TABLET ORAL at 08:16

## 2021-03-01 RX ADMIN — FOLIC ACID 1 MG: 1 TABLET ORAL at 08:16

## 2021-03-01 RX ADMIN — ESCITALOPRAM 10 MG: 10 TABLET, FILM COATED ORAL at 08:16

## 2021-03-01 RX ADMIN — GABAPENTIN 200 MG: 100 CAPSULE ORAL at 13:41

## 2021-03-01 RX ADMIN — METOPROLOL SUCCINATE 100 MG: 100 TABLET, EXTENDED RELEASE ORAL at 08:16

## 2021-03-01 RX ADMIN — GABAPENTIN 200 MG: 100 CAPSULE ORAL at 08:15

## 2021-03-01 RX ADMIN — PANTOPRAZOLE SODIUM 40 MG: 40 TABLET, DELAYED RELEASE ORAL at 08:16

## 2021-03-01 RX ADMIN — ASPIRIN 81 MG: 81 TABLET, CHEWABLE ORAL at 08:15

## 2021-03-01 RX ADMIN — ENOXAPARIN SODIUM 40 MG: 40 INJECTION SUBCUTANEOUS at 08:15

## 2021-03-01 RX ADMIN — GABAPENTIN 200 MG: 100 CAPSULE ORAL at 23:13

## 2021-03-01 RX ADMIN — KETOROLAC TROMETHAMINE 30 MG: 30 INJECTION, SOLUTION INTRAMUSCULAR; INTRAVENOUS at 04:50

## 2021-03-01 RX ADMIN — ATORVASTATIN CALCIUM 80 MG: 80 TABLET, FILM COATED ORAL at 23:13

## 2021-03-01 RX ADMIN — KETOROLAC TROMETHAMINE 30 MG: 30 INJECTION, SOLUTION INTRAMUSCULAR; INTRAVENOUS at 11:39

## 2021-03-01 RX ADMIN — QUETIAPINE FUMARATE 200 MG: 200 TABLET ORAL at 23:13

## 2021-03-01 RX ADMIN — NYSTATIN: 100000 OINTMENT TOPICAL at 23:13

## 2021-03-01 RX ADMIN — TICAGRELOR 90 MG: 90 TABLET ORAL at 23:13

## 2021-03-01 ASSESSMENT — PAIN DESCRIPTION - PAIN TYPE
TYPE: ACUTE PAIN

## 2021-03-01 ASSESSMENT — PAIN DESCRIPTION - DESCRIPTORS
DESCRIPTORS: DISCOMFORT;SORE
DESCRIPTORS: DULL;DISCOMFORT;ACHING

## 2021-03-01 ASSESSMENT — PAIN DESCRIPTION - ORIENTATION
ORIENTATION: RIGHT;LEFT;LOWER
ORIENTATION: RIGHT

## 2021-03-01 ASSESSMENT — PAIN SCALES - GENERAL
PAINLEVEL_OUTOF10: 0
PAINLEVEL_OUTOF10: 0
PAINLEVEL_OUTOF10: 7

## 2021-03-01 ASSESSMENT — PAIN DESCRIPTION - LOCATION
LOCATION: LEG
LOCATION: LEG

## 2021-03-01 NOTE — PROCEDURES
[address] [facility] Report    MRN: [id number]   PATIENT NAME: Flori Harris   DATE OF REPORT: 3/1/2021    DATE OF SERVICE: 3/1/2021   PHYSICIAN NAME: Adriane De León DO    Referring Physician: Lynda Silva MD      Patient's : 1955   Patient's Age: 77 y.o. Gender: female     PROCEDURE: Routine EEG with video      Clinical Interpretation: This was a normal study during wakefulness. No seizures or epileptiform discharges were noted during this study. ____________________________  Electronically signed by: Adriane De León DO, 3/1/2021 1:49 PM      Patient Clinical Information   Reason for Study: Patient undergoing evaluation for altered mental status  Patient State: Awake  Primary neurological diagnosis: Altered mental status       Pertinent Medications and Treatments    disulfiram     escitalopram     folic acid     Gabapentin    Quetiapine    ticagrelor     ketorolac     diphenhydramine       Sedatives administered: No  Intubated: No  Pharmacological paralytic: No    Reporting Period  Start of Study: 919, 3/1/2021   End of Study:  0945, 3/1/2021       EEG Description  Digital video and scalp EEG monitoring was performed using the standard protocol for this laboratory. Scalp electrodes were applied in the international 10/20 system. Multiple digital montage arrangements were utilized for evaluation. EKG and video were recorded. Background:      Occipital rhythm (posterior dominant rhythm or PDR): Present   Frequency: 10 Hz  Voltage: medium   Organization: good  Reactivity to eye opening/closure: fair    Drowsiness: Absent  Sleep: Absent    Technical and Activation Procedures:  Hyperventilation: Not done        Photic stimulation: Done - physiologic driving noted        Reactivity to stimulation: Yes    Abnormalities:    I. Seizures? No    II. Rhythmic or Periodic Patterns? No    III. Other Abnormalities?         No

## 2021-03-01 NOTE — PROGRESS NOTES
Lee Dunbar is a 77 y.o. right handed female     PMH hypertension, history of MI, CAD, [place] and alcoholism    Patient presented to ED on 2/27 status post fall and altered mental status. Patient's daughter told ED she was altered since the night before arrival after she fell down and had become increasingly altered ever since. On initial neuro consult patient was back to baseline, with complaint of headache and did not remember the details of the last few days. Patient did not have tongue bite but had bit the inside of her cheek which she says she does from time to time. Questionable seizure although further information regarding this was unfounded. Patient was given Keppra in ER. CT head showed no acute intracranial pathology. CT cervical spine showed no acute fracture or subluxation. Patient was subsequently admitted for new onset seizure. MRI with and without contrast pending   EEG completed this morning---- report to follow    Vital signs are stable with exception of being hypertensive. Patient resting quietly with no complaints today. Asking for discharge. Objective:     BP (!) 176/83   Pulse 67   Temp 97 °F (36.1 °C) (Temporal)   Resp 16   Ht 5' 5\" (1.651 m)   Wt 187 lb 12.8 oz (85.2 kg)   SpO2 99%   BMI 31.25 kg/m²     General appearance: alert, appears stated age, cooperative and no distress  Head: normocephalic, without obvious abnormality, atraumatic  Eyes: conjunctivae/corneas clear.  .  Neck: supple, symmetrical, trachea midline   Lungs: clear to auscultation bilaterally  Heart: regular rate and rhythm  Extremities: normal, atraumatic, no cyanosis or edema  Pulses: 2+ and symmetric  Skin: color, texture, turgor normal---no rashes or lesions      Mental Status: Alert and oriented x4, cooperative, follows commands well    Appropriate attention/concentration  Intact fundus of knowledge  Intact memories    Speech: no dysarthria  Language: no aphasias    Cranial Nerves:  I: smell NA   II: visual acuity  NA   II: visual fields Full to confrontation   II: pupils PERRL   III,VII: ptosis None   III,IV,VI: extraocular muscles   EOMI without nystagmus   V: mastication Normal   V: facial light touch sensation  Normal   V,VII: corneal reflex     VII: facial muscle function - upper  Normal   VII: facial muscle function - lower Normal   VIII: hearing [place]   IX: soft palate elevation  Normal   IX,X: gag reflex    XI: trapezius strength  5/5   XI: sternocleidomastoid strength 5/5   XI: neck extension strength  5/5   XII: tongue strength  Normal     Motor:  5/5 bilateral handgrips  5/5 throughout  Normal bulk and tone  No abnormal movements    Sensory:  LT intact throughout    Coordination:   FN, FFM and JEAN CARLOS intact  HKS intact    Gait:  Stable with IV pole    No Babinskis  No Castro's    No pathological reflexes    Laboratory/Radiology:     CBC:   Lab Results   Component Value Date    WBC 5.9 03/01/2021    RBC 3.84 03/01/2021    HGB 11.1 03/01/2021    HCT 35.9 03/01/2021    MCV 93.5 03/01/2021    MCH 28.9 03/01/2021    MCHC 30.9 03/01/2021    RDW 13.4 03/01/2021     03/01/2021    MPV 11.2 03/01/2021     CMP:    Lab Results   Component Value Date     02/27/2021    K 4.4 02/27/2021     02/27/2021    CO2 23 02/27/2021    BUN 11 02/27/2021    CREATININE 0.9 02/27/2021    GFRAA >60 02/27/2021    LABGLOM >60 02/27/2021    GLUCOSE 119 02/27/2021    PROT 6.8 02/27/2021    LABALBU 4.3 02/27/2021    CALCIUM 9.6 02/27/2021    BILITOT <0.2 02/27/2021    ALKPHOS 109 02/27/2021    AST 12 02/27/2021    ALT 8 02/27/2021     U/A:    Lab Results   Component Value Date    COLORU Yellow 02/27/2021    PROTEINU Negative 02/27/2021    PHUR 5.5 02/27/2021    WBCUA 0-1 02/27/2021    RBCUA NONE 02/27/2021    BACTERIA NONE SEEN 02/27/2021    CLARITYU Clear 02/27/2021    SPECGRAV 1.015 02/27/2021    LEUKOCYTESUR Negative 02/27/2021    UROBILINOGEN 0.2 02/27/2021    BILIRUBINUR Negative without contrast pending  EEG report pending  Continue aspirin and statin for stroke prevention  Monitor for seizure activity  Continue telemetry  SCDs    YASIR Ryan NP-C   1:23 PM  3/1/2021

## 2021-03-01 NOTE — PLAN OF CARE
Problem: Falls - Risk of:  Goal: Will remain free from falls  Description: Will remain free from falls  3/1/2021 1249 by Peterson Cheatham  Outcome: Met This Shift  3/1/2021 0737 by Nancy Lama RN  Outcome: Met This Shift  Goal: Absence of physical injury  Description: Absence of physical injury  3/1/2021 0737 by Nancy Lama RN  Outcome: Met This Shift     Problem: Pain:  Goal: Pain level will decrease  Description: Pain level will decrease  3/1/2021 1249 by Peterson Cheatham  Outcome: Met This Shift  3/1/2021 0737 by Nancy Lama RN  Outcome: Met This Shift

## 2021-03-01 NOTE — CARE COORDINATION
Transition of care: EEG done. Met with pt in room. Pt lives with her son and daughter in law in a 1 story home. Independent with ADLs and drives. DME- FWW and cane - uses as needed. Also, has a shower chair and BSC. Hx of [facility] with [facility]. Pt requested a script for outpt physical therapy at [facility]. I will leave a sticky note for attending physician. Urine tox noted (+) cannabinoid. Pt states has alcohol history, but has been 5 yrs sober. Denies use of drugs. States has not smoked marijuana since the 70's. She was to get a nebulizer on her last admission in December. DME supplier was [address] [place] [address]. I called and spoke with Vi Booker at [address] [place] [address] and she said they contacted pt's pcp and notified them pt did not have qualifying diagnosis for the nebulizer. Pt and sw updated. Discharge plan is to return home. Family will provide transportation. PCP is Dr. Anne Betancur and pharmacy is [facility].  Sw/cm will follow

## 2021-03-02 ENCOUNTER — APPOINTMENT (OUTPATIENT)
Dept: MRI IMAGING | Age: 66
DRG: 101 | End: 2021-03-02
Payer: MEDICARE

## 2021-03-02 PROCEDURE — 6370000000 HC RX 637 (ALT 250 FOR IP): Performed by: FAMILY MEDICINE

## 2021-03-02 PROCEDURE — 94640 AIRWAY INHALATION TREATMENT: CPT

## 2021-03-02 PROCEDURE — 70553 MRI BRAIN STEM W/O & W/DYE: CPT

## 2021-03-02 PROCEDURE — 6360000002 HC RX W HCPCS: Performed by: FAMILY MEDICINE

## 2021-03-02 PROCEDURE — 1200000000 HC SEMI PRIVATE

## 2021-03-02 PROCEDURE — 6370000000 HC RX 637 (ALT 250 FOR IP): Performed by: GENERAL PRACTICE

## 2021-03-02 PROCEDURE — 99231 SBSQ HOSP IP/OBS SF/LOW 25: CPT | Performed by: NURSE PRACTITIONER

## 2021-03-02 PROCEDURE — A9579 GAD-BASE MR CONTRAST NOS,1ML: HCPCS | Performed by: RADIOLOGY

## 2021-03-02 PROCEDURE — 6360000004 HC RX CONTRAST MEDICATION: Performed by: RADIOLOGY

## 2021-03-02 RX ORDER — LORAZEPAM 1 MG/1
1 TABLET ORAL
Status: DISCONTINUED | OUTPATIENT
Start: 2021-03-02 | End: 2021-03-03 | Stop reason: HOSPADM

## 2021-03-02 RX ADMIN — NYSTATIN: 100000 OINTMENT TOPICAL at 08:02

## 2021-03-02 RX ADMIN — TICAGRELOR 90 MG: 90 TABLET ORAL at 08:01

## 2021-03-02 RX ADMIN — LORAZEPAM 1 MG: 1 TABLET ORAL at 16:59

## 2021-03-02 RX ADMIN — GABAPENTIN 200 MG: 100 CAPSULE ORAL at 13:05

## 2021-03-02 RX ADMIN — GADOTERIDOL 17 ML: 279.3 INJECTION, SOLUTION INTRAVENOUS at 18:37

## 2021-03-02 RX ADMIN — FOLIC ACID 1 MG: 1 TABLET ORAL at 08:00

## 2021-03-02 RX ADMIN — ASPIRIN 81 MG: 81 TABLET, CHEWABLE ORAL at 08:00

## 2021-03-02 RX ADMIN — KETOROLAC TROMETHAMINE 30 MG: 30 INJECTION, SOLUTION INTRAMUSCULAR; INTRAVENOUS at 06:35

## 2021-03-02 RX ADMIN — TICAGRELOR 90 MG: 90 TABLET ORAL at 19:59

## 2021-03-02 RX ADMIN — KETOROLAC TROMETHAMINE 30 MG: 30 INJECTION, SOLUTION INTRAMUSCULAR; INTRAVENOUS at 19:03

## 2021-03-02 RX ADMIN — NYSTATIN: 100000 OINTMENT TOPICAL at 19:58

## 2021-03-02 RX ADMIN — ARFORMOTEROL TARTRATE 15 MCG: 15 SOLUTION RESPIRATORY (INHALATION) at 11:35

## 2021-03-02 RX ADMIN — KETOROLAC TROMETHAMINE 30 MG: 30 INJECTION, SOLUTION INTRAMUSCULAR; INTRAVENOUS at 13:04

## 2021-03-02 RX ADMIN — QUETIAPINE FUMARATE 200 MG: 200 TABLET ORAL at 20:28

## 2021-03-02 RX ADMIN — GABAPENTIN 200 MG: 100 CAPSULE ORAL at 19:58

## 2021-03-02 RX ADMIN — ENOXAPARIN SODIUM 40 MG: 40 INJECTION SUBCUTANEOUS at 08:00

## 2021-03-02 RX ADMIN — ESCITALOPRAM 10 MG: 10 TABLET, FILM COATED ORAL at 08:00

## 2021-03-02 RX ADMIN — LISINOPRIL 20 MG: 20 TABLET ORAL at 08:00

## 2021-03-02 RX ADMIN — ATORVASTATIN CALCIUM 80 MG: 80 TABLET, FILM COATED ORAL at 19:58

## 2021-03-02 RX ADMIN — METOPROLOL SUCCINATE 100 MG: 100 TABLET, EXTENDED RELEASE ORAL at 08:01

## 2021-03-02 RX ADMIN — ARFORMOTEROL TARTRATE 15 MCG: 15 SOLUTION RESPIRATORY (INHALATION) at 19:31

## 2021-03-02 RX ADMIN — PANTOPRAZOLE SODIUM 40 MG: 40 TABLET, DELAYED RELEASE ORAL at 06:36

## 2021-03-02 RX ADMIN — KETOROLAC TROMETHAMINE 30 MG: 30 INJECTION, SOLUTION INTRAMUSCULAR; INTRAVENOUS at 00:28

## 2021-03-02 RX ADMIN — GABAPENTIN 200 MG: 100 CAPSULE ORAL at 08:00

## 2021-03-02 ASSESSMENT — PAIN SCALES - GENERAL
PAINLEVEL_OUTOF10: 10
PAINLEVEL_OUTOF10: 5

## 2021-03-02 NOTE — CARE COORDINATION
3/2, MADHU met with patient at bedside. Discussed discharge plan which remains home. Patient is requesting OP script for PT/OT where she can go to facility and do therapy on her own. Patient has a BSC, [employer], 2 canes and a shower chair. Family will be transportation. Referral made to peer recovery to see patient due to urine tox  Noted (+) cannabinoid. Patient to have MRI-will await results to determine further discharge planning needs.       Essie Kawasaki, LSW  [facility] Case Management  [phone]

## 2021-03-02 NOTE — PROGRESS NOTES
Patient seen feels okay. Co leg pain. Await eeg and mri for possible seizure. vss bp a little elevated. Await further results.

## 2021-03-02 NOTE — PROGRESS NOTES
Erika Potts is a 77 y.o. right handed female     PMH hypertension, history of MI, CAD, [place] and alcoholism    Patient presented to ED on 2/27 status post fall and altered mental status. Patient's daughter told ED she was altered since the night before arrival after she fell down and had become increasingly altered ever since. On initial neuro consult patient was back to baseline, with complaint of headache and did not remember the details of the last few days. Patient did not have tongue bite but had bit the inside of her cheek which she says she does from time to time. Questionable seizure although further information regarding this was unfounded. Patient was given Keppra in ER. CT head showed no acute intracranial pathology. CT cervical spine showed no acute fracture or subluxation. Patient was subsequently admitted for new onset seizure. MRI with and without contrast pending   EEG was normal     No additional spells, LOC or seizure like activity noted     Vital signs are stable with exception of being hypertensive. Patient resting quietly with no complaints today-- receiving neb with RT    Objective:     BP (!) 156/68   Pulse 59   Temp 97 °F (36.1 °C) (Temporal)   Resp 16   Ht 5' 5\" (1.651 m)   Wt 187 lb 12.8 oz (85.2 kg)   SpO2 99%   BMI 31.25 kg/m²     General appearance: alert, appears stated age, cooperative and no distress  Head: normocephalic, without obvious abnormality, atraumatic  Eyes: conjunctivae/corneas clear.  .  Neck: supple, symmetrical, trachea midline   Lungs: clear to auscultation bilaterally  Heart: regular rate and rhythm  Extremities: normal, atraumatic, no cyanosis or edema  Pulses: 2+ and symmetric  Skin: color, texture, turgor normal---no rashes or lesions      Mental Status: Alert and oriented x4, cooperative, follows commands well    Appropriate attention/concentration  Intact fundus of knowledge  Intact memories    Speech: no dysarthria  Language: no aphasias    Cranial Nerves:  I: smell NA   II: visual acuity  NA   II: visual fields Full to confrontation   II: pupils PERRL   III,VII: ptosis None   III,IV,VI: extraocular muscles   EOMI without nystagmus   V: mastication Normal   V: facial light touch sensation  Normal   V,VII: corneal reflex     VII: facial muscle function - upper  Normal   VII: facial muscle function - lower Normal   VIII: hearing [place]   IX: soft palate elevation  Normal   IX,X: gag reflex    XI: trapezius strength  5/5   XI: sternocleidomastoid strength 5/5   XI: neck extension strength  5/5   XII: tongue strength  Normal     Motor:  5/5 bilateral handgrips  5/5 throughout  Normal bulk and tone  No abnormal movements    Sensory:  LT intact throughout    Coordination:   FN, FFM and JEAN CARLOS intact  HKS intact    Gait:  Stable with IV pole    No Babinskis  No Castro's    No pathological reflexes    Laboratory/Radiology:     CBC:   Lab Results   Component Value Date    WBC 5.9 03/01/2021    RBC 3.84 03/01/2021    HGB 11.1 03/01/2021    HCT 35.9 03/01/2021    MCV 93.5 03/01/2021    MCH 28.9 03/01/2021    MCHC 30.9 03/01/2021    RDW 13.4 03/01/2021     03/01/2021    MPV 11.2 03/01/2021     CMP:    Lab Results   Component Value Date     02/27/2021    K 4.4 02/27/2021     02/27/2021    CO2 23 02/27/2021    BUN 11 02/27/2021    CREATININE 0.9 02/27/2021    GFRAA >60 02/27/2021    LABGLOM >60 02/27/2021    GLUCOSE 119 02/27/2021    PROT 6.8 02/27/2021    LABALBU 4.3 02/27/2021    CALCIUM 9.6 02/27/2021    BILITOT <0.2 02/27/2021    ALKPHOS 109 02/27/2021    AST 12 02/27/2021    ALT 8 02/27/2021     U/A:    Lab Results   Component Value Date    COLORU Yellow 02/27/2021    PROTEINU Negative 02/27/2021    PHUR 5.5 02/27/2021    WBCUA 0-1 02/27/2021    RBCUA NONE 02/27/2021    BACTERIA NONE SEEN 02/27/2021    CLARITYU Clear 02/27/2021    SPECGRAV 1.015 02/27/2021    LEUKOCYTESUR Negative 02/27/2021    UROBILINOGEN 0.2 Continue supportive care  MRI brain with and without contrast pending  Continue aspirin and statin for stroke prevention  Monitor for seizure activity  Continue telemetry  SCDs    YASIR Pearson NP-C   11:40 AM  3/2/2021

## 2021-03-03 VITALS
HEART RATE: 61 BPM | TEMPERATURE: 97.2 F | WEIGHT: 187.8 LBS | DIASTOLIC BLOOD PRESSURE: 76 MMHG | OXYGEN SATURATION: 98 % | RESPIRATION RATE: 16 BRPM | BODY MASS INDEX: 31.29 KG/M2 | HEIGHT: 65 IN | SYSTOLIC BLOOD PRESSURE: 168 MMHG

## 2021-03-03 PROCEDURE — 99232 SBSQ HOSP IP/OBS MODERATE 35: CPT | Performed by: NURSE PRACTITIONER

## 2021-03-03 PROCEDURE — 6370000000 HC RX 637 (ALT 250 FOR IP): Performed by: FAMILY MEDICINE

## 2021-03-03 PROCEDURE — 94640 AIRWAY INHALATION TREATMENT: CPT

## 2021-03-03 PROCEDURE — 6360000002 HC RX W HCPCS: Performed by: FAMILY MEDICINE

## 2021-03-03 RX ORDER — POTASSIUM CHLORIDE 20 MEQ/1
40 TABLET, EXTENDED RELEASE ORAL 2 TIMES DAILY WITH MEALS
Qty: 60 TABLET | Refills: 3 | Status: SHIPPED | OUTPATIENT
Start: 2021-03-03 | End: 2021-05-22

## 2021-03-03 RX ORDER — NYSTATIN 100000 U/G
OINTMENT TOPICAL
Qty: 100 G | Refills: 1 | Status: SHIPPED | OUTPATIENT
Start: 2021-03-03 | End: 2021-05-22

## 2021-03-03 RX ADMIN — ARFORMOTEROL TARTRATE 15 MCG: 15 SOLUTION RESPIRATORY (INHALATION) at 10:42

## 2021-03-03 RX ADMIN — NYSTATIN: 100000 OINTMENT TOPICAL at 09:38

## 2021-03-03 RX ADMIN — LISINOPRIL 20 MG: 20 TABLET ORAL at 09:39

## 2021-03-03 RX ADMIN — PANTOPRAZOLE SODIUM 40 MG: 40 TABLET, DELAYED RELEASE ORAL at 06:29

## 2021-03-03 RX ADMIN — ENOXAPARIN SODIUM 40 MG: 40 INJECTION SUBCUTANEOUS at 09:39

## 2021-03-03 RX ADMIN — METOPROLOL SUCCINATE 100 MG: 100 TABLET, EXTENDED RELEASE ORAL at 09:39

## 2021-03-03 RX ADMIN — KETOROLAC TROMETHAMINE 30 MG: 30 INJECTION, SOLUTION INTRAMUSCULAR; INTRAVENOUS at 12:53

## 2021-03-03 RX ADMIN — TICAGRELOR 90 MG: 90 TABLET ORAL at 09:39

## 2021-03-03 RX ADMIN — GABAPENTIN 200 MG: 100 CAPSULE ORAL at 13:48

## 2021-03-03 RX ADMIN — ASPIRIN 81 MG: 81 TABLET, CHEWABLE ORAL at 09:39

## 2021-03-03 RX ADMIN — GABAPENTIN 200 MG: 100 CAPSULE ORAL at 09:38

## 2021-03-03 RX ADMIN — ESCITALOPRAM 10 MG: 10 TABLET, FILM COATED ORAL at 09:39

## 2021-03-03 RX ADMIN — FOLIC ACID 1 MG: 1 TABLET ORAL at 09:39

## 2021-03-03 RX ADMIN — KETOROLAC TROMETHAMINE 30 MG: 30 INJECTION, SOLUTION INTRAMUSCULAR; INTRAVENOUS at 06:44

## 2021-03-03 ASSESSMENT — PAIN DESCRIPTION - DESCRIPTORS: DESCRIPTORS: CONSTANT;ACHING;THROBBING

## 2021-03-03 ASSESSMENT — PAIN DESCRIPTION - ORIENTATION: ORIENTATION: RIGHT;LEFT

## 2021-03-03 ASSESSMENT — PAIN DESCRIPTION - FREQUENCY: FREQUENCY: CONTINUOUS

## 2021-03-03 ASSESSMENT — PAIN DESCRIPTION - PROGRESSION: CLINICAL_PROGRESSION: GRADUALLY IMPROVING

## 2021-03-03 ASSESSMENT — PAIN DESCRIPTION - LOCATION: LOCATION: LEG

## 2021-03-03 NOTE — PLAN OF CARE
Problem: Falls - Risk of:  Goal: Will remain free from falls  Description: Will remain free from falls  3/3/2021 0945 by Rima Reynoso RN  Outcome: Met This Shift     Problem: Falls - Risk of:  Goal: Absence of physical injury  Description: Absence of physical injury  3/3/2021 0945 by Rima Reynoso RN  Outcome: Met This Shift     Problem: Pain:  Goal: Control of acute pain  Description: Control of acute pain  3/3/2021 0945 by Rima Reynoso RN  Outcome: Met This Shift     Problem: Skin Integrity:  Goal: Will show no infection signs and symptoms  Description: Will show no infection signs and symptoms  3/3/2021 0945 by Rima Reynoso RN  Outcome: Met This Shift     Problem: Skin Integrity:  Goal: Absence of new skin breakdown  Description: Absence of new skin breakdown  3/3/2021 0945 by Rima Reynoso RN  Outcome: Met This Shift

## 2021-03-03 NOTE — CARE COORDINATION
3/3, SW met with patient at bedside. Observed patient coming out of bathroom not using any ambulatory device. Confirmed discharge plan remains home once medically cleared for discharge. Patient is requesting an OP script for PT. No other needs identified at this time. Family to be transportation upon discharge. SW to follow for further discharge planning needs.       JENNIFER Schneider  [facility] Case Management  [phone]

## 2021-03-03 NOTE — CARE COORDINATION
This note will not be viewable in [facility] for the following reason(s). Suspected substance abuse disorder. Peer Recovery Support Note    Name: Juvenal Ellis  Date: 3/3/2021    Chief Complaint   Patient presents with   Hyun Prater     had fall this am landed on jacket so states no injury EMS reports AMS since last evening daughter thinks she might have UTI       Peer Support met with patient.   [x] Support and education provided  [x] Resources provided   [] Treatment referral:  [] Other:   [] Patient declined peer recovery services     Referred By: Spencer Matthews ()    Notes:    Signed: Tarsha Platt, 3/3/2021

## 2021-03-04 LAB
BLOOD CULTURE, ROUTINE: NORMAL
CULTURE, BLOOD 2: NORMAL

## 2021-03-11 ENCOUNTER — NURSE ONLY (OUTPATIENT)
Dept: CARDIOLOGY CLINIC | Age: 66
End: 2021-03-11

## 2021-03-19 NOTE — DISCHARGE SUMMARY
[address]                  [address] [facility],  [facility]                               DISCHARGE SUMMARY    PATIENT NAME: Laci Trejo                   :        1955  MED REC NO:   [id number]                            ROOM:       [id number]  ACCOUNT NO:   [de-identified]                           ADMIT DATE: 2021  PROVIDER:     Kevin Salinas DO                  DISCHARGE DATE:  2021    FINAL DIAGNOSES:  1. Suspected seizure, not proven. 2.  Altered mental status, transient confusional state. 3.  Hypertension. 4.  COPD.  5.  History of alcohol abuse. 6.  Coronary artery disease with recent MI. The patient is a 69-year-old white female, was acting confused and  altered, and family brought her into the emergency room. She had a  similar episode, suspect related to a urinary tract infection. They  were not sure if this was the case, but they were bringing her into the  emergency department for evaluation, and during the workup, she seemed  to have some type of seizure disorder and was given Keppra. She was  subsequently admitted for seizure disorder and altered mental status. While in the hospital, her chemistries looked pretty good. She had an  EEG that was not convincing for any type of seizure activity. CT of her  head was negative for any acute intracranial pathology, although there  were some chronic microvascular ischemic changes and encephalomalacia. CT scan of the cervical spine showed significant degenerative joint  disease. She had a chest x-ray that was negative. Neurology had seen  her and was not quite sure exactly what happened except for a transient  confusional state and did not recommend any antiepileptic drugs at this  point. She did complain of some low back pain. She saw Dr. Jennifer Disla from  chronic pain management, had her gabapentin discontinued and switched  over to Lyrica.   She seems to be back at her

## 2021-03-22 ENCOUNTER — TELEPHONE (OUTPATIENT)
Dept: CARDIOLOGY CLINIC | Age: 66
End: 2021-03-22

## 2021-04-09 ENCOUNTER — TELEPHONE (OUTPATIENT)
Dept: NON INVASIVE DIAGNOSTICS | Age: 66
End: 2021-04-09

## 2021-04-09 DIAGNOSIS — R55 SYNCOPE AND COLLAPSE: ICD-10-CM

## 2021-04-09 NOTE — TELEPHONE ENCOUNTER
----- Message from Domo Snyder MD sent at 4/9/2021 12:22 PM EDT -----  Please let him know that 30 days monitor is normal. Thanks  ----- Message -----  From: Phil Navarrete  Sent: 4/9/2021   8:47 AM EDT  To: Domo Snyder MD

## 2021-04-14 ENCOUNTER — OFFICE VISIT (OUTPATIENT)
Dept: CARDIOLOGY CLINIC | Age: 66
End: 2021-04-14
Payer: MEDICARE

## 2021-04-14 VITALS
RESPIRATION RATE: 26 BRPM | SYSTOLIC BLOOD PRESSURE: 132 MMHG | OXYGEN SATURATION: 98 % | HEIGHT: 65 IN | HEART RATE: 75 BPM | DIASTOLIC BLOOD PRESSURE: 80 MMHG | BODY MASS INDEX: 34.62 KG/M2 | WEIGHT: 207.8 LBS

## 2021-04-14 DIAGNOSIS — I25.5 ISCHEMIC CARDIOMYOPATHY: ICD-10-CM

## 2021-04-14 DIAGNOSIS — Z72.0 TOBACCO ABUSE: ICD-10-CM

## 2021-04-14 DIAGNOSIS — R06.02 SHORTNESS OF BREATH: ICD-10-CM

## 2021-04-14 DIAGNOSIS — I10 ESSENTIAL HYPERTENSION: ICD-10-CM

## 2021-04-14 DIAGNOSIS — I25.10 CORONARY ARTERY DISEASE WITHOUT ANGINA PECTORIS, UNSPECIFIED VESSEL OR LESION TYPE, UNSPECIFIED WHETHER NATIVE OR TRANSPLANTED HEART: Primary | ICD-10-CM

## 2021-04-14 PROCEDURE — 4004F PT TOBACCO SCREEN RCVD TLK: CPT | Performed by: CLINICAL NURSE SPECIALIST

## 2021-04-14 PROCEDURE — G8400 PT W/DXA NO RESULTS DOC: HCPCS | Performed by: CLINICAL NURSE SPECIALIST

## 2021-04-14 PROCEDURE — 3017F COLORECTAL CA SCREEN DOC REV: CPT | Performed by: CLINICAL NURSE SPECIALIST

## 2021-04-14 PROCEDURE — 99214 OFFICE O/P EST MOD 30 MIN: CPT | Performed by: CLINICAL NURSE SPECIALIST

## 2021-04-14 PROCEDURE — 4040F PNEUMOC VAC/ADMIN/RCVD: CPT | Performed by: CLINICAL NURSE SPECIALIST

## 2021-04-14 PROCEDURE — 93000 ELECTROCARDIOGRAM COMPLETE: CPT | Performed by: CLINICAL NURSE SPECIALIST

## 2021-04-14 PROCEDURE — G8427 DOCREV CUR MEDS BY ELIG CLIN: HCPCS | Performed by: CLINICAL NURSE SPECIALIST

## 2021-04-14 PROCEDURE — 1090F PRES/ABSN URINE INCON ASSESS: CPT | Performed by: CLINICAL NURSE SPECIALIST

## 2021-04-14 PROCEDURE — G8417 CALC BMI ABV UP PARAM F/U: HCPCS | Performed by: CLINICAL NURSE SPECIALIST

## 2021-04-14 PROCEDURE — 1123F ACP DISCUSS/DSCN MKR DOCD: CPT | Performed by: CLINICAL NURSE SPECIALIST

## 2021-04-14 RX ORDER — CLOPIDOGREL BISULFATE 75 MG/1
75 TABLET ORAL DAILY
Qty: 90 TABLET | Refills: 3 | Status: ON HOLD
Start: 2021-04-14 | End: 2022-08-01 | Stop reason: ALTCHOICE

## 2021-04-14 RX ORDER — IBUPROFEN 800 MG/1
800 TABLET ORAL EVERY 6 HOURS PRN
COMMUNITY
End: 2021-05-22

## 2021-04-14 NOTE — PROGRESS NOTES
OFFICE VISIT        PRIMARY CARE PHYSICIAN:    Florence Julian DO       ALLERGIES / SENSITIVITIES:    Allergies   Allergen Reactions    Nitroglycerin Other (See Comments)     Severe headache    Tramadol      seizures        REVIEWED MEDICATIONS:      Current Outpatient Medications:     ibuprofen (ADVIL;MOTRIN) 800 MG tablet, Take 800 mg by mouth every 6 hours as needed for Pain, Disp: , Rfl:     clopidogrel (PLAVIX) 75 MG tablet, Take 1 tablet by mouth daily, Disp: 90 tablet, Rfl: 3    pregabalin (LYRICA) 150 MG capsule, Take 1 capsule by mouth 2 times daily for 60 days. , Disp: 60 capsule, Rfl: 1    nystatin (MYCOSTATIN) 048524 UNIT/GM ointment, Apply topically 2 times daily. , Disp: 100 g, Rfl: 1    potassium chloride (KLOR-CON M) 20 MEQ extended release tablet, Take 2 tablets by mouth 2 times daily (with meals), Disp: 60 tablet, Rfl: 3    QUEtiapine (SEROQUEL) 100 MG tablet, Take 1 tablet by mouth daily (Patient taking differently: Take 200 mg by mouth daily ), Disp: 60 tablet, Rfl: 3    folic acid (FOLVITE) 1 MG tablet, Take 1 tablet by mouth daily, Disp: 30 tablet, Rfl: 3    lisinopril (PRINIVIL;ZESTRIL) 20 MG tablet, Take 20 mg by mouth daily, Disp: , Rfl:     metoprolol succinate (TOPROL XL) 100 MG extended release tablet, Take 100 mg by mouth daily, Disp: , Rfl:     pantoprazole (PROTONIX) 40 MG tablet, Take 1 tablet by mouth every morning (before breakfast), Disp: 30 tablet, Rfl: 3    aspirin 81 MG chewable tablet, Take 1 tablet by mouth daily, Disp: 30 tablet, Rfl: 3    atorvastatin (LIPITOR) 80 MG tablet, Take 1 tablet by mouth nightly, Disp: 30 tablet, Rfl: 3    disulfiram (ANTABUSE) 250 MG tablet, Take 250 mg by mouth daily, Disp: , Rfl:     escitalopram (LEXAPRO) 20 MG tablet, Take 0.5 tablets by mouth daily, Disp: 30 tablet, Rfl: 3    ibuprofen (ADVIL;MOTRIN) 600 MG tablet, Take 1 tablet by mouth 3 times daily as needed for Pain or Fever, Disp: 21 tablet, Rfl: 0    Arformoterol Tartrate (BROVANA) 15 MCG/2ML NEBU, Take 2 mLs by nebulization 2 times daily (Patient not taking: Reported on 4/14/2021), Disp: 120 mL, Rfl: 3    ipratropium-albuterol (DUONEB) 0.5-2.5 (3) MG/3ML SOLN nebulizer solution, Inhale 3 mLs into the lungs every 4 hours as needed for Shortness of Breath (Patient not taking: Reported on 4/14/2021), Disp: 360 mL, Rfl: 2    Respiratory Therapy Supplies (FULL KIT NEBULIZER SET) MISC, Use as directed with nebulized medication. , Disp: 1 each, Rfl: 0    nitroGLYCERIN (NITROSTAT) 0.4 MG SL tablet, Place 0.4 mg under the tongue every 5 minutes as needed for Chest pain, Disp: , Rfl:       S: REASON FOR VISIT:     Coronary artery disease/ischemic cardiomyopathy. Ms. Vashti Guo is a 74year old lady who is followed at our practice by Dr. Hernietta Aldrich. She has had multiple hospitalizations since her last visit here in September 2020. Her most recent admission was in March due to altered mental status. She was evaluated by neurology and EEG showed no evidence of seizure activity. There was concern for polypharmacy and adverse medication effects, and gabapentin was changed to Lyrica. Cardiology did not see her that admission and she has had no outpatient neurology follow up. She was to wear a 30 day event monitor, but reports she \"couldn't stand it\" and returned the monitor after 17 days; there were no documented arrhythmias. Today, she complains of shortness of breath both at rest and with mild activity, such was walking room to room in her home. She has an extensive smoking history but has never been diagnosed with COPD, and feels her shortness of breath has only been present since her MI. She did complain of this during admission in October, and consideration was given to changing from Brilinta to Plavix. She denies chest discomfort or lower extremity edema.  She has gained over 20 lbs within the last year, which she attributes to being less active and in addition she now thinks the Lyrica is causing increased appetite. She also complains of fatigue but denies hair loss, dry skin, or temperature intolerance; she reports Dr. Severa Auer checked her thyroid function earlier this week and it \"was okay\". She states she is now smoking one cigarette daily. She denies orthopnea, PND's, lower extremity swelling, palpitations, dizziness, presyncope or syncope. She is requesting referral to lung doctor for her dyspnea, but is adamant that she will not have sleep studies as she would not be able to tolerate wearing a machine. REVIEW OF SYSTEMS:    CONSTITUTIONAL:  Denies fevers, chills, night sweats. Positive for fatigue and weight gain. HEENT:  Denies any unusual headaches. Denies recent changes in hearing or vision. Denies dysphagia, hoarseness, hemoptysis, hematemesis or epistaxis. ENDOCRINE:  Denies polyphagia, polydipsia or polyuria. Denies heat or cold intolerance. MUSCULOSKELETAL:  Denies any significant arthralgias or myalgias. SKIN:  Denies rashes, ulcers or itching. HEME/LYMPH:  Denies any palpable lymph nodes, bleeding or easy bruisability. HEART:  As above. LUNGS:  Dyspnea as above. Denies orthopnea, PND, wheezing, or cough. GI: Denies abdominal pain, nausea, vomiting, diarrhea, constipation, rectal bleeding or tarry stools  :  Denies hematuria or dysuria. PSYCHIATRIC:  She has depression. NEUROLOGIC:  Denies memory loss, motor weakness, numbness, tingling or tremors. PAST MEDICAL/SURGICAL HISTORY:    1. Hypertension. 2.  Obesity. 3.  Dyslipidemia. 4.  Depression. 5.  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 6.  Hard of hearing. 7.  History of C. Section. 8.  History of tonsillectomy. 9.  History of hemorrhoidectomy. 10. History of left breast lumpectomy. 11. Coronary artery disease/ischemic cardiomyopathy. 12.  History of acute ST elevation anterior wall myocardial infarction with cardiogenic shock, 8/22/2020.   13. Cardiac catheterization and angioplasty, 8/22/2020: Left main, no significant disease. LAD 95% thrombotic proximal vessel stenosis with SADI 1-2 flow in the distal vessel. LCX, large codominant vessel with 70% ostial stenosis of the high first marginal branch/intermediate ramus branch and 70% stenosis of the mid left circumflex after the AV groove branch before any of the other marginal branches. The AV groove branch provided a small posterolateral branch and a small posterior descending artery branch. RCA, moderate sized codominant vessel with no significant angiographic disease. Normal left ventricular size with large area of anterolateral, apical and apical-inferior wall hypokinesis to akinesis with an estimated ejection fraction of 35-40%. Mildly elevated left ventricular end diastolic pressure. Successful balloon angioplasty with the deployment of a 3.0 mm x 33 mm Xience Sara drug-eluting coronary stent to the proximal LAD with post stent deployment dilatation with a 3.5 mm x 15 mm noncompliant balloon inflated to 18 atmospheres with very good results. 14.  Echocardiogram done on 8/23/2020 showed normal left ventricular size with mild concentric left ventricular hypertrophy with severe hypokinesis to akinesis of the anteroseptal wall, the apex, the distal septum and the distal inferior wall with an ejection fraction of 40 +/- 5% with stage 1 left ventricular diastolic dysfunction. Normal right ventricular size and function. Normal size left atrium by volume index. Mild mitral regurgitation. Mild to moderate tricuspid regurgitation. 13.  Hospitalized 8/27/2020 shortly after her hospitalization for her acute anterior wall myocardial infarction with left arm antecubital fossa area cellulitis site of recent IV with hypotension and acute kidney injury. 16.  Hospitalized 10/2020 with nausea, vomiting, diarrhea, and chest pain. She was hypotensive on arrival and was seen in consultation by Dr. Patric Singh.  Beta alcohol intake  25. Hearing loss      FAMILY HISTORY:  Significant for lung cancer in her mother. Father has diabetes. SOCIAL HISTORY:  Patient smoked 1/4 pack of cigarettes a day for 47 years and currently is smoking a couple of cigarettes a day. She denies llicit drug use but had heavy alcohol intake until about two years ago. O:  COMPLETE PHYSICAL EXAM:      /80   Pulse 75   Resp 26   Ht 5' 5\" (1.651 m)   Wt 207 lb 12.8 oz (94.3 kg)   SpO2 98%   BMI 34.58 kg/m²      General:  Well-developed, well-nourished, obese lady in no acute distress. HEENT: Normocephalic and atraumatic head. No JVD. No carotid bruits. Mucous membranes moist.  Chest:  Symmetrical and nontender. No deformities. Lungs:  Clear to auscultation bilaterally. Heart:  Normal S1 and S2. No S3 or S4. No murmurs or rubs. Abdomen: Soft, nontender without organomegaly or masses. No bruits. Normal bowel sounds. Extremities:  No edema. Pulses palpable. Skin:  Normal turgor. No rashes or ulcers noted. Neurologic: Oriented x3. No motor or sensory deficits detected. REVIEW OF DIAGNOSTIC TESTS:    1.  CBC  3/01/2021: WBC 5.9,  HGB 11.1, HCT 35.9, platelets 073.   2.  CMP 2/27/2021: sodium 143, potassium 4.4, chloride 109, CO2 23, glucose 119, BUN 11, Cr 0.9, GFR >60  3. EKG done today showed sinus rhythm, 75 bpm and was within normal limits. ASSESSMENT / DIAGNOSIS:   1. Coronary artery disease/ischemic cardiomyopathy, compensated with no angina or signs of congestive heart failure/fluid overload. Ejection fraction normalized on most recent echocardiogram.   2.  History of hypertension. 3.  Dyslipidemia/hypertriglyceridemia. 4.  Obesity. 5.  History of syncope: no arrhythmias detected on ambulatory monitor   6. History of tobacco abuse with continued tobacco use. 7.  History of acute kidney injury. 8.  History of cellulitis. 9.  Degenerative disorder of the bone. 10. Hard of hearing  11.

## 2021-04-18 NOTE — PLAN OF CARE
Problem: Falls - Risk of:  Goal: Will remain free from falls  Description: Will remain free from falls  Outcome: Met This Shift  Goal: Absence of physical injury  Description: Absence of physical injury  Outcome: Met This Shift     Problem: Confusion - Acute:  Goal: Absence of continued neurological deterioration signs and symptoms  Description: Absence of continued neurological deterioration signs and symptoms  12/15/2020 2250 by Carson Hines  Outcome: Met This Shift  12/15/2020 1424 by Enedelia Sauceda RN  Outcome: Not Met This Shift  Goal: Mental status will be restored to baseline  Description: Mental status will be restored to baseline  12/15/2020 2250 by Yasmine Headings  Outcome: Met This Shift  12/15/2020 1424 by Enedelia Sauceda RN  Outcome: Not Met This Shift     Problem: Discharge Planning:  Goal: Ability to perform activities of daily living will improve  Description: Ability to perform activities of daily living will improve  12/15/2020 2250 by Yasmine Headings  Outcome: Met This Shift  12/15/2020 1424 by Enedelia Sauceda RN  Outcome: Not Met This Shift  Goal: Participates in care planning  Description: Participates in care planning  12/15/2020 2250 by Yasmine Headings  Outcome: Met This Shift  12/15/2020 1424 by Enedelia Sauceda RN  Outcome: Not Met This Shift     Problem: Injury - Risk of, Physical Injury:  Goal: Will remain free from falls  Description: Will remain free from falls  Outcome: Met This Shift  Goal: Absence of physical injury  Description: Absence of physical injury  Outcome: Met This Shift     Problem: Mood - Altered:  Goal: Mood stable  Description: Mood stable  Outcome: Met This Shift  Goal: Absence of abusive behavior  Description: Absence of abusive behavior  Outcome: Met This Shift  Goal: Verbalizations of feeling emotionally comfortable while being cared for will increase  Description: Verbalizations of feeling emotionally comfortable while being cared for will increase  Outcome: Met This Shift     Problem: Psychomotor Activity - Altered:  Goal: Absence of psychomotor disturbance signs and symptoms  Description: Absence of psychomotor disturbance signs and symptoms  Outcome: Met This Shift     Problem: Sensory Perception - Impaired:  Goal: Demonstrations of improved sensory functioning will increase  Description: Demonstrations of improved sensory functioning will increase  Outcome: Met This Shift  Goal: Decrease in sensory misperception frequency  Description: Decrease in sensory misperception frequency  Outcome: Met This Shift  Goal: Able to refrain from responding to false sensory perceptions  Description: Able to refrain from responding to false sensory perceptions  Outcome: Met This Shift  Goal: Demonstrates accurate environmental perceptions  Description: Demonstrates accurate environmental perceptions  Outcome: Met This Shift  Goal: Able to distinguish between reality-based and nonreality-based thinking  Description: Able to distinguish between reality-based and nonreality-based thinking  Outcome: Met This Shift  Goal: Able to interrupt nonreality-based thinking  Description: Able to interrupt nonreality-based thinking  Outcome: Met This Shift     Problem: Sleep Pattern Disturbance:  Goal: Appears well-rested  Description: Appears well-rested  Outcome: Met This Shift     Problem: Skin Integrity:  Goal: Will show no infection signs and symptoms  Description: Will show no infection signs and symptoms  Outcome: Met This Shift  Goal: Absence of new skin breakdown  Description: Absence of new skin breakdown  Outcome: Met This Shift 3

## 2021-05-22 ENCOUNTER — APPOINTMENT (OUTPATIENT)
Dept: CT IMAGING | Age: 66
DRG: 315 | End: 2021-05-22
Payer: MEDICARE

## 2021-05-22 ENCOUNTER — APPOINTMENT (OUTPATIENT)
Dept: GENERAL RADIOLOGY | Age: 66
DRG: 315 | End: 2021-05-22
Payer: MEDICARE

## 2021-05-22 ENCOUNTER — HOSPITAL ENCOUNTER (INPATIENT)
Age: 66
LOS: 3 days | Discharge: HOME | DRG: 315 | End: 2021-05-25
Attending: EMERGENCY MEDICINE | Admitting: GENERAL PRACTICE
Payer: MEDICARE

## 2021-05-22 DIAGNOSIS — I95.9 HYPOTENSION, UNSPECIFIED HYPOTENSION TYPE: Primary | ICD-10-CM

## 2021-05-22 LAB
ABO/RH: NORMAL
ALBUMIN SERPL-MCNC: 4 G/DL (ref 3.5–5.2)
ALP BLD-CCNC: 102 U/L (ref 35–104)
ALT SERPL-CCNC: 9 U/L (ref 0–32)
AMORPHOUS: ABNORMAL
ANION GAP SERPL CALCULATED.3IONS-SCNC: 14 MMOL/L (ref 7–16)
ANTIBODY SCREEN: NORMAL
APTT: 35.3 SEC (ref 24.5–35.1)
AST SERPL-CCNC: 14 U/L (ref 0–31)
BACTERIA: ABNORMAL /HPF
BILIRUB SERPL-MCNC: 0.3 MG/DL (ref 0–1.2)
BILIRUBIN URINE: ABNORMAL
BLOOD, URINE: NEGATIVE
BUN BLDV-MCNC: 21 MG/DL (ref 6–23)
CALCIUM SERPL-MCNC: 9.3 MG/DL (ref 8.6–10.2)
CHLORIDE BLD-SCNC: 105 MMOL/L (ref 98–107)
CLARITY: CLEAR
CO2: 20 MMOL/L (ref 22–29)
COLOR: YELLOW
CREAT SERPL-MCNC: 1.9 MG/DL (ref 0.5–1)
CRYSTALS, UA: ABNORMAL /HPF
D DIMER: 1329 NG/ML DDU
EPITHELIAL CELLS, UA: ABNORMAL /HPF
GFR AFRICAN AMERICAN: 30
GFR AFRICAN AMERICAN: 32
GFR NON-AFRICAN AMERICAN: 25 ML/MIN/1.73
GFR NON-AFRICAN AMERICAN: 26 ML/MIN/1.73
GLUCOSE BLD-MCNC: 104 MG/DL (ref 74–99)
GLUCOSE BLD-MCNC: 106 MG/DL (ref 74–99)
GLUCOSE URINE: NEGATIVE MG/DL
HCT VFR BLD CALC: 35.9 % (ref 34–48)
HEMOGLOBIN: 10.9 G/DL (ref 11.5–15.5)
HYALINE CASTS: ABNORMAL /LPF (ref 0–2)
INR BLD: 1
KETONES, URINE: ABNORMAL MG/DL
LACTIC ACID: 2.3 MMOL/L (ref 0.5–2.2)
LEUKOCYTE ESTERASE, URINE: NEGATIVE
MCH RBC QN AUTO: 28.5 PG (ref 26–35)
MCHC RBC AUTO-ENTMCNC: 30.4 % (ref 32–34.5)
MCV RBC AUTO: 93.7 FL (ref 80–99.9)
METER GLUCOSE: 110 MG/DL (ref 74–99)
NITRITE, URINE: NEGATIVE
PDW BLD-RTO: 15.3 FL (ref 11.5–15)
PERFORMED ON: ABNORMAL
PH UA: 5.5 (ref 5–9)
PLATELET # BLD: 197 E9/L (ref 130–450)
PMV BLD AUTO: 11.9 FL (ref 7–12)
POC CHLORIDE: 115 MMOL/L (ref 100–108)
POC CREATININE: 2 MG/DL (ref 0.5–1)
POC POTASSIUM: 4.4 MMOL/L (ref 3.5–5)
POC SODIUM: 142 MMOL/L (ref 132–146)
POTASSIUM SERPL-SCNC: 4.5 MMOL/L (ref 3.5–5)
PROTEIN UA: ABNORMAL MG/DL
PROTHROMBIN TIME: 11 SEC (ref 9.3–12.4)
RBC # BLD: 3.83 E12/L (ref 3.5–5.5)
RBC UA: ABNORMAL /HPF (ref 0–2)
SODIUM BLD-SCNC: 139 MMOL/L (ref 132–146)
SPECIFIC GRAVITY UA: 1.02 (ref 1–1.03)
TOTAL PROTEIN: 6.6 G/DL (ref 6.4–8.3)
TROPONIN: <0.01 NG/ML (ref 0–0.03)
UROBILINOGEN, URINE: 1 E.U./DL
WBC # BLD: 7.4 E9/L (ref 4.5–11.5)
WBC UA: ABNORMAL /HPF (ref 0–5)

## 2021-05-22 PROCEDURE — 36556 INSERT NON-TUNNEL CV CATH: CPT

## 2021-05-22 PROCEDURE — 83735 ASSAY OF MAGNESIUM: CPT

## 2021-05-22 PROCEDURE — 80053 COMPREHEN METABOLIC PANEL: CPT

## 2021-05-22 PROCEDURE — 86901 BLOOD TYPING SEROLOGIC RH(D): CPT

## 2021-05-22 PROCEDURE — 84100 ASSAY OF PHOSPHORUS: CPT

## 2021-05-22 PROCEDURE — 6370000000 HC RX 637 (ALT 250 FOR IP): Performed by: EMERGENCY MEDICINE

## 2021-05-22 PROCEDURE — 87088 URINE BACTERIA CULTURE: CPT

## 2021-05-22 PROCEDURE — 6360000002 HC RX W HCPCS: Performed by: EMERGENCY MEDICINE

## 2021-05-22 PROCEDURE — 06HY33Z INSERTION OF INFUSION DEVICE INTO LOWER VEIN, PERCUTANEOUS APPROACH: ICD-10-PCS | Performed by: EMERGENCY MEDICINE

## 2021-05-22 PROCEDURE — 70450 CT HEAD/BRAIN W/O DYE: CPT

## 2021-05-22 PROCEDURE — 2500000003 HC RX 250 WO HCPCS

## 2021-05-22 PROCEDURE — 86900 BLOOD TYPING SEROLOGIC ABO: CPT

## 2021-05-22 PROCEDURE — 85027 COMPLETE CBC AUTOMATED: CPT

## 2021-05-22 PROCEDURE — 85610 PROTHROMBIN TIME: CPT

## 2021-05-22 PROCEDURE — 85378 FIBRIN DEGRADE SEMIQUANT: CPT

## 2021-05-22 PROCEDURE — 81001 URINALYSIS AUTO W/SCOPE: CPT

## 2021-05-22 PROCEDURE — 2580000003 HC RX 258: Performed by: EMERGENCY MEDICINE

## 2021-05-22 PROCEDURE — 83605 ASSAY OF LACTIC ACID: CPT

## 2021-05-22 PROCEDURE — 74177 CT ABD & PELVIS W/CONTRAST: CPT

## 2021-05-22 PROCEDURE — 85730 THROMBOPLASTIN TIME PARTIAL: CPT

## 2021-05-22 PROCEDURE — 96365 THER/PROPH/DIAG IV INF INIT: CPT

## 2021-05-22 PROCEDURE — 86850 RBC ANTIBODY SCREEN: CPT

## 2021-05-22 PROCEDURE — 2580000003 HC RX 258: Performed by: INTERNAL MEDICINE

## 2021-05-22 PROCEDURE — 84484 ASSAY OF TROPONIN QUANT: CPT

## 2021-05-22 PROCEDURE — 36415 COLL VENOUS BLD VENIPUNCTURE: CPT

## 2021-05-22 PROCEDURE — 93005 ELECTROCARDIOGRAM TRACING: CPT | Performed by: EMERGENCY MEDICINE

## 2021-05-22 PROCEDURE — 96367 TX/PROPH/DG ADDL SEQ IV INF: CPT

## 2021-05-22 PROCEDURE — 71045 X-RAY EXAM CHEST 1 VIEW: CPT

## 2021-05-22 PROCEDURE — 6360000002 HC RX W HCPCS: Performed by: STUDENT IN AN ORGANIZED HEALTH CARE EDUCATION/TRAINING PROGRAM

## 2021-05-22 PROCEDURE — 71275 CT ANGIOGRAPHY CHEST: CPT

## 2021-05-22 PROCEDURE — 6360000004 HC RX CONTRAST MEDICATION: Performed by: RADIOLOGY

## 2021-05-22 PROCEDURE — 99284 EMERGENCY DEPT VISIT MOD MDM: CPT

## 2021-05-22 PROCEDURE — 87040 BLOOD CULTURE FOR BACTERIA: CPT

## 2021-05-22 PROCEDURE — 2000000000 HC ICU R&B

## 2021-05-22 RX ORDER — QUETIAPINE FUMARATE 200 MG/1
200 TABLET, FILM COATED ORAL NIGHTLY
COMMUNITY

## 2021-05-22 RX ORDER — POLYETHYLENE GLYCOL 3350 17 G/17G
17 POWDER, FOR SOLUTION ORAL DAILY PRN
Status: DISCONTINUED | OUTPATIENT
Start: 2021-05-22 | End: 2021-05-25 | Stop reason: HOSPADM

## 2021-05-22 RX ORDER — ESCITALOPRAM OXALATE 20 MG/1
20 TABLET ORAL DAILY
Status: ON HOLD | COMMUNITY
End: 2021-05-25 | Stop reason: HOSPADM

## 2021-05-22 RX ORDER — SODIUM CHLORIDE 0.9 % (FLUSH) 0.9 %
5-40 SYRINGE (ML) INJECTION PRN
Status: DISCONTINUED | OUTPATIENT
Start: 2021-05-22 | End: 2021-05-25 | Stop reason: HOSPADM

## 2021-05-22 RX ORDER — SODIUM CHLORIDE 9 MG/ML
25 INJECTION, SOLUTION INTRAVENOUS PRN
Status: DISCONTINUED | OUTPATIENT
Start: 2021-05-22 | End: 2021-05-25 | Stop reason: HOSPADM

## 2021-05-22 RX ORDER — METRONIDAZOLE 500 MG/1
500 TABLET ORAL ONCE
Status: COMPLETED | OUTPATIENT
Start: 2021-05-22 | End: 2021-05-22

## 2021-05-22 RX ORDER — SODIUM CHLORIDE 9 MG/ML
1000 INJECTION, SOLUTION INTRAVENOUS CONTINUOUS
Status: DISCONTINUED | OUTPATIENT
Start: 2021-05-22 | End: 2021-05-25 | Stop reason: HOSPADM

## 2021-05-22 RX ORDER — PROMETHAZINE HYDROCHLORIDE 25 MG/1
12.5 TABLET ORAL EVERY 6 HOURS PRN
Status: DISCONTINUED | OUTPATIENT
Start: 2021-05-22 | End: 2021-05-25 | Stop reason: HOSPADM

## 2021-05-22 RX ORDER — ACETAMINOPHEN 325 MG/1
650 TABLET ORAL EVERY 6 HOURS PRN
Status: DISCONTINUED | OUTPATIENT
Start: 2021-05-22 | End: 2021-05-25 | Stop reason: HOSPADM

## 2021-05-22 RX ORDER — ONDANSETRON 2 MG/ML
4 INJECTION INTRAMUSCULAR; INTRAVENOUS EVERY 6 HOURS PRN
Status: DISCONTINUED | OUTPATIENT
Start: 2021-05-22 | End: 2021-05-25 | Stop reason: HOSPADM

## 2021-05-22 RX ORDER — POTASSIUM CHLORIDE 20 MEQ/1
20 TABLET, EXTENDED RELEASE ORAL 2 TIMES DAILY
Status: ON HOLD | COMMUNITY
End: 2021-05-25 | Stop reason: HOSPADM

## 2021-05-22 RX ORDER — ATORVASTATIN CALCIUM 80 MG/1
80 TABLET, FILM COATED ORAL DAILY
Status: ON HOLD | COMMUNITY
End: 2021-05-25 | Stop reason: HOSPADM

## 2021-05-22 RX ORDER — FENTANYL CITRATE 50 UG/ML
50 INJECTION, SOLUTION INTRAMUSCULAR; INTRAVENOUS ONCE
Status: COMPLETED | OUTPATIENT
Start: 2021-05-22 | End: 2021-05-22

## 2021-05-22 RX ORDER — IBUPROFEN 600 MG/1
600 TABLET ORAL EVERY 6 HOURS PRN
Status: ON HOLD | COMMUNITY
End: 2022-08-02 | Stop reason: HOSPADM

## 2021-05-22 RX ORDER — SODIUM CHLORIDE 0.9 % (FLUSH) 0.9 %
5-40 SYRINGE (ML) INJECTION EVERY 12 HOURS SCHEDULED
Status: DISCONTINUED | OUTPATIENT
Start: 2021-05-23 | End: 2021-05-25 | Stop reason: HOSPADM

## 2021-05-22 RX ORDER — HEPARIN SODIUM 10000 [USP'U]/ML
5000 INJECTION, SOLUTION INTRAVENOUS; SUBCUTANEOUS 2 TIMES DAILY
Status: DISCONTINUED | OUTPATIENT
Start: 2021-05-22 | End: 2021-05-23

## 2021-05-22 RX ORDER — SODIUM CHLORIDE 9 MG/ML
INJECTION, SOLUTION INTRAVENOUS ONCE
Status: COMPLETED | OUTPATIENT
Start: 2021-05-22 | End: 2021-05-22

## 2021-05-22 RX ORDER — 0.9 % SODIUM CHLORIDE 0.9 %
500 INTRAVENOUS SOLUTION INTRAVENOUS ONCE
Status: COMPLETED | OUTPATIENT
Start: 2021-05-23 | End: 2021-05-23

## 2021-05-22 RX ORDER — ACETAMINOPHEN 650 MG/1
650 SUPPOSITORY RECTAL EVERY 6 HOURS PRN
Status: DISCONTINUED | OUTPATIENT
Start: 2021-05-22 | End: 2021-05-25 | Stop reason: HOSPADM

## 2021-05-22 RX ADMIN — Medication 16 MG: at 17:46

## 2021-05-22 RX ADMIN — FENTANYL CITRATE 50 MCG: 0.05 INJECTION, SOLUTION INTRAMUSCULAR; INTRAVENOUS at 20:52

## 2021-05-22 RX ADMIN — METRONIDAZOLE 500 MG: 500 TABLET ORAL at 19:25

## 2021-05-22 RX ADMIN — CEFTRIAXONE 1000 MG: 1 INJECTION, POWDER, FOR SOLUTION INTRAMUSCULAR; INTRAVENOUS at 18:58

## 2021-05-22 RX ADMIN — IOPAMIDOL 90 ML: 755 INJECTION, SOLUTION INTRAVENOUS at 17:43

## 2021-05-22 RX ADMIN — SODIUM CHLORIDE: 9 INJECTION, SOLUTION INTRAVENOUS at 19:25

## 2021-05-22 RX ADMIN — IOPAMIDOL 60 ML: 755 INJECTION, SOLUTION INTRAVENOUS at 21:20

## 2021-05-22 RX ADMIN — SODIUM CHLORIDE 1000 ML: 9 INJECTION, SOLUTION INTRAVENOUS at 18:58

## 2021-05-22 RX ADMIN — SODIUM CHLORIDE 500 ML: 9 INJECTION, SOLUTION INTRAVENOUS at 23:55

## 2021-05-22 ASSESSMENT — PAIN SCALES - GENERAL: PAINLEVEL_OUTOF10: 6

## 2021-05-22 NOTE — ED NOTES
Name: Hari Console  : 1955  MRN: [id number]    Date: 2021    Benefits of immediately proceeding with Radiology exam outweigh the risks and therefore the following is being waived:      [] Pregnancy test    [] Protocol for Iodine allergy    [] MRI questionnaire    [x] BUN/Creatinine        MD Kathy Roblero MD  21 0936

## 2021-05-22 NOTE — ED PROVIDER NOTES
HPI:  21,   Time: 7:54 PM EDT         Lars Phalen is a 74 y.o. female presenting to the ED for dizziness nausea vomiting abdominal pain vertigo, beginning 2 hours ago. The complaint has been intermittent, severe in severity, and worsened by nothing. When the patient initially arrived she was profoundly hypotensive. Patient complained that she was having pain in her abdomen radiating to her back and when I asked her \"do you have a history of an abdominal aortic aneurysm\" she replied \"Yes. \" Patient denied chest pain or shortness of breath    ROS:   Pertinent positives and negatives are stated within HPI, all other systems reviewed and are negative.  --------------------------------------------- PAST HISTORY ---------------------------------------------  Past Medical History:  has a past medical history of CAD (coronary artery disease), Degenerative disorder of bone, Hard of hearing, Hypertension, and MI (myocardial infarction) ([facility] [address] Past Surgical History:  has a past surgical history that includes joint replacement;  section; Tonsillectomy; Hysterectomy; Hemorrhoid surgery; Breast lumpectomy (Left); and Coronary angioplasty with stent (2020). Social History:  reports that she has been smoking cigarettes. She started smoking about 47 years ago. She has a 11.75 pack-year smoking history. She has never used smokeless tobacco. She reports previous alcohol use. She reports that she does not use drugs. Family History: family history includes Diabetes in her father; Katja Alto in her mother. The patients home medications have been reviewed.     Allergies: Nitroglycerin and Tramadol    -------------------------------------------------- RESULTS -------------------------------------------------  All laboratory and radiology results have been personally reviewed by myself   LABS:  Results for orders placed or performed during the hospital encounter of 21   CBC   Result Value Ref Range    WBC 7.4 4.5 - 11.5 E9/L    RBC 3.83 3.50 - 5.50 E12/L    Hemoglobin 10.9 (L) 11.5 - 15.5 g/dL    Hematocrit 35.9 34.0 - 48.0 %    MCV 93.7 80.0 - 99.9 fL    MCH 28.5 26.0 - 35.0 pg    MCHC 30.4 (L) 32.0 - 34.5 %    RDW 15.3 (H) 11.5 - 15.0 fL    Platelets 498 012 - 553 E9/L    MPV 11.9 7.0 - 12.0 fL   Comprehensive metabolic panel   Result Value Ref Range    Sodium 139 132 - 146 mmol/L    Potassium 4.5 3.5 - 5.0 mmol/L    Chloride 105 98 - 107 mmol/L    CO2 20 (L) 22 - 29 mmol/L    Anion Gap 14 7 - 16 mmol/L    Glucose 104 (H) 74 - 99 mg/dL    BUN 21 6 - 23 mg/dL    CREATININE 1.9 (H) 0.5 - 1.0 mg/dL    GFR Non-African American 26 >=60 mL/min/1.73    GFR African American 32     Calcium 9.3 8.6 - 10.2 mg/dL    Total Protein 6.6 6.4 - 8.3 g/dL    Albumin 4.0 3.5 - 5.2 g/dL    Total Bilirubin 0.3 0.0 - 1.2 mg/dL    Alkaline Phosphatase 102 35 - 104 U/L    ALT 9 0 - 32 U/L    AST 14 0 - 31 U/L   Troponin   Result Value Ref Range    Troponin <0.01 0.00 - 0.03 ng/mL   LACTIC ACID, PLASMA   Result Value Ref Range    Lactic Acid 2.3 (H) 0.5 - 2.2 mmol/L   Protime-INR   Result Value Ref Range    Protime 11.0 9.3 - 12.4 sec    INR 1.0    APTT   Result Value Ref Range    aPTT 35.3 (H) 24.5 - 35.1 sec   Urinalysis   Result Value Ref Range    Color, UA Yellow Straw/Yellow    Clarity, UA Clear Clear    Glucose, Ur Negative Negative mg/dL    Bilirubin Urine SMALL (A) Negative    Ketones, Urine TRACE (A) Negative mg/dL    Specific Gravity, UA 1.025 1.005 - 1.030    Blood, Urine Negative Negative    pH, UA 5.5 5.0 - 9.0    Protein, UA TRACE Negative mg/dL    Urobilinogen, Urine 1.0 <2.0 E.U./dL    Nitrite, Urine Negative Negative    Leukocyte Esterase, Urine Negative Negative   Microscopic Urinalysis   Result Value Ref Range    Hyaline Casts, UA 3-5 (A) 0 - 2 /LPF    WBC, UA 2-5 0 - 5 /HPF    RBC, UA 0-1 0 - 2 /HPF    Epithelial Cells, UA FEW /HPF    Bacteria, UA MANY (A) None Seen /HPF    Amorphous, UA FEW     Crystals, UA warm and dry without rash  Neurologic: GCS 15,  Psych: Normal Affect      ------------------------------ ED COURSE/MEDICAL DECISION MAKING----------------------  Medications   0.9 % sodium chloride infusion (1,000 mLs Intravenous New Bag 5/22/21 1858)   norepinephrine (LEVOPHED) 64 MCG/ML infusion SOLN (16 mg  New Bag 5/22/21 1746)   iopamidol (ISOVUE-370) 76 % injection 90 mL (90 mLs Intravenous Given 5/22/21 1743)   cefTRIAXone (ROCEPHIN) 1,000 mg in sterile water 10 mL IV syringe (0 mg Intravenous Stopped 5/22/21 1906)   metroNIDAZOLE (FLAGYL) tablet 500 mg (500 mg Oral Given 5/22/21 1925)   0.9 % sodium chloride infusion ( Intravenous New Bag 5/22/21 1925)         Medical Decision Making:    Once the patient told me she had a history of an abdominal aortic aneurysm and was profoundly hypotensive complaining of abdominal pain radiating to her back I immediately placed a Cordis in her right femoral vein and send her over for a stat CT of her abdomen. The CT failed to reveal any evidence of an abdominal aortic aneurysm and there is no obvious pathology demonstrated on lab or imaging. Patient was given antibiotics IV fluids and placed on a pressor. After she was placed on a pressor her blood pressure came up. The patient's daughter came and stated that the patient has been using her vape pen and taking of variety of antiemetic and other medications that she is concerned may have caused her low blood pressure. Patient was discussed with Dr. Richard Dawn  as well as with Dr. Ramos Arts  Please note that the withdrawal or failure to initiate urgent interventions for this patient would likely result in a life threatening deterioration or permanent disability. Accordingly this patient received 30 minutes of critical care time, excluding separately billable procedures. PROCEDURE  5/22/21       Time:     CENTRAL LINE INSERTION  Risks, benefits and alternatives (for applicable procedures below) described. Performed By: Kathy Monique MD.    Indication: Hypotension need for possible large-volume resuscitation and need for intravenous pressors. Informed consent: The patient provided verbal consent for this procedure. .  Procedure: After routine sterile preparation, a right 9 Bulgarian Cordis Central Venous Catheter was placed by femoral vein approach and secured by standard fashion. Ultrasound Guidance:   used. Number of Attempts: 1  Post-procedure Findings: A post procedural chest x-ray  was not indicated. Patient tolerated the procedure well. Counseling: The emergency provider has spoken with the patient and family member patient and daughter and discussed todays results, in addition to providing specific details for the plan of care and counseling regarding the diagnosis and prognosis. Questions are answered at this time and they are agreeable with the plan.      --------------------------------- IMPRESSION AND DISPOSITION ---------------------------------    IMPRESSION  1.  Hypotension, unspecified hypotension type        DISPOSITION  Disposition: Admit to CCU/ICU  Patient condition is critical                  Kathy Monique MD  05/22/21 2917

## 2021-05-22 NOTE — ED NOTES
Bed: 18A-18  Expected date:   Expected time:   Means of arrival:   Comments:  MURTAZA Hernández, RN  05/22/21 0236

## 2021-05-23 ENCOUNTER — APPOINTMENT (OUTPATIENT)
Dept: ULTRASOUND IMAGING | Age: 66
DRG: 315 | End: 2021-05-23
Payer: MEDICARE

## 2021-05-23 LAB
ANION GAP SERPL CALCULATED.3IONS-SCNC: 10 MMOL/L (ref 7–16)
BUN BLDV-MCNC: 22 MG/DL (ref 6–23)
C-REACTIVE PROTEIN: 0.3 MG/DL (ref 0–0.4)
CALCIUM SERPL-MCNC: 7.9 MG/DL (ref 8.6–10.2)
CHLORIDE BLD-SCNC: 110 MMOL/L (ref 98–107)
CHLORIDE URINE RANDOM: 47 MMOL/L
CO2: 20 MMOL/L (ref 22–29)
CREAT SERPL-MCNC: 1.3 MG/DL (ref 0.5–1)
CREATININE URINE: 96 MG/DL (ref 29–226)
EKG ATRIAL RATE: 61 BPM
EKG P AXIS: 70 DEGREES
EKG P-R INTERVAL: 196 MS
EKG Q-T INTERVAL: 448 MS
EKG QRS DURATION: 82 MS
EKG QTC CALCULATION (BAZETT): 450 MS
EKG R AXIS: 24 DEGREES
EKG T AXIS: 32 DEGREES
EKG VENTRICULAR RATE: 61 BPM
GFR AFRICAN AMERICAN: 49
GFR NON-AFRICAN AMERICAN: 41 ML/MIN/1.73
GLUCOSE BLD-MCNC: 110 MG/DL (ref 74–99)
LACTIC ACID: 1 MMOL/L (ref 0.5–2.2)
LACTIC ACID: 1.2 MMOL/L (ref 0.5–2.2)
MAGNESIUM: 2.1 MG/DL (ref 1.6–2.6)
PHOSPHORUS: 4.7 MG/DL (ref 2.5–4.5)
POTASSIUM SERPL-SCNC: 4.5 MMOL/L (ref 3.5–5)
POTASSIUM, UR: 53.5 MMOL/L
PROCALCITONIN: 0.05 NG/ML (ref 0–0.08)
SEDIMENTATION RATE, ERYTHROCYTE: 15 MM/HR (ref 0–20)
SODIUM BLD-SCNC: 140 MMOL/L (ref 132–146)
SODIUM URINE: 39 MMOL/L
UREA NITROGEN, UR: 486 MG/DL (ref 800–1666)

## 2021-05-23 PROCEDURE — 93880 EXTRACRANIAL BILAT STUDY: CPT | Performed by: RADIOLOGY

## 2021-05-23 PROCEDURE — 6370000000 HC RX 637 (ALT 250 FOR IP): Performed by: INTERNAL MEDICINE

## 2021-05-23 PROCEDURE — 2580000003 HC RX 258: Performed by: EMERGENCY MEDICINE

## 2021-05-23 PROCEDURE — 85651 RBC SED RATE NONAUTOMATED: CPT

## 2021-05-23 PROCEDURE — 84145 PROCALCITONIN (PCT): CPT

## 2021-05-23 PROCEDURE — 99223 1ST HOSP IP/OBS HIGH 75: CPT | Performed by: INTERNAL MEDICINE

## 2021-05-23 PROCEDURE — 84540 ASSAY OF URINE/UREA-N: CPT

## 2021-05-23 PROCEDURE — 93880 EXTRACRANIAL BILAT STUDY: CPT

## 2021-05-23 PROCEDURE — 82570 ASSAY OF URINE CREATININE: CPT

## 2021-05-23 PROCEDURE — 6360000002 HC RX W HCPCS: Performed by: INTERNAL MEDICINE

## 2021-05-23 PROCEDURE — 93010 ELECTROCARDIOGRAM REPORT: CPT | Performed by: INTERNAL MEDICINE

## 2021-05-23 PROCEDURE — 84133 ASSAY OF URINE POTASSIUM: CPT

## 2021-05-23 PROCEDURE — 80048 BASIC METABOLIC PNL TOTAL CA: CPT

## 2021-05-23 PROCEDURE — 83605 ASSAY OF LACTIC ACID: CPT

## 2021-05-23 PROCEDURE — 82436 ASSAY OF URINE CHLORIDE: CPT

## 2021-05-23 PROCEDURE — 86140 C-REACTIVE PROTEIN: CPT

## 2021-05-23 PROCEDURE — 2580000003 HC RX 258: Performed by: INTERNAL MEDICINE

## 2021-05-23 PROCEDURE — 2060000000 HC ICU INTERMEDIATE R&B

## 2021-05-23 PROCEDURE — 84300 ASSAY OF URINE SODIUM: CPT

## 2021-05-23 RX ORDER — ATORVASTATIN CALCIUM 80 MG/1
80 TABLET, FILM COATED ORAL DAILY
Status: DISCONTINUED | OUTPATIENT
Start: 2021-05-23 | End: 2021-05-25 | Stop reason: HOSPADM

## 2021-05-23 RX ORDER — PREGABALIN 75 MG/1
150 CAPSULE ORAL 2 TIMES DAILY
Status: DISCONTINUED | OUTPATIENT
Start: 2021-05-23 | End: 2021-05-25 | Stop reason: HOSPADM

## 2021-05-23 RX ORDER — HEPARIN SODIUM 10000 [USP'U]/ML
5000 INJECTION, SOLUTION INTRAVENOUS; SUBCUTANEOUS EVERY 8 HOURS
Status: DISCONTINUED | OUTPATIENT
Start: 2021-05-24 | End: 2021-05-25 | Stop reason: HOSPADM

## 2021-05-23 RX ORDER — ASPIRIN 81 MG/1
81 TABLET, CHEWABLE ORAL DAILY
Status: DISCONTINUED | OUTPATIENT
Start: 2021-05-23 | End: 2021-05-25 | Stop reason: HOSPADM

## 2021-05-23 RX ORDER — QUETIAPINE FUMARATE 200 MG/1
200 TABLET, FILM COATED ORAL DAILY
Status: DISCONTINUED | OUTPATIENT
Start: 2021-05-23 | End: 2021-05-23 | Stop reason: SDUPTHER

## 2021-05-23 RX ORDER — QUETIAPINE FUMARATE 200 MG/1
200 TABLET, FILM COATED ORAL DAILY
Status: DISCONTINUED | OUTPATIENT
Start: 2021-05-23 | End: 2021-05-23

## 2021-05-23 RX ORDER — CLOPIDOGREL BISULFATE 75 MG/1
75 TABLET ORAL DAILY
Status: DISCONTINUED | OUTPATIENT
Start: 2021-05-23 | End: 2021-05-25 | Stop reason: HOSPADM

## 2021-05-23 RX ORDER — QUETIAPINE FUMARATE 200 MG/1
200 TABLET, FILM COATED ORAL DAILY
Status: DISCONTINUED | OUTPATIENT
Start: 2021-05-23 | End: 2021-05-25 | Stop reason: HOSPADM

## 2021-05-23 RX ORDER — PANTOPRAZOLE SODIUM 40 MG/1
40 TABLET, DELAYED RELEASE ORAL
Status: DISCONTINUED | OUTPATIENT
Start: 2021-05-23 | End: 2021-05-25 | Stop reason: HOSPADM

## 2021-05-23 RX ORDER — FOLIC ACID 1 MG/1
1 TABLET ORAL DAILY
Status: DISCONTINUED | OUTPATIENT
Start: 2021-05-23 | End: 2021-05-25 | Stop reason: HOSPADM

## 2021-05-23 RX ADMIN — PANTOPRAZOLE SODIUM 40 MG: 40 TABLET, DELAYED RELEASE ORAL at 05:04

## 2021-05-23 RX ADMIN — QUETIAPINE FUMARATE 200 MG: 200 TABLET ORAL at 22:52

## 2021-05-23 RX ADMIN — QUETIAPINE FUMARATE 200 MG: 200 TABLET ORAL at 00:51

## 2021-05-23 RX ADMIN — SODIUM CHLORIDE 1000 ML: 9 INJECTION, SOLUTION INTRAVENOUS at 12:20

## 2021-05-23 RX ADMIN — FOLIC ACID 1 MG: 1 TABLET ORAL at 10:33

## 2021-05-23 RX ADMIN — HEPARIN SODIUM 5000 UNITS: 10000 INJECTION INTRAVENOUS; SUBCUTANEOUS at 20:51

## 2021-05-23 RX ADMIN — ACETAMINOPHEN 650 MG: 325 TABLET ORAL at 12:20

## 2021-05-23 RX ADMIN — ASPIRIN 81 MG CHEWABLE TABLET 81 MG: 81 TABLET CHEWABLE at 10:33

## 2021-05-23 RX ADMIN — ACETAMINOPHEN 650 MG: 325 TABLET ORAL at 00:50

## 2021-05-23 RX ADMIN — PREGABALIN 150 MG: 75 CAPSULE ORAL at 00:50

## 2021-05-23 RX ADMIN — HEPARIN SODIUM 5000 UNITS: 10000 INJECTION INTRAVENOUS; SUBCUTANEOUS at 10:33

## 2021-05-23 RX ADMIN — PREGABALIN 150 MG: 75 CAPSULE ORAL at 10:33

## 2021-05-23 RX ADMIN — SODIUM CHLORIDE, PRESERVATIVE FREE 10 ML: 5 INJECTION INTRAVENOUS at 09:35

## 2021-05-23 RX ADMIN — CLOPIDOGREL 75 MG: 75 TABLET, FILM COATED ORAL at 10:33

## 2021-05-23 RX ADMIN — SODIUM CHLORIDE, PRESERVATIVE FREE 10 ML: 5 INJECTION INTRAVENOUS at 20:54

## 2021-05-23 RX ADMIN — ACETAMINOPHEN 650 MG: 325 TABLET ORAL at 17:41

## 2021-05-23 RX ADMIN — HEPARIN SODIUM 5000 UNITS: 10000 INJECTION INTRAVENOUS; SUBCUTANEOUS at 00:51

## 2021-05-23 RX ADMIN — PREGABALIN 150 MG: 75 CAPSULE ORAL at 20:51

## 2021-05-23 RX ADMIN — ATORVASTATIN CALCIUM 80 MG: 80 TABLET, FILM COATED ORAL at 10:33

## 2021-05-23 ASSESSMENT — PAIN DESCRIPTION - PAIN TYPE
TYPE: ACUTE PAIN
TYPE: CHRONIC PAIN
TYPE: ACUTE PAIN

## 2021-05-23 ASSESSMENT — PAIN DESCRIPTION - DESCRIPTORS
DESCRIPTORS: DISCOMFORT;SORE;TENDER
DESCRIPTORS: DISCOMFORT;ACHING;THROBBING

## 2021-05-23 ASSESSMENT — PAIN SCALES - GENERAL
PAINLEVEL_OUTOF10: 6
PAINLEVEL_OUTOF10: 0
PAINLEVEL_OUTOF10: 0
PAINLEVEL_OUTOF10: 5
PAINLEVEL_OUTOF10: 0

## 2021-05-23 ASSESSMENT — PAIN DESCRIPTION - ORIENTATION
ORIENTATION: RIGHT
ORIENTATION: RIGHT

## 2021-05-23 ASSESSMENT — PAIN DESCRIPTION - LOCATION
LOCATION: GENERALIZED
LOCATION: GROIN
LOCATION: LEG

## 2021-05-23 ASSESSMENT — PAIN DESCRIPTION - ONSET: ONSET: ON-GOING

## 2021-05-23 ASSESSMENT — PAIN DESCRIPTION - PROGRESSION: CLINICAL_PROGRESSION: NOT CHANGED

## 2021-05-23 ASSESSMENT — PAIN DESCRIPTION - FREQUENCY
FREQUENCY: CONTINUOUS
FREQUENCY: CONTINUOUS

## 2021-05-23 ASSESSMENT — PAIN - FUNCTIONAL ASSESSMENT: PAIN_FUNCTIONAL_ASSESSMENT: ACTIVITIES ARE NOT PREVENTED

## 2021-05-23 NOTE — PLAN OF CARE
Problem: Skin Integrity:  Goal: Will show no infection signs and symptoms  Description: Will show no infection signs and symptoms  Outcome: Met This Shift  Goal: Absence of new skin breakdown  Description: Absence of new skin breakdown  Outcome: Met This Shift     Problem: Falls - Risk of:  Goal: Will remain free from falls  Description: Will remain free from falls  Outcome: Met This Shift  Goal: Absence of physical injury  Description: Absence of physical injury  Outcome: Met This Shift     Problem: Cardiac:  Goal: Hemodynamic stability will improve  Description: Hemodynamic stability will improve  Outcome: Met This Shift     Problem: Fluid Volume:  Goal: Ability to achieve and maintain adequate urine output will improve  Description: Ability to achieve and maintain adequate urine output will improve  Outcome: Met This Shift     Problem: Respiratory:  Goal: Respiratory status will improve  Description: Respiratory status will improve  Outcome: Met This Shift

## 2021-05-23 NOTE — PLAN OF CARE
Pt needs syncope workup per PCP, previous workup by cardiology and neurology are unremarkable thus far. My HPI for details    My note is locked by attending Dr. Preeti Koch at the moment, not able to revise it, hence the plan of care note.

## 2021-05-23 NOTE — ED NOTES
Multiple attempts made to call ICU for report.  Placed on hold, no one picking up phone      Julieta Sánchez RN  05/22/21 2010

## 2021-05-23 NOTE — H&P
[address]                  [address] [facility],  [facility]                              HISTORY AND PHYSICAL    PATIENT NAME: Shayy Tate                   :        1955  MED REC NO:   [id number]                            ROOM:       [id number]  ACCOUNT NO:   [de-identified]                           ADMIT DATE: 2021  PROVIDER:     Trish Apgar, MD    CHIEF COMPLAINT:  Dizziness, hypotension, near syncope. HISTORY OF PRESENTING ILLNESS:  A 23-year-old with a history of  hypertension; COPD; coronary artery disease, status post MI, I believe  last year with a diagnosis of ischemic cardiomyopathy, she had two  stents placed at that time; presented to the emergency room with  dizziness and hypotension. In the ER, her systolic pressure was 40. CT  of the abdomen was negative. CT of the chest was negative. The patient  was initially placed on Levophed and admitted to MICU. At this time,  Levophed is off. Pressures have improved, but still low. The patient  states that she had a big lunch yesterday and started to get nauseated,  did vomit x1 and became quite dizzy. When she got home, she fell x1,  but did not lose consciousness. She was brought in to the emergency  room and diagnosed with hypotension. At present, she denies any chest  pain or shortness of breath. Denies any dizziness at this time. The  patient states she has had symptoms of this before and has been admitted  before. She is not aware of any actual diagnosis or cause of her  episodic hypertension. RECENT AND PRESENT MEDICATIONS:  See med rec in chart. PAST MEDICAL HISTORY:  Includes MI, coronary artery disease, status post  stents with diagnosis of ischemic cardiomyopathy; degenerative disk  disease; osteoarthritis; hypertension.     PAST SURGICAL HISTORY:  Includes breast lumpectomy on the left,   sections, coronary angioplasty with stent placement in 2020, hemorrhoid surgery, hysterectomy, joint replacement, tonsillectomy. REVIEW OF SYSTEMS:  ALLERGIES:  To NITROGLYCERIN and TRAMADOL. FAMILY MEDICAL HISTORY:  Noncontributory. SOCIAL HISTORY:  She does smoke for the last 47 years. She does report  alcohol use. PHYSICAL EXAMINATION:  GENERAL:  At this time, she is awake and alert. Does not appear to be  in any distress. VITAL SIGNS:  Her temperature is 97.9, pulse 71, respirations 14 and  nonlabored, blood pressure 103/47. SKIN:  Shows no pallor, no jaundice. HEENT:  Eyes reveal no icterus. Head is normocephalic. Pupils are  equal and reactive to light and accommodation. Extraocular muscles are  intact. ENT is clear. NECK:  Supple. No masses. No bruits. CHEST:  Showed diminished breath sounds, but no wheezing, rhonchi or  rales. HEART:  Regular rate and rhythm without murmur, gallop or rub. ABDOMEN:  Soft, nontender. EXTREMITIES:  Reveal no cyanosis, clubbing or edema. NEUROLOGICAL:  Once again, she is alert and oriented. There are no  focal neurological deficits at this point. LABORATORY DATA:  This morning, BUN 22, creatinine 1.3. Lactic acid is  1. Procalcitonin is normal.  C-reactive protein is normal.    DIAGNOSTIC DATA:  Once again CT of the chest and abdomen were  unremarkable. DIAGNOSTIC IMPRESSION:  Near-syncope, dizziness with hypotension. According to the patient this had happened before, not sure _____ actual  etiologies have been discovered. She is on multiple blood pressure  medications which were on hold at this point. She will continue IV  fluids and more unlikely could be transferred out of ICU today. Discharge plan home when stable.         Loraine Howard MD    D: 05/23/2021 8:57:20       T: 05/23/2021 8:59:16     [email or web address]  Job#: [id number]     Doc#: [id number]    CC:

## 2021-05-23 NOTE — CONSULTS
 CAD (coronary artery disease)     Degenerative disorder of bone     Hard of hearing     Hypertension     MI (myocardial infarction) ([facility] [address] 2020       Past Surgical History:        Procedure Laterality Date    BREAST LUMPECTOMY Left      SECTION      CORONARY ANGIOPLASTY WITH STENT PLACEMENT  2020    3.0 x 33mm Xience Sara to Prox LAD, EF35%, Dr. Kayley Jean Baptiste         Medications Prior to Admission:    Prior to Admission medications    Medication Sig Start Date End Date Taking? Authorizing Provider   atorvastatin (LIPITOR) 80 MG tablet Take 80 mg by mouth daily   Yes Historical Provider, MD   escitalopram (LEXAPRO) 20 MG tablet Take 20 mg by mouth daily   Yes Historical Provider, MD   ibuprofen (ADVIL;MOTRIN) 600 MG tablet Take 600 mg by mouth every 6 hours as needed for Pain   Yes Historical Provider, MD   potassium chloride (KLOR-CON M) 20 MEQ extended release tablet Take 20 mEq by mouth 2 times daily   Yes Historical Provider, MD   QUEtiapine (SEROQUEL) 200 MG tablet Take 200 mg by mouth daily   Yes Historical Provider, MD   pregabalin (LYRICA) 150 MG capsule Take 1 capsule by mouth 2 times daily for 60 days.  21 Yes Faustina Martinez MD   clopidogrel (PLAVIX) 75 MG tablet Take 1 tablet by mouth daily 21  Yes YASIR Meehan - CNS   folic acid (FOLVITE) 1 MG tablet Take 1 tablet by mouth daily 20  Yes Juma Hoffman Si,    lisinopril (PRINIVIL;ZESTRIL) 20 MG tablet Take 20 mg by mouth daily   Yes Historical Provider, MD   metoprolol succinate (TOPROL XL) 100 MG extended release tablet Take 100 mg by mouth daily   Yes Historical Provider, MD   pantoprazole (PROTONIX) 40 MG tablet Take 1 tablet by mouth every morning (before breakfast) 10/23/20  Yes Genevive Stalls, DO   aspirin 81 MG chewable tablet Take 1 tablet by mouth daily 20  Yes Genevive Stalls, DO   disulfiram (ANTABUSE) 250 MG tablet Take 250 mg by mouth daily   Yes Historical Provider, MD   nitroGLYCERIN (NITROSTAT) 0.4 MG SL tablet Place 0.4 mg under the tongue every 5 minutes as needed for Chest pain    Historical Provider, MD       Allergies:  Nitroglycerin and Tramadol    Social History:   TOBACCO:   reports that she has been smoking cigarettes. She started smoking about 47 years ago. She has a 11.75 pack-year smoking history. She has never used smokeless tobacco.  ETOH:   reports previous alcohol use. Family History:       Problem Relation Age of Onset    Lung Cancer Mother     Diabetes Father        REVIEW OF SYSTEMS:    · Constitutional: No fever, no chills, no change in weight; good appetite  · HEENT: No blurred vision, no ear problems, no sore throat, no rhinorrhea. · Respiratory: No cough, no sputum production, no pleuritic chest pain  · Cardiology: No angina, no dyspnea on exertion, no paroxysmal nocturnal dyspnea, no orthopnea, no palpitation, no leg swelling. · Gastroenterology: No dysphagia, no reflux; + abdominal pain, + nausea or vomiting; no constipation or diarrhea.  No hematochezia   · Genitourinary: No dysuria, no frequency, hesitancy; no hematuria  · Musculoskeletal: no joint pain, no myalgia, no change in range of movement  · Neurology: no focal weakness in extremities, no slurred speech, no double vision, no tingling or numbness sensation  · Endocrinology: no temperature intolerance, no polyphagia, polydipsia or polyuria  · Hematology: no increased bleeding, no bruising, no lymphadenopathy  · Skin: no skin changes noticed by patient  · Psychology: no depressed mood, no suicidal ideation    Physical Exam   · Vitals: /67   Pulse 61   Temp 97.9 °F (36.6 °C) (Bladder)   Resp 13   Ht 5' 5\" (1.651 m)   Wt 214 lb 6.4 oz (97.3 kg)   SpO2 98%   BMI 35.68 kg/m²   ·    ·      · General Appearance: alert and oriented to person, place and time, well developed and well- nourished, in no acute distress  · Skin: warm and dry, no rash or erythema  · Head: normocephalic and atraumatic  · Eyes: pupils equal, round, and reactive to light, extraocular eye movements intact, conjunctivae normal  · ENT: tympanic membrane, external ear and ear canal normal bilaterally, nose without deformity, nasal mucosa and turbinates normal without polyps  · Neck: supple and non-tender without mass, no thyromegaly or thyroid nodules, no cervical lymphadenopathy  · Pulmonary/Chest: clear to auscultation bilaterally- no wheezes, rales or rhonchi, normal air movement, no respiratory distress  · Cardiovascular: normal rate, regular rhythm, normal S1 and S2, no murmurs, rubs, clicks, or gallops, distal pulses intact, no carotid bruits  · Abdomen: soft, non-tender, non-distended, normal bowel sounds, no masses or organomegaly  · Extremities: no cyanosis, clubbing or edema  · Musculoskeletal: normal range of motion, no joint swelling, deformity or tenderness  · Neurologic: reflexes normal and symmetric, no cranial nerve deficit, gait, coordination and speech normal     Lines     site day    Art line   None    TLC R Fem Cordis introducer   PICC None    Hemoaccess None    Oxygen:     Room air  Mechanical Ventilation:   Mode:   ABG:     Lab Results   Component Value Date    PH 7.376 02/27/2021    PCO2 31.9 02/27/2021    PO2 93.2 02/27/2021    HCO3 18.3 02/27/2021    BE -5.9 02/27/2021    THB 12.5 02/27/2021    O2SAT 96.8 02/27/2021     Labs and Imaging Studies   Basic Labs  CBC:   Lab Results   Component Value Date    WBC 7.4 05/22/2021    RBC 3.83 05/22/2021    HGB 10.9 05/22/2021    HCT 35.9 05/22/2021    MCV 93.7 05/22/2021    RDW 15.3 05/22/2021     05/22/2021     CMP:  Lab Results   Component Value Date     05/22/2021    K 4.5 05/22/2021    K 4.4 02/27/2021     05/22/2021    CO2 20 05/22/2021    BUN 21 05/22/2021    PROT 6.6 05/22/2021       Imaging Studies:     CT Head WO Contrast    Result Date: 5/22/2021  EXAMINATION: CT OF THE HEAD WITHOUT CONTRAST  5/22/2021 5:30 pm TECHNIQUE: CT of the head was performed without the administration of intravenous contrast. Dose modulation, iterative reconstruction, and/or weight based adjustment of the mA/kV was utilized to reduce the radiation dose to as low as reasonably achievable. COMPARISON: February 27, 2021 HISTORY: ORDERING SYSTEM PROVIDED HISTORY: bleed TECHNOLOGIST PROVIDED HISTORY: Reason for exam:->bleed Has a \"code stroke\" or \"stroke alert\" been called? ->No Decision Support Exception - unselect if not a suspected or confirmed emergency medical condition->Emergency Medical Condition (MA) What reading provider will be dictating this exam?->CRC FINDINGS: BRAIN/VENTRICLES: There is no acute intracranial hemorrhage, mass effect or midline shift. No abnormal extra-axial fluid collection. The gray-white differentiation is maintained without evidence of an acute infarct. There is no evidence of hydrocephalus. ORBITS: The visualized portion of the orbits demonstrate no acute abnormality. SINUSES: The visualized paranasal sinuses demonstrate no acute abnormality. There is opacification of the mastoid air cells bilaterally. SOFT TISSUES/SKULL:  No acute abnormality of the visualized skull or soft tissues. No acute intracranial abnormality. Bilateral mastoiditis. CT ABDOMEN PELVIS W IV CONTRAST Additional Contrast? None    Result Date: 5/22/2021  EXAMINATION: CT OF THE ABDOMEN AND PELVIS WITH CONTRAST 5/22/2021 5:30 pm TECHNIQUE: CT of the abdomen and pelvis was performed with the administration of intravenous contrast. Multiplanar reformatted images are provided for review. Dose modulation, iterative reconstruction, and/or weight based adjustment of the mA/kV was utilized to reduce the radiation dose to as low as reasonably achievable.  COMPARISON: January 12, 2021 HISTORY: ORDERING SYSTEM PROVIDED HISTORY: abdominal pain; no US evidence for aortic dilation TECHNOLOGIST PROVIDED HISTORY: Reason for exam:->abdominal pain; no US evidence for aortic dilation Additional Contrast?->None What reading provider will be dictating this exam?->CRC FINDINGS: Lower Chest: Mild chronic changes at the lung bases. No pleural effusion. Organs: No focal liver lesions. Gallbladder is present with no calcified stones. Spleen is not enlarged. Pancreas and adrenal glands appear unremarkable. Kidneys demonstrate symmetric enhancement with no hydronephrosis. GI/Bowel: No obstructing or constricting lesions. Fluid and stool filled colon. Appendix is normal. Pelvis: Bladder is decompressed with Martin catheter in place. No significant free fluid. The uterus is absent. Peritoneum/Retroperitoneum: No free air or significant adenopathy. Bones/Soft Tissues: Severe degenerative changes and grade 1 anterolisthesis at L4-L5. No acute process in the abdomen and pelvis. No evidence of acute appendicitis or obstructive uropathy. XR CHEST PORTABLE    Result Date: 5/22/2021  EXAMINATION: ONE XRAY VIEW OF THE CHEST 5/22/2021 6:48 pm COMPARISON: None. HISTORY: ORDERING SYSTEM PROVIDED HISTORY: pneumonia TECHNOLOGIST PROVIDED HISTORY: Reason for exam:->pneumonia What reading provider will be dictating this exam?->CRC FINDINGS: The lungs are without acute focal process. There is no effusion or pneumothorax. The cardiomediastinal silhouette is without acute process. The osseous structures are without acute process. No acute process. CTA CHEST W CONTRAST    Result Date: 5/22/2021  EXAMINATION: CTA OF THE CHEST 5/22/2021 9:20 pm TECHNIQUE: CTA of the chest was performed after the administration of intravenous contrast.  Multiplanar reformatted images are provided for review. MIP images are provided for review. Dose modulation, iterative reconstruction, and/or weight based adjustment of the mA/kV was utilized to reduce the radiation dose to as low as reasonably achievable.  COMPARISON: Portable chest performed earlier today at 1846 hours and CT dated 01/12/2021 HISTORY: ORDERING SYSTEM PROVIDED HISTORY: Hypotension  D-dimer elevated TECHNOLOGIST PROVIDED HISTORY: Reason for exam:->Hypotension  D-dimer elevated What reading provider will be dictating this exam?->CRC FINDINGS: Pulmonary Arteries: Pulmonary arteries are adequately opacified for evaluation. No evidence of intraluminal filling defect to suggest pulmonary embolism. Main pulmonary artery is normal in caliber. Mediastinum: No evidence of mediastinal lymphadenopathy. The heart and pericardium demonstrate no acute abnormality. There is no acute abnormality of the thoracic aorta. Scattered vascular calcifications. Lungs/pleura: No pneumothorax. Some images are degraded by patient motion artifact. Mild dependent atelectasis in the lungs. No localized consolidation. Upper Abdomen: Limited images of the upper abdomen are unremarkable. Soft Tissues/Bones: No acute bone or soft tissue abnormality. No pulmonary embolism. Mild dependent atelectasis. No acute process or significant change otherwise. EKG: normal sinus rhythm. Resident's Assessment and Plan     The patient is a 74 y.o. female past medical history of hypertension, COPD, tobacco abuse who is currently vaping, CAD with ischemic cardiomyopathy with 2 stents, chronic back pain currently evaluated by neurosurgery, presented with syncope and profound hypotension.     Assessment  Shock, likely hypovolemic secondary to GI loss, versus multifactorial GI loss and over medication with vapering  Near syncopal event, likely secondary to hypotension and hypoperfusion   Patient has history of syncopal or near syncopal events   Most recent admission was March, and concerning for seizure, however EEG was negative, but her gabapentin was switched to Lyrica   Patient also follow Dr. Brendon Piedra for her CAD, she had a evaluation of rhythm monitor for 17 days, and no arrhythmia appreciated, the most recent EKG is normal   Recent echo shows ejection fraction 60%  Abdominal pain, viral gastritis versus gastritis due to over eating  LUANNE, likely in secondary to hypoperfusion  Chronic normocytic anemia   Patient developed normocytic anemia back to Dec 2020, when she was admitted due to UTI and/or pneumonia, and the thought was normocytic anemia secondary to infectious process  Hx of CAD    Plan   Continue normal saline infusion 100 mL/h, additional normocytic bolus of 500 mL  Monitor vital sign  Urine electrolytes, urine nitrogen, urine creatinine  Holding home meds, lisinopril and metoprolol due to LUANNE and recent hypotension, resume as able  Iron panel  Fu urine and blood culture  Fu procal , ESR and CRP      DVT prophylaxis/ GI prophylaxis: heparin/protonix  Disposition: ICU    Jenaro Alicea MD, PGY-1   Attending physician: Dr. Preeti Koch  I personally saw, examined and provided care for the patient. Radiographs, labs and medication list were reviewed by me independently. I spoke with bedside nursing, therapists and consultants. Critical care services and times documented are independent of procedures and multidisciplinary rounds with Residents. Additionally comprehensive, multidisciplinary rounds were conducted with the MICU team. The case was discussed in detail and plans for care were established. Review of Residents documentation was conducted and revisions were made as appropriate. I agree with the above documented exam, problem list and plan of care.     AGE  hypotension   hypovolemia   Work up for dizziness include cardiovascular and neurology as per hospitlist/primary    Danuta Santiago MD,[facility]  Pulmonary&Critical Care Medicine   Director of [address] [place] [address] Director of [address] [place] [address]    Katherine Meadows

## 2021-05-24 LAB — URINE CULTURE, ROUTINE: NORMAL

## 2021-05-24 PROCEDURE — 2580000003 HC RX 258: Performed by: INTERNAL MEDICINE

## 2021-05-24 PROCEDURE — 6360000002 HC RX W HCPCS: Performed by: GENERAL PRACTICE

## 2021-05-24 PROCEDURE — 6370000000 HC RX 637 (ALT 250 FOR IP): Performed by: INTERNAL MEDICINE

## 2021-05-24 PROCEDURE — 2060000000 HC ICU INTERMEDIATE R&B

## 2021-05-24 PROCEDURE — 97530 THERAPEUTIC ACTIVITIES: CPT

## 2021-05-24 PROCEDURE — 6360000002 HC RX W HCPCS: Performed by: INTERNAL MEDICINE

## 2021-05-24 PROCEDURE — 97161 PT EVAL LOW COMPLEX 20 MIN: CPT

## 2021-05-24 RX ORDER — KETOROLAC TROMETHAMINE 30 MG/ML
10 INJECTION, SOLUTION INTRAMUSCULAR; INTRAVENOUS EVERY 8 HOURS PRN
Status: DISCONTINUED | OUTPATIENT
Start: 2021-05-24 | End: 2021-05-25 | Stop reason: HOSPADM

## 2021-05-24 RX ADMIN — QUETIAPINE FUMARATE 200 MG: 200 TABLET ORAL at 21:41

## 2021-05-24 RX ADMIN — ACETAMINOPHEN 650 MG: 325 TABLET ORAL at 11:23

## 2021-05-24 RX ADMIN — HEPARIN SODIUM 5000 UNITS: 10000 INJECTION INTRAVENOUS; SUBCUTANEOUS at 13:22

## 2021-05-24 RX ADMIN — SODIUM CHLORIDE, PRESERVATIVE FREE 10 ML: 5 INJECTION INTRAVENOUS at 17:04

## 2021-05-24 RX ADMIN — PREGABALIN 150 MG: 75 CAPSULE ORAL at 21:41

## 2021-05-24 RX ADMIN — ATORVASTATIN CALCIUM 80 MG: 80 TABLET, FILM COATED ORAL at 09:16

## 2021-05-24 RX ADMIN — SODIUM CHLORIDE, PRESERVATIVE FREE 10 ML: 5 INJECTION INTRAVENOUS at 09:20

## 2021-05-24 RX ADMIN — HEPARIN SODIUM 5000 UNITS: 10000 INJECTION INTRAVENOUS; SUBCUTANEOUS at 05:33

## 2021-05-24 RX ADMIN — FOLIC ACID 1 MG: 1 TABLET ORAL at 09:20

## 2021-05-24 RX ADMIN — PANTOPRAZOLE SODIUM 40 MG: 40 TABLET, DELAYED RELEASE ORAL at 05:36

## 2021-05-24 RX ADMIN — HEPARIN SODIUM 5000 UNITS: 10000 INJECTION INTRAVENOUS; SUBCUTANEOUS at 21:41

## 2021-05-24 RX ADMIN — PREGABALIN 150 MG: 75 CAPSULE ORAL at 09:19

## 2021-05-24 RX ADMIN — ACETAMINOPHEN 650 MG: 325 TABLET ORAL at 05:36

## 2021-05-24 RX ADMIN — KETOROLAC TROMETHAMINE 9.9 MG: 30 INJECTION, SOLUTION INTRAMUSCULAR; INTRAVENOUS at 17:03

## 2021-05-24 RX ADMIN — CLOPIDOGREL 75 MG: 75 TABLET, FILM COATED ORAL at 09:15

## 2021-05-24 RX ADMIN — ASPIRIN 81 MG CHEWABLE TABLET 81 MG: 81 TABLET CHEWABLE at 09:16

## 2021-05-24 RX ADMIN — SODIUM CHLORIDE, PRESERVATIVE FREE 10 ML: 5 INJECTION INTRAVENOUS at 21:41

## 2021-05-24 ASSESSMENT — PAIN DESCRIPTION - PAIN TYPE
TYPE: ACUTE PAIN
TYPE: CHRONIC PAIN
TYPE: ACUTE PAIN;CHRONIC PAIN
TYPE: CHRONIC PAIN

## 2021-05-24 ASSESSMENT — PAIN SCALES - GENERAL
PAINLEVEL_OUTOF10: 3
PAINLEVEL_OUTOF10: 6
PAINLEVEL_OUTOF10: 3
PAINLEVEL_OUTOF10: 0
PAINLEVEL_OUTOF10: 7
PAINLEVEL_OUTOF10: 8
PAINLEVEL_OUTOF10: 0

## 2021-05-24 ASSESSMENT — PAIN DESCRIPTION - LOCATION
LOCATION: BUTTOCKS
LOCATION: BACK;BUTTOCKS
LOCATION: LEG
LOCATION: LEG

## 2021-05-24 ASSESSMENT — PAIN DESCRIPTION - DESCRIPTORS
DESCRIPTORS: ACHING;SORE
DESCRIPTORS: DISCOMFORT
DESCRIPTORS: ACHING;BURNING;DISCOMFORT;SHARP

## 2021-05-24 ASSESSMENT — PAIN DESCRIPTION - DIRECTION: RADIATING_TOWARDS: LEFT LEG

## 2021-05-24 ASSESSMENT — PAIN DESCRIPTION - ORIENTATION
ORIENTATION: LOWER
ORIENTATION: LEFT
ORIENTATION: LEFT

## 2021-05-24 ASSESSMENT — PAIN DESCRIPTION - FREQUENCY
FREQUENCY: INTERMITTENT
FREQUENCY: INTERMITTENT

## 2021-05-24 NOTE — PROGRESS NOTES
[]Decreased posture  []Decreased sensation  []Decreased coordination   []Decreased vision  []Decreased safety awareness   []Increased pain       Comments:  Pt was received supine and was agreeable to PT evaluation. Pt assisted to restroom and was independent for commode transfer and functional activity in restroom. Pt agreeable to further amb on unit and demonstrated good balance, but antalgic gait due to chronic LLE pain from sciatica. Pt exhibits wide CHERYL and decreased LLE stance phase. Pt instructed in sequencing with steps and to perform with NR pattern when having increased LLE pain. Pt declined further steps due to having pain and not having to do steps at home. Pt was left supine with call button within reach and all needs met. Treatment:  Patient practiced and was instructed in the following treatment:    · Therapeutic Exercises/Activities as noted above. · Stair training with VCs for NR pattern and sequencing.  Patient education provided continuously throughout session with focus on correcting deviations or safety concerns observed throughout session. Pt's/ family goals   1. To return home. Patient and or family understand(s) diagnosis, prognosis, and plan of care. Yes     PHYSICAL THERAPY PLAN OF CARE:    PT POC is established based on physician order and patient diagnosis     Referring provider/PT Order:    05/24/21 0930  PT eval and treat Start: 05/24/21 0930, End: 05/24/21 0930, ONE TIME, Standing Count: 1 Occurrences, R      Vernadine American, DO       Diagnosis:  Hypotension [I95.9]  Specific instructions for next treatment:  NA    Patient demonstrates independent level with functional mobility and reports being at her baseline for mobility. Pt does not demonstrate any acute skilled PT needs at this time and will discharge pt from PT services.       Time in  1410  Time out  1440    Total Treatment Time  10 minutes     Evaluation Time includes thorough review of current medical information, gathering information on past medical history/social history and prior level of function, completion of standardized testing/informal observation of tasks, assessment of data and education on plan of care and goals.     CPT codes:  [x] Low Complexity PT evaluation 24848  [] Moderate Complexity PT evaluation 21698  [] High Complexity PT evaluation 18960  [] PT Re-evaluation 56162  [] Gait training 14301 - minutes  [] Manual therapy 35187 - minutes  [x] Therapeutic activities 86772 10 minutes  [] Therapeutic exercises 70730 - minutes  [] Neuromuscular reeducation 69975 - minutes     Osiel Scott, PT, DPT [id number]

## 2021-05-24 NOTE — CARE COORDINATION
Patient from home, lives with son but his son is currently in rehab at [facility]. She has a walker and cane at home, 1 step to enter the home. PCP is Dr. Faustina Garcia. Plan is home when released. Will follow up after PT/OT eval to verify plan. For questions I can be reached at [phone].  [facility]

## 2021-05-24 NOTE — PROGRESS NOTES
Pulmonary [facility]                      Pulmonary Consult Khushbu Molina Note :        Chief complaint: hypotension and near syncopal episode  History of Present Illness   Doing much better  Denies any fever or chills  No chest pain   On RA    ·     Physical Exam   · Vitals: /74   Pulse 73   Temp 97.5 °F (36.4 °C) (Oral)   Resp 18   Ht 5' 5\" (1.651 m)   Wt 207 lb 8 oz (94.1 kg)   SpO2 98%   BMI 34.53 kg/m²           · General Appearance: alert and oriented to person, place and time, well developed and well- nourished, in no acute distress  · Skin: warm and dry, no rash or erythema  · Head: normocephalic and atraumatic  · Eyes: pupils equal, round, and reactive to light, extraocular eye movements intact, conjunctivae normal  · ENT: tympanic membrane, external ear and ear canal normal bilaterally, nose without deformity, nasal mucosa and turbinates normal without polyps  · Neck: supple and non-tender without mass, no thyromegaly or thyroid nodules, no cervical lymphadenopathy  · Pulmonary/Chest: clear to auscultation bilaterally- no wheezes, rales or rhonchi, normal air movement, no respiratory distress  · Cardiovascular: normal rate, regular rhythm, normal S1 and S2, no murmurs, rubs, clicks, or gallops, distal pulses intact, no carotid bruits  · Abdomen: soft, non-tender, non-distended, normal bowel sounds, no masses or organomegaly  · Extremities: no cyanosis, clubbing or edema  · Musculoskeletal: normal range of motion, no joint swelling, deformity or tenderness  · Neurologic: reflexes normal and symmetric, no cranial nerve deficit, gait, coordination and speech normal     Labs and Imaging Studies   Basic Labs  CBC:   Lab Results   Component Value Date    WBC 7.4 05/22/2021    RBC 3.83 05/22/2021    HGB 10.9 05/22/2021    HCT 35.9 05/22/2021    MCV 93.7 05/22/2021    RDW 15.3 05/22/2021     05/22/2021     CMP:  Lab Results   Component Value Date     05/23/2021    K 4.5 05/23/2021    K 4.4 02/27/2021     05/23/2021    CO2 20 05/23/2021    BUN 22 05/23/2021    PROT 6.6 05/22/2021       Imaging Studies:    Assessment and Plan        Assessment  Shock,resolved    Near syncopal event, likely secondary to hypotension and hypoperfusion     Possible COPD  ? UTI     Plan   Follow up for COPD as OP with PFT  On RA  Ambulate  If continue doing well ,she can be discharged from pulm stand point and follow as OP     DVT prophylaxis/ GI prophylaxis: heparin/protonix        Danuta Santiago MD,[facility]  Pulmonary&Critical Care Medicine   Director of [address] [place] [address] Director of [address] [facility]    Lester Rodrigues

## 2021-05-25 VITALS
HEIGHT: 65 IN | TEMPERATURE: 97.9 F | RESPIRATION RATE: 18 BRPM | WEIGHT: 207.5 LBS | DIASTOLIC BLOOD PRESSURE: 80 MMHG | OXYGEN SATURATION: 96 % | SYSTOLIC BLOOD PRESSURE: 158 MMHG | BODY MASS INDEX: 34.57 KG/M2 | HEART RATE: 64 BPM

## 2021-05-25 LAB
ANION GAP SERPL CALCULATED.3IONS-SCNC: 10 MMOL/L (ref 7–16)
BUN BLDV-MCNC: 18 MG/DL (ref 6–23)
CALCIUM SERPL-MCNC: 9.2 MG/DL (ref 8.6–10.2)
CHLORIDE BLD-SCNC: 111 MMOL/L (ref 98–107)
CO2: 19 MMOL/L (ref 22–29)
CREAT SERPL-MCNC: 0.8 MG/DL (ref 0.5–1)
GFR AFRICAN AMERICAN: >60
GFR NON-AFRICAN AMERICAN: >60 ML/MIN/1.73
GLUCOSE BLD-MCNC: 106 MG/DL (ref 74–99)
POTASSIUM SERPL-SCNC: 5.2 MMOL/L (ref 3.5–5)
REASON FOR REJECTION: NORMAL
REJECTED TEST: NORMAL
SODIUM BLD-SCNC: 140 MMOL/L (ref 132–146)

## 2021-05-25 PROCEDURE — 36415 COLL VENOUS BLD VENIPUNCTURE: CPT

## 2021-05-25 PROCEDURE — 6360000002 HC RX W HCPCS: Performed by: INTERNAL MEDICINE

## 2021-05-25 PROCEDURE — 6360000002 HC RX W HCPCS: Performed by: GENERAL PRACTICE

## 2021-05-25 PROCEDURE — 2580000003 HC RX 258: Performed by: INTERNAL MEDICINE

## 2021-05-25 PROCEDURE — 6370000000 HC RX 637 (ALT 250 FOR IP): Performed by: INTERNAL MEDICINE

## 2021-05-25 PROCEDURE — 80048 BASIC METABOLIC PNL TOTAL CA: CPT

## 2021-05-25 PROCEDURE — 6370000000 HC RX 637 (ALT 250 FOR IP): Performed by: GENERAL PRACTICE

## 2021-05-25 RX ORDER — ATORVASTATIN CALCIUM 80 MG/1
80 TABLET, FILM COATED ORAL NIGHTLY
Qty: 30 TABLET | Refills: 3 | Status: ON HOLD | OUTPATIENT
Start: 2021-05-25 | End: 2022-08-02 | Stop reason: SDUPTHER

## 2021-05-25 RX ORDER — IPRATROPIUM BROMIDE AND ALBUTEROL SULFATE 2.5; .5 MG/3ML; MG/3ML
3 SOLUTION RESPIRATORY (INHALATION) EVERY 4 HOURS PRN
Qty: 360 ML | Refills: 2 | Status: ON HOLD | OUTPATIENT
Start: 2021-05-25 | End: 2022-08-01 | Stop reason: ALTCHOICE

## 2021-05-25 RX ORDER — ESCITALOPRAM OXALATE 20 MG/1
10 TABLET ORAL DAILY
Qty: 30 TABLET | Refills: 3 | Status: SHIPPED | OUTPATIENT
Start: 2021-05-25 | End: 2022-08-05 | Stop reason: HOSPADM

## 2021-05-25 RX ORDER — METOPROLOL SUCCINATE 25 MG/1
25 TABLET, EXTENDED RELEASE ORAL DAILY
Status: DISCONTINUED | OUTPATIENT
Start: 2021-05-25 | End: 2021-05-25 | Stop reason: HOSPADM

## 2021-05-25 RX ORDER — METOPROLOL SUCCINATE 25 MG/1
25 TABLET, EXTENDED RELEASE ORAL DAILY
Qty: 30 TABLET | Refills: 3 | Status: ON HOLD | OUTPATIENT
Start: 2021-05-26 | End: 2022-08-02 | Stop reason: HOSPADM

## 2021-05-25 RX ORDER — ARFORMOTEROL TARTRATE 15 UG/2ML
15 SOLUTION RESPIRATORY (INHALATION) 2 TIMES DAILY
Qty: 120 ML | Refills: 3 | Status: ON HOLD | OUTPATIENT
Start: 2021-05-25 | End: 2022-08-01 | Stop reason: ALTCHOICE

## 2021-05-25 RX ORDER — NYSTATIN 100000 U/G
OINTMENT TOPICAL
Qty: 100 G | Refills: 1 | Status: SHIPPED | OUTPATIENT
Start: 2021-05-25

## 2021-05-25 RX ADMIN — METOPROLOL SUCCINATE 25 MG: 25 TABLET, EXTENDED RELEASE ORAL at 09:18

## 2021-05-25 RX ADMIN — SODIUM CHLORIDE, PRESERVATIVE FREE 10 ML: 5 INJECTION INTRAVENOUS at 04:39

## 2021-05-25 RX ADMIN — PREGABALIN 150 MG: 75 CAPSULE ORAL at 09:18

## 2021-05-25 RX ADMIN — FOLIC ACID 1 MG: 1 TABLET ORAL at 09:18

## 2021-05-25 RX ADMIN — PANTOPRAZOLE SODIUM 40 MG: 40 TABLET, DELAYED RELEASE ORAL at 05:35

## 2021-05-25 RX ADMIN — KETOROLAC TROMETHAMINE 9.9 MG: 30 INJECTION, SOLUTION INTRAMUSCULAR; INTRAVENOUS at 11:45

## 2021-05-25 RX ADMIN — ASPIRIN 81 MG CHEWABLE TABLET 81 MG: 81 TABLET CHEWABLE at 09:18

## 2021-05-25 RX ADMIN — HEPARIN SODIUM 5000 UNITS: 10000 INJECTION INTRAVENOUS; SUBCUTANEOUS at 05:35

## 2021-05-25 RX ADMIN — KETOROLAC TROMETHAMINE 9.9 MG: 30 INJECTION, SOLUTION INTRAMUSCULAR; INTRAVENOUS at 04:38

## 2021-05-25 RX ADMIN — ATORVASTATIN CALCIUM 80 MG: 80 TABLET, FILM COATED ORAL at 09:18

## 2021-05-25 RX ADMIN — CLOPIDOGREL 75 MG: 75 TABLET, FILM COATED ORAL at 09:18

## 2021-05-25 ASSESSMENT — PAIN SCALES - GENERAL
PAINLEVEL_OUTOF10: 0
PAINLEVEL_OUTOF10: 8
PAINLEVEL_OUTOF10: 3

## 2021-05-25 NOTE — PLAN OF CARE
Problem: Skin Integrity:  Goal: Will show no infection signs and symptoms  Description: Will show no infection signs and symptoms  Outcome: Completed  Goal: Absence of new skin breakdown  Description: Absence of new skin breakdown  Outcome: Completed     Problem: Falls - Risk of:  Goal: Will remain free from falls  Description: Will remain free from falls  Outcome: Completed  Goal: Absence of physical injury  Description: Absence of physical injury  Outcome: Completed     Problem: Pain:  Goal: Pain level will decrease  Description: Pain level will decrease  Outcome: Completed  Goal: Control of acute pain  Description: Control of acute pain  Outcome: Completed  Goal: Control of chronic pain  Description: Control of chronic pain  Outcome: Completed     Problem: Cardiac:  Goal: Hemodynamic stability will improve  Description: Hemodynamic stability will improve  Outcome: Completed     Problem: Fluid Volume:  Goal: Ability to achieve and maintain adequate urine output will improve  Description: Ability to achieve and maintain adequate urine output will improve  Outcome: Completed     Problem: Respiratory:  Goal: Respiratory status will improve  Description: Respiratory status will improve  Outcome: Completed

## 2021-05-27 LAB
BLOOD CULTURE, ROUTINE: NORMAL
CULTURE, BLOOD 2: NORMAL

## 2021-06-25 NOTE — DISCHARGE SUMMARY
to hypotension. She seemed to do well. Fluids were stopped. Her appetite was good. Bladder and bowel function  were pretty much normal.    DISCHARGE MEDICATIONS:  She was able to be discharged to home on;  1. Nitrostat p.r.n.  2.  Antabuse 250 daily. 3.  Aspirin 81 daily. 4.  Protonix 40 daily. 5.  Folic acid 1 mg daily. 6.  Plavix 75 daily. 7.  Lyrica 150 b.i.d.  8.  Seroquel 200 at bedtime. 9.  Advil as needed. 10.  Duoneb as needed. 11.  Metoprolol XL 25 daily. 12.  Lipitor 80 daily. 13.  Lexapro 20 daily. We will send her home and then I will follow her up in the office  shortly in about a week, sooner if any problems should occur. At the  time of discharge, her condition was good. Her prognosis is good.         Nilson Major DO    D: 06/24/2021 10:57:24       T: 06/24/2021 11:05:29     [email or web address]  Job#: [id number]     Doc#: [id number]    CC:

## 2022-01-31 ENCOUNTER — TELEPHONE (OUTPATIENT)
Dept: SURGERY | Age: 67
End: 2022-01-31

## 2022-01-31 NOTE — TELEPHONE ENCOUNTER
MA received referral from Dr. Javad Salter for colonoscopy appointment.  MA called patient to schedule but left message    Electronically signed by Quinton Brenner on 1/31/2022 at 3:20 PM

## 2022-03-29 ENCOUNTER — OFFICE VISIT (OUTPATIENT)
Dept: SURGERY | Age: 67
End: 2022-03-29
Payer: MEDICARE

## 2022-03-29 VITALS
DIASTOLIC BLOOD PRESSURE: 87 MMHG | TEMPERATURE: 98.1 F | RESPIRATION RATE: 18 BRPM | HEART RATE: 88 BPM | WEIGHT: 180 LBS | SYSTOLIC BLOOD PRESSURE: 138 MMHG | BODY MASS INDEX: 29.99 KG/M2 | OXYGEN SATURATION: 98 % | HEIGHT: 65 IN

## 2022-03-29 DIAGNOSIS — Z12.11 ENCOUNTER FOR SCREENING COLONOSCOPY: ICD-10-CM

## 2022-03-29 DIAGNOSIS — R10.11 RUQ PAIN: Primary | ICD-10-CM

## 2022-03-29 PROCEDURE — G8484 FLU IMMUNIZE NO ADMIN: HCPCS | Performed by: SURGERY

## 2022-03-29 PROCEDURE — G8417 CALC BMI ABV UP PARAM F/U: HCPCS | Performed by: SURGERY

## 2022-03-29 PROCEDURE — 1123F ACP DISCUSS/DSCN MKR DOCD: CPT | Performed by: SURGERY

## 2022-03-29 PROCEDURE — 4040F PNEUMOC VAC/ADMIN/RCVD: CPT | Performed by: SURGERY

## 2022-03-29 PROCEDURE — 1090F PRES/ABSN URINE INCON ASSESS: CPT | Performed by: SURGERY

## 2022-03-29 PROCEDURE — 99203 OFFICE O/P NEW LOW 30 MIN: CPT | Performed by: SURGERY

## 2022-03-29 PROCEDURE — G8427 DOCREV CUR MEDS BY ELIG CLIN: HCPCS | Performed by: SURGERY

## 2022-03-29 PROCEDURE — 4004F PT TOBACCO SCREEN RCVD TLK: CPT | Performed by: SURGERY

## 2022-03-29 PROCEDURE — G8400 PT W/DXA NO RESULTS DOC: HCPCS | Performed by: SURGERY

## 2022-03-29 PROCEDURE — 3017F COLORECTAL CA SCREEN DOC REV: CPT | Performed by: SURGERY

## 2022-03-29 NOTE — PROGRESS NOTES
[address] [facility] Surgery Clinic Note    Assessment/Plan:      Diagnosis Orders   1. RUQ pain  US ABDOMEN COMPLETE    We will evaluate with ultrasound. 2. Encounter for colonoscopy; loose stool      We will plan for colonoscopy. If loose stools persist we will look at getting stool studies         Return for Colonoscopy, Results. Chief Complaint   Patient presents with    New Patient     colonoscopy        PCP: Oilvia Shahid DO    HPI: Aleksandra Pierre is a 69 y.o. female who presents in consultation. For the last year and a half she has had constant diarrhea. She does take a lot of Imodium daily she states. She says she had negative stool based test last year. She does do a fiber diet. She says she will get some bilateral lower abdominal pains prior to having bowel movements. That improves afterwards. She has never had prior colonoscopy. She has no family history of colon cancer or inflammatory bowel disease. She does also have some right upper quadrant discomfort. Is mostly on palpation. She is not sure if it is worse with any foods or not. She is on a PPI        Past Medical History:   Diagnosis Date    CAD (coronary artery disease)     Degenerative disorder of bone     Hard of hearing     Hypertension     MI (myocardial infarction) ([facility] [address] 2020       Past Surgical History:   Procedure Laterality Date    BREAST LUMPECTOMY Left      SECTION      CORONARY ANGIOPLASTY WITH STENT PLACEMENT  2020    3.0 x 33mm Xience Sara to Prox LAD, EF35%, Dr. Young Hussein         Prior to Admission medications    Medication Sig Start Date End Date Taking?  Authorizing Provider   Arformoterol Tartrate (BROVANA) 15 MCG/2ML NEBU Take 2 mLs by nebulization 2 times daily 21  Yes Olivia Shahid DO   escitalopram (LEXAPRO) 20 MG tablet Take 0.5 tablets by mouth daily 21  Yes Olivia Shahid DO   atorvastatin (LIPITOR) 80 MG tablet Take 1 tablet by mouth nightly 5/25/21  Yes Acadia Clonts, DO   metoprolol succinate (TOPROL XL) 25 MG extended release tablet Take 1 tablet by mouth daily 5/26/21  Yes Juma Simms, DO   nystatin (MYCOSTATIN) 489727 UNIT/GM ointment Apply topically 2 times daily. 5/25/21  Yes Dennis Clonts, DO   Respiratory Therapy Supplies (FULL KIT NEBULIZER SET) MISC Use as directed with nebulized medication. 5/25/21  Yes Juma Simms, DO   ipratropium-albuterol (DUONEB) 0.5-2.5 (3) MG/3ML SOLN nebulizer solution Inhale 3 mLs into the lungs every 4 hours as needed for Shortness of Breath 5/25/21  Yes Juma Simms, DO   ibuprofen (ADVIL;MOTRIN) 600 MG tablet Take 600 mg by mouth every 6 hours as needed for Pain   Yes Historical Provider, MD   QUEtiapine (SEROQUEL) 200 MG tablet Take 200 mg by mouth daily   Yes Historical Provider, MD   clopidogrel (PLAVIX) 75 MG tablet Take 1 tablet by mouth daily 4/14/21  Yes YASIR Prieto - CNS   folic acid (FOLVITE) 1 MG tablet Take 1 tablet by mouth daily 12/18/20  Yes Dennis Clonts, DO   nitroGLYCERIN (NITROSTAT) 0.4 MG SL tablet Place 0.4 mg under the tongue every 5 minutes as needed for Chest pain   Yes Historical Provider, MD   pantoprazole (PROTONIX) 40 MG tablet Take 1 tablet by mouth every morning (before breakfast) 10/23/20  Yes Acadia Clonts, DO   aspirin 81 MG chewable tablet Take 1 tablet by mouth daily 8/26/20  Yes Acadia Clonts, DO   disulfiram (ANTABUSE) 250 MG tablet Take 250 mg by mouth daily   Yes Historical Provider, MD   pregabalin (LYRICA) 150 MG capsule Take 1 capsule by mouth 2 times daily for 60 days.  5/12/21 7/11/21  Aria Choudhury MD       Allergies   Allergen Reactions    Nitroglycerin Other (See Comments)     Severe headache    Tramadol      seizures       Social History     Socioeconomic History    Marital status: Single     Spouse name: None    Number of children: None    Years of education: None    Highest education level: None   Occupational History    None   Tobacco Use    Smoking status: Current Every Day Smoker     Packs/day: 0.25     Years: 47.00     Pack years: 11.75     Types: Cigarettes     Start date: 1973     Last attempt to quit: 2020     Years since quittin.5    Smokeless tobacco: Never Used    Tobacco comment: 2 cigs daily   Vaping Use    Vaping Use: Former    Quit date: 2018    Substances: Always   Substance and Sexual Activity    Alcohol use: Not Currently    Drug use: Never    Sexual activity: None   Other Topics Concern    None   Social History Narrative    None     Social Determinants of Health     Financial Resource Strain:     Difficulty of Paying Living Expenses: Not on file   Food Insecurity:     Worried About Running Out of Food in the Last Year: Not on file    Jamie of Food in the Last Year: Not on file   Transportation Needs:     Lack of Transportation (Medical): Not on file    Lack of Transportation (Non-Medical):  Not on file   Physical Activity:     Days of Exercise per Week: Not on file    Minutes of Exercise per Session: Not on file   Stress:     Feeling of Stress : Not on file   Social Connections:     Frequency of Communication with Friends and Family: Not on file    Frequency of Social Gatherings with Friends and Family: Not on file    Attends [employer] Services: Not on file    Active Member of [address] [place] [address] or Organizations: Not on file    Attends Club or Organization Meetings: Not on file    Marital Status: Not on file   Intimate Partner Violence:     Fear of Current or Ex-Partner: Not on file    Emotionally Abused: Not on file    Physically Abused: Not on file    Sexually Abused: Not on file   Housing Stability:     Unable to Pay for Housing in the Last Year: Not on file    Number of [place] in the Last Year: Not on file    Unstable Housing in the Last Year: Not on file       Family History   Problem Relation Age of Onset    Lung Cancer Mother  Diabetes Father        Review of Systems   All other systems reviewed and are negative. Objective:  Vitals:    03/29/22 1332   BP: 138/87   Pulse: 88   Resp: 18   Temp: 98.1 °F (36.7 °C)   TempSrc: Temporal   SpO2: 98%   Weight: 180 lb (81.6 kg)   Height: 5' 5\" (1.651 m)          Physical Exam  HENT:      Head: Normocephalic and atraumatic. Eyes:      General:         Right eye: No discharge. Left eye: No discharge. Neck:      Trachea: No tracheal deviation. Cardiovascular:      Rate and Rhythm: Normal rate. Pulmonary:      Effort: Pulmonary effort is normal. No respiratory distress. Abdominal:      General: There is no distension. Palpations: Abdomen is soft. Tenderness: There is abdominal tenderness (Right upper quad). There is no guarding or rebound. Skin:     General: Skin is warm and dry. Neurological:      Mental Status: She is alert and oriented to person, place, and time. Dipika Spencer MD      NOTE: This report, in part or full,may have been transcribed using voice recognition software. Every effort was made to ensure accuracy; however, inadvertent computerized transcription errors may be present. Please excuse any transcriptional grammatical or spelling errors that may have escaped my editorial review.     CC: Dennis Malcolm, DO

## 2022-03-30 ENCOUNTER — TELEPHONE (OUTPATIENT)
Dept: SURGERY | Age: 67
End: 2022-03-30

## 2022-03-30 NOTE — TELEPHONE ENCOUNTER
Prior Authorization Form:      DEMOGRAPHICS:                     Patient Name:  Nessa Smith  Patient :  1955            Insurance:  Payor: Yecenia Miller / Plan: [facility] HMO / Product Type: *No Product type* /   Insurance ID Number:    Payor/Plan Subscr  Sex Relation Sub. Ins. ID Effective Group Num   1.  [address] 1955 Female Self [id number] 1/1/15 [id number]                                    [address] [id number]         DIAGNOSIS & PROCEDURE:                       Procedure/Operation: Colonoscopy           CPT Code: 84827    Diagnosis:  Screening    ICD10 Code: Z12.11    Location:  [facility]    Surgeon:  Dr Sydni Mendez INFORMATION:                          Date: 22    Time: 9:45 am              Anesthesia:  [facility]                                                       Status:  Outpatient        Special Comments:         Electronically signed by Jude Hale MA on 3/30/2022 at 1:13 PM

## 2022-03-30 NOTE — TELEPHONE ENCOUNTER
Ashley Wilson is scheduled for colonoscopy with Dr Edwin Beltran on 05-25-22 at [facility] at 9:45 am. Patient was told to arrive at 8:45 am. Patient needs to be NPO after midnight the night before procedure. All surgery instructions were explained to the patient and a surgery letter was also mailed out. MA informed patient that PAT will also be calling to review pre-op instructions and medications. Patient verbalized understanding.   Electronically signed by Karyn Brown MA on 3/30/2022 at 1:12 PM

## 2022-04-12 ENCOUNTER — TELEPHONE (OUTPATIENT)
Dept: SURGERY | Age: 67
End: 2022-04-12

## 2022-04-12 NOTE — TELEPHONE ENCOUNTER
Patient called stating she did not want to have ultrasound done if she did not have to. Patient states that the RUQ pain has gone away. Told patient I would advise the Doctor and let him make that decision. Patient is scheduled for colonoscopy on 5/25/22.     Electronically signed by Marcia Arellano on 4/12/2022 at 9:57 AM

## 2022-04-12 NOTE — TELEPHONE ENCOUNTER
That is her decision. Cannot evaluate her gallbladder for the pain she was having without it but up to her.

## 2022-04-26 ENCOUNTER — HOSPITAL ENCOUNTER (OUTPATIENT)
Dept: ULTRASOUND IMAGING | Age: 67
Discharge: HOME | End: 2022-04-28
Payer: MEDICARE

## 2022-04-26 DIAGNOSIS — R10.11 RUQ PAIN: ICD-10-CM

## 2022-04-26 PROCEDURE — 76700 US EXAM ABDOM COMPLETE: CPT

## 2022-05-18 ENCOUNTER — TELEPHONE (OUTPATIENT)
Dept: SURGERY | Age: 67
End: 2022-05-18

## 2022-05-18 NOTE — TELEPHONE ENCOUNTER
MA received a call from [facility] that patient needed to cancel her surgery due to an erupted ear drum.    Electronically signed by Victor Hugo Kramer on 5/18/2022 at 12:14 PM

## 2022-05-18 NOTE — TELEPHONE ENCOUNTER
PAT called and stated that patient called them and wanted to cancel her colonoscopy for 05-25-22 because she ruptured her eardrum. They informed patient that she needed to call our office but patient hung up on them. MA did try to call patient but her voicemail was full.   Electronically signed by Lilia Duverney, MA on 5/18/2022 at 2:27 PM

## 2022-05-26 ENCOUNTER — TELEPHONE (OUTPATIENT)
Dept: SURGERY | Age: 67
End: 2022-05-26

## 2022-05-26 NOTE — TELEPHONE ENCOUNTER
Received a voice message from the patient. Attempted to call back. No answer, left message on the phone.   Electronically signed by Nikole Maloney on 5/26/2022 at 3:45 PM

## 2022-05-26 NOTE — TELEPHONE ENCOUNTER
Received a call from the patient who is wanting to know if she needs to make a follow up appt with Dr. Alex Osborne for RUQ pain. The patient stated she does not have RUQ pain anymore. The patient stated she does not wish to proceed with colonoscopy. Forwarded message to Ji Fay MA.   Electronically signed by Judith Felix on 5/26/2022 at 3:53 PM

## 2022-05-27 NOTE — TELEPHONE ENCOUNTER
BETTY quiroga/ashli for patient to call the office back because Dr Rosie Lowery wants her to know that he recommends her having the colonoscopy for preventative measures and her loose stools.   Electronically signed by Melania Vale MA on 5/27/2022 at 10:18 AM

## 2022-06-06 NOTE — TELEPHONE ENCOUNTER
MA left another message for patient to call the office.   Electronically signed by Sherren Jensen, MA on 6/6/2022 at 8:56 AM

## 2022-07-29 ENCOUNTER — APPOINTMENT (OUTPATIENT)
Dept: GENERAL RADIOLOGY | Age: 67
End: 2022-07-29
Payer: MEDICARE

## 2022-07-29 ENCOUNTER — HOSPITAL ENCOUNTER (EMERGENCY)
Age: 67
Discharge: ANOTHER HOSPITAL | End: 2022-07-30
Attending: EMERGENCY MEDICINE
Payer: MEDICARE

## 2022-07-29 DIAGNOSIS — R07.9 CHEST PAIN, UNSPECIFIED TYPE: Primary | ICD-10-CM

## 2022-07-29 LAB
ALBUMIN SERPL-MCNC: 4.3 G/DL (ref 3.5–5.2)
ALP BLD-CCNC: 90 U/L (ref 35–104)
ALT SERPL-CCNC: 7 U/L (ref 0–32)
ANION GAP SERPL CALCULATED.3IONS-SCNC: 11 MMOL/L (ref 7–16)
AST SERPL-CCNC: 11 U/L (ref 0–31)
BASOPHILS ABSOLUTE: 0.03 E9/L (ref 0–0.2)
BASOPHILS RELATIVE PERCENT: 0.4 % (ref 0–2)
BILIRUB SERPL-MCNC: 0.3 MG/DL (ref 0–1.2)
BUN BLDV-MCNC: 10 MG/DL (ref 6–23)
CALCIUM SERPL-MCNC: 8.9 MG/DL (ref 8.6–10.2)
CHLORIDE BLD-SCNC: 103 MMOL/L (ref 98–107)
CO2: 24 MMOL/L (ref 22–29)
CREAT SERPL-MCNC: 1 MG/DL (ref 0.5–1)
EKG ATRIAL RATE: 64 BPM
EKG P AXIS: 30 DEGREES
EKG P-R INTERVAL: 158 MS
EKG Q-T INTERVAL: 402 MS
EKG QRS DURATION: 96 MS
EKG QTC CALCULATION (BAZETT): 414 MS
EKG R AXIS: -10 DEGREES
EKG T AXIS: 36 DEGREES
EKG VENTRICULAR RATE: 64 BPM
EOSINOPHILS ABSOLUTE: 0.4 E9/L (ref 0.05–0.5)
EOSINOPHILS RELATIVE PERCENT: 5.4 % (ref 0–6)
GFR AFRICAN AMERICAN: >60
GFR NON-AFRICAN AMERICAN: 55 ML/MIN/1.73
GLUCOSE BLD-MCNC: 96 MG/DL (ref 74–99)
HCT VFR BLD CALC: 33.9 % (ref 34–48)
HEMOGLOBIN: 11.2 G/DL (ref 11.5–15.5)
IMMATURE GRANULOCYTES #: 0.02 E9/L
IMMATURE GRANULOCYTES %: 0.3 % (ref 0–5)
LYMPHOCYTES ABSOLUTE: 2.68 E9/L (ref 1.5–4)
LYMPHOCYTES RELATIVE PERCENT: 35.9 % (ref 20–42)
MCH RBC QN AUTO: 30.6 PG (ref 26–35)
MCHC RBC AUTO-ENTMCNC: 33 % (ref 32–34.5)
MCV RBC AUTO: 92.6 FL (ref 80–99.9)
MONOCYTES ABSOLUTE: 0.54 E9/L (ref 0.1–0.95)
MONOCYTES RELATIVE PERCENT: 7.2 % (ref 2–12)
NEUTROPHILS ABSOLUTE: 3.79 E9/L (ref 1.8–7.3)
NEUTROPHILS RELATIVE PERCENT: 50.8 % (ref 43–80)
PDW BLD-RTO: 13.6 FL (ref 11.5–15)
PLATELET # BLD: 225 E9/L (ref 130–450)
PMV BLD AUTO: 10.5 FL (ref 7–12)
POTASSIUM SERPL-SCNC: 4.4 MMOL/L (ref 3.5–5)
RBC # BLD: 3.66 E12/L (ref 3.5–5.5)
SODIUM BLD-SCNC: 138 MMOL/L (ref 132–146)
TOTAL PROTEIN: 6.7 G/DL (ref 6.4–8.3)
TROPONIN, HIGH SENSITIVITY: 8 NG/L (ref 0–9)
TROPONIN, HIGH SENSITIVITY: 9 NG/L (ref 0–9)
TROPONIN, HIGH SENSITIVITY: <6 NG/L (ref 0–9)
WBC # BLD: 7.5 E9/L (ref 4.5–11.5)

## 2022-07-29 PROCEDURE — 6360000002 HC RX W HCPCS: Performed by: EMERGENCY MEDICINE

## 2022-07-29 PROCEDURE — 96375 TX/PRO/DX INJ NEW DRUG ADDON: CPT

## 2022-07-29 PROCEDURE — 85025 COMPLETE CBC W/AUTO DIFF WBC: CPT

## 2022-07-29 PROCEDURE — 84484 ASSAY OF TROPONIN QUANT: CPT

## 2022-07-29 PROCEDURE — 93005 ELECTROCARDIOGRAM TRACING: CPT | Performed by: EMERGENCY MEDICINE

## 2022-07-29 PROCEDURE — 96374 THER/PROPH/DIAG INJ IV PUSH: CPT

## 2022-07-29 PROCEDURE — 80053 COMPREHEN METABOLIC PANEL: CPT

## 2022-07-29 PROCEDURE — 6370000000 HC RX 637 (ALT 250 FOR IP): Performed by: EMERGENCY MEDICINE

## 2022-07-29 PROCEDURE — 96376 TX/PRO/DX INJ SAME DRUG ADON: CPT

## 2022-07-29 PROCEDURE — 71045 X-RAY EXAM CHEST 1 VIEW: CPT

## 2022-07-29 PROCEDURE — 99285 EMERGENCY DEPT VISIT HI MDM: CPT

## 2022-07-29 PROCEDURE — 36415 COLL VENOUS BLD VENIPUNCTURE: CPT

## 2022-07-29 RX ORDER — DIAZEPAM 5 MG/ML
5 INJECTION, SOLUTION INTRAMUSCULAR; INTRAVENOUS
Status: DISCONTINUED | OUTPATIENT
Start: 2022-07-29 | End: 2022-07-29

## 2022-07-29 RX ORDER — DIAZEPAM 5 MG/ML
10 INJECTION, SOLUTION INTRAMUSCULAR; INTRAVENOUS ONCE
Status: DISCONTINUED | OUTPATIENT
Start: 2022-07-29 | End: 2022-07-29

## 2022-07-29 RX ORDER — FENTANYL CITRATE 50 UG/ML
50 INJECTION, SOLUTION INTRAMUSCULAR; INTRAVENOUS ONCE
Status: COMPLETED | OUTPATIENT
Start: 2022-07-29 | End: 2022-07-29

## 2022-07-29 RX ORDER — MORPHINE SULFATE 4 MG/ML
4 INJECTION, SOLUTION INTRAMUSCULAR; INTRAVENOUS ONCE
Status: COMPLETED | OUTPATIENT
Start: 2022-07-29 | End: 2022-07-29

## 2022-07-29 RX ORDER — ASPIRIN 81 MG/1
324 TABLET, CHEWABLE ORAL ONCE
Status: COMPLETED | OUTPATIENT
Start: 2022-07-29 | End: 2022-07-29

## 2022-07-29 RX ORDER — DIAZEPAM 5 MG/ML
10 INJECTION, SOLUTION INTRAMUSCULAR; INTRAVENOUS ONCE
Status: COMPLETED | OUTPATIENT
Start: 2022-07-29 | End: 2022-07-29

## 2022-07-29 RX ORDER — DIAZEPAM 5 MG/ML
10 INJECTION, SOLUTION INTRAMUSCULAR; INTRAVENOUS
Status: COMPLETED | OUTPATIENT
Start: 2022-07-29 | End: 2022-07-29

## 2022-07-29 RX ADMIN — MORPHINE SULFATE 4 MG: 4 INJECTION, SOLUTION INTRAMUSCULAR; INTRAVENOUS at 13:59

## 2022-07-29 RX ADMIN — DIAZEPAM 10 MG: 5 INJECTION, SOLUTION INTRAMUSCULAR; INTRAVENOUS at 23:37

## 2022-07-29 RX ADMIN — FENTANYL CITRATE 50 MCG: 50 INJECTION, SOLUTION INTRAMUSCULAR; INTRAVENOUS at 15:06

## 2022-07-29 RX ADMIN — ASPIRIN 81 MG CHEWABLE TABLET 324 MG: 81 TABLET CHEWABLE at 13:58

## 2022-07-29 RX ADMIN — FENTANYL CITRATE 50 MCG: 50 INJECTION, SOLUTION INTRAMUSCULAR; INTRAVENOUS at 16:45

## 2022-07-29 RX ADMIN — DIAZEPAM 10 MG: 5 INJECTION, SOLUTION INTRAMUSCULAR; INTRAVENOUS at 19:49

## 2022-07-29 ASSESSMENT — PAIN DESCRIPTION - ORIENTATION
ORIENTATION: LEFT
ORIENTATION: RIGHT;LEFT;MID
ORIENTATION: LEFT

## 2022-07-29 ASSESSMENT — PAIN DESCRIPTION - LOCATION
LOCATION: CHEST
LOCATION: JAW;CHEST
LOCATION: CHEST
LOCATION: CHEST;JAW
LOCATION: CHEST

## 2022-07-29 ASSESSMENT — PAIN DESCRIPTION - DESCRIPTORS
DESCRIPTORS: SHARP
DESCRIPTORS: ACHING;SHARP;DULL
DESCRIPTORS: ACHING;OTHER (COMMENT)
DESCRIPTORS: SHARP
DESCRIPTORS: SHARP

## 2022-07-29 ASSESSMENT — PAIN DESCRIPTION - FREQUENCY
FREQUENCY: CONTINUOUS

## 2022-07-29 ASSESSMENT — PAIN SCALES - GENERAL
PAINLEVEL_OUTOF10: 7
PAINLEVEL_OUTOF10: 4
PAINLEVEL_OUTOF10: 6
PAINLEVEL_OUTOF10: 5
PAINLEVEL_OUTOF10: 3

## 2022-07-29 ASSESSMENT — PAIN - FUNCTIONAL ASSESSMENT
PAIN_FUNCTIONAL_ASSESSMENT: PREVENTS OR INTERFERES SOME ACTIVE ACTIVITIES AND ADLS
PAIN_FUNCTIONAL_ASSESSMENT: 0-10
PAIN_FUNCTIONAL_ASSESSMENT: PREVENTS OR INTERFERES SOME ACTIVE ACTIVITIES AND ADLS
PAIN_FUNCTIONAL_ASSESSMENT: PREVENTS OR INTERFERES SOME ACTIVE ACTIVITIES AND ADLS
PAIN_FUNCTIONAL_ASSESSMENT: 0-10

## 2022-07-29 ASSESSMENT — PAIN DESCRIPTION - PAIN TYPE: TYPE: ACUTE PAIN

## 2022-07-29 NOTE — ED PROVIDER NOTES
HPI:  22, Time: 1:32 PM EDT         Clemente Workman is a 69 y.o. female presenting to the ED for substernal chest pain radiating to her jaw, beginning 3 days ago. It was intermittent and in the past few hours it has become constant. She states this feels just like her previous MI when she had stent placement. She did take ASA today. The complaint has been persistent, moderate in severity, and worsened by nothing. It is not associated with any other symptoms. She still smokes (2 cigarettes daily). Patient denies fever/chills, sore throat, cough, congestion, shortness of breath, edema, headache, visual disturbances, focal paresthesias, focal weakness, abdominal pain, nausea, vomiting, diarrhea, constipation, dysuria, hematuria, trauma, neck or back pain, rash or other complaints. ROS:   A complete review of systems was performed and all pertinent positives and negatives are stated within HPI, all other systems reviewed and are negative.      --------------------------------------------- PAST HISTORY ---------------------------------------------  Past Medical History:  has a past medical history of CAD (coronary artery disease), Degenerative disorder of bone, Hard of hearing, Hypertension, MI (myocardial infarction) ([facility] [address] Psoriasis, Ruptured ear drum, and Sciatica of left side. Past Surgical History:  has a past surgical history that includes  section; Tonsillectomy; Hysterectomy; Hemorrhoid surgery; Breast lumpectomy (Left); Coronary angioplasty with stent (2020); and joint replacement. Social History:  reports that she has been smoking cigarettes. She started smoking about 49 years ago. She has a 11.75 pack-year smoking history. She has never used smokeless tobacco. She reports that she does not currently use alcohol. She reports that she does not use drugs. Family History: family history includes Diabetes in her father; Jamar Morale in her mother.      The patients home medications have been reviewed. Allergies: Nitroglycerin and Tramadol        ----------------------------------------PHYSICAL EXAM--------------------------------------  Constitutional:  Well developed, well nourished, no acute distress, non-toxic appearance   Eyes:  PERRL, conjunctiva normal, EOMI  HENT:  Atraumatic, external ears normal, nose normal, oropharynx moist. Neck- normal range of motion, no nuchal rigidity   Respiratory:  No respiratory distress, normal breath sounds, no rales, no wheezing   Cardiovascular:  Normal rate, normal rhythm, no murmurs, no gallops, no rubs. Radial and DP pulses 2+ bilaterally. Compartments are soft. She is warm and well-perfused. Cap refill less than 3 seconds. GI:  Soft, nondistended, normal bowel sounds, nontender, no organomegaly, no mass, no rebound, no guarding   :  No costovertebral angle tenderness   Musculoskeletal:  No edema, no tenderness, no deformities. Back- no tenderness  Integument:  Well hydrated, no rash. Adequate perfusion. Lymphatic:  No cervical lymphadenopathy noted   Neurologic:  Alert & oriented x 3, CN 2-12 normal, no focal deficits noted. Psychiatric:  Speech and behavior appropriate       -------------------------------------------------- RESULTS -------------------------------------------------  I have personally reviewed all laboratory and imaging results for this patient. Results are listed below.      LABS:  Results for orders placed or performed during the hospital encounter of 07/29/22   CBC with Auto Differential   Result Value Ref Range    WBC 7.5 4.5 - 11.5 E9/L    RBC 3.66 3.50 - 5.50 E12/L    Hemoglobin 11.2 (L) 11.5 - 15.5 g/dL    Hematocrit 33.9 (L) 34.0 - 48.0 %    MCV 92.6 80.0 - 99.9 fL    MCH 30.6 26.0 - 35.0 pg    MCHC 33.0 32.0 - 34.5 %    RDW 13.6 11.5 - 15.0 fL    Platelets 351 933 - 041 E9/L    MPV 10.5 7.0 - 12.0 fL    Neutrophils % 50.8 43.0 - 80.0 %    Immature Granulocytes % 0.3 0.0 - 5.0 %    Lymphocytes % 35.9 20.0 - 42.0 %    Monocytes % 7.2 2.0 - 12.0 %    Eosinophils % 5.4 0.0 - 6.0 %    Basophils % 0.4 0.0 - 2.0 %    Neutrophils Absolute 3.79 1.80 - 7.30 E9/L    Immature Granulocytes # 0.02 E9/L    Lymphocytes Absolute 2.68 1.50 - 4.00 E9/L    Monocytes Absolute 0.54 0.10 - 0.95 E9/L    Eosinophils Absolute 0.40 0.05 - 0.50 E9/L    Basophils Absolute 0.03 0.00 - 0.20 E9/L   Comprehensive Metabolic Panel   Result Value Ref Range    Sodium 138 132 - 146 mmol/L    Potassium 4.4 3.5 - 5.0 mmol/L    Chloride 103 98 - 107 mmol/L    CO2 24 22 - 29 mmol/L    Anion Gap 11 7 - 16 mmol/L    Glucose 96 74 - 99 mg/dL    BUN 10 6 - 23 mg/dL    Creatinine 1.0 0.5 - 1.0 mg/dL    GFR Non-African American 55 >=60 mL/min/1.73    GFR African American >60     Calcium 8.9 8.6 - 10.2 mg/dL    Total Protein 6.7 6.4 - 8.3 g/dL    Albumin 4.3 3.5 - 5.2 g/dL    Total Bilirubin 0.3 0.0 - 1.2 mg/dL    Alkaline Phosphatase 90 35 - 104 U/L    ALT 7 0 - 32 U/L    AST 11 0 - 31 U/L   Troponin   Result Value Ref Range    Troponin, High Sensitivity 8 0 - 9 ng/L   Troponin   Result Value Ref Range    Troponin, High Sensitivity 9 0 - 9 ng/L   EKG 12 Lead   Result Value Ref Range    Ventricular Rate 64 BPM    Atrial Rate 64 BPM    P-R Interval 158 ms    QRS Duration 96 ms    Q-T Interval 402 ms    QTc Calculation (Bazett) 414 ms    P Axis 30 degrees    R Axis -10 degrees    T Axis 36 degrees       RADIOLOGY:  Interpreted by Radiologist.  XR CHEST PORTABLE   Final Result   No acute process. MILD CARDIOMEGALY.                EKG Interpretation  Time: 13:08 PM EDT  Rhythm: normal sinus   Rate: 64  Axis: normal  Conduction: normal  ST Segments: no acute change  T Waves: no acute change  Clinical Impression: no acute changes  Comparison to prior EKG: stable as compared to patient's most recent EKG      ------------------------- NURSING NOTES AND VITALS REVIEWED ---------------------------  The nursing notes within the ED encounter and vital signs as below have been reviewed by myself. BP (!) 162/77   Pulse 59   Temp 97.9 °F (36.6 °C) (Oral)   Resp 20   Ht 5' 5\" (1.651 m)   Wt 210 lb (95.3 kg)   SpO2 96%   BMI 34.95 kg/m²   Oxygen Saturation Interpretation: Normal      The patients available past medical records and past encounters were reviewed. ------------------------------ ED COURSE/MEDICAL DECISION MAKING----------------------  Medications   aspirin chewable tablet 324 mg (324 mg Oral Given 22 1358)   morphine sulfate (PF) injection 4 mg (4 mg IntraVENous Given 22 1359)   fentaNYL (SUBLIMAZE) injection 50 mcg (50 mcg IntraVENous Given 22 1506)   fentaNYL (SUBLIMAZE) injection 50 mcg (50 mcg IntraVENous Given 22 1645)           Procedures:   none      Medical Decision Making:     PATIENT REFUSING NITROGLYCERIN, WILL ONLY EXCEPT OPIATES FOR HER CHEST PAIN AS THE NITROGLYCERIN GAVE HER SIGNIFICANT HEADACHE IN THE PAST. FENTANYL WORKED BETTER THAN MORPHINE HERE IN ER. HEART Score For Major Cardiac Events  (Max Score 10 Points)  HISTORY       []   Slightly Suspicious  0       []   Moderately Suspicious  +1       [x]   Highly Suspicious  +2    EK point: No ST deviation but LBBB, LVH repolarization changes (ex:digoxin);  2 points: ST deviation not due to LBBB, LVH or digoxin         [x]   Normal  0       []   Nonspecific Repolarization Disturbance  +1       []   Significant ST Depression  +2    AGE       []   <45  0       []   45-64  +1       [x]    >65  +2    RISK FACTORS:  1. HTN    2. Hypercholesterolemia    3. DM     4. Cigarette smoking (current or cessation < 3 mos)    5. Positive family history  (parent or sibling with CVD before age 72).    6. Obesity (BMI >30kg/m2)         []   No Risk factors Known  0       []   1-2 Risk Factors  +1       [x]   >3 Risk Factors or History of Atherosclerotic Disease  +2    INITIAL TROPONIN       [x]   < Normal Limit   0       []   1-3 x Normal Limit   +1       []   >3 x Normal Limit   +2     -----------------------------------------------------------------------------------------------------------------  SCORE TOTAL:  6 POINTS     Low Score:         (0-3 Points), risk of MACE of 0.9-1.7% (discuss d/c home with f/u)  Mod Score:         (4-6 Points), risk of MACE of 12-16.6% (discuss admission for further testing)  High Score:        (7-10 Points), risk of MACE of 50-65% (Admit ALL as they are candidates for early invasive measures)        This patient's ED course included: re-evaluation prior to disposition, IV medications, cardiac monitoring, continuous pulse oximetry, and a personal history and physicial eaxmination    This patient has remained hemodynamically stable, improved, and been closely monitored during their ED course. Re-Evaluations:  Time: 6:02 PM EDT   Re-evaluation. Patients symptoms are improving  Repeat physical examination is improved      Consultations:   1800 PM EDT  Spoke with Dr Marshall Vela, discussed case, accepts admission for chest pain, rule out MI    Critical Care: none    I, Ángel Novak, DO, am the Primary Provider of Record    Counseling: The emergency provider has spoken with the patient and son and discussed todays results, in addition to providing specific details for the plan of care and counseling regarding the diagnosis and prognosis. Questions are answered at this time and they are agreeable with the plan.    --------------------------- IMPRESSION AND DISPOSITION ---------------------------------    IMPRESSION  1.  Chest pain, unspecified type        DISPOSITION  Disposition: Transfer to [facility]  Patient condition is stable             Jase Best DO  07/29/22 3355

## 2022-07-30 ENCOUNTER — APPOINTMENT (OUTPATIENT)
Dept: NUCLEAR MEDICINE | Age: 67
End: 2022-07-30
Attending: GENERAL PRACTICE
Payer: MEDICARE

## 2022-07-30 ENCOUNTER — HOSPITAL ENCOUNTER (OUTPATIENT)
Age: 67
Setting detail: OBSERVATION
Discharge: ANOTHER HOSPITAL | End: 2022-08-01
Attending: GENERAL PRACTICE | Admitting: GENERAL PRACTICE
Payer: MEDICARE

## 2022-07-30 VITALS
RESPIRATION RATE: 20 BRPM | BODY MASS INDEX: 34.99 KG/M2 | HEART RATE: 64 BPM | DIASTOLIC BLOOD PRESSURE: 75 MMHG | SYSTOLIC BLOOD PRESSURE: 128 MMHG | HEIGHT: 65 IN | WEIGHT: 210 LBS | TEMPERATURE: 98.1 F | OXYGEN SATURATION: 96 %

## 2022-07-30 LAB
LV EF: 68 %
LVEF MODALITY: NORMAL
TROPONIN, HIGH SENSITIVITY: <6 NG/L (ref 0–9)
TROPONIN, HIGH SENSITIVITY: <6 NG/L (ref 0–9)

## 2022-07-30 PROCEDURE — 84484 ASSAY OF TROPONIN QUANT: CPT

## 2022-07-30 PROCEDURE — 3430000000 HC RX DIAGNOSTIC RADIOPHARMACEUTICAL: Performed by: RADIOLOGY

## 2022-07-30 PROCEDURE — 36415 COLL VENOUS BLD VENIPUNCTURE: CPT

## 2022-07-30 PROCEDURE — 78452 HT MUSCLE IMAGE SPECT MULT: CPT

## 2022-07-30 PROCEDURE — 93005 ELECTROCARDIOGRAM TRACING: CPT | Performed by: GENERAL PRACTICE

## 2022-07-30 PROCEDURE — 6370000000 HC RX 637 (ALT 250 FOR IP): Performed by: GENERAL PRACTICE

## 2022-07-30 PROCEDURE — G0379 DIRECT REFER HOSPITAL OBSERV: HCPCS

## 2022-07-30 PROCEDURE — G0378 HOSPITAL OBSERVATION PER HR: HCPCS

## 2022-07-30 PROCEDURE — 93017 CV STRESS TEST TRACING ONLY: CPT

## 2022-07-30 PROCEDURE — A9500 TC99M SESTAMIBI: HCPCS | Performed by: RADIOLOGY

## 2022-07-30 PROCEDURE — 6360000002 HC RX W HCPCS: Performed by: INTERNAL MEDICINE

## 2022-07-30 RX ORDER — ACETAMINOPHEN 325 MG/1
650 TABLET ORAL EVERY 4 HOURS PRN
Status: DISCONTINUED | OUTPATIENT
Start: 2022-07-30 | End: 2022-08-01 | Stop reason: HOSPADM

## 2022-07-30 RX ORDER — METOPROLOL SUCCINATE 50 MG/1
50 TABLET, EXTENDED RELEASE ORAL DAILY
Status: DISCONTINUED | OUTPATIENT
Start: 2022-07-30 | End: 2022-08-01 | Stop reason: HOSPADM

## 2022-07-30 RX ORDER — LISINOPRIL 20 MG/1
20 TABLET ORAL DAILY
Status: DISCONTINUED | OUTPATIENT
Start: 2022-07-30 | End: 2022-08-01 | Stop reason: HOSPADM

## 2022-07-30 RX ORDER — NYSTATIN 100000 U/G
OINTMENT TOPICAL 2 TIMES DAILY
Status: DISCONTINUED | OUTPATIENT
Start: 2022-07-30 | End: 2022-08-01 | Stop reason: HOSPADM

## 2022-07-30 RX ORDER — QUETIAPINE FUMARATE 100 MG/1
200 TABLET, FILM COATED ORAL NIGHTLY
Status: DISCONTINUED | OUTPATIENT
Start: 2022-07-30 | End: 2022-08-01 | Stop reason: HOSPADM

## 2022-07-30 RX ORDER — ASPIRIN 81 MG/1
81 TABLET, CHEWABLE ORAL DAILY
Status: DISCONTINUED | OUTPATIENT
Start: 2022-07-30 | End: 2022-08-01 | Stop reason: HOSPADM

## 2022-07-30 RX ADMIN — QUETIAPINE FUMARATE 200 MG: 100 TABLET ORAL at 21:40

## 2022-07-30 RX ADMIN — Medication 10 MILLICURIE: at 11:30

## 2022-07-30 RX ADMIN — METOPROLOL SUCCINATE 50 MG: 50 TABLET, EXTENDED RELEASE ORAL at 11:45

## 2022-07-30 RX ADMIN — Medication 30 MILLICURIE: at 12:30

## 2022-07-30 RX ADMIN — REGADENOSON 0.4 MG: 0.08 INJECTION, SOLUTION INTRAVENOUS at 12:40

## 2022-07-30 RX ADMIN — LISINOPRIL 20 MG: 20 TABLET ORAL at 08:27

## 2022-07-30 RX ADMIN — ASPIRIN 81 MG CHEWABLE TABLET 81 MG: 81 TABLET CHEWABLE at 08:27

## 2022-07-30 RX ADMIN — ACETAMINOPHEN 650 MG: 325 TABLET ORAL at 19:56

## 2022-07-30 RX ADMIN — NYSTATIN OINTMENT: 100000 OINTMENT TOPICAL at 19:57

## 2022-07-30 ASSESSMENT — PAIN - FUNCTIONAL ASSESSMENT
PAIN_FUNCTIONAL_ASSESSMENT: PREVENTS OR INTERFERES SOME ACTIVE ACTIVITIES AND ADLS
PAIN_FUNCTIONAL_ASSESSMENT: PREVENTS OR INTERFERES SOME ACTIVE ACTIVITIES AND ADLS
PAIN_FUNCTIONAL_ASSESSMENT: 0-10

## 2022-07-30 ASSESSMENT — PAIN DESCRIPTION - LOCATION
LOCATION: HEAD
LOCATION: CHEST;JAW
LOCATION: CHEST;JAW

## 2022-07-30 ASSESSMENT — PAIN DESCRIPTION - ORIENTATION: ORIENTATION: RIGHT;LEFT

## 2022-07-30 ASSESSMENT — PAIN DESCRIPTION - ONSET
ONSET: ON-GOING
ONSET: ON-GOING

## 2022-07-30 ASSESSMENT — PAIN DESCRIPTION - FREQUENCY
FREQUENCY: CONTINUOUS
FREQUENCY: CONTINUOUS

## 2022-07-30 ASSESSMENT — PAIN SCALES - GENERAL
PAINLEVEL_OUTOF10: 6
PAINLEVEL_OUTOF10: 7
PAINLEVEL_OUTOF10: 3

## 2022-07-30 ASSESSMENT — PAIN DESCRIPTION - DESCRIPTORS
DESCRIPTORS: ACHING

## 2022-07-30 ASSESSMENT — PAIN DESCRIPTION - PAIN TYPE: TYPE: ACUTE PAIN

## 2022-07-30 NOTE — H&P
[address] [facility], [place]                              HISTORY AND PHYSICAL    PATIENT NAME: Sheri Garcia                   :        1955  MED REC NO:   [id number]                            ROOM:       0401  ACCOUNT NO:   [de-identified]                           ADMIT DATE: 2022  PROVIDER:     Mahendra Mackey DO    HISTORY OF PRESENT ILLNESS:  This a 68-year-old female who presented to  the [facility] ED department outpatient facility with the complaint of  substernal chest pain radiating into her jaw. She states it happened  about three days ago and it was only intermittent, but over the last  several hours, it has became constant and severe and she states it feels  like her previous myocardial infarction, which she had stent placement. I was called from the transfer center and we brought her into the  hospital for further evaluation with these signs and symptoms. PAST MEDICAL HISTORY:  Significant for coronary artery disease,  non-STEMI, degenerative disorder of the bone, hard-of-hearing,  hypertension, psoriasis, ruptured eardrum, sciatica, fibromyalgia,  chronic anxiety, and history of narcotic use. PAST SURGICAL HISTORY:  Significant for  section, tonsillectomy,  hysterectomy, hemorrhoid surgery, lumpectomy, left breast, coronary  angioplasty with stent, and joint replacement. SOCIAL HISTORY:  She is smoker. She continues to do so. Currently not  using any alcohol, but there has been an alcohol history in the past as  well. She denies the use of narcotic drugs. FAMILY HISTORY:  Positive for father had diabetes and mother had lung  cancer. Sister  of a heart attack. HOME MEDICATIONS:  Were reviewed. ALLERGIES:  She has allergies to NITROGLYCERIN and TRAMADOL. LABORATORY STUDIES:  White count was normal.  Hemoglobin and hematocrit  slightly low. Indices good.   Electrolytes rodolfo.  High-sensitivity  troponin only 8, repeat 9. DIAGNOSTIC STUDIES:  EKG:  No acute changes. Chest x-ray:  Mild  cardiomegaly. Blood pressure was 162/77, pulse 59, temperature 97.9, respiratory rate  is 20. Height 5 feet 5 inches and weight 210. BMI 34.96.  O2  saturation 96. She was given aspirin, morphine, and fentanyl for her  chest pain. We brought her in for definitive diagnosis of her  underlying chest pain, rule out myocardial infarction and coronary  syndrome. REVIEW OF SYSTEMS:  Negative for vertigo, cephalgia, ringing in the  ears, hearing loss, difficult swallowing, hoarseness in her voice, or  bloody noses. She has this chest pain and previous MI. No orthopnea or  paroxysmal nocturnal dyspnea. Positive for cough and shortness of  breath. No wheeze, hemoptysis, or pleuritic pain. No nausea, vomiting,  diarrhea, constipation, or blood in her stool or recent change in  weight. No urgency, frequency, dysuria, or hematuria. The endocrine  system negative for diabetes or thyroid disorder. Musculoskeletal  systems:  Chronic myofascial pain. Psychiatric system positive for  anxiety and depression. Neurologic system negative for CVA, seizures,  tremor, tics, or TIAs. PHYSICAL EXAMINATION:  GENERAL:  Exam shows this to be a 70-year-old white female in moderate  distress with the above complaints. HEENT:  Head, eyes, ears, nose, and throat examination shows the head to  be normocephalic and atraumatic. The pupils are equal and reactive to  light and accommodation. Extraocular eye muscles are intact. Tympanic  membranes are clear. Ear canals are patent. Oral mucosa pink and  moist.  NECK:  Veins are flat and nondistended. No carotid bruits. CHEST:  Symmetrical.  HEART:  Regular rate and rhythm without murmur, gallops, or friction  rub. LUNGS:  Clear to auscultation bilaterally without rhonchi, rales, or  wheezes. ABDOMEN:  Soft, nontender, and nondistended.   Bowel sounds are present  in all four quadrants. EXTERNAL GENITALIA:  Intact. MUSCULOSKELETAL:  Peripheral pulses intact. Legs without edema. BACK:  Spine shows physiologic curve. ASSESSMENT AND PLAN:  Initial impression at this time is chest pain,  rule out MI, and acute coronary syndrome. The patient will be admitted. Kept n.p.o.  Repeat the EKG and troponins and get Cardiology consult. Anticipate stress testing. Further orders will be written for her as  her clinical course dictates. At time of admission, her condition is  fair. Her prognosis is guarded.         Kateryna Florence DO    D: 07/30/2022 13:33:53       T: 07/30/2022 13:36:37     [id number]  Job#: [id number]     Doc#: [id number]    CC:

## 2022-07-30 NOTE — PROGRESS NOTES
Clarified with patient upon arrival to the stress lab whether or not she is still following with [facility], who she has been treated by in the past.  She states that she has not followed up with them in about a year and she doesn't plan on doing so.

## 2022-07-30 NOTE — ED NOTES
Called [facility], spoke to [place], [place] 3-4 hours ([phone])     Alley Servin  07/29/22 1929       Alley Servin  07/29/22 1931
Called [facility] for status; will be 45 minutes     Francisco Reinoso RN  07/30/22 0168
Called and gave report to [address] at [facility] and she is aware of her ETA     Nicole Pearl RN  07/29/22 5902
[facility] ADM     Lonny Kauffman MD  07/29/22 1960
Heart Failure/MI/Diabetes

## 2022-07-31 LAB
ANION GAP SERPL CALCULATED.3IONS-SCNC: 14 MMOL/L (ref 7–16)
BUN BLDV-MCNC: 11 MG/DL (ref 6–23)
CALCIUM SERPL-MCNC: 9.2 MG/DL (ref 8.6–10.2)
CHLORIDE BLD-SCNC: 105 MMOL/L (ref 98–107)
CO2: 20 MMOL/L (ref 22–29)
CREAT SERPL-MCNC: 1 MG/DL (ref 0.5–1)
GFR AFRICAN AMERICAN: >60
GFR NON-AFRICAN AMERICAN: 55 ML/MIN/1.73
GLUCOSE BLD-MCNC: 96 MG/DL (ref 74–99)
HCT VFR BLD CALC: 37 % (ref 34–48)
HEMOGLOBIN: 12.4 G/DL (ref 11.5–15.5)
MCH RBC QN AUTO: 31 PG (ref 26–35)
MCHC RBC AUTO-ENTMCNC: 33.5 % (ref 32–34.5)
MCV RBC AUTO: 92.5 FL (ref 80–99.9)
PDW BLD-RTO: 13.2 FL (ref 11.5–15)
PLATELET # BLD: 258 E9/L (ref 130–450)
PMV BLD AUTO: 10.5 FL (ref 7–12)
POTASSIUM SERPL-SCNC: 4.2 MMOL/L (ref 3.5–5)
RBC # BLD: 4 E12/L (ref 3.5–5.5)
SODIUM BLD-SCNC: 139 MMOL/L (ref 132–146)
WBC # BLD: 8.5 E9/L (ref 4.5–11.5)

## 2022-07-31 PROCEDURE — 6360000002 HC RX W HCPCS: Performed by: GENERAL PRACTICE

## 2022-07-31 PROCEDURE — 6370000000 HC RX 637 (ALT 250 FOR IP): Performed by: GENERAL PRACTICE

## 2022-07-31 PROCEDURE — G0378 HOSPITAL OBSERVATION PER HR: HCPCS

## 2022-07-31 PROCEDURE — 36415 COLL VENOUS BLD VENIPUNCTURE: CPT

## 2022-07-31 PROCEDURE — 85027 COMPLETE CBC AUTOMATED: CPT

## 2022-07-31 PROCEDURE — 80048 BASIC METABOLIC PNL TOTAL CA: CPT

## 2022-07-31 PROCEDURE — 96374 THER/PROPH/DIAG INJ IV PUSH: CPT

## 2022-07-31 RX ORDER — ONDANSETRON 2 MG/ML
4 INJECTION INTRAMUSCULAR; INTRAVENOUS EVERY 6 HOURS PRN
Status: DISCONTINUED | OUTPATIENT
Start: 2022-07-31 | End: 2022-08-01 | Stop reason: HOSPADM

## 2022-07-31 RX ORDER — SODIUM CHLORIDE 0.9 % (FLUSH) 0.9 %
5-40 SYRINGE (ML) INJECTION PRN
Status: CANCELLED | OUTPATIENT
Start: 2022-07-31

## 2022-07-31 RX ORDER — SODIUM CHLORIDE 9 MG/ML
INJECTION, SOLUTION INTRAVENOUS PRN
Status: CANCELLED | OUTPATIENT
Start: 2022-07-31

## 2022-07-31 RX ORDER — SODIUM CHLORIDE 0.9 % (FLUSH) 0.9 %
5-40 SYRINGE (ML) INJECTION EVERY 12 HOURS SCHEDULED
Status: CANCELLED | OUTPATIENT
Start: 2022-07-31

## 2022-07-31 RX ADMIN — LISINOPRIL 20 MG: 20 TABLET ORAL at 08:41

## 2022-07-31 RX ADMIN — METOPROLOL SUCCINATE 50 MG: 50 TABLET, EXTENDED RELEASE ORAL at 08:41

## 2022-07-31 RX ADMIN — ACETAMINOPHEN 650 MG: 325 TABLET ORAL at 10:01

## 2022-07-31 RX ADMIN — ONDANSETRON 4 MG: 2 INJECTION INTRAMUSCULAR; INTRAVENOUS at 19:35

## 2022-07-31 RX ADMIN — ACETAMINOPHEN 650 MG: 325 TABLET ORAL at 19:36

## 2022-07-31 RX ADMIN — QUETIAPINE FUMARATE 200 MG: 100 TABLET ORAL at 19:39

## 2022-07-31 RX ADMIN — NYSTATIN OINTMENT: 100000 OINTMENT TOPICAL at 08:42

## 2022-07-31 RX ADMIN — NYSTATIN OINTMENT: 100000 OINTMENT TOPICAL at 19:39

## 2022-07-31 RX ADMIN — ASPIRIN 81 MG CHEWABLE TABLET 81 MG: 81 TABLET CHEWABLE at 08:41

## 2022-07-31 ASSESSMENT — PAIN SCALES - GENERAL
PAINLEVEL_OUTOF10: 0
PAINLEVEL_OUTOF10: 0
PAINLEVEL_OUTOF10: 2
PAINLEVEL_OUTOF10: 6
PAINLEVEL_OUTOF10: 5
PAINLEVEL_OUTOF10: 6
PAINLEVEL_OUTOF10: 0

## 2022-07-31 ASSESSMENT — PAIN DESCRIPTION - DESCRIPTORS
DESCRIPTORS: ACHING
DESCRIPTORS: ACHING

## 2022-07-31 ASSESSMENT — PAIN DESCRIPTION - LOCATION
LOCATION: LEG
LOCATION: HEAD

## 2022-07-31 ASSESSMENT — PAIN DESCRIPTION - ORIENTATION: ORIENTATION: LEFT

## 2022-07-31 NOTE — PROGRESS NOTES
PROGRESS NOTE       PATIENT PROBLEM LIST:  Patient Active Problem List   Diagnosis Code    Chest pain R07.9    STEMI (ST elevation myocardial infarction) ([facility] I21.3    Acute kidney injury ([facility] [address] N17.9    Hypotension I95.9    Acute delirium R41.0    Leukocytosis D72.829    Altered mental status M71.34    Acute metabolic encephalopathy T93.07    Acute encephalopathy G93.40    Syncope and collapse R55    New onset seizure ([facility] [address] R56.9    [facility] R56.9       SUBJECTIVE:  Kirstin Kong states she continues to experience left sided chest discomfort without associated dyspnea nor diaphoresis. REVIEW OF SYSTEMS:  General ROS: positive for - fatigue. Psychological ROS: negative for - anxiety , depression  Ophthalmic ROS: negative for - decreased vision or visual distortion. ENT ROS: negative  Allergy and Immunology ROS: negative  Hematological and Lymphatic ROS: negative  Endocrine: no heat or cold intolerance and no polyphagia, polydipsia, or polyuria  Respiratory ROS: positive for - shortness of breath  Cardiovascular ROS: positive for -chest discomfort. Gastrointestinal ROS: no abdominal pain, change in bowel habits, or black or bloody stools  Genito-Urinary ROS: no nocturia, dysuria, trouble voiding, frequency or hematuria  Musculoskeletal ROS: negative for- myalgias, arthralgias, or claudication  Neurological ROS: no TIA or stroke symptoms otherwise no significant change in symptoms or problems since yesterday as documented in previous progress notes.     SCHEDULED MEDICATIONS:   lisinopril  20 mg Oral Daily    metoprolol succinate  50 mg Oral Daily    nystatin   Topical BID    aspirin  81 mg Oral Daily    QUEtiapine  200 mg Oral Nightly       VITAL SIGNS:                                                                                                                          BP (!) 142/76   Pulse 55   Temp 97.6 °F (36.4 °C) (Oral)   Resp 16   Ht 5' 5\" (1.651 m)   Wt 210 lb 11.2 oz (95.6 kg)   SpO2 96% BMI 35.06 kg/m²   Patient Vitals for the past 96 hrs (Last 3 readings):   Weight   07/30/22 0230 210 lb 11.2 oz (95.6 kg)     OBJECTIVE:    HEENT: PERRL, EOM  Intact; sclera non-icteric, conjunctiva pink. Carotids are brisk in upstroke with normal contour. No carotid bruits. Normal jugular venous pulsation at 45°. No palpable cervical nor supraclavicular nodes. Thyroid not palpable. Trachea midline. Chest: Even excursion  Lungs: CTA B, no expiratory wheezes or rhonchi, no decreased tactile fremitus without inspiratory rales. Heart: Regular  rhythm; S1 > S2, no gallop or murmur. No clicks, rub, palpable thrills   or heaves. PMI nondisplaced, 5th intercostal space MCL. Abdomen: Soft, nontender, nondistended,  moderately protuberant, no masses or organomegaly. Bowel sounds active. Extremities: Without clubbing, cyanosis or edema. Pulses present 3+ upper extermities bilaterally; present 1+ DP and present 1+ PT bilaterally. Data:   Scheduled Meds: Reviewed  Continuous Infusions:   No intake or output data in the 24 hours ending 07/31/22 1515  CBC:   Recent Labs     07/29/22  1340   WBC 7.5   HGB 11.2*   HCT 33.9*        BMP:  Recent Labs     07/29/22  1340      K 4.4      CO2 24   BUN 10   CREATININE 1.0   LABGLOM 55     ABGs:   Lab Results   Component Value Date/Time    PH 7.376 02/27/2021 11:28 AM    PO2 93.2 02/27/2021 11:28 AM    PCO2 31.9 02/27/2021 11:28 AM     INR: No results for input(s): INR in the last 72 hours.   PRO-BNP:   Lab Results   Component Value Date    PROBNP 448 (H) 12/04/2020    PROBNP 189 (H) 08/27/2020      TSH:   Lab Results   Component Value Date    TSH 1.320 12/03/2020      Cardiac Injury Profile:   Recent Labs     07/29/22  1814 07/30/22  0823 07/30/22  1022   TROPHS <6 <6 <6      Lipid Profile:   Lab Results   Component Value Date/Time    TRIG 714 12/10/2020 04:27 AM    HDL 45 12/06/2020 04:02 AM    LDLCALC 44 12/06/2020 04:02 AM    CHOL 126 12/06/2020 04:02 AM Hemoglobin A1C: No components found for: HGBA1C      RAD:   NM Cardiac Stress Test Nuclear Imaging   Final Result   1. Mildly abnormal nuclear perfusion to mid lateral segment during   stress with complete resolution at rest suggesting stress-induced   ischemia. .   2. Normal global left ventricular wall motion with Gated SPECT left   ventricular ejection fraction 68%. Jase Castro D.O. FACP FACC FSCAI                              EKG: See Report  Echo: See Report      IMPRESSIONS:  Patient Active Problem List   Diagnosis Code    Chest pain R07.9    STEMI (ST elevation myocardial infarction) ([address] I21.3    Acute kidney injury ([address] N17.9    Hypotension I95.9    Acute delirium R41.0    Leukocytosis D72.829    Altered mental status I27.94    Acute metabolic encephalopathy M38.21    Acute encephalopathy G93.40    Syncope and collapse R55    New onset seizure ([address] R56.9    Seizure ([address] R56.9       RECOMMENDATIONS:  Based upon the patient's clinical status and abnormal nuclear stress test and especially that of recurring symptoms since her stress test cardiac catheterization has been recommended. The indications risks and benefits have been reviewed in detail with the patient and she agrees to proceed. I have spent more than 25 minutes face to face with Kinjal Cadet and reviewing notes and laboratory data, with greater than 50% of this time instructing and counseling the patient face to face regarding my findings and recommendations and I have answered all questions as posed to me by Ms. Eckert. Dajuan Woodard, DO FACP,FACC,FSCAI      NOTE:  This report was transcribed using voice recognition software.   Every effort was made to ensure accuracy; however, inadvertent computerized transcription errors may be present

## 2022-07-31 NOTE — CONSULTS
CARDIOLOGY CONSULTATION    Patient Name:  Kirstin Kong    :  1955    Reason for Consultation:   Chest discomfort    History of Present Illness:   Kirstin Kong presents to [facility] following history of recurrent left-sided chest discomfort which she first noted to be sharp and followed by a pressure-like sensation. She recognized this to be similar to what she experienced prior to her previous myocardial infarction and subsequent stenting within her coronary arteries. She now presents for further diagnostic studies and therapeutic intervention as needed. Past Medical History:   has a past medical history of CAD (coronary artery disease), Degenerative disorder of bone, Hard of hearing, Hypertension, MI (myocardial infarction) ([facility] [address] Psoriasis, Ruptured ear drum, and Sciatica of left side. Surgical History:   has a past surgical history that includes  section; Tonsillectomy; Hysterectomy; Hemorrhoid surgery; Breast lumpectomy (Left); Coronary angioplasty with stent (2020); and joint replacement. Social History:   reports that she has been smoking cigarettes. She started smoking about 49 years ago. She has a 11.75 pack-year smoking history. She has never used smokeless tobacco. She reports that she does not currently use alcohol. She reports that she does not use drugs. Family History:  family history includes Diabetes in her father; Kaveh Ebbs in her mother. Medications:  Prior to Admission medications    Medication Sig Start Date End Date Taking? Authorizing Provider   lisinopril (PRINIVIL;ZESTRIL) 20 MG tablet Take 20 mg by mouth daily    Historical Provider, MD   amLODIPine (NORVASC) 10 MG tablet Take 10 mg by mouth daily    Historical Provider, MD   NONFORMULARY Take by mouth daily as needed Kratom-herbal supplement. Has opioid properties and some stimulant-like effects. Historical Provider, MD   Arformoterol Tartrate (BROVANA) 15 MCG/2ML NEBU Take 2 mLs by nebulization 2 times daily  Patient not taking: No sig reported 5/25/21   Antonia Couch DO   escitalopram (LEXAPRO) 20 MG tablet Take 0.5 tablets by mouth daily 5/25/21   Antonia Couch DO   atorvastatin (LIPITOR) 80 MG tablet Take 1 tablet by mouth nightly  Patient not taking: No sig reported 5/25/21   Antonia Couch DO   metoprolol succinate (TOPROL XL) 25 MG extended release tablet Take 1 tablet by mouth daily  Patient taking differently: Take 50 mg by mouth in the morning. 5/26/21   Antonia Couch DO   nystatin (MYCOSTATIN) 472212 UNIT/GM ointment Apply topically 2 times daily. 5/25/21   Antonia Couch DO   Respiratory Therapy Supplies (FULL KIT NEBULIZER SET) MISC Use as directed with nebulized medication. 5/25/21   Antonia Couch DO   ipratropium-albuterol (DUONEB) 0.5-2.5 (3) MG/3ML SOLN nebulizer solution Inhale 3 mLs into the lungs every 4 hours as needed for Shortness of Breath  Patient not taking: No sig reported 5/25/21   Antonia Couch DO   ibuprofen (ADVIL;MOTRIN) 600 MG tablet Take 600 mg by mouth every 6 hours as needed for Pain    Historical Provider, MD   QUEtiapine (SEROQUEL) 200 MG tablet Take 200 mg by mouth daily Takes at night    Historical Provider, MD   pregabalin (LYRICA) 150 MG capsule Take 1 capsule by mouth 2 times daily for 60 days. Patient taking differently: Take 75 mg by mouth in the morning and 75 mg at noon and 75 mg before bedtime.  5/12/21 7/11/21  Brian Jacobsen MD   clopidogrel (PLAVIX) 75 MG tablet Take 1 tablet by mouth daily  Patient not taking: No sig reported 4/14/21   YASIR Majano - CNS   folic acid (FOLVITE) 1 MG tablet Take 1 tablet by mouth daily 12/18/20   Antonia Couch DO   pantoprazole (PROTONIX) 40 MG tablet Take 1 tablet by mouth every morning (before breakfast) 10/23/20   Antonia Couch DO   aspirin 81 MG chewable tablet Take 1 tablet by mouth daily 8/26/20   Kike Bauman Keturah Shankar, DO   disulfiram (ANTABUSE) 250 MG tablet Take 250 mg by mouth daily    Historical Provider, MD       Allergies:  Nitroglycerin and Tramadol     Review of Systems:   Constitutional: there has been no unanticipated weight loss. There's been no significant change in energy or activity level, nor sleep pattern . No fever chills or rigors. Eyes: No visual changes or diplopia. No scleral icterus. ENT: No Headaches, hearing loss or vertigo. No mouth sores or sore throat. No change in taste or smell. Cardiovascular: Has left-sided chest discomfort both at rest and with exertion. , dyspnea on exertion, but denies palpitations, loss of consciousness, no phlebitis, no claudication. Respiratory: No cough or wheezing, no sputum production. No hemoptysis, pleuritic pain. Gastrointestinal: No abdominal pain, appetite loss, blood in stools. No change in bowel habits. No hematemesis  Genitourinary: No dysuria, trouble voiding or hematuria. No nocturia or increased frequency. Musculoskeletal:  No gait disturbance, weakness or joint complaints. Integumentary: No rash or pruritis. Neurological: No headache, diplopia, change in muscle strength, numbness or tingling. No change in gait, balance, coordination, mood, affect, memory, mentation, behavior. Psychiatric: No anxiety or depression. Endocrine: No temperature intolerance. No excessive thirst, fluid intake, or urination. No tremor. Hematologic/Lymphatic: No abnormal bruising or bleeding, blood clots or swollen lymph nodes. Allergic/Immunologic: No nasal congestion or hives. Physical Examination:    Vital Signs: BP (!) 142/76   Pulse 55   Temp 97.6 °F (36.4 °C) (Oral)   Resp 16   Ht 5' 5\" (1.651 m)   Wt 210 lb 11.2 oz (95.6 kg)   SpO2 96%   BMI 35.06 kg/m²   General appearance: Well preserved, mesomorphic body habitus, alert, no distress. Skin: Skin color, texture, turgor normal. No rashes or lesions. No induration or tightening palpated.   Head: Normocephalic. No masses, lesions, tenderness or abnormalities  Eyes: Conjunctivae/corneas clear. PERRL, EOMs intact. Sclera non icteric. Ears: External ears normal. Canals clear. TM's clear bilaterally. Hearing normal to finger rub. Nose/Sinuses: Nares normal. Septum midline. Mucosa normal. No drainage or sinus tenderness. Oropharynx: Lips, mucosa, and tongue normal. Oropharynx clear with no exudate seen. Neck: Neck supple and symmetric. No adenopathy. Thyroid symmetric, normal size, without nodules. Trachea is midline. Carotids brisk in upstroke without bruits, no abnormal JVP noted at 45°. Chest: Even excursion  Lungs: Lungs clear to auscultation bilaterally. No retractions or use of accessory muscles. No tactile vocal fremitus. No rhonchi, crackles or rales. Heart:  S1 > S2. Regular rhythm. No gallop or murmur. No rub, palpable thrill or heave noted. PMI 5th intercostal space midclavicular line. Abdomen: Abdomen soft, moderately protuberant, non-tender. BS normal. No masses, organomegaly. No hernia noted. Extremities: Extremities normal. No deformities, edema, or skin discoloration. No cyanosis or clubbing noted to the nails. Peripheral pulses present 2+ upper extremities and present 1+  lower extremities. Musculoskeletal: Spine ROM normal. Muscular strength intact. Neuro: Cranial nerves intact. Motor: Strength 5/5 in all extremities. Reflexes 2+ in all extremities. No focal weakness. Sensory: grossly normal to touch. Coordination intact. Pertinent Labs:  CBC:   Recent Labs     07/29/22  1340   WBC 7.5   HGB 11.2*        BMP:  Recent Labs     07/29/22  1340      K 4.4      CO2 24   BUN 10   CREATININE 1.0   GLUCOSE 96   LABGLOM 55     ABGs:   Lab Results   Component Value Date/Time    PH 7.376 02/27/2021 11:28 AM    PO2 93.2 02/27/2021 11:28 AM    PCO2 31.9 02/27/2021 11:28 AM     INR: No results for input(s): INR in the last 72 hours.   PRO-BNP:   Lab Results   Component Value DO Vitaly for allowing me to consult in the care of this patient. Karolyn Jaeger DO , [place], [facility], [facility]    NOTE:  This report was transcribed using voice recognition software. Every effort was made to ensure accuracy; however, inadvertent computerized transcription errors may be present.

## 2022-07-31 NOTE — PLAN OF CARE
Problem: Discharge Planning  Goal: Discharge to home or other facility with appropriate resources  7/31/2022 1135 by Tito Beck RN  Outcome: Progressing  7/30/2022 2215 by Pola Marley RN  Outcome: Progressing     Problem: Safety - Adult  Goal: Free from fall injury  7/31/2022 1135 by Tito Beck RN  Outcome: Progressing  7/30/2022 2215 by Pola Marley RN  Outcome: Progressing     Problem: ABCDS Injury Assessment  Goal: Absence of physical injury  7/31/2022 1135 by Tito Beck RN  Outcome: Progressing  7/30/2022 2215 by Pola Marley RN  Outcome: Progressing

## 2022-08-01 ENCOUNTER — HOSPITAL ENCOUNTER (OUTPATIENT)
Age: 67
Setting detail: OBSERVATION
Discharge: HOME | End: 2022-08-02
Attending: INTERNAL MEDICINE | Admitting: INTERNAL MEDICINE
Payer: MEDICARE

## 2022-08-01 VITALS
DIASTOLIC BLOOD PRESSURE: 63 MMHG | BODY MASS INDEX: 34.17 KG/M2 | WEIGHT: 205.1 LBS | SYSTOLIC BLOOD PRESSURE: 139 MMHG | TEMPERATURE: 97.8 F | HEIGHT: 65 IN | RESPIRATION RATE: 16 BRPM | OXYGEN SATURATION: 97 % | HEART RATE: 81 BPM

## 2022-08-01 DIAGNOSIS — M79.2 PERIPHERAL NEUROPATHY DUE TO INFLAMMATION: Primary | ICD-10-CM

## 2022-08-01 DIAGNOSIS — G62.9 PERIPHERAL NEUROPATHY DUE TO INFLAMMATION: Primary | ICD-10-CM

## 2022-08-01 LAB
ABO/RH: NORMAL
ANTIBODY SCREEN: NORMAL
EKG ATRIAL RATE: 62 BPM
EKG P AXIS: 63 DEGREES
EKG P-R INTERVAL: 208 MS
EKG Q-T INTERVAL: 430 MS
EKG QRS DURATION: 104 MS
EKG QTC CALCULATION (BAZETT): 436 MS
EKG R AXIS: -4 DEGREES
EKG T AXIS: 43 DEGREES
EKG VENTRICULAR RATE: 62 BPM

## 2022-08-01 PROCEDURE — C1769 GUIDE WIRE: HCPCS

## 2022-08-01 PROCEDURE — G0378 HOSPITAL OBSERVATION PER HR: HCPCS

## 2022-08-01 PROCEDURE — 96376 TX/PRO/DX INJ SAME DRUG ADON: CPT

## 2022-08-01 PROCEDURE — 36415 COLL VENOUS BLD VENIPUNCTURE: CPT

## 2022-08-01 PROCEDURE — 86850 RBC ANTIBODY SCREEN: CPT

## 2022-08-01 PROCEDURE — 6360000002 HC RX W HCPCS

## 2022-08-01 PROCEDURE — 86900 BLOOD TYPING SEROLOGIC ABO: CPT

## 2022-08-01 PROCEDURE — 6360000002 HC RX W HCPCS: Performed by: GENERAL PRACTICE

## 2022-08-01 PROCEDURE — 93458 L HRT ARTERY/VENTRICLE ANGIO: CPT

## 2022-08-01 PROCEDURE — G0379 DIRECT REFER HOSPITAL OBSERV: HCPCS

## 2022-08-01 PROCEDURE — 2709999900 HC NON-CHARGEABLE SUPPLY

## 2022-08-01 PROCEDURE — 2500000003 HC RX 250 WO HCPCS

## 2022-08-01 PROCEDURE — 86901 BLOOD TYPING SEROLOGIC RH(D): CPT

## 2022-08-01 PROCEDURE — 6370000000 HC RX 637 (ALT 250 FOR IP): Performed by: GENERAL PRACTICE

## 2022-08-01 PROCEDURE — C1894 INTRO/SHEATH, NON-LASER: HCPCS

## 2022-08-01 RX ORDER — FOLIC ACID 1 MG/1
1 TABLET ORAL DAILY
Status: DISCONTINUED | OUTPATIENT
Start: 2022-08-02 | End: 2022-08-02 | Stop reason: HOSPADM

## 2022-08-01 RX ORDER — LISINOPRIL 20 MG/1
20 TABLET ORAL DAILY
Status: DISCONTINUED | OUTPATIENT
Start: 2022-08-02 | End: 2022-08-02 | Stop reason: HOSPADM

## 2022-08-01 RX ORDER — ASPIRIN 81 MG/1
81 TABLET, CHEWABLE ORAL DAILY
Status: DISCONTINUED | OUTPATIENT
Start: 2022-08-02 | End: 2022-08-02 | Stop reason: HOSPADM

## 2022-08-01 RX ORDER — ATORVASTATIN CALCIUM 80 MG/1
80 TABLET, FILM COATED ORAL DAILY
Status: ON HOLD | COMMUNITY
End: 2022-08-02 | Stop reason: HOSPADM

## 2022-08-01 RX ORDER — ATORVASTATIN CALCIUM 80 MG/1
80 TABLET, FILM COATED ORAL NIGHTLY
Status: DISCONTINUED | OUTPATIENT
Start: 2022-08-01 | End: 2022-08-02 | Stop reason: HOSPADM

## 2022-08-01 RX ORDER — METOPROLOL SUCCINATE 50 MG/1
50 TABLET, EXTENDED RELEASE ORAL DAILY
Status: DISCONTINUED | OUTPATIENT
Start: 2022-08-02 | End: 2022-08-02 | Stop reason: HOSPADM

## 2022-08-01 RX ORDER — NYSTATIN 100000 U/G
OINTMENT TOPICAL 2 TIMES DAILY
Status: DISCONTINUED | OUTPATIENT
Start: 2022-08-01 | End: 2022-08-02 | Stop reason: HOSPADM

## 2022-08-01 RX ORDER — PANTOPRAZOLE SODIUM 40 MG/1
40 TABLET, DELAYED RELEASE ORAL 2 TIMES DAILY
Status: DISCONTINUED | OUTPATIENT
Start: 2022-08-01 | End: 2022-08-02 | Stop reason: HOSPADM

## 2022-08-01 RX ORDER — QUETIAPINE FUMARATE 200 MG/1
200 TABLET, FILM COATED ORAL NIGHTLY
Status: DISCONTINUED | OUTPATIENT
Start: 2022-08-01 | End: 2022-08-02 | Stop reason: HOSPADM

## 2022-08-01 RX ORDER — PREGABALIN 75 MG/1
75 CAPSULE ORAL 3 TIMES DAILY
Status: DISCONTINUED | OUTPATIENT
Start: 2022-08-01 | End: 2022-08-02 | Stop reason: HOSPADM

## 2022-08-01 RX ORDER — ESCITALOPRAM OXALATE 10 MG/1
10 TABLET ORAL DAILY
Status: DISCONTINUED | OUTPATIENT
Start: 2022-08-02 | End: 2022-08-02 | Stop reason: HOSPADM

## 2022-08-01 RX ORDER — AMLODIPINE BESYLATE 10 MG/1
10 TABLET ORAL DAILY
Status: DISCONTINUED | OUTPATIENT
Start: 2022-08-02 | End: 2022-08-02 | Stop reason: HOSPADM

## 2022-08-01 RX ADMIN — NYSTATIN OINTMENT: 100000 OINTMENT TOPICAL at 08:34

## 2022-08-01 RX ADMIN — ONDANSETRON 4 MG: 2 INJECTION INTRAMUSCULAR; INTRAVENOUS at 08:32

## 2022-08-01 RX ADMIN — METOPROLOL SUCCINATE 50 MG: 50 TABLET, EXTENDED RELEASE ORAL at 08:29

## 2022-08-01 RX ADMIN — ASPIRIN 81 MG CHEWABLE TABLET 81 MG: 81 TABLET CHEWABLE at 08:29

## 2022-08-01 RX ADMIN — LISINOPRIL 20 MG: 20 TABLET ORAL at 08:29

## 2022-08-01 ASSESSMENT — PAIN SCALES - GENERAL: PAINLEVEL_OUTOF10: 0

## 2022-08-01 NOTE — CARE COORDINATION
8/1/2022 Social Work Discharge Planning:OBS status. Chest pain-heart cath at [facility] today. SW discussed discharge planning with Pt. Pt resides with her son and daughter in law. Pt uses a cane as needed. Room air. Daughter in law will provide transportation at discharge. Pharmacy is [address] [place] [address] and PCP Is Dr. Kaylin Roberts. Electronically signed by JENNIFER Patel on 8/1/2022 at 9:58 AM

## 2022-08-01 NOTE — PLAN OF CARE
Problem: Discharge Planning  Goal: Discharge to home or other facility with appropriate resources  7/31/2022 2159 by Farheen Andrew RN  Outcome: Progressing  7/31/2022 1135 by Umberto Cox RN  Outcome: Progressing     Problem: Pain  Goal: Verbalizes/displays adequate comfort level or baseline comfort level  Outcome: Progressing

## 2022-08-01 NOTE — PROGRESS NOTES
PROGRESS NOTE       PATIENT PROBLEM LIST:  Patient Active Problem List   Diagnosis Code    Chest pain R07.9    STEMI (ST elevation myocardial infarction) ([facility] [address] I21.3    Acute kidney injury ([facility] N17.9    Hypotension I95.9    Acute delirium R41.0    Leukocytosis D72.829    Altered mental status W20.90    Acute metabolic encephalopathy F83.76    Acute encephalopathy G93.40    Syncope and collapse R55    New onset seizure ([facility] R56.9    Seizure ([facility] R56.9       SUBJECTIVE:  Nayely Fields states she is still having symptoms of chest discomfort and is quite anxious relative to her undergone cardiac catheterization/potential PCI. REVIEW OF SYSTEMS:  General ROS: negative for - fatigue, malaise,  weight gain or weight loss  Psychological ROS: negative for - anxiety , depression  Ophthalmic ROS: negative for - decreased vision or visual distortion. ENT ROS: negative  Allergy and Immunology ROS: negative  Hematological and Lymphatic ROS: negative  Endocrine: no heat or cold intolerance and no polyphagia, polydipsia, or polyuria  Respiratory ROS: positive for - shortness of breath  Cardiovascular ROS: positive for - chest pain and dyspnea on exertion. Gastrointestinal ROS: no abdominal pain, change in bowel habits, or black or bloody stools  Genito-Urinary ROS: no nocturia, dysuria, trouble voiding, frequency or hematuria  Musculoskeletal ROS: negative for- myalgias, arthralgias, or claudication  Neurological ROS: no TIA or stroke symptoms otherwise no significant change in symptoms or problems since yesterday as documented in previous progress notes.     SCHEDULED MEDICATIONS:   lisinopril  20 mg Oral Daily    metoprolol succinate  50 mg Oral Daily    nystatin   Topical BID    aspirin  81 mg Oral Daily    QUEtiapine  200 mg Oral Nightly       VITAL SIGNS:                                                                                                                          /63   Pulse 81   Temp 97.8 °F (36.6 °C) (Oral)   Resp 16   Ht 5' 5\" (1.651 m)   Wt 205 lb 1.6 oz (93 kg)   SpO2 97%   BMI 34.13 kg/m²   Patient Vitals for the past 96 hrs (Last 3 readings):   Weight   08/01/22 0559 205 lb 1.6 oz (93 kg)   07/30/22 0230 210 lb 11.2 oz (95.6 kg)     OBJECTIVE:    HEENT: PERRL, EOM  Intact; sclera non-icteric, conjunctiva pink. Carotids are brisk in upstroke with normal contour. No carotid bruits. Normal jugular venous pulsation at 45°. No palpable cervical nor supraclavicular nodes. Thyroid not palpable. Trachea midline. Chest: Even excursion  Lungs: CTA B, no expiratory wheezes or rhonchi, no decreased tactile fremitus without inspiratory rales. Heart: Regular  rhythm; S1 > S2, no gallop or murmur. No clicks, rub, palpable thrills   or heaves. PMI nondisplaced, 5th intercostal space MCL. Abdomen: Soft, nontender, nondistended,  moderately protuberant, no masses or organomegaly. Bowel sounds active. Extremities: Without clubbing, cyanosis or edema. Pulses present 3+ upper extermities bilaterally; present 1+ DP and present 1+ PT bilaterally. Data:   Scheduled Meds: Reviewed  Continuous Infusions:     Intake/Output Summary (Last 24 hours) at 8/1/2022 1254  Last data filed at 8/1/2022 0947  Gross per 24 hour   Intake 0 ml   Output --   Net 0 ml     CBC:   Recent Labs     07/29/22  1340 07/31/22  1610   WBC 7.5 8.5   HGB 11.2* 12.4   HCT 33.9* 37.0    258     BMP:  Recent Labs     07/29/22  1340 07/31/22  1610    139   K 4.4 4.2    105   CO2 24 20*   BUN 10 11   CREATININE 1.0 1.0   LABGLOM 55 55     ABGs:   Lab Results   Component Value Date/Time    PH 7.376 02/27/2021 11:28 AM    PO2 93.2 02/27/2021 11:28 AM    PCO2 31.9 02/27/2021 11:28 AM     INR: No results for input(s): INR in the last 72 hours.   PRO-BNP:   Lab Results   Component Value Date    PROBNP 448 (H) 12/04/2020    PROBNP 189 (H) 08/27/2020      TSH:   Lab Results   Component Value Date    TSH 1.320 12/03/2020      Cardiac Injury Profile:   Recent Labs     07/29/22  1814 07/30/22  0823 07/30/22  1022   TROPHS <6 <6 <6      Lipid Profile:   Lab Results   Component Value Date/Time    TRIG 714 12/10/2020 04:27 AM    HDL 45 12/06/2020 04:02 AM    LDLCALC 44 12/06/2020 04:02 AM    CHOL 126 12/06/2020 04:02 AM      Hemoglobin A1C: No components found for: HGBA1C      RAD:   NM Cardiac Stress Test Nuclear Imaging   Final Result   1. Mildly abnormal nuclear perfusion to mid lateral segment during   stress with complete resolution at rest suggesting stress-induced   ischemia. .   2. Normal global left ventricular wall motion with Gated SPECT left   ventricular ejection fraction 68%. Jase Castro D.O. FACP FACC FSCAI                              EKG: See Report  Echo: See Report      IMPRESSIONS:  Patient Active Problem List   Diagnosis Code    Chest pain R07.9    STEMI (ST elevation myocardial infarction) ([address] I21.3    Acute kidney injury ([address] N17.9    Hypotension I95.9    Acute delirium R41.0    Leukocytosis D72.829    Altered mental status T14.10    Acute metabolic encephalopathy V63.59    Acute encephalopathy G93.40    Syncope and collapse R55    New onset seizure ([address] R56.9    Seizure ([address] R56.9       RECOMMENDATIONS:  I have once again reviewed the potential cardiovascular risks of cardiac catheterization/PCI with Ms. Eckert and while anxious she agrees to proceed. Based upon her catheterization findings further recommendations will be made. I have spent more than 25 minutes face to face with Kinjal Cadet and reviewing notes and laboratory data, with greater than 50% of this time instructing and counseling the patient and her daughter who is at her bedside face to face regarding my findings and recommendations and I have answered all questions as posed to me by Ms. Eckert. Dajuan Woodard,  FACP,FACC,FSCAI      NOTE:  This report was transcribed using voice recognition software.   Every effort was made to ensure accuracy; however, inadvertent computerized transcription errors may be present

## 2022-08-01 NOTE — PROGRESS NOTES
Spoke with Laney from the cath lab. Asked for patient to be there as soon as possible. Called physicians, they will be here in 60-90 minutes. Laney notified.    Donnell Wing RN

## 2022-08-02 VITALS
DIASTOLIC BLOOD PRESSURE: 65 MMHG | HEART RATE: 58 BPM | OXYGEN SATURATION: 95 % | TEMPERATURE: 97.7 F | RESPIRATION RATE: 18 BRPM | SYSTOLIC BLOOD PRESSURE: 136 MMHG | WEIGHT: 205 LBS | HEIGHT: 65 IN | BODY MASS INDEX: 34.16 KG/M2

## 2022-08-02 PROBLEM — R94.39 ABNORMAL NUCLEAR STRESS TEST: Status: ACTIVE | Noted: 2022-08-02

## 2022-08-02 PROBLEM — E78.00 PURE HYPERCHOLESTEROLEMIA WITH TARGET LOW DENSITY LIPOPROTEIN (LDL) CHOLESTEROL LESS THAN 70 MG/DL: Status: ACTIVE | Noted: 2022-08-02

## 2022-08-02 PROBLEM — Z95.5 HISTORY OF CORONARY ARTERY STENT PLACEMENT: Chronic | Status: ACTIVE | Noted: 2022-08-02

## 2022-08-02 LAB
EKG ATRIAL RATE: 63 BPM
EKG P AXIS: 70 DEGREES
EKG P-R INTERVAL: 180 MS
EKG Q-T INTERVAL: 434 MS
EKG QRS DURATION: 92 MS
EKG QTC CALCULATION (BAZETT): 444 MS
EKG R AXIS: -16 DEGREES
EKG T AXIS: 43 DEGREES
EKG VENTRICULAR RATE: 63 BPM

## 2022-08-02 PROCEDURE — G0378 HOSPITAL OBSERVATION PER HR: HCPCS

## 2022-08-02 PROCEDURE — 6370000000 HC RX 637 (ALT 250 FOR IP): Performed by: INTERNAL MEDICINE

## 2022-08-02 RX ORDER — ATORVASTATIN CALCIUM 80 MG/1
80 TABLET, FILM COATED ORAL NIGHTLY
Qty: 30 TABLET | Refills: 3 | Status: SHIPPED | OUTPATIENT
Start: 2022-08-02

## 2022-08-02 RX ORDER — METOPROLOL SUCCINATE 50 MG/1
50 TABLET, EXTENDED RELEASE ORAL DAILY
Qty: 30 TABLET | Refills: 3 | Status: SHIPPED | OUTPATIENT
Start: 2022-08-03

## 2022-08-02 RX ORDER — PREGABALIN 75 MG/1
75 CAPSULE ORAL 3 TIMES DAILY
Qty: 60 CAPSULE | Refills: 3 | Status: SHIPPED | OUTPATIENT
Start: 2022-08-02 | End: 2022-08-12

## 2022-08-02 RX ADMIN — PANTOPRAZOLE SODIUM 40 MG: 40 TABLET, DELAYED RELEASE ORAL at 09:24

## 2022-08-02 RX ADMIN — QUETIAPINE FUMARATE 200 MG: 200 TABLET ORAL at 00:25

## 2022-08-02 RX ADMIN — PREGABALIN 75 MG: 75 CAPSULE ORAL at 09:24

## 2022-08-02 RX ADMIN — METOPROLOL SUCCINATE 50 MG: 50 TABLET, EXTENDED RELEASE ORAL at 09:24

## 2022-08-02 RX ADMIN — PREGABALIN 75 MG: 75 CAPSULE ORAL at 13:46

## 2022-08-02 RX ADMIN — LISINOPRIL 20 MG: 20 TABLET ORAL at 09:23

## 2022-08-02 RX ADMIN — NYSTATIN OINTMENT: 100000 OINTMENT TOPICAL at 00:26

## 2022-08-02 RX ADMIN — AMLODIPINE BESYLATE 10 MG: 10 TABLET ORAL at 09:24

## 2022-08-02 RX ADMIN — ESCITALOPRAM OXALATE 10 MG: 10 TABLET ORAL at 09:23

## 2022-08-02 RX ADMIN — PANTOPRAZOLE SODIUM 40 MG: 40 TABLET, DELAYED RELEASE ORAL at 00:25

## 2022-08-02 RX ADMIN — FOLIC ACID 1 MG: 1 TABLET ORAL at 09:24

## 2022-08-02 RX ADMIN — ASPIRIN 81 MG CHEWABLE TABLET 81 MG: 81 TABLET CHEWABLE at 09:24

## 2022-08-02 RX ADMIN — PREGABALIN 75 MG: 75 CAPSULE ORAL at 00:26

## 2022-08-02 RX ADMIN — ATORVASTATIN CALCIUM 80 MG: 80 TABLET, FILM COATED ORAL at 00:26

## 2022-08-02 RX ADMIN — NYSTATIN OINTMENT: 100000 OINTMENT TOPICAL at 09:25

## 2022-08-02 ASSESSMENT — PAIN SCALES - GENERAL
PAINLEVEL_OUTOF10: 0
PAINLEVEL_OUTOF10: 0

## 2022-08-02 NOTE — PROGRESS NOTES
Cardiac catheterization completed without incident. Somewhat surprisingly, cardiac catheterization demonstrated mild coronary atherosclerosis without areas of critical stenosis and with widely patent stents from 2 years ago. Beatriz Pratt

## 2022-08-02 NOTE — PLAN OF CARE
Problem: Discharge Planning  Goal: Discharge to home or other facility with appropriate resources  Outcome: Progressing  Flowsheets (Taken 8/1/2022 2000)  Discharge to home or other facility with appropriate resources: Refer to discharge planning if patient needs post-hospital services based on physician order or complex needs related to functional status, cognitive ability or social support system

## 2022-08-02 NOTE — H&P
History and Physical      CHIEF COMPLAINT:  Here for cardiac catheterization / PCI    History of Present Illness:  Ronnell Arreola is a 69y.o. year-old female presents for cardiac catheterization / PCI following a history of recurrent chest discomfort prior to admission which was not specifically related to exertion but noted that it was similar to what she experienced 2 years ago when she underwent stent deployment to her left anterior descending artery. She subsequently underwent a nuclear stress test would suggest ischemia to the anterolateral apical segment and for this reason repeat cardiac catheterization/potential PCI was decided upon. Past Medical History:   Diagnosis Date    CAD (coronary artery disease)     Degenerative disorder of bone     Hard of hearing     Hypertension     MI (myocardial infarction) ([facility] 2020    Psoriasis     Ruptured ear drum 2022    Sciatica of left side          Past Surgical History:   Procedure Laterality Date    BREAST LUMPECTOMY Left     CARDIAC CATHETERIZATION  2022    Dr Ruy Matthews EF 70%     SECTION      CORONARY ANGIOPLASTY WITH STENT PLACEMENT  2020    3.0 x 33mm Xience Sara to Prox LAD, EF35%, Dr. Nerissa Houston (CERVIX STATUS UNKNOWN)      JOINT REPLACEMENT      bilateral knees    TONSILLECTOMY         Medications Prior to Admission:    Prior to Admission medications    Medication Sig Start Date End Date Taking? Authorizing Provider   atorvastatin (LIPITOR) 80 MG tablet Take 80 mg by mouth in the morning. Yes Historical Provider, MD   lisinopril (PRINIVIL;ZESTRIL) 20 MG tablet Take 20 mg by mouth daily    Historical Provider, MD   amLODIPine (NORVASC) 10 MG tablet Take 10 mg by mouth daily    Historical Provider, MD   NONFORMULARY Take by mouth daily as needed Kratom-herbal supplement. Has opioid properties and some stimulant-like effects.   Pt not taking anymore    Historical Provider, MD escitalopram (LEXAPRO) 20 MG tablet Take 0.5 tablets by mouth daily  Patient taking differently: Take 20 mg by mouth in the morning. 5/25/21   Felicita Scheuermann, DO   atorvastatin (LIPITOR) 80 MG tablet Take 1 tablet by mouth nightly  Patient not taking: No sig reported 5/25/21   Felicita Scheuermann, DO   metoprolol succinate (TOPROL XL) 25 MG extended release tablet Take 1 tablet by mouth daily  Patient taking differently: Take 50 mg by mouth in the morning. 5/26/21   Felicita Scheuermann, DO   nystatin (MYCOSTATIN) 981873 UNIT/GM ointment Apply topically 2 times daily. Patient taking differently: Apply topically 2 times daily. Apply to groin and navel 5/25/21   Felicita Scheuermann, DO   Respiratory Therapy Supplies (FULL KIT NEBULIZER SET) MISC Use as directed with nebulized medication. 5/25/21   Felicita Scheuermann, DO   ibuprofen (ADVIL;MOTRIN) 600 MG tablet Take 600 mg by mouth every 6 hours as needed for Pain    Historical Provider, MD   QUEtiapine (SEROQUEL) 200 MG tablet Take 200 mg by mouth at bedtime Takes at night    Historical Provider, MD   pregabalin (LYRICA) 150 MG capsule Take 1 capsule by mouth 2 times daily for 60 days. Patient taking differently: Take 75 mg by mouth in the morning and 75 mg at noon and 75 mg before bedtime. 5/12/21 7/11/21  Emerita Oliver MD   folic acid (FOLVITE) 1 MG tablet Take 1 tablet by mouth daily 12/18/20   Felicita Scheuermann, DO   pantoprazole (PROTONIX) 40 MG tablet Take 1 tablet by mouth every morning (before breakfast)  Patient taking differently: Take 40 mg by mouth in the morning and 40 mg before bedtime. 10/23/20   Felicita Scheuermann, DO   aspirin 81 MG chewable tablet Take 1 tablet by mouth daily 8/26/20   Felicita Scheuermann, DO       Allergies:    Nitroglycerin and Tramadol    Social History:    reports that she has been smoking cigarettes. She started smoking about 49 years ago. She has a 11.75 pack-year smoking history.  She has never used smokeless tobacco. She reports that she does not currently use alcohol. She reports that she does not use drugs. Family History:   family history includes Diabetes in her father; Philippe Sender in her mother. REVIEW OF SYSTEMS:  As above in the HPI, otherwise negative    PHYSICAL EXAM:    Vitals:  BP (!) 161/77   Pulse (!) 45   Temp 97.5 °F (36.4 °C) (Temporal)   Resp 18   SpO2 94%     General:  Awake, alert, oriented X 3. Well developed, well nourished, well groomed. No apparent distress. HEENT:  Normocephalic, atraumatic. Pupils equal, round, reactive to light. No scleral icterus. No conjunctival injection. Normal lips, teeth, and gums. No nasal discharge. Neck:  Supple No palpable cervical or supraclavicular nodes. Carotids brisk in upstroke , without carotid bruits. No abnormal JVP at 45°. Thyroid not palpable. Chest: Even excursion. Heart:  Regular rate Regular rhythm, S1> S2 no murmurs, gallops, or rubs. PMI 5th intercostal space midclavicular line. Lungs: CTA bilaterally, bilat symmetrical expansion, no wheeze, rales, or rhonchi. No decreased tactile fremitus. Abdomen: Grossly protuberant bowel sounds present, soft, nontender, no masses, no organomegaly, no peritoneal signs  Extremities:  No clubbing, cyanosis, 1+ edema PT present 1+  , DP present 1+   R  present 2+ bilaterally.   Skin: Warm and dry, no open lesions or rash  Neuro:  Cranial nerves 2-12 intact, no focal sensory or motor deficits  Breast: deferred  Rectal: deferred  Genitalia:  deferred    LABS:  CBC:   Lab Results   Component Value Date/Time    WBC 8.5 07/31/2022 04:10 PM    RBC 4.00 07/31/2022 04:10 PM    HGB 12.4 07/31/2022 04:10 PM    HCT 37.0 07/31/2022 04:10 PM    MCV 92.5 07/31/2022 04:10 PM    MCH 31.0 07/31/2022 04:10 PM    MCHC 33.5 07/31/2022 04:10 PM    RDW 13.2 07/31/2022 04:10 PM     07/31/2022 04:10 PM    MPV 10.5 07/31/2022 04:10 PM     BMP:    Lab Results   Component Value Date/Time     07/31/2022 04:10 PM    K 4.2 07/31/2022 04:10 PM    K 4.4 02/27/2021 09:30 AM     07/31/2022 04:10 PM    CO2 20 07/31/2022 04:10 PM    BUN 11 07/31/2022 04:10 PM    LABALBU 4.3 07/29/2022 01:40 PM    CREATININE 1.0 07/31/2022 04:10 PM    CALCIUM 9.2 07/31/2022 04:10 PM    GFRAA >60 07/31/2022 04:10 PM    LABGLOM 55 07/31/2022 04:10 PM    GLUCOSE 96 07/31/2022 04:10 PM     PT/INR:    Lab Results   Component Value Date/Time    PROTIME 11.0 05/22/2021 05:14 PM    INR 1.0 05/22/2021 05:14 PM         ASSESSMENT:      Patient Active Problem List   Diagnosis    Chest pain    STEMI (ST elevation myocardial infarction) ([facility] [address]    Acute kidney injury ([facility] [facility]    Hypotension    Acute delirium    Leukocytosis    Altered mental status    Acute metabolic encephalopathy    Acute encephalopathy    Syncope and collapse    New onset seizure ([facility] [address]    Seizure ([facility]       PLAN:  I have reviewed the indications, risks, and benefits of cardiac catheterization / PCI with Mavis Roman, and the patient states she fully understands and agrees to proceed. I have answered all questions posed to me by the patient. SCAI-AUC  Indication 16; Score 7.     Marissa Saleem, DO FACP,FACC,FSCAI  8/2/2022

## 2022-08-02 NOTE — DISCHARGE SUMMARY
Discharge instructions given to pt, who verbalized understanding. Changed dressing to right groin. Area ecchymotic but soft. No bleeding or hematoma. Applied bandaid.

## 2022-08-02 NOTE — DISCHARGE INSTRUCTIONS
you eat but also to what you are missing from your diet. You caneat a healthy, balanced diet by making a few small changes. Follow-up care is a key part of your treatment and safety. Be sure to make and go to all appointments, and call your doctor if you are having problems. It's also a good idea to know your test results and keep alist of the medicines you take. How can you care for yourself at home? Look at what you eat  Keep a food diary for a week or two and record everything you eat or drink. Track the number of servings you eat from each food group. For a balanced diet every day, eat a variety of:  6 or more ounce-equivalents of grains, such as cereals, breads, crackers, rice, or pasta, every day. An ounce-equivalent is 1 slice of bread, 1 cup of ready-to-eat cereal, or ½ cup of cooked rice, cooked pasta, or cooked cereal.  2½ cups of vegetables, especially:  Dark-green vegetables such as broccoli and spinach. Orange vegetables such as carrots and sweet potatoes. Dry beans (such as ryan and kidney beans) and peas (such as lentils). 2 cups of fresh, frozen, or canned fruit. A small apple or 1 banana or orange equals 1 cup.  3 cups of nonfat or low-fat milk, yogurt, or other milk products. 5½ ounces of meat and beans, such as chicken, fish, lean meat, beans, nuts, and seeds. One egg, 1 tablespoon of peanut butter, ½ ounce nuts or seeds, or ¼ cup of cooked beans equals 1 ounce of meat. Learn how to read food labels for serving sizes and ingredients. Fast-food and convenience-food meals often contain few or no fruits or vegetables. Make sure you eat some fruits and vegetables to make the meal more nutritious. Look at your food diary. For each food group, add up what you have eaten and then divide the total by the number of days. This will give you an idea of how much you are eating from each food group. See if you can find some ways to change your diet to make it more healthy.   Start small  Do not try to make dramatic changes to your diet all at once. You might feel that you are missing out on your favorite foods and then be more likely to fail. Start slowly, and gradually change your habits. Try some of the following:  Use whole wheat bread instead of white bread. Use nonfat or low-fat milk instead of whole milk. Eat brown rice instead of white rice, and eat whole wheat pasta instead of white-flour pasta. Try low-fat cheeses and low-fat yogurt. Add more fruits and vegetables to meals and have them for snacks. Add lettuce, tomato, cucumber, and onion to sandwiches. Add fruit to yogurt and cereal.  Enjoy food  You can still eat your favorite foods. You just may need to eat less of them. If your favorite foods are high in fat, salt, and sugar, limit how often you eat them, but do not cut them out entirely. Eat a wide variety of foods. Make healthy choices when eating out  The type of restaurant you choose can help you make healthy choices. Even fast-food chains are now offering more low-fat or healthier choices on the menu. Choose smaller portions, or take half of your meal home. When eating out, try:  A veggie pizza with a whole wheat crust or grilled chicken (instead of sausage or pepperoni). Pasta with roasted vegetables, grilled chicken, or marinara sauce instead of cream sauce. A vegetable wrap or grilled chicken wrap. Broiled or poached food instead of fried or breaded items. Make healthy choices easy  Buy packaged, prewashed, ready-to-eat fresh vegetables and fruits, such as baby carrots, salad mixes, and chopped or shredded broccoli and cauliflower. Buy packaged, presliced fruits, such as melon or pineapple. Choose 100% fruit or vegetable juice instead of soda. Limit juice intake to 4 to 6 oz (½ to ¾ cup) a day. Blend low-fat yogurt, fruit juice, and canned or frozen fruit to make a smoothie for breakfast or a snack. Where can you learn more? Go to https://keren.[facility]. org and sign in to your delicious account. Enter X740 in the Rapleaf box to learn more about \"Eating Healthy Foods: Care Instructions. \"     If you do not have an account, please click on the \"Sign Up Now\" link. Current as of: September 8, 2021               Content Version: 13.3  © 3769-8719 [employer]. Care instructions adapted under license by [facility] ([facility]). If you have questions about a medical condition or this instruction, always ask your healthcare professional. [address] any warranty or liability for your use of this information.

## 2022-08-03 NOTE — PROCEDURES
[address]                  [address] [facility]  [facility]                            CARDIAC CATHETERIZATION    PATIENT NAME: Daniel Grimaldo                   :        1955  MED REC NO:   [id number]                            ROOM:       6301  ACCOUNT NO:   [de-identified]                           ADMIT DATE: 2022  PROVIDER:     Melquiades Alatorre DO    DATE OF PROCEDURE:  2022    RAJINDER LEFT HEART CATHETERIZATION    SCAI indicator 5, score of 8. PRESSURES:  /77, mean 116, /7. SUMMARY OF INJECTIONS:  II.  Selective coronary arteriograms:  A. Right coronary artery - codominant, a congenitally small vessel,  giving rise to the posterior ventricular branch without areas of  critical stenosis or spontaneous coronary artery spasm. B.  Left coronary artery. 1.  Left main trunk - normal.  2.  Left anterior descending. In the area of previous stent deployment  to the proximal anterior descending artery, there are minimal luminal  wall irregularities. In the mid segment the vessel tapers but is widely  patent. 3.  Circumflex. In the proximal segment of the posterolateral marginal  branch, there is 25% segmental narrowing with the distal vessel being of  normal caliber and widely patent. III. Left ventricular angiogram.  Left ventricular cavity size is  normal with prominent papillary muscles noted and hyperdynamic left  ventricular systolic function with estimated left ventricular ejection  fraction 70%. CONCLUSIONS:  1. Coronary atherosclerosis - noncritical.  2.  Persistently patent area of previous stent deployment to the left  anterior descending artery. 3.  Hyperdynamic left ventricular systolic function with mild  hypertrophy and prominent papillary muscles. CONSCIOUS SEDATION UTILIZED:  Intravenous midazolam and fentanyl.     INITIATION OF CONSCIOUS SEDATION:  At 09:15    COMPLETION OF CONSCIOUS SEDATION:  At 09:32    ESTIMATED BLOOD LOSS:  Less than 10 mL. FLUOROSCOPY TIME:  2.7 minutes. CONTRAST UTILIZED:  50 mL.         ANNETTE JOHNSON DO    D: 08/02/2022 23:36:20       T: 08/02/2022 23:39:08     [email or web address]  Job#: [id number]     Doc#: [id number]    CC:

## 2022-08-05 RX ORDER — IBUPROFEN 600 MG/1
600 TABLET ORAL EVERY 6 HOURS PRN
Qty: 120 TABLET | Refills: 3 | Status: SHIPPED | OUTPATIENT
Start: 2022-08-05

## 2022-08-06 NOTE — DISCHARGE SUMMARY
[address] [address]                               DISCHARGE SUMMARY    PATIENT NAME: Tayler Wade                   :        1955  MED REC NO:   [id number]                            ROOM:       0401  ACCOUNT NO:   [de-identified]                           ADMIT DATE: 2022  PROVIDER:     Danielle Stauffer DO                  [facility] DATE: 2022    FINAL DIAGNOSES:  1. Chest pain, rule out myocardial infarction. 2.  Coronary artery disease. 3.  Chronic anxiety. 4.  Degenerative disk disease, lumbar spine. 5.  Hard of hearing. 6.  Hypertension. 7.  Old myocardial infarction. HISTORY AND HOSPITAL COURSE:  The patient did present to the emergency  department with a history and chief complaint of pain radiating into her  jaw and the substernal area, happened about three days ago, seemed to be  intermittent, but over the last several hours, it became constant,  severe, feels like her previous heart attack for which she had stent  placement. She presented to the [facility] and  we were called by the transfer center and she was brought into the  hospital for further evaluation, Cardiology consultation, and stress  testing. So, her labs during this admission actually looked pretty  good. She had a high-sensitivity troponin of 8, repeat was less than 6. Her _____ was very convincing. White count was normal.  Hemoglobin and  hematocrit were stable at 11.2 and 33.9. Chest x-ray did not  demonstrate any significant cardiopulmonary pathology with the exception  of mild cardiomegaly. EKG interpretation showed sinus rhythm. No  significant change to previous EKGs.   Cardiology consult was asked for  and she had a stress test performed and it was mildly abnormal nuclear  perfusion to the mid lateral segment during stress with complete  resolution at rest suggesting stress-induced ischemia. She had normal  global left ventricular wall motion with an ejection fraction of 68%,  but because of the test being suspicious for new ischemic changes, the  patient was actually set up for transfer to go to [facility] per Dr. Neeraj Freeman to have a heart catheterization. When she was discharged, she  actually felt pretty good and was stable. Her discharge medications  remained the same. She was on aspirin 81, Advil as needed. She takes  her Lyrica, lisinopril 20 daily, Seroquel 200 at bedtime. She is on  metoprolol XL, amlodipine 10, Mycostatin ointment, folic acid, Protonix  40. All of these remained the same as she was transferred for further  testing and evaluation for positive stress test.  At the time of  discharge, her condition was good. Prognosis is guarded.         Anya Mcbride DO    D: 08/05/2022 17:11:53       T: 08/05/2022 17:14:36     [email or web address]  Job#: [id number]     Doc#: [id number]    CC:

## 2022-08-27 ENCOUNTER — APPOINTMENT (OUTPATIENT)
Dept: CT IMAGING | Age: 67
End: 2022-08-27
Payer: MEDICARE

## 2022-08-27 ENCOUNTER — HOSPITAL ENCOUNTER (OUTPATIENT)
Age: 67
Setting detail: OBSERVATION
Discharge: HOME | End: 2022-08-29
Attending: EMERGENCY MEDICINE | Admitting: GENERAL PRACTICE
Payer: MEDICARE

## 2022-08-27 ENCOUNTER — APPOINTMENT (OUTPATIENT)
Dept: GENERAL RADIOLOGY | Age: 67
End: 2022-08-27
Payer: MEDICARE

## 2022-08-27 DIAGNOSIS — R56.9 SEIZURE (HCC): ICD-10-CM

## 2022-08-27 DIAGNOSIS — R41.82 ALTERED MENTAL STATUS, UNSPECIFIED ALTERED MENTAL STATUS TYPE: Primary | ICD-10-CM

## 2022-08-27 LAB
ALBUMIN SERPL-MCNC: 4.5 G/DL (ref 3.5–5.2)
ALP BLD-CCNC: 99 U/L (ref 35–104)
ALT SERPL-CCNC: 9 U/L (ref 0–32)
AMPHETAMINE SCREEN, URINE: NOT DETECTED
ANION GAP SERPL CALCULATED.3IONS-SCNC: 13 MMOL/L (ref 7–16)
APTT: 34.6 SEC (ref 24.5–35.1)
AST SERPL-CCNC: 16 U/L (ref 0–31)
BACTERIA: NORMAL /HPF
BARBITURATE SCREEN URINE: NOT DETECTED
BASOPHILS ABSOLUTE: 0.04 E9/L (ref 0–0.2)
BASOPHILS RELATIVE PERCENT: 0.4 % (ref 0–2)
BENZODIAZEPINE SCREEN, URINE: NOT DETECTED
BILIRUB SERPL-MCNC: 0.2 MG/DL (ref 0–1.2)
BILIRUBIN URINE: NEGATIVE
BLOOD, URINE: NEGATIVE
BUN BLDV-MCNC: 11 MG/DL (ref 6–23)
CALCIUM SERPL-MCNC: 9.3 MG/DL (ref 8.6–10.2)
CANNABINOID SCREEN URINE: POSITIVE
CHLORIDE BLD-SCNC: 106 MMOL/L (ref 98–107)
CLARITY: CLEAR
CO2: 21 MMOL/L (ref 22–29)
COCAINE METABOLITE SCREEN URINE: NOT DETECTED
COLOR: YELLOW
CREAT SERPL-MCNC: 1.1 MG/DL (ref 0.5–1)
EKG ATRIAL RATE: 64 BPM
EKG P AXIS: 74 DEGREES
EKG P-R INTERVAL: 186 MS
EKG Q-T INTERVAL: 426 MS
EKG QRS DURATION: 94 MS
EKG QTC CALCULATION (BAZETT): 439 MS
EKG R AXIS: 0 DEGREES
EKG T AXIS: 37 DEGREES
EKG VENTRICULAR RATE: 64 BPM
EOSINOPHILS ABSOLUTE: 0.41 E9/L (ref 0.05–0.5)
EOSINOPHILS RELATIVE PERCENT: 4.2 % (ref 0–6)
EPITHELIAL CELLS, UA: NORMAL /HPF
FENTANYL SCREEN, URINE: NOT DETECTED
GFR AFRICAN AMERICAN: 60
GFR NON-AFRICAN AMERICAN: 49 ML/MIN/1.73
GLUCOSE BLD-MCNC: 108 MG/DL (ref 74–99)
GLUCOSE BLD-MCNC: 116 MG/DL
GLUCOSE URINE: NEGATIVE MG/DL
HCT VFR BLD CALC: 38.3 % (ref 34–48)
HEMOGLOBIN: 12.5 G/DL (ref 11.5–15.5)
IMMATURE GRANULOCYTES #: 0.03 E9/L
IMMATURE GRANULOCYTES %: 0.3 % (ref 0–5)
INR BLD: 0.9
KETONES, URINE: NEGATIVE MG/DL
LACTIC ACID: 1 MMOL/L (ref 0.5–2.2)
LEUKOCYTE ESTERASE, URINE: NEGATIVE
LYMPHOCYTES ABSOLUTE: 2.32 E9/L (ref 1.5–4)
LYMPHOCYTES RELATIVE PERCENT: 23.9 % (ref 20–42)
Lab: ABNORMAL
MCH RBC QN AUTO: 31.1 PG (ref 26–35)
MCHC RBC AUTO-ENTMCNC: 32.6 % (ref 32–34.5)
MCV RBC AUTO: 95.3 FL (ref 80–99.9)
METER GLUCOSE: 116 MG/DL (ref 74–99)
METHADONE SCREEN, URINE: NOT DETECTED
MONOCYTES ABSOLUTE: 0.78 E9/L (ref 0.1–0.95)
MONOCYTES RELATIVE PERCENT: 8 % (ref 2–12)
NEUTROPHILS ABSOLUTE: 6.13 E9/L (ref 1.8–7.3)
NEUTROPHILS RELATIVE PERCENT: 63.2 % (ref 43–80)
NITRITE, URINE: NEGATIVE
OPIATE SCREEN URINE: NOT DETECTED
OXYCODONE URINE: NOT DETECTED
PDW BLD-RTO: 14 FL (ref 11.5–15)
PH UA: 5.5 (ref 5–9)
PHENCYCLIDINE SCREEN URINE: NOT DETECTED
PLATELET # BLD: 293 E9/L (ref 130–450)
PMV BLD AUTO: 10.7 FL (ref 7–12)
POTASSIUM REFLEX MAGNESIUM: 4.3 MMOL/L (ref 3.5–5)
PRO-BNP: 112 PG/ML (ref 0–125)
PROTEIN UA: NEGATIVE MG/DL
PROTHROMBIN TIME: 9.7 SEC (ref 9.3–12.4)
RBC # BLD: 4.02 E12/L (ref 3.5–5.5)
RBC UA: NORMAL /HPF (ref 0–2)
SODIUM BLD-SCNC: 140 MMOL/L (ref 132–146)
SPECIFIC GRAVITY UA: 1.02 (ref 1–1.03)
TOTAL PROTEIN: 6.9 G/DL (ref 6.4–8.3)
TROPONIN, HIGH SENSITIVITY: <6 NG/L (ref 0–9)
UROBILINOGEN, URINE: 0.2 E.U./DL
WBC # BLD: 9.7 E9/L (ref 4.5–11.5)
WBC UA: NORMAL /HPF (ref 0–5)

## 2022-08-27 PROCEDURE — 71045 X-RAY EXAM CHEST 1 VIEW: CPT

## 2022-08-27 PROCEDURE — 70450 CT HEAD/BRAIN W/O DYE: CPT

## 2022-08-27 PROCEDURE — 85025 COMPLETE CBC W/AUTO DIFF WBC: CPT

## 2022-08-27 PROCEDURE — 85730 THROMBOPLASTIN TIME PARTIAL: CPT

## 2022-08-27 PROCEDURE — G0378 HOSPITAL OBSERVATION PER HR: HCPCS

## 2022-08-27 PROCEDURE — 83880 ASSAY OF NATRIURETIC PEPTIDE: CPT

## 2022-08-27 PROCEDURE — 81001 URINALYSIS AUTO W/SCOPE: CPT

## 2022-08-27 PROCEDURE — 83605 ASSAY OF LACTIC ACID: CPT

## 2022-08-27 PROCEDURE — 93005 ELECTROCARDIOGRAM TRACING: CPT

## 2022-08-27 PROCEDURE — 6370000000 HC RX 637 (ALT 250 FOR IP): Performed by: GENERAL PRACTICE

## 2022-08-27 PROCEDURE — 82962 GLUCOSE BLOOD TEST: CPT

## 2022-08-27 PROCEDURE — 80307 DRUG TEST PRSMV CHEM ANLYZR: CPT

## 2022-08-27 PROCEDURE — 85610 PROTHROMBIN TIME: CPT

## 2022-08-27 PROCEDURE — 96374 THER/PROPH/DIAG INJ IV PUSH: CPT

## 2022-08-27 PROCEDURE — 99285 EMERGENCY DEPT VISIT HI MDM: CPT

## 2022-08-27 PROCEDURE — 80053 COMPREHEN METABOLIC PANEL: CPT

## 2022-08-27 PROCEDURE — 36415 COLL VENOUS BLD VENIPUNCTURE: CPT

## 2022-08-27 PROCEDURE — 6360000002 HC RX W HCPCS: Performed by: GENERAL PRACTICE

## 2022-08-27 PROCEDURE — 72125 CT NECK SPINE W/O DYE: CPT

## 2022-08-27 PROCEDURE — 84484 ASSAY OF TROPONIN QUANT: CPT

## 2022-08-27 RX ORDER — ATORVASTATIN CALCIUM 40 MG/1
80 TABLET, FILM COATED ORAL NIGHTLY
Status: DISCONTINUED | OUTPATIENT
Start: 2022-08-27 | End: 2022-08-29 | Stop reason: HOSPADM

## 2022-08-27 RX ORDER — QUETIAPINE FUMARATE 100 MG/1
200 TABLET, FILM COATED ORAL 2 TIMES DAILY
Status: DISCONTINUED | OUTPATIENT
Start: 2022-08-27 | End: 2022-08-27

## 2022-08-27 RX ORDER — IBUPROFEN 600 MG/1
600 TABLET ORAL EVERY 6 HOURS PRN
Status: DISCONTINUED | OUTPATIENT
Start: 2022-08-27 | End: 2022-08-29 | Stop reason: HOSPADM

## 2022-08-27 RX ORDER — LISINOPRIL 20 MG/1
20 TABLET ORAL DAILY
Status: DISCONTINUED | OUTPATIENT
Start: 2022-08-27 | End: 2022-08-29 | Stop reason: HOSPADM

## 2022-08-27 RX ORDER — QUETIAPINE FUMARATE 100 MG/1
200 TABLET, FILM COATED ORAL NIGHTLY
Status: DISCONTINUED | OUTPATIENT
Start: 2022-08-27 | End: 2022-08-29 | Stop reason: HOSPADM

## 2022-08-27 RX ORDER — ASPIRIN 81 MG/1
81 TABLET, CHEWABLE ORAL DAILY
Status: DISCONTINUED | OUTPATIENT
Start: 2022-08-27 | End: 2022-08-29 | Stop reason: HOSPADM

## 2022-08-27 RX ORDER — PREGABALIN 75 MG/1
75 CAPSULE ORAL 3 TIMES DAILY
Status: DISCONTINUED | OUTPATIENT
Start: 2022-08-27 | End: 2022-08-29 | Stop reason: HOSPADM

## 2022-08-27 RX ORDER — ONDANSETRON 2 MG/ML
4 INJECTION INTRAMUSCULAR; INTRAVENOUS EVERY 6 HOURS PRN
Status: DISCONTINUED | OUTPATIENT
Start: 2022-08-27 | End: 2022-08-29 | Stop reason: HOSPADM

## 2022-08-27 RX ORDER — SODIUM CHLORIDE 9 MG/ML
INJECTION, SOLUTION INTRAVENOUS PRN
Status: DISCONTINUED | OUTPATIENT
Start: 2022-08-27 | End: 2022-08-29 | Stop reason: HOSPADM

## 2022-08-27 RX ORDER — PANTOPRAZOLE SODIUM 40 MG/1
40 TABLET, DELAYED RELEASE ORAL
Status: DISCONTINUED | OUTPATIENT
Start: 2022-08-28 | End: 2022-08-29 | Stop reason: HOSPADM

## 2022-08-27 RX ORDER — FOLIC ACID 1 MG/1
1 TABLET ORAL DAILY
Status: DISCONTINUED | OUTPATIENT
Start: 2022-08-27 | End: 2022-08-29 | Stop reason: HOSPADM

## 2022-08-27 RX ORDER — AMLODIPINE BESYLATE 10 MG/1
10 TABLET ORAL DAILY
Status: DISCONTINUED | OUTPATIENT
Start: 2022-08-27 | End: 2022-08-29 | Stop reason: HOSPADM

## 2022-08-27 RX ORDER — METOPROLOL SUCCINATE 50 MG/1
50 TABLET, EXTENDED RELEASE ORAL DAILY
Status: DISCONTINUED | OUTPATIENT
Start: 2022-08-27 | End: 2022-08-29 | Stop reason: HOSPADM

## 2022-08-27 RX ADMIN — PREGABALIN 75 MG: 75 CAPSULE ORAL at 10:56

## 2022-08-27 RX ADMIN — METOPROLOL SUCCINATE 50 MG: 50 TABLET, EXTENDED RELEASE ORAL at 10:56

## 2022-08-27 RX ADMIN — LISINOPRIL 20 MG: 20 TABLET ORAL at 10:56

## 2022-08-27 RX ADMIN — PREGABALIN 75 MG: 75 CAPSULE ORAL at 20:05

## 2022-08-27 RX ADMIN — IBUPROFEN 600 MG: 600 TABLET ORAL at 18:45

## 2022-08-27 RX ADMIN — QUETIAPINE FUMARATE 200 MG: 100 TABLET ORAL at 20:05

## 2022-08-27 RX ADMIN — PREGABALIN 75 MG: 75 CAPSULE ORAL at 15:33

## 2022-08-27 RX ADMIN — ONDANSETRON 4 MG: 2 INJECTION INTRAMUSCULAR; INTRAVENOUS at 15:42

## 2022-08-27 RX ADMIN — ATORVASTATIN CALCIUM 80 MG: 40 TABLET, FILM COATED ORAL at 20:05

## 2022-08-27 RX ADMIN — FOLIC ACID 1 MG: 1 TABLET ORAL at 10:56

## 2022-08-27 RX ADMIN — AMLODIPINE BESYLATE 10 MG: 10 TABLET ORAL at 10:56

## 2022-08-27 RX ADMIN — ASPIRIN 81 MG CHEWABLE TABLET 81 MG: 81 TABLET CHEWABLE at 10:56

## 2022-08-27 ASSESSMENT — PAIN DESCRIPTION - LOCATION
LOCATION: LEG
LOCATION: LEG

## 2022-08-27 ASSESSMENT — PAIN DESCRIPTION - DESCRIPTORS
DESCRIPTORS: ACHING;DISCOMFORT;DULL
DESCRIPTORS: ACHING;DISCOMFORT;DULL

## 2022-08-27 ASSESSMENT — PAIN DESCRIPTION - ONSET: ONSET: GRADUAL

## 2022-08-27 ASSESSMENT — ENCOUNTER SYMPTOMS
DIARRHEA: 0
VOMITING: 0
ABDOMINAL PAIN: 0
EYES NEGATIVE: 1
SHORTNESS OF BREATH: 0
NAUSEA: 0

## 2022-08-27 ASSESSMENT — PAIN DESCRIPTION - PAIN TYPE: TYPE: ACUTE PAIN

## 2022-08-27 ASSESSMENT — PAIN DESCRIPTION - ORIENTATION: ORIENTATION: RIGHT;LEFT

## 2022-08-27 ASSESSMENT — PAIN - FUNCTIONAL ASSESSMENT: PAIN_FUNCTIONAL_ASSESSMENT: PREVENTS OR INTERFERES SOME ACTIVE ACTIVITIES AND ADLS

## 2022-08-27 ASSESSMENT — PAIN SCALES - GENERAL
PAINLEVEL_OUTOF10: 3
PAINLEVEL_OUTOF10: 4

## 2022-08-27 ASSESSMENT — PAIN DESCRIPTION - FREQUENCY: FREQUENCY: INTERMITTENT

## 2022-08-27 NOTE — ED NOTES
The following labs labeled with pt sticker and tubed to lab:     [] Blue     [] Lavender   [] on ice  [] Green/yellow  [] Green/black [] on ice  [] Yellow  [] Red  [] Pink  Carter Wiley on ice    [] COVID-19 swab    [] Rapid  [] PCR  [] Flu Swab  [] Strep Swab  [] Peds Viral Panel     [] Urine Sample  [] Pelvic Cultures  [] Blood Cultures   [] Wound Cultures          Mildred Mendez RN  08/27/22 2478

## 2022-08-27 NOTE — ED PROVIDER NOTES
Uriel Shabazz is a 68-year-old female who presents to the emergency department after a fall that occurred tonight at an unknown time. Patient reports it is moderate severity, intermittent, nothing makes it better or worse. Patient is a poor historian with intermittent confusion, reports she rolled out of bed and struck her head complaining of a headache. Patient lives with her daughter, reports that they called EMS for assistance. Limited HPI secondary to patient's condition. No blood thinners, takes aspirin daily. Had discussion with daughter over the phone, she reports that she is normally alert and oriented x3, able to drive and take care of herself. She states that she has had an episode like this last year in which she had a questionable seizure versus UTI for altered mental status. The history is provided by the patient and the EMS personnel. Review of Systems   Unable to perform ROS: Mental status change   Constitutional:  Negative for chills and fever. HENT: Negative. Eyes: Negative. Respiratory:  Negative for shortness of breath. Cardiovascular:  Negative for chest pain. Gastrointestinal:  Negative for abdominal pain, diarrhea, nausea and vomiting. Genitourinary: Negative. Musculoskeletal:  Negative for neck pain and neck stiffness. Skin: Negative. Neurological:  Positive for headaches. Negative for dizziness and light-headedness. Psychiatric/Behavioral: Negative. Physical Exam  Constitutional:       Comments: Intermittently alert then somnolent, easily arousable with loud noise or painful stimuli. Eyes:      Extraocular Movements: Extraocular movements intact. Pupils: Pupils are equal, round, and reactive to light. Cardiovascular:      Rate and Rhythm: Normal rate and regular rhythm. Pulmonary:      Effort: Pulmonary effort is normal.      Breath sounds: No wheezing, rhonchi or rales. Abdominal:      Palpations: Abdomen is soft. Tenderness: There is no abdominal tenderness. There is no guarding or rebound. Musculoskeletal:      Cervical back: Normal range of motion. Tenderness present. No rigidity. Skin:     General: Skin is warm and dry. Capillary Refill: Capillary refill takes less than 2 seconds. Neurological:      General: No focal deficit present. Comments: Oriented to person and place, does not know the month or year. Cranial nerves II through XII grossly intact. No sensory deficits. No pronator drift while lifting arms or weakness to leg rise. EKG: This EKG is signed and interpreted by me. Rate: 64  Rhythm: Sinus  Interpretation: Normal axis. No ST or T wave changes. No STEMI. Comparison: stable as compared to patient's most recent EKG (7/30/22)      Procedures     MDM  Number of Diagnoses or Management Options  Altered mental status, unspecified altered mental status type  Seizure [facility])  Diagnosis management comments: Patient presented to the emergency department for altered mental status after having a fall rolling out of bed last night. On arrival, patient was altered including slow to answer questions and would intermittently become somnolent but arousable. Patient has a seizure history. Head CT shows no evidence of intracranial hemorrhage and CT of the neck shows no fracture or dislocation. Labs are unremarkable showing no evidence of leukocytosis, UTI, or elevated lactic acid suspicious of seizure. On reevaluation, patient's mental status is improved but not back to baseline as described by daughter over the phone.   Discussed case with her PCP who will admit the patient for observation.      --------------------------------------------- PAST HISTORY ---------------------------------------------  Past Medical History:  has a past medical history of CAD (coronary artery disease), Degenerative disorder of bone, Hard of hearing, Hypertension, MI (myocardial infarction) ([facility] [address] Psoriasis, Ruptured mmol/L    Chloride 106 98 - 107 mmol/L    CO2 21 (L) 22 - 29 mmol/L    Anion Gap 13 7 - 16 mmol/L    Glucose 108 (H) 74 - 99 mg/dL    BUN 11 6 - 23 mg/dL    Creatinine 1.1 (H) 0.5 - 1.0 mg/dL    GFR Non-African American 49 >=60 mL/min/1.73    GFR African American 60     Calcium 9.3 8.6 - 10.2 mg/dL    Total Protein 6.9 6.4 - 8.3 g/dL    Albumin 4.5 3.5 - 5.2 g/dL    Total Bilirubin 0.2 0.0 - 1.2 mg/dL    Alkaline Phosphatase 99 35 - 104 U/L    ALT 9 0 - 32 U/L    AST 16 0 - 31 U/L   Troponin   Result Value Ref Range    Troponin, High Sensitivity <6 0 - 9 ng/L   Brain Natriuretic Peptide   Result Value Ref Range    Pro- 0 - 125 pg/mL   Protime-INR   Result Value Ref Range    Protime 9.7 9.3 - 12.4 sec    INR 0.9    APTT   Result Value Ref Range    aPTT 34.6 24.5 - 35.1 sec   Urinalysis with Microscopic   Result Value Ref Range    Color, UA Yellow Straw/Yellow    Clarity, UA Clear Clear    Glucose, Ur Negative Negative mg/dL    Bilirubin Urine Negative Negative    Ketones, Urine Negative Negative mg/dL    Specific Gravity, UA 1.020 1.005 - 1.030    Blood, Urine Negative Negative    pH, UA 5.5 5.0 - 9.0    Protein, UA Negative Negative mg/dL    Urobilinogen, Urine 0.2 <2.0 E.U./dL    Nitrite, Urine Negative Negative    Leukocyte Esterase, Urine Negative Negative   Lactic Acid   Result Value Ref Range    Lactic Acid 1.0 0.5 - 2.2 mmol/L   POCT Glucose   Result Value Ref Range    Glucose 116 mg/dL   POCT Glucose   Result Value Ref Range    Meter Glucose 116 (H) 74 - 99 mg/dL       Radiology  CT Head WO Contrast    Result Date: 8/27/2022  EXAMINATION: CT OF THE HEAD WITHOUT CONTRAST  8/27/2022 5:05 am TECHNIQUE: CT of the head was performed without the administration of intravenous contrast. Automated exposure control, iterative reconstruction, and/or weight based adjustment of the mA/kV was utilized to reduce the radiation dose to as low as reasonably achievable.  COMPARISON: 05/22/2021 HISTORY: [address] PROVIDED HISTORY: AMS with fall TECHNOLOGIST PROVIDED HISTORY: Reason for exam:->AMS with fall Has a \"code stroke\" or \"stroke alert\" been called? ->No Decision Support Exception - unselect if not a suspected or confirmed emergency medical condition->Emergency Medical Condition (MA) FINDINGS: BRAIN/VENTRICLES: There is no acute intracranial hemorrhage, mass effect or midline shift. No abnormal extra-axial fluid collection. The gray-white differentiation is maintained without evidence of an acute infarct. There is no evidence of hydrocephalus. ORBITS: The visualized portion of the orbits demonstrate no acute abnormality. SINUSES: The visualized paranasal sinuses and mastoid air cells demonstrate no acute abnormality. There is opacification of the mastoid air cells bilaterally. SOFT TISSUES/SKULL:  No acute abnormality of the visualized skull or soft tissues. No acute intracranial abnormality. CT Cervical Spine WO Contrast    Result Date: 8/27/2022  EXAMINATION: CT OF THE CERVICAL SPINE WITHOUT CONTRAST 8/27/2022 5:05 am TECHNIQUE: CT of the cervical spine was performed without the administration of intravenous contrast. Multiplanar reformatted images are provided for review. Automated exposure control, iterative reconstruction, and/or weight based adjustment of the mA/kV was utilized to reduce the radiation dose to as low as reasonably achievable. COMPARISON: 02/27/2021 HISTORY: ORDERING SYSTEM PROVIDED HISTORY: fall and hit head TECHNOLOGIST PROVIDED HISTORY: Reason for exam:->fall and hit head Decision Support Exception - unselect if not a suspected or confirmed emergency medical condition->Emergency Medical Condition (MA) FINDINGS: BONES/ALIGNMENT: There is no acute fracture or traumatic malalignment. There is reversal of the normal lordotic curvature with moderately severe discogenic changes. DEGENERATIVE CHANGES: No significant degenerative changes.  SOFT TISSUES: There is no prevertebral soft tissue swelling. No acute abnormality of the cervical spine. XR CHEST PORTABLE    Result Date: 8/27/2022  EXAMINATION: ONE XRAY VIEW OF THE CHEST8/27/2022 TECHNIQUE: Frontal view submitted COMPARISON: None. HISTORY: ORDERING SYSTEM PROVIDED HISTORY: emergency TECHNOLOGIST PROVIDED HISTORY: Reason for exam:->emergency FINDINGS: The lungs are clear without focal consolidation, large pleural effusion, or pneumothorax. The cardiomediastinal silhouette is stable. No acute cardiopulmonary process. XR CHEST PORTABLE    Result Date: 7/29/2022  EXAMINATION: ONE XRAY VIEW OF THE CHEST 7/29/2022 1:49 pm COMPARISON: None. HISTORY: ORDERING SYSTEM PROVIDED HISTORY: pain TECHNOLOGIST PROVIDED HISTORY: Reason for exam:->pain FINDINGS: The lungs are without acute focal process. There is no effusion or pneumothorax. The cardiomediastinal silhouette is without acute process. The osseous structures are without acute process. No acute process. MILD CARDIOMEGALY. NM Cardiac Stress Test Nuclear Imaging    Result Date: 7/30/2022  Indication:  Chest discomfort Clinical History:   Patient has known previous history of coronary artery disease. IMAGING: Myocardial perfusion imaging was performed at rest 30-35 minutes following the intravenous injection of 13.2 mCi of (Tc-Sestamibi) followed by 10 ml of Normal Saline. At peak exercise, the patient was injected intravenously with 36. 1mCi of (Tc-Sestamibi) followed by 10 ml of Normal Saline. Gated post-stress tomographic imaging was performed 20-25 minutes after stress. FINDINGS: The overall quality of the study was good. Left ventricular cavity size was noted to be normal.  EDV 77 mL Rotational analog analysis demonstrated breast tissue attenuation artifact.  The gated SPECT stress imaging in the short, vertical long, and horizontal long axes demonstrate decreased uptake in a mid anterolateral apical segment of mild degree with complete resolution at rest. Gated SPECT left ventricular ejection fraction was calculated to be 68%, with normal myocardial segmental wall motion. 1. Mildly abnormal nuclear perfusion to mid lateral segment during stress with complete resolution at rest suggesting stress-induced ischemia. . 2. Normal global left ventricular wall motion with Gated SPECT left ventricular ejection fraction 68%. Jase Davey 23           ------------------------- NURSING NOTES AND VITALS REVIEWED ---------------------------  Date / Time Roomed:  8/27/2022  3:22 AM  ED Bed Assignment:  28/28    The nursing notes within the ED encounter and vital signs as below have been reviewed. Patient Vitals for the past 24 hrs:   BP Temp Temp src Pulse Resp SpO2 Height Weight   08/27/22 0327 (!) 190/81 97.5 °F (36.4 °C) Oral 66 16 97 % 5' 5\" (1.651 m) 180 lb (81.6 kg)       Oxygen Saturation Interpretation: Normal      ------------------------------------------ PROGRESS NOTES ------------------------------------------  Re-evaluation(s):  Time: 0407. Patients symptoms are improving  Repeat physical examination is mildly improved        I have spoken with the patient and daughter over the phone  and discussed todays results, in addition to providing specific details for the plan of care and counseling regarding the diagnosis and prognosis. Their questions are answered at this time and they are agreeable with the plan.      --------------------------------- ADDITIONAL PROVIDER NOTES ---------------------------------  Consultations:  Spoke with Dr. Marshall Vela,  They will admit this patient. This patient's ED course included: a personal history and physicial examination, re-evaluation prior to disposition, cardiac monitoring, and continuous pulse oximetry    This patient has improved during their ED course.     Please note that the withdrawal or failure to initiate urgent interventions for this patient would likely result in a life threatening deterioration or permanent disability. Clinical Impression  1. Altered mental status, unspecified altered mental status type    2. Seizure [facility])          Disposition  Patient's disposition: Admit to telemetry  Patient's condition is fair.                  Dar Aguila DO  Resident  08/27/22 6821

## 2022-08-27 NOTE — PLAN OF CARE
Problem: Discharge Planning  Goal: Discharge to home or other facility with appropriate resources  Outcome: Progressing  Flowsheets (Taken 8/27/2022 0752)  Discharge to home or other facility with appropriate resources:   Identify barriers to discharge with patient and caregiver   Identify discharge learning needs (meds, wound care, etc)     Problem: Safety - Adult  Goal: Free from fall injury  Outcome: Progressing     Problem: ABCDS Injury Assessment  Goal: Absence of physical injury  Outcome: Progressing  Flowsheets (Taken 8/27/2022 8419)  Absence of Physical Injury: Implement safety measures based on patient assessment

## 2022-08-28 PROCEDURE — 6370000000 HC RX 637 (ALT 250 FOR IP): Performed by: GENERAL PRACTICE

## 2022-08-28 PROCEDURE — 6360000002 HC RX W HCPCS: Performed by: GENERAL PRACTICE

## 2022-08-28 PROCEDURE — 96376 TX/PRO/DX INJ SAME DRUG ADON: CPT

## 2022-08-28 PROCEDURE — G0378 HOSPITAL OBSERVATION PER HR: HCPCS

## 2022-08-28 RX ADMIN — PREGABALIN 75 MG: 75 CAPSULE ORAL at 08:54

## 2022-08-28 RX ADMIN — LISINOPRIL 20 MG: 20 TABLET ORAL at 08:55

## 2022-08-28 RX ADMIN — QUETIAPINE FUMARATE 200 MG: 100 TABLET ORAL at 20:13

## 2022-08-28 RX ADMIN — PANTOPRAZOLE SODIUM 40 MG: 40 TABLET, DELAYED RELEASE ORAL at 06:22

## 2022-08-28 RX ADMIN — ONDANSETRON 4 MG: 2 INJECTION INTRAMUSCULAR; INTRAVENOUS at 09:12

## 2022-08-28 RX ADMIN — ASPIRIN 81 MG CHEWABLE TABLET 81 MG: 81 TABLET CHEWABLE at 08:57

## 2022-08-28 RX ADMIN — ONDANSETRON 4 MG: 2 INJECTION INTRAMUSCULAR; INTRAVENOUS at 01:24

## 2022-08-28 RX ADMIN — FOLIC ACID 1 MG: 1 TABLET ORAL at 08:57

## 2022-08-28 RX ADMIN — PREGABALIN 75 MG: 75 CAPSULE ORAL at 15:24

## 2022-08-28 RX ADMIN — AMLODIPINE BESYLATE 10 MG: 10 TABLET ORAL at 08:57

## 2022-08-28 RX ADMIN — METOPROLOL SUCCINATE 50 MG: 50 TABLET, EXTENDED RELEASE ORAL at 08:55

## 2022-08-28 RX ADMIN — ATORVASTATIN CALCIUM 80 MG: 40 TABLET, FILM COATED ORAL at 20:14

## 2022-08-28 RX ADMIN — PREGABALIN 75 MG: 75 CAPSULE ORAL at 20:13

## 2022-08-28 RX ADMIN — IBUPROFEN 600 MG: 600 TABLET ORAL at 17:50

## 2022-08-28 RX ADMIN — ONDANSETRON 4 MG: 2 INJECTION INTRAMUSCULAR; INTRAVENOUS at 15:39

## 2022-08-28 ASSESSMENT — PAIN SCALES - GENERAL
PAINLEVEL_OUTOF10: 0
PAINLEVEL_OUTOF10: 6
PAINLEVEL_OUTOF10: 4

## 2022-08-28 ASSESSMENT — PAIN DESCRIPTION - LOCATION: LOCATION: LEG

## 2022-08-29 VITALS
HEIGHT: 65 IN | BODY MASS INDEX: 34.07 KG/M2 | DIASTOLIC BLOOD PRESSURE: 70 MMHG | SYSTOLIC BLOOD PRESSURE: 143 MMHG | WEIGHT: 204.5 LBS | TEMPERATURE: 98.3 F | HEART RATE: 60 BPM | OXYGEN SATURATION: 96 % | RESPIRATION RATE: 16 BRPM

## 2022-08-29 PROCEDURE — G0378 HOSPITAL OBSERVATION PER HR: HCPCS

## 2022-08-29 PROCEDURE — 6360000002 HC RX W HCPCS: Performed by: GENERAL PRACTICE

## 2022-08-29 PROCEDURE — 6370000000 HC RX 637 (ALT 250 FOR IP): Performed by: GENERAL PRACTICE

## 2022-08-29 PROCEDURE — 96376 TX/PRO/DX INJ SAME DRUG ADON: CPT

## 2022-08-29 RX ADMIN — ASPIRIN 81 MG CHEWABLE TABLET 81 MG: 81 TABLET CHEWABLE at 09:17

## 2022-08-29 RX ADMIN — IBUPROFEN 600 MG: 600 TABLET ORAL at 09:17

## 2022-08-29 RX ADMIN — LISINOPRIL 20 MG: 20 TABLET ORAL at 09:18

## 2022-08-29 RX ADMIN — FOLIC ACID 1 MG: 1 TABLET ORAL at 09:18

## 2022-08-29 RX ADMIN — PREGABALIN 75 MG: 75 CAPSULE ORAL at 14:40

## 2022-08-29 RX ADMIN — PANTOPRAZOLE SODIUM 40 MG: 40 TABLET, DELAYED RELEASE ORAL at 06:30

## 2022-08-29 RX ADMIN — AMLODIPINE BESYLATE 10 MG: 10 TABLET ORAL at 09:18

## 2022-08-29 RX ADMIN — PREGABALIN 75 MG: 75 CAPSULE ORAL at 09:18

## 2022-08-29 RX ADMIN — METOPROLOL SUCCINATE 50 MG: 50 TABLET, EXTENDED RELEASE ORAL at 09:18

## 2022-08-29 RX ADMIN — ONDANSETRON 4 MG: 2 INJECTION INTRAMUSCULAR; INTRAVENOUS at 03:49

## 2022-08-29 RX ADMIN — ONDANSETRON 4 MG: 2 INJECTION INTRAMUSCULAR; INTRAVENOUS at 09:19

## 2022-08-29 ASSESSMENT — PAIN DESCRIPTION - DESCRIPTORS: DESCRIPTORS: SHARP;SPASM

## 2022-08-29 ASSESSMENT — PAIN DESCRIPTION - LOCATION: LOCATION: OTHER (COMMENT)

## 2022-08-29 ASSESSMENT — PAIN SCALES - GENERAL: PAINLEVEL_OUTOF10: 8

## 2022-08-29 NOTE — CARE COORDINATION
Social work / Discharge planning:            Patient is a readmission from home . Lives with son and daughter in law and uses a cane prn. Discharge order noted. No needs identified at this time.   Electronically signed by JENNIFER Simmons on 8/29/2022 at 9:33 AM

## 2022-09-06 ENCOUNTER — HOSPITAL ENCOUNTER (OUTPATIENT)
Age: 67
Discharge: HOME | End: 2022-09-06
Payer: MEDICARE

## 2022-09-06 LAB
ANION GAP SERPL CALCULATED.3IONS-SCNC: 13 MMOL/L (ref 7–16)
BASOPHILS ABSOLUTE: 0.04 E9/L (ref 0–0.2)
BASOPHILS RELATIVE PERCENT: 0.4 % (ref 0–2)
BUN BLDV-MCNC: 30 MG/DL (ref 6–23)
CALCIUM SERPL-MCNC: 9.1 MG/DL (ref 8.6–10.2)
CHLORIDE BLD-SCNC: 100 MMOL/L (ref 98–107)
CO2: 23 MMOL/L (ref 22–29)
CREAT SERPL-MCNC: 1.5 MG/DL (ref 0.5–1)
EOSINOPHILS ABSOLUTE: 0.47 E9/L (ref 0.05–0.5)
EOSINOPHILS RELATIVE PERCENT: 5.1 % (ref 0–6)
GFR AFRICAN AMERICAN: 42
GFR NON-AFRICAN AMERICAN: 34 ML/MIN/1.73
GLUCOSE BLD-MCNC: 101 MG/DL (ref 74–99)
HCT VFR BLD CALC: 36.7 % (ref 34–48)
HEMOGLOBIN: 11.9 G/DL (ref 11.5–15.5)
IMMATURE GRANULOCYTES #: 0.03 E9/L
IMMATURE GRANULOCYTES %: 0.3 % (ref 0–5)
LYMPHOCYTES ABSOLUTE: 2.21 E9/L (ref 1.5–4)
LYMPHOCYTES RELATIVE PERCENT: 23.8 % (ref 20–42)
MCH RBC QN AUTO: 30.7 PG (ref 26–35)
MCHC RBC AUTO-ENTMCNC: 32.4 % (ref 32–34.5)
MCV RBC AUTO: 94.8 FL (ref 80–99.9)
MONOCYTES ABSOLUTE: 0.88 E9/L (ref 0.1–0.95)
MONOCYTES RELATIVE PERCENT: 9.5 % (ref 2–12)
NEUTROPHILS ABSOLUTE: 5.66 E9/L (ref 1.8–7.3)
NEUTROPHILS RELATIVE PERCENT: 60.9 % (ref 43–80)
PARATHYROID HORMONE INTACT: 90 PG/ML (ref 15–65)
PDW BLD-RTO: 13.7 FL (ref 11.5–15)
PHOSPHORUS: 5.6 MG/DL (ref 2.5–4.5)
PLATELET # BLD: 206 E9/L (ref 130–450)
PMV BLD AUTO: 9.7 FL (ref 7–12)
POTASSIUM SERPL-SCNC: 4.8 MMOL/L (ref 3.5–5)
RBC # BLD: 3.87 E12/L (ref 3.5–5.5)
SODIUM BLD-SCNC: 136 MMOL/L (ref 132–146)
VITAMIN D 25-HYDROXY: 23 NG/ML (ref 30–100)
WBC # BLD: 9.3 E9/L (ref 4.5–11.5)

## 2022-09-06 PROCEDURE — 85025 COMPLETE CBC W/AUTO DIFF WBC: CPT

## 2022-09-06 PROCEDURE — 80048 BASIC METABOLIC PNL TOTAL CA: CPT

## 2022-09-06 PROCEDURE — 36415 COLL VENOUS BLD VENIPUNCTURE: CPT

## 2022-09-06 PROCEDURE — 82306 VITAMIN D 25 HYDROXY: CPT

## 2022-09-06 PROCEDURE — 83970 ASSAY OF PARATHORMONE: CPT

## 2022-09-06 PROCEDURE — 84100 ASSAY OF PHOSPHORUS: CPT

## 2022-09-06 NOTE — H&P
[address] [address]                              HISTORY AND PHYSICAL    PATIENT NAME: Nancy Knight                   :        1955  MED REC NO:   [id number]                            ROOM:       3985  ACCOUNT NO:   [de-identified]                           ADMIT DATE: 2022  PROVIDER:     Stephen Mora DO    HISTORY OF PRESENT ILLNESS:  This is a 64-year-old white female, well  known to me, who actually presented to the emergency department stating  that she got up in the middle of the night and had a fall. She does not  really remember the events. She is a very poor historian. She seems to  be a little bit confused and some mental status changes. Family called  EMS for assistance. She seemed to be altered. She had a similar  episode in the past.  No diagnosis was made. White count was normal.   Hemoglobin and hematocrit were normal.  Mildly elevated blood glucose  and mildly elevated BUN. Troponins were negative. ProBNP was negative. Urinalysis was unremarkable. CT of the head was without intracranial  pathology. CT of the cervical spine was without any acute abnormality. Chest x-ray was reported as normal, maybe mild cardiomegaly. Her blood  pressure was a bit elevated in the emergency department at 190/81. Temperature was normal.  Respirations were 16. O2 saturations were  good. She is going to be admitted for observation. PAST MEDICAL HISTORY:  Significant for coronary artery disease,  degenerative disk disease, hard of hearing, hypertension, previous  myocardial infarction, psoriasis, ruptured eardrum, left-sided sciatica. PAST SURGICAL HISTORY:  Includes  section, tonsillectomy,  hysterectomy, hemorrhoid surgery, breast lumpectomy, coronary  angioplasty with stent, heart catheterization. SOCIAL HISTORY:  The patient is a smoker.   Previous history of drug use.  Previous history of alcohol use. FAMILY HISTORY:  Positive for diabetes in her father and lung cancer in  her mother. ALLERGIES:  She has allergies to NITROGLYCERIN and TRAMADOL. MEDICATIONS:  Include Advil as needed, atorvastatin 80 daily, Lyrica 75  b.i.d., metoprolol XL daily, Zestril 20 daily, amlodipine 10 daily,  Seroquel 200 at bedtime, and folic acid 1 mg daily. REVIEW OF SYSTEMS:  At this time, she denies cephalgia or vertigo. Positive for hearing loss. No visual disturbances. No difficulty  swallowing or bloody noses. No hoarseness in her voice. She currently  has no chest pain, palpitations, orthopnea, paroxysmal nocturnal  dyspnea, or edema. There is no cough, wheeze, shortness of breath,  hemoptysis, or pleuritic pain. There is no nausea, vomiting, diarrhea,  or constipation. No blood in her stool. No recent change in weight. No urgency, frequency, dysuria, or hematuria. The endocrine system is  negative for diabetes or thyroid disorder. Musculoskeletal system is  positive for degenerative joint and degenerative disk disease. Psychiatric system is positive for anxiety and depression. Neurologic  system is positive for altered mental status. No stroke, seizures,  tremors, tics, or TIAs. Skin is without rash, eruptions, urticaria, or  pruritus. PHYSICAL EXAMINATION:  GENERAL:  Shows this to be a 67-year-old white female, in moderate  distress with the above complaints. HEAD, EYES, EARS, NOSE, AND THROAT:  Show the head to be normocephalic  and atraumatic. The pupils are equal and reactive to light and  accommodation. Extraocular eye muscles are intact. Tympanic membranes  are clear. Ear canals are patent. Oral mucosa is pink and moist.  NECK:  Veins are flat and nondistended. No carotid bruits. CHEST:  Symmetrical.  HEART:  Had a regular rate and rhythm without murmurs, gallops, or  friction rubs.   LUNGS:  Clear to auscultation bilaterally without rhonchi, rales, or  wheezes. ABDOMEN:  Soft, nontender, and nondistended. Bowel sounds are present  in all four quadrants. EXTERNAL GENITALIA:  Intact. EXTREMITIES:  Peripheral pulses are intact. Legs are without edema. BACK:  Spine shows physiologic curve. IMPRESSION:  Initial impression at this time is likely accidental fall  with closed head injury and mild altered mental status changes. PLAN:  The patient is going to be brought in and monitored and observed. Etiology of this fall is undetermined at this time. I am doubtful that  it is a seizure. No CVA. At the time of her admission, her condition  is fair and her prognosis is good.         Phan Jo DO    D: 09/06/2022 9:02:31       T: 09/06/2022 9:05:53     [email or web address]  Job#: [id number]     Doc#: [id number]    CC:

## 2022-09-07 NOTE — DISCHARGE SUMMARY
[address] [address]                               DISCHARGE SUMMARY    PATIENT NAME: Destinee Rosa                   :        1955  MED REC NO:   [id number]                            ROOM:       2632  ACCOUNT NO:   [de-identified]                           ADMIT DATE: 2022  PROVIDER:     Arnie Frank DO                  [address] [facility] DATE: 2022    FINAL DIAGNOSES:  1. Accidental fall. 2.  Closed head injury. 3.  Questionable altered mental status. 4.  Very hard of hearing. BRIEF HISTORY AND HOSPITAL COURSE:  The patient is a 49-year-old white  female who was getting out of bed and seemed to wake up from her sleep  and had a fall and struck her head, she thinks on the nightstand, she is  not sure, very hard of hearing, difficulty getting good history from. Family was concerned she could not recall the events clearly enough and  thought maybe she may have had a concussion or seizure or stroke, so  they called EMS and EMS brought her into the emergency room, where she  was evaluated. CT of the head was negative for acute intracranial  pathology. CT of the cervical spine was negative for any acute bony  pathology. Chest x-ray showed mild cardiomegaly. Blood pressure was  slightly elevated. Labs were pretty much within normal limits. She had  a similar episode about a year ago and had a neurological workup with an  EEG and this did not demonstrate any seizure activity. Previous imaging  does not suggest any CVA. When I saw her the next day after admission,  she seemed to be quite clear except very tired from being up all night,  oriented to time, person, place. She was kept for 24-hour observation.    No neurologic abnormalities were appreciated and she was subsequently  discharged to home with her current medical regimen and is to follow up  in the office in approximately a week or so, sooner if symptoms should  recur. At the time of discharge, her condition was good. Her prognosis  is good.         Stewart Marley DO    D: 09/06/2022 9:30:27       T: 09/06/2022 9:33:55     [email or web address]  Job#: [id number]     Doc#: [id number]    CC:

## 2023-03-07 ENCOUNTER — HOSPITAL ENCOUNTER (OUTPATIENT)
Age: 68
Setting detail: OBSERVATION
Discharge: HOME | End: 2023-03-09
Attending: EMERGENCY MEDICINE | Admitting: GENERAL PRACTICE
Payer: MEDICARE

## 2023-03-07 ENCOUNTER — APPOINTMENT (OUTPATIENT)
Dept: CT IMAGING | Age: 68
End: 2023-03-07
Payer: MEDICARE

## 2023-03-07 ENCOUNTER — APPOINTMENT (OUTPATIENT)
Dept: GENERAL RADIOLOGY | Age: 68
End: 2023-03-07
Payer: MEDICARE

## 2023-03-07 DIAGNOSIS — R42 DIZZINESS: ICD-10-CM

## 2023-03-07 DIAGNOSIS — I95.9 HYPOTENSION, UNSPECIFIED HYPOTENSION TYPE: ICD-10-CM

## 2023-03-07 DIAGNOSIS — R10.84 GENERALIZED ABDOMINAL PAIN: Primary | ICD-10-CM

## 2023-03-07 LAB
ANION GAP SERPL CALCULATED.3IONS-SCNC: 9 MMOL/L (ref 7–16)
BASOPHILS ABSOLUTE: 0 E9/L (ref 0–0.2)
BASOPHILS RELATIVE PERCENT: 0 % (ref 0–2)
BILIRUBIN URINE: NEGATIVE
BLOOD, URINE: NEGATIVE
BUN BLDV-MCNC: 21 MG/DL (ref 6–23)
CALCIUM SERPL-MCNC: 8.2 MG/DL (ref 8.6–10.2)
CHLORIDE BLD-SCNC: 105 MMOL/L (ref 98–107)
CLARITY: CLEAR
CO2: 24 MMOL/L (ref 22–29)
COLOR: YELLOW
CREAT SERPL-MCNC: 1.3 MG/DL (ref 0.5–1)
EOSINOPHILS ABSOLUTE: 0 E9/L (ref 0.05–0.5)
EOSINOPHILS RELATIVE PERCENT: 0 % (ref 0–6)
GFR SERPL CREATININE-BSD FRML MDRD: 44 ML/MIN/1.73
GLUCOSE BLD-MCNC: 142 MG/DL (ref 74–99)
GLUCOSE URINE: NEGATIVE MG/DL
HCT VFR BLD CALC: 37 % (ref 34–48)
HEMOGLOBIN: 11.8 G/DL (ref 11.5–15.5)
KETONES, URINE: NEGATIVE MG/DL
LACTIC ACID: 0.8 MMOL/L (ref 0.5–2.2)
LACTIC ACID: 1.2 MMOL/L (ref 0.5–2.2)
LEUKOCYTE ESTERASE, URINE: NEGATIVE
LIPASE: 28 U/L (ref 13–60)
LYMPHOCYTES ABSOLUTE: 2.37 E9/L (ref 1.5–4)
LYMPHOCYTES RELATIVE PERCENT: 9.5 % (ref 20–42)
MCH RBC QN AUTO: 30.4 PG (ref 26–35)
MCHC RBC AUTO-ENTMCNC: 31.9 % (ref 32–34.5)
MCV RBC AUTO: 95.4 FL (ref 80–99.9)
MONOCYTES ABSOLUTE: 0.95 E9/L (ref 0.1–0.95)
MONOCYTES RELATIVE PERCENT: 4.3 % (ref 2–12)
NEUTROPHILS ABSOLUTE: 20.38 E9/L (ref 1.8–7.3)
NEUTROPHILS RELATIVE PERCENT: 86.2 % (ref 43–80)
NITRITE, URINE: NEGATIVE
NUCLEATED RED BLOOD CELLS: 0 /100 WBC
OVALOCYTES: ABNORMAL
PDW BLD-RTO: 14.4 FL (ref 11.5–15)
PH UA: 6.5 (ref 5–9)
PLATELET # BLD: 274 E9/L (ref 130–450)
PMV BLD AUTO: 10.4 FL (ref 7–12)
POIKILOCYTES: ABNORMAL
POLYCHROMASIA: ABNORMAL
POTASSIUM SERPL-SCNC: 4.2 MMOL/L (ref 3.5–5)
PROTEIN UA: NEGATIVE MG/DL
RBC # BLD: 3.88 E12/L (ref 3.5–5.5)
REASON FOR REJECTION: NORMAL
REJECTED TEST: NORMAL
SARS-COV-2, NAAT: NOT DETECTED
SODIUM BLD-SCNC: 138 MMOL/L (ref 132–146)
SPECIFIC GRAVITY UA: <=1.005 (ref 1–1.03)
TEAR DROP CELLS: ABNORMAL
TROPONIN, HIGH SENSITIVITY: 7 NG/L (ref 0–9)
UROBILINOGEN, URINE: 0.2 E.U./DL
WBC # BLD: 23.7 E9/L (ref 4.5–11.5)

## 2023-03-07 PROCEDURE — 84484 ASSAY OF TROPONIN QUANT: CPT

## 2023-03-07 PROCEDURE — 87635 SARS-COV-2 COVID-19 AMP PRB: CPT

## 2023-03-07 PROCEDURE — 74177 CT ABD & PELVIS W/CONTRAST: CPT

## 2023-03-07 PROCEDURE — 87040 BLOOD CULTURE FOR BACTERIA: CPT

## 2023-03-07 PROCEDURE — 83690 ASSAY OF LIPASE: CPT

## 2023-03-07 PROCEDURE — 6360000002 HC RX W HCPCS: Performed by: EMERGENCY MEDICINE

## 2023-03-07 PROCEDURE — 70450 CT HEAD/BRAIN W/O DYE: CPT

## 2023-03-07 PROCEDURE — 6360000004 HC RX CONTRAST MEDICATION: Performed by: RADIOLOGY

## 2023-03-07 PROCEDURE — 96365 THER/PROPH/DIAG IV INF INIT: CPT

## 2023-03-07 PROCEDURE — 96374 THER/PROPH/DIAG INJ IV PUSH: CPT

## 2023-03-07 PROCEDURE — 2580000003 HC RX 258: Performed by: GENERAL PRACTICE

## 2023-03-07 PROCEDURE — 99285 EMERGENCY DEPT VISIT HI MDM: CPT

## 2023-03-07 PROCEDURE — 81003 URINALYSIS AUTO W/O SCOPE: CPT

## 2023-03-07 PROCEDURE — 96361 HYDRATE IV INFUSION ADD-ON: CPT

## 2023-03-07 PROCEDURE — 96375 TX/PRO/DX INJ NEW DRUG ADDON: CPT

## 2023-03-07 PROCEDURE — 2500000003 HC RX 250 WO HCPCS: Performed by: EMERGENCY MEDICINE

## 2023-03-07 PROCEDURE — 85025 COMPLETE CBC W/AUTO DIFF WBC: CPT

## 2023-03-07 PROCEDURE — G0378 HOSPITAL OBSERVATION PER HR: HCPCS

## 2023-03-07 PROCEDURE — 71045 X-RAY EXAM CHEST 1 VIEW: CPT

## 2023-03-07 PROCEDURE — 2580000003 HC RX 258: Performed by: EMERGENCY MEDICINE

## 2023-03-07 PROCEDURE — 36415 COLL VENOUS BLD VENIPUNCTURE: CPT

## 2023-03-07 PROCEDURE — 6370000000 HC RX 637 (ALT 250 FOR IP): Performed by: GENERAL PRACTICE

## 2023-03-07 PROCEDURE — 96366 THER/PROPH/DIAG IV INF ADDON: CPT

## 2023-03-07 PROCEDURE — 80048 BASIC METABOLIC PNL TOTAL CA: CPT

## 2023-03-07 PROCEDURE — 83605 ASSAY OF LACTIC ACID: CPT

## 2023-03-07 RX ORDER — PREGABALIN 75 MG/1
75 CAPSULE ORAL 3 TIMES DAILY
Status: DISCONTINUED | OUTPATIENT
Start: 2023-03-07 | End: 2023-03-09 | Stop reason: HOSPADM

## 2023-03-07 RX ORDER — FOLIC ACID 1 MG/1
1 TABLET ORAL DAILY
Status: DISCONTINUED | OUTPATIENT
Start: 2023-03-08 | End: 2023-03-09 | Stop reason: HOSPADM

## 2023-03-07 RX ORDER — 0.9 % SODIUM CHLORIDE 0.9 %
1000 INTRAVENOUS SOLUTION INTRAVENOUS ONCE
Status: COMPLETED | OUTPATIENT
Start: 2023-03-07 | End: 2023-03-07

## 2023-03-07 RX ORDER — PANTOPRAZOLE SODIUM 40 MG/1
40 TABLET, DELAYED RELEASE ORAL
Status: DISCONTINUED | OUTPATIENT
Start: 2023-03-08 | End: 2023-03-09 | Stop reason: HOSPADM

## 2023-03-07 RX ORDER — BENZONATATE 100 MG/1
100 CAPSULE ORAL 3 TIMES DAILY PRN
Status: DISCONTINUED | OUTPATIENT
Start: 2023-03-07 | End: 2023-03-09 | Stop reason: HOSPADM

## 2023-03-07 RX ORDER — QUETIAPINE FUMARATE 100 MG/1
200 TABLET, FILM COATED ORAL NIGHTLY
Status: DISCONTINUED | OUTPATIENT
Start: 2023-03-07 | End: 2023-03-09 | Stop reason: HOSPADM

## 2023-03-07 RX ORDER — METRONIDAZOLE 500 MG/100ML
500 INJECTION, SOLUTION INTRAVENOUS ONCE
Status: COMPLETED | OUTPATIENT
Start: 2023-03-07 | End: 2023-03-07

## 2023-03-07 RX ORDER — SODIUM CHLORIDE 9 MG/ML
INJECTION, SOLUTION INTRAVENOUS CONTINUOUS
Status: DISCONTINUED | OUTPATIENT
Start: 2023-03-07 | End: 2023-03-09

## 2023-03-07 RX ORDER — AMLODIPINE BESYLATE 10 MG/1
10 TABLET ORAL DAILY
Status: DISCONTINUED | OUTPATIENT
Start: 2023-03-08 | End: 2023-03-09 | Stop reason: HOSPADM

## 2023-03-07 RX ORDER — FENTANYL CITRATE 50 UG/ML
25 INJECTION, SOLUTION INTRAMUSCULAR; INTRAVENOUS ONCE
Status: COMPLETED | OUTPATIENT
Start: 2023-03-07 | End: 2023-03-07

## 2023-03-07 RX ORDER — ASPIRIN 81 MG/1
81 TABLET, CHEWABLE ORAL DAILY
Status: DISCONTINUED | OUTPATIENT
Start: 2023-03-08 | End: 2023-03-09 | Stop reason: HOSPADM

## 2023-03-07 RX ORDER — METOPROLOL SUCCINATE 50 MG/1
50 TABLET, EXTENDED RELEASE ORAL DAILY
Status: DISCONTINUED | OUTPATIENT
Start: 2023-03-08 | End: 2023-03-09 | Stop reason: HOSPADM

## 2023-03-07 RX ORDER — HYDROXYZINE HYDROCHLORIDE 25 MG/1
TABLET, FILM COATED ORAL
COMMUNITY
Start: 2023-02-22

## 2023-03-07 RX ORDER — HYDROXYZINE HYDROCHLORIDE 10 MG/1
25 TABLET, FILM COATED ORAL EVERY 6 HOURS PRN
Status: DISCONTINUED | OUTPATIENT
Start: 2023-03-07 | End: 2023-03-09 | Stop reason: HOSPADM

## 2023-03-07 RX ORDER — LISINOPRIL 20 MG/1
20 TABLET ORAL DAILY
Status: DISCONTINUED | OUTPATIENT
Start: 2023-03-08 | End: 2023-03-09 | Stop reason: HOSPADM

## 2023-03-07 RX ORDER — ONDANSETRON 2 MG/ML
4 INJECTION INTRAMUSCULAR; INTRAVENOUS EVERY 4 HOURS PRN
Status: DISCONTINUED | OUTPATIENT
Start: 2023-03-07 | End: 2023-03-09 | Stop reason: HOSPADM

## 2023-03-07 RX ORDER — ATORVASTATIN CALCIUM 40 MG/1
80 TABLET, FILM COATED ORAL NIGHTLY
Status: DISCONTINUED | OUTPATIENT
Start: 2023-03-07 | End: 2023-03-09 | Stop reason: HOSPADM

## 2023-03-07 RX ORDER — ACETAMINOPHEN 325 MG/1
650 TABLET ORAL EVERY 4 HOURS PRN
Status: DISCONTINUED | OUTPATIENT
Start: 2023-03-07 | End: 2023-03-09 | Stop reason: HOSPADM

## 2023-03-07 RX ADMIN — ATORVASTATIN CALCIUM 80 MG: 40 TABLET, FILM COATED ORAL at 20:39

## 2023-03-07 RX ADMIN — IOPAMIDOL 75 ML: 755 INJECTION, SOLUTION INTRAVENOUS at 15:08

## 2023-03-07 RX ADMIN — WATER 1000 MG: 1 INJECTION INTRAMUSCULAR; INTRAVENOUS; SUBCUTANEOUS at 17:22

## 2023-03-07 RX ADMIN — SODIUM CHLORIDE: 9 INJECTION, SOLUTION INTRAVENOUS at 19:46

## 2023-03-07 RX ADMIN — FENTANYL CITRATE 25 MCG: 50 INJECTION INTRAMUSCULAR; INTRAVENOUS at 14:20

## 2023-03-07 RX ADMIN — QUETIAPINE FUMARATE 200 MG: 100 TABLET ORAL at 20:39

## 2023-03-07 RX ADMIN — PREGABALIN 75 MG: 75 CAPSULE ORAL at 20:39

## 2023-03-07 RX ADMIN — BENZONATATE 100 MG: 100 CAPSULE ORAL at 20:39

## 2023-03-07 RX ADMIN — METRONIDAZOLE 500 MG: 500 INJECTION, SOLUTION INTRAVENOUS at 17:33

## 2023-03-07 RX ADMIN — SODIUM CHLORIDE 1000 ML: 9 INJECTION, SOLUTION INTRAVENOUS at 14:20

## 2023-03-07 RX ADMIN — ACETAMINOPHEN 650 MG: 325 TABLET ORAL at 20:39

## 2023-03-07 ASSESSMENT — PAIN DESCRIPTION - LOCATION
LOCATION: ABDOMEN
LOCATION: ABDOMEN

## 2023-03-07 ASSESSMENT — PAIN DESCRIPTION - ORIENTATION
ORIENTATION: LEFT
ORIENTATION: MID

## 2023-03-07 ASSESSMENT — PAIN SCALES - GENERAL
PAINLEVEL_OUTOF10: 6
PAINLEVEL_OUTOF10: 6

## 2023-03-07 ASSESSMENT — PAIN - FUNCTIONAL ASSESSMENT: PAIN_FUNCTIONAL_ASSESSMENT: NONE - DENIES PAIN

## 2023-03-07 NOTE — PROGRESS NOTES
Database initiated. Patient is A&O independent from home with son. States she uses no assistive devices and is RA at baseline. She states she falls often. She is VARY hard of hearing.

## 2023-03-07 NOTE — ED NOTES
ED to Inpatient Handoff Report    Notified Julia Lin that electronic handoff available and patient ready for transport to room 644. Safety Risks: Hearing problems    Patient in Restraints: no    Constant Observer or Patient : no    Telemetry Monitoring Ordered :Yes           Order to transfer to unit without monitor:YES    Last MEWS: 1 Time completed: 0809    Vitals:    03/07/23 1500 03/07/23 1542 03/07/23 1630 03/07/23 1730   BP: 94/64  (!) 108/55 (!) 85/56   Pulse: 75 73 73 73   Resp: 17 16 18 17   Temp:    97.5 °F (36.4 °C)   TempSrc:       SpO2: 97%  93% 95%   Weight:       Height:           Opportunity for questions and clarification was provided.         Gerardo Yousif RN  03/07/23 0346 [facility], RN  03/07/23 8252

## 2023-03-08 LAB
BASOPHILS ABSOLUTE: 0.05 E9/L (ref 0–0.2)
BASOPHILS RELATIVE PERCENT: 0.3 % (ref 0–2)
EOSINOPHILS ABSOLUTE: 0.14 E9/L (ref 0.05–0.5)
EOSINOPHILS RELATIVE PERCENT: 0.9 % (ref 0–6)
HCT VFR BLD CALC: 34.5 % (ref 34–48)
HEMOGLOBIN: 10.9 G/DL (ref 11.5–15.5)
IMMATURE GRANULOCYTES #: 0.06 E9/L
IMMATURE GRANULOCYTES %: 0.4 % (ref 0–5)
LACTIC ACID: 1.4 MMOL/L (ref 0.5–2.2)
LYMPHOCYTES ABSOLUTE: 3.28 E9/L (ref 1.5–4)
LYMPHOCYTES RELATIVE PERCENT: 20.5 % (ref 20–42)
MCH RBC QN AUTO: 30.1 PG (ref 26–35)
MCHC RBC AUTO-ENTMCNC: 31.6 % (ref 32–34.5)
MCV RBC AUTO: 95.3 FL (ref 80–99.9)
MONOCYTES ABSOLUTE: 0.58 E9/L (ref 0.1–0.95)
MONOCYTES RELATIVE PERCENT: 3.6 % (ref 2–12)
NEUTROPHILS ABSOLUTE: 11.89 E9/L (ref 1.8–7.3)
NEUTROPHILS RELATIVE PERCENT: 74.3 % (ref 43–80)
PDW BLD-RTO: 14.6 FL (ref 11.5–15)
PLATELET # BLD: 240 E9/L (ref 130–450)
PMV BLD AUTO: 10.6 FL (ref 7–12)
RBC # BLD: 3.62 E12/L (ref 3.5–5.5)
SEDIMENTATION RATE, ERYTHROCYTE: 10 MM/HR (ref 0–20)
WBC # BLD: 16 E9/L (ref 4.5–11.5)

## 2023-03-08 PROCEDURE — G0378 HOSPITAL OBSERVATION PER HR: HCPCS

## 2023-03-08 PROCEDURE — 85025 COMPLETE CBC W/AUTO DIFF WBC: CPT

## 2023-03-08 PROCEDURE — 2580000003 HC RX 258: Performed by: GENERAL PRACTICE

## 2023-03-08 PROCEDURE — 6360000002 HC RX W HCPCS: Performed by: GENERAL PRACTICE

## 2023-03-08 PROCEDURE — 85651 RBC SED RATE NONAUTOMATED: CPT

## 2023-03-08 PROCEDURE — 96375 TX/PRO/DX INJ NEW DRUG ADDON: CPT

## 2023-03-08 PROCEDURE — 6370000000 HC RX 637 (ALT 250 FOR IP): Performed by: GENERAL PRACTICE

## 2023-03-08 PROCEDURE — 83605 ASSAY OF LACTIC ACID: CPT

## 2023-03-08 PROCEDURE — 36415 COLL VENOUS BLD VENIPUNCTURE: CPT

## 2023-03-08 PROCEDURE — 96361 HYDRATE IV INFUSION ADD-ON: CPT

## 2023-03-08 RX ORDER — KETOROLAC TROMETHAMINE 30 MG/ML
10 INJECTION, SOLUTION INTRAMUSCULAR; INTRAVENOUS ONCE
Status: COMPLETED | OUTPATIENT
Start: 2023-03-08 | End: 2023-03-08

## 2023-03-08 RX ORDER — KETOROLAC TROMETHAMINE 15 MG/ML
10 INJECTION, SOLUTION INTRAMUSCULAR; INTRAVENOUS ONCE
Status: DISCONTINUED | OUTPATIENT
Start: 2023-03-08 | End: 2023-03-08 | Stop reason: CLARIF

## 2023-03-08 RX ADMIN — BENZONATATE 100 MG: 100 CAPSULE ORAL at 20:13

## 2023-03-08 RX ADMIN — PREGABALIN 75 MG: 75 CAPSULE ORAL at 08:00

## 2023-03-08 RX ADMIN — LISINOPRIL 20 MG: 20 TABLET ORAL at 07:59

## 2023-03-08 RX ADMIN — SODIUM CHLORIDE: 9 INJECTION, SOLUTION INTRAVENOUS at 20:14

## 2023-03-08 RX ADMIN — PREGABALIN 75 MG: 75 CAPSULE ORAL at 20:13

## 2023-03-08 RX ADMIN — ATORVASTATIN CALCIUM 80 MG: 40 TABLET, FILM COATED ORAL at 20:13

## 2023-03-08 RX ADMIN — ACETAMINOPHEN 650 MG: 325 TABLET ORAL at 11:28

## 2023-03-08 RX ADMIN — QUETIAPINE FUMARATE 200 MG: 100 TABLET ORAL at 20:13

## 2023-03-08 RX ADMIN — BENZONATATE 100 MG: 100 CAPSULE ORAL at 11:28

## 2023-03-08 RX ADMIN — ASPIRIN 81 MG CHEWABLE TABLET 81 MG: 81 TABLET CHEWABLE at 07:59

## 2023-03-08 RX ADMIN — PREGABALIN 75 MG: 75 CAPSULE ORAL at 14:45

## 2023-03-08 RX ADMIN — SODIUM CHLORIDE: 9 INJECTION, SOLUTION INTRAVENOUS at 07:59

## 2023-03-08 RX ADMIN — PANTOPRAZOLE SODIUM 40 MG: 40 TABLET, DELAYED RELEASE ORAL at 06:26

## 2023-03-08 RX ADMIN — BENZONATATE 100 MG: 100 CAPSULE ORAL at 03:47

## 2023-03-08 RX ADMIN — METOPROLOL SUCCINATE 50 MG: 50 TABLET, EXTENDED RELEASE ORAL at 08:00

## 2023-03-08 RX ADMIN — FOLIC ACID 1 MG: 1 TABLET ORAL at 07:59

## 2023-03-08 RX ADMIN — KETOROLAC TROMETHAMINE 9.9 MG: 30 INJECTION, SOLUTION INTRAMUSCULAR; INTRAVENOUS at 17:14

## 2023-03-08 RX ADMIN — AMLODIPINE BESYLATE 10 MG: 10 TABLET ORAL at 08:00

## 2023-03-08 RX ADMIN — ACETAMINOPHEN 650 MG: 325 TABLET ORAL at 20:13

## 2023-03-08 ASSESSMENT — PAIN DESCRIPTION - DESCRIPTORS
DESCRIPTORS: DISCOMFORT
DESCRIPTORS: DISCOMFORT

## 2023-03-08 ASSESSMENT — PAIN DESCRIPTION - ORIENTATION
ORIENTATION: RIGHT;LEFT;LOWER
ORIENTATION: RIGHT;LEFT

## 2023-03-08 ASSESSMENT — PAIN - FUNCTIONAL ASSESSMENT
PAIN_FUNCTIONAL_ASSESSMENT: ACTIVITIES ARE NOT PREVENTED
PAIN_FUNCTIONAL_ASSESSMENT: PREVENTS OR INTERFERES SOME ACTIVE ACTIVITIES AND ADLS

## 2023-03-08 ASSESSMENT — PAIN DESCRIPTION - LOCATION
LOCATION: ABDOMEN
LOCATION: ABDOMEN

## 2023-03-08 ASSESSMENT — ENCOUNTER SYMPTOMS
EYE PAIN: 0
CONSTIPATION: 0
VOMITING: 0
ABDOMINAL DISTENTION: 0
HEMATEMESIS: 0
SORE THROAT: 0
HEMATOCHEZIA: 0
SHORTNESS OF BREATH: 0
ABDOMINAL PAIN: 1
SINUS PRESSURE: 0
DIARRHEA: 0
EYE DISCHARGE: 0
BACK PAIN: 0
COUGH: 0
NAUSEA: 1
EYE REDNESS: 0
WHEEZING: 0
BELCHING: 0

## 2023-03-08 ASSESSMENT — PAIN SCALES - GENERAL
PAINLEVEL_OUTOF10: 3
PAINLEVEL_OUTOF10: 6
PAINLEVEL_OUTOF10: 3
PAINLEVEL_OUTOF10: 0
PAINLEVEL_OUTOF10: 6
PAINLEVEL_OUTOF10: 0

## 2023-03-08 NOTE — ED PROVIDER NOTES
24-year-old female presents to the emergency department lower abdominal pain dizziness and low blood pressure. Patient reportedly symptoms started suddenly. She had a fall as well. She reports no headache vision changes nausea vomiting diarrhea. She previously had similar symptoms when she had evidence of a urinary tract infection. She states no other complaints at this time    The history is provided by the patient. Abdominal Pain  Pain location:  Generalized  Pain quality: aching    Pain radiates to:  Does not radiate  Pain severity:  Moderate  Onset quality:  Sudden  Duration:  2 hours  Timing:  Intermittent  Progression:  Waxing and waning  Chronicity:  New  Relieved by:  Nothing  Worsened by:  Nothing  Ineffective treatments:  None tried  Associated symptoms: nausea    Associated symptoms: no anorexia, no belching, no chest pain, no chills, no constipation, no cough, no diarrhea, no dysuria, no fever, no hematemesis, no hematochezia, no shortness of breath, no sore throat and no vomiting       Review of Systems   Constitutional:  Negative for chills and fever. HENT:  Negative for ear pain, sinus pressure and sore throat. Eyes:  Negative for pain, discharge and redness. Respiratory:  Negative for cough, shortness of breath and wheezing. Cardiovascular:  Negative for chest pain. Gastrointestinal:  Positive for abdominal pain and nausea. Negative for abdominal distention, anorexia, constipation, diarrhea, hematemesis, hematochezia and vomiting. Genitourinary:  Negative for dysuria and frequency. Musculoskeletal:  Negative for arthralgias and back pain. Skin:  Negative for rash and wound. Neurological:  Positive for dizziness and light-headedness. Negative for weakness and headaches. Hematological:  Negative for adenopathy. All other systems reviewed and are negative. Physical Exam  Constitutional:       Appearance: She is well-developed.    HENT:      Head: Normocephalic and atraumatic. Mouth/Throat:      Mouth: Mucous membranes are moist.   Eyes:      Extraocular Movements: Extraocular movements intact. Pupils: Pupils are equal, round, and reactive to light. Cardiovascular:      Rate and Rhythm: Normal rate and regular rhythm. Heart sounds: Normal heart sounds. Pulmonary:      Effort: Pulmonary effort is normal.      Breath sounds: Normal breath sounds. Abdominal:      General: Bowel sounds are normal.      Palpations: Abdomen is soft. Tenderness: There is generalized abdominal tenderness. Musculoskeletal:         General: Normal range of motion. Skin:     Capillary Refill: Capillary refill takes less than 2 seconds. Neurological:      Mental Status: She is alert and oriented to person, place, and time. Procedures     MDM  Number of Diagnoses or Management Options  Dizziness  Generalized abdominal pain  Hypotension, unspecified hypotension type  Diagnosis management comments: Patient was seen and examined. Labs and imaging were ordered. Imagings were found to be reassuring patient was noted have a leukocytosis 23.7. No clear etiology for this could be identified patient had recent ultrasound showed no evidence of an aneurysm and CT scanshows no concern for this. No evidence of diverticulitis or urinary tract infection was noted. After work-up patient was still having discomfort and had had hypotensive blood pressures that responded to fluids so patient deserves observation overnight and case was discussed with Dr. Samuel Villalba who agreed to admit the patient         ED Course as of 03/08/23 0730   Tue Mar 07, 2023   154 78-year-old female presents to the emergency department with sudden onset of abdominal pain and altered mental status the patient per son has had similar symptoms in the past related to urinary tract infections. Patient is reported a mild cough over the last few days on evaluation she is initially hypotensive.   Otherwise exam is benign. Did review patient's previous work-up she is an ultrasound of her abdomen revealed no evidence of aortic aneurysm. Her exam is general tenderness to the lower abdomen. We will start with IV fluid bolus lab work. Patient is agreeable to be straight catheter urinalysis. [CF]      ED Course User Index  [CF] Ori Dickinson, DO     --------------------------------------------- PAST HISTORY ---------------------------------------------  Past Medical History:  has a past medical history of CAD (coronary artery disease), Degenerative disorder of bone, Hard of hearing, Hypertension, MI (myocardial infarction) ([address] Psoriasis, Ruptured ear drum, and Sciatica of left side. Past Surgical History:  has a past surgical history that includes  section; Tonsillectomy; Hysterectomy; Hemorrhoid surgery; Breast lumpectomy (Left); Coronary angioplasty with stent (2020); joint replacement; and Cardiac catheterization (2022). Social History:  reports that she has been smoking cigarettes. She started smoking about 49 years ago. She has a 11.75 pack-year smoking history. She has never used smokeless tobacco. She reports that she does not currently use alcohol. She reports that she does not use drugs. Family History: family history includes Diabetes in her father; Hillary Sleight in her mother. The patients home medications have been reviewed.     Allergies: Baclofen, Nitroglycerin, and Tramadol    -------------------------------------------------- RESULTS -------------------------------------------------    LABS:  Results for orders placed or performed during the hospital encounter of 23   COVID-19, Rapid    Specimen: Nasopharyngeal Swab   Result Value Ref Range    SARS-CoV-2, NAAT Not Detected Not Detected   CBC with Auto Differential   Result Value Ref Range    WBC 23.7 (H) 4.5 - 11.5 E9/L    RBC 3.88 3.50 - 5.50 E12/L    Hemoglobin 11.8 11.5 - 15.5 g/dL    Hematocrit 37.0 34.0 - 48.0 % MCV 95.4 80.0 - 99.9 fL    MCH 30.4 26.0 - 35.0 pg    MCHC 31.9 (L) 32.0 - 34.5 %    RDW 14.4 11.5 - 15.0 fL    Platelets 447 366 - 408 E9/L    MPV 10.4 7.0 - 12.0 fL    Neutrophils % 86.2 (H) 43.0 - 80.0 %    Lymphocytes % 9.5 (L) 20.0 - 42.0 %    Monocytes % 4.3 2.0 - 12.0 %    Eosinophils % 0.0 0.0 - 6.0 %    Basophils % 0.0 0.0 - 2.0 %    Neutrophils Absolute 20.38 (H) 1.80 - 7.30 E9/L    Lymphocytes Absolute 2.37 1.50 - 4.00 E9/L    Monocytes Absolute 0.95 0.10 - 0.95 E9/L    Eosinophils Absolute 0.00 (L) 0.05 - 0.50 E9/L    Basophils Absolute 0.00 0.00 - 0.20 E9/L    nRBC 0.0 /100 WBC    Polychromasia 1+     Poikilocytes 1+     Ovalocytes 1+     Tear Drop Cells 1+    Basic Metabolic Panel   Result Value Ref Range    Sodium 138 132 - 146 mmol/L    Potassium 4.2 3.5 - 5.0 mmol/L    Chloride 105 98 - 107 mmol/L    CO2 24 22 - 29 mmol/L    Anion Gap 9 7 - 16 mmol/L    Glucose 142 (H) 74 - 99 mg/dL    BUN 21 6 - 23 mg/dL    Creatinine 1.3 (H) 0.5 - 1.0 mg/dL    Est, Glom Filt Rate 44 >=60 mL/min/1.73    Calcium 8.2 (L) 8.6 - 10.2 mg/dL   Troponin   Result Value Ref Range    Troponin, High Sensitivity 7 0 - 9 ng/L   Lipase   Result Value Ref Range    Lipase 28 13 - 60 U/L   Lactic Acid   Result Value Ref Range    Lactic Acid 1.2 0.5 - 2.2 mmol/L   SPECIMEN REJECTION   Result Value Ref Range    Rejected Test ua     Reason for Rejection see below    Urinalysis   Result Value Ref Range    Color, UA Yellow Straw/Yellow    Clarity, UA Clear Clear    Glucose, Ur Negative Negative mg/dL    Bilirubin Urine Negative Negative    Ketones, Urine Negative Negative mg/dL    Specific Gravity, UA <=1.005 1.005 - 1.030    Blood, Urine Negative Negative    pH, UA 6.5 5.0 - 9.0    Protein, UA Negative Negative mg/dL    Urobilinogen, Urine 0.2 <2.0 E.U./dL    Nitrite, Urine Negative Negative    Leukocyte Esterase, Urine Negative Negative   CBC with Auto Differential   Result Value Ref Range    WBC 16.0 (H) 4.5 - 11.5 E9/L    RBC 3.62 3.50 - 5.50 E12/L    Hemoglobin 10.9 (L) 11.5 - 15.5 g/dL    Hematocrit 34.5 34.0 - 48.0 %    MCV 95.3 80.0 - 99.9 fL    MCH 30.1 26.0 - 35.0 pg    MCHC 31.6 (L) 32.0 - 34.5 %    RDW 14.6 11.5 - 15.0 fL    Platelets 968 973 - 139 E9/L    MPV 10.6 7.0 - 12.0 fL    Neutrophils % 74.3 43.0 - 80.0 %    Immature Granulocytes % 0.4 0.0 - 5.0 %    Lymphocytes % 20.5 20.0 - 42.0 %    Monocytes % 3.6 2.0 - 12.0 %    Eosinophils % 0.9 0.0 - 6.0 %    Basophils % 0.3 0.0 - 2.0 %    Neutrophils Absolute 11.89 (H) 1.80 - 7.30 E9/L    Immature Granulocytes # 0.06 E9/L    Lymphocytes Absolute 3.28 1.50 - 4.00 E9/L    Monocytes Absolute 0.58 0.10 - 0.95 E9/L    Eosinophils Absolute 0.14 0.05 - 0.50 E9/L    Basophils Absolute 0.05 0.00 - 0.20 E9/L   Lactic Acid   Result Value Ref Range    Lactic Acid 1.4 0.5 - 2.2 mmol/L   Lactic Acid   Result Value Ref Range    Lactic Acid 0.8 0.5 - 2.2 mmol/L       RADIOLOGY:  CT HEAD WO CONTRAST    Result Date: 3/7/2023  EXAMINATION: CT OF THE HEAD WITHOUT CONTRAST  3/7/2023 3:10 pm TECHNIQUE: CT of the head was performed without the administration of intravenous contrast. Automated exposure control, iterative reconstruction, and/or weight based adjustment of the mA/kV was utilized to reduce the radiation dose to as low as reasonably achievable. COMPARISON: None. HISTORY: ORDERING SYSTEM PROVIDED HISTORY: altered mental status TECHNOLOGIST PROVIDED HISTORY: Has a \"code stroke\" or \"stroke alert\" been called? ->No Reason for exam:->altered mental status Decision Support Exception - unselect if not a suspected or confirmed emergency medical condition->Emergency Medical Condition (MA) FINDINGS: BRAIN/VENTRICLES: There is no acute intracranial hemorrhage, mass effect or midline shift. No abnormal extra-axial fluid collection. The gray-white differentiation is maintained without evidence of an acute infarct. There is no evidence of hydrocephalus.  ORBITS: The visualized portion of the orbits demonstrate no acute abnormality. SINUSES: The visualized paranasal sinuses and mastoid air cells demonstrate no acute abnormality. SOFT TISSUES/SKULL:  No acute abnormality of the visualized skull or soft tissues. No acute intracranial abnormality. CT ABDOMEN PELVIS W IV CONTRAST Additional Contrast? None    Result Date: 3/7/2023  EXAMINATION: CT OF THE ABDOMEN AND PELVIS WITH CONTRAST 3/7/2023 3:10 pm TECHNIQUE: CT of the abdomen and pelvis was performed with the administration of intravenous contrast. Multiplanar reformatted images are provided for review. Automated exposure control, iterative reconstruction, and/or weight based adjustment of the mA/kV was utilized to reduce the radiation dose to as low as reasonably achievable. COMPARISON: May 22, 2021 HISTORY: ORDERING SYSTEM PROVIDED HISTORY: abd pain TECHNOLOGIST PROVIDED HISTORY: Reason for exam:->abd pain Additional Contrast?->None Decision Support Exception - unselect if not a suspected or confirmed emergency medical condition->Emergency Medical Condition (MA) FINDINGS: Lower Chest: Mild pulmonary vascular congestive changes. No pleural effusion. Organs: No focal liver lesions. Gallbladder is present with no calcified stones. Spleen is not enlarged. Pancreas and adrenal glands appear unremarkable. There is symmetric enhancement of the kidneys with no hydronephrosis or perinephric infiltration. Small left renal cyst noted. GI/Bowel: No evidence of obstruction. Large amount of retained stool in the colon. Appendix is normal.  There are mildly thickened loops of small bowel in the lower abdomen. Pelvis: Bladder is decompressed. The uterus is surgically absent. There is no significant free fluid. Peritoneum/Retroperitoneum: No free air or significant adenopathy. Bones/Soft Tissues: Demineralized bones. There are severe degenerative changes and grade 1 anterolisthesis at L4-L5. Chronic compression deformity of T10 vertebral body.      No evidence of acute intra-abdominal or pelvic injury. Mild thickening of mid and distal small bowel, suspicious for early enteritis. XR CHEST PORTABLE    Result Date: 3/7/2023  EXAMINATION: ONE XRAY VIEW OF THE CHEST 3/7/2023 3:04 pm COMPARISON: 08/27/2022 HISTORY: ORDERING SYSTEM PROVIDED HISTORY: cough TECHNOLOGIST PROVIDED HISTORY: Reason for exam:->cough FINDINGS: The lungs are without acute focal process. There is no effusion or pneumothorax. The cardiomediastinal silhouette is without acute process. The osseous structures are without acute process. No acute process. ------------------------- NURSING NOTES AND VITALS REVIEWED ---------------------------  Date / Time Roomed:  3/7/2023  1:52 PM  ED Bed Assignment:  [id number]    The nursing notes within the ED encounter and vital signs as below have been reviewed.      Patient Vitals for the past 24 hrs:   BP Temp Temp src Pulse Resp SpO2 Height Weight   03/08/23 0525 -- -- -- -- -- -- -- 214 lb (97.1 kg)   03/08/23 0345 129/65 98 °F (36.7 °C) Oral 63 22 94 % -- --   03/07/23 1930 (!) 101/56 98.1 °F (36.7 °C) Oral 72 18 94 % -- --   03/07/23 1926 (!) 102/58 98 °F (36.7 °C) Oral 66 18 -- -- --   03/07/23 1750 116/70 -- -- 68 16 -- -- --   03/07/23 1730 (!) 85/56 97.5 °F (36.4 °C) -- 73 17 95 % -- --   03/07/23 1630 (!) 108/55 -- -- 73 18 93 % -- --   03/07/23 1542 -- -- -- 73 16 -- -- --   03/07/23 1500 94/64 -- -- 75 17 97 % -- --   03/07/23 1459 (!) 95/49 -- -- 75 18 98 % -- --   03/07/23 1434 -- -- -- 74 20 94 % -- --   03/07/23 1417 (!) 92/53 -- -- 74 23 94 % -- --   03/07/23 1413 -- -- -- 75 19 94 % -- --   03/07/23 1411 (!) 89/52 -- -- 76 20 -- -- --   03/07/23 1349 (!) 80/42 98.2 °F (36.8 °C) Oral 76 16 94 % 5' 5\" (1.651 m) 204 lb (92.5 kg)       Oxygen Saturation Interpretation: Normal    ------------------------------------------ PROGRESS NOTES ------------------------------------------    Counseling:  I have spoken with the patient and discussed todays results, in addition to providing specific details for the plan of care and counseling regarding the diagnosis and prognosis. Their questions are answered at this time and they are agreeable with the plan of admission.    --------------------------------- ADDITIONAL PROVIDER NOTES ---------------------------------  This patient's ED course included: a personal history and physicial examination, re-evaluation prior to disposition, multiple bedside re-evaluations, IV medications, cardiac monitoring, and continuous pulse oximetry    This patient has remained hemodynamically stable during their ED course. Diagnosis:  1. Generalized abdominal pain    2. Dizziness    3. Hypotension, unspecified hypotension type        Disposition:  Patient's disposition: Admit to telemetry  Patient's condition is fair.          Stefan Scott,   03/08/23 5921

## 2023-03-08 NOTE — PROGRESS NOTES
[facility] Quality Flow/Interdisciplinary Rounds Progress Note        Quality Flow Rounds held on March 8, 2023    Disciplines Attending:  Bedside Nurse, , , and Nursing Unit Leadership    Mary Jo Canada was admitted on 3/7/2023  1:52 PM    Anticipated Discharge Date:       Disposition:    Junior Score:  Junior Scale Score: 20    Readmission Risk              Risk of Unplanned Readmission:  0           Discussed patient goal for the day, patient clinical progression, and barriers to discharge.   The following Goal(s) of the Day/Commitment(s) have been identified:   iv fluids, advance diet      Doreen Nagel RN  March 8, 2023

## 2023-03-08 NOTE — CARE COORDINATION
Transition of Care-Patient admitted under Observation for abdominal pain, General Surgery consulted-ok to discharge. Met with patient at the bedside, introduced myself and Cm role in discharge planning. She lives with her son and daughter in law in a one story home. She uses a cane to ambulate-asked for a walker-no preference in DME supplier-referral made to [facility]. PCP is Dr. Samuel Kahn, preferred pharmacy is [facility] on [address]. Anticipate discharge in the next 24 hrs or less. Case Management Assessment  Initial Evaluation    Date/Time of Evaluation: 3/8/2023 11:25 AM  Assessment Completed by: Ernie Randle RN    If patient is discharged prior to next notation, then this note serves as note for discharge by case management. Patient Name: Omar Horton                   YOB: 1955  Diagnosis: Generalized abdominal pain [R10.84]                   Date / Time: 3/7/2023  1:52 PM    Patient Admission Status: Observation   Readmission Risk (Low < 19, Mod (19-27), High > 27): No data recorded  Current PCP: Verner Plum, DO  PCP verified by CM? Yes    Chart Reviewed: Yes      History Provided by: Patient  Patient Orientation: Alert and Oriented    Patient Cognition: Alert    Hospitalization in the last 30 days (Readmission):  No    If yes, Readmission Assessment in CM Navigator will be completed. Advance Directives:      Code Status: Full Code   Patient's Primary Decision Maker is: Legal Next of Kin      Discharge Planning:    Patient lives with: Children Type of Home: House  Primary Care Giver: Self  Patient Support Systems include: Children   Current Financial resources:    Current community resources:    Current services prior to admission: None            Current DME:              Type of Home Care services:  None    ADLS  Prior functional level: Independent in ADLs/IADLs  Current functional level:  Independent in ADLs/IADLs    PT AM-PAC:   /24  OT AM-PAC:   /24    Family can provide assistance at DC: Yes  Would you like Case Management to discuss the discharge plan with any other family members/significant others, and if so, who? No  Plans to Return to Present Housing: Yes  Other Identified Issues/Barriers to RETURNING to current housing:   Potential Assistance needed at discharge: N/A            Potential DME:    Patient expects to discharge to: [address] [place] [address] for transportation at discharge:      Financial    Payor: Keesha Washington / Plan: [address] [place] / Product Type: *No Product type* /     Does insurance require precert for SNF: Yes    Potential assistance Purchasing Medications: No  Meds-to-Beds request:        [facility] #[id number] Tomas Lugo [phone] [facility] [phone]  [address] [place] [id number]  Phone: [phone] Fax: [phone]      Notes:    Factors facilitating achievement of predicted outcomes: Family support    Barriers to discharge: Decreased endurance    Additional Case Management Notes: The Plan for Transition of Care is related to the following treatment goals of Generalized abdominal pain [M47.71]    IF APPLICABLE: The Patient and/or patient representative Emma Washington and her family were provided with a choice of provider and agrees with the discharge plan. Freedom of choice list with basic dialogue that supports the patient's individualized plan of care/goals and shares the quality data associated with the providers was provided to:     Patient Representative Name:       The Patient and/or Patient Representative Agree with the Discharge Plan?       Mely Matthew RN  Case Management Department  Ph: [phone] Fax: [phone]

## 2023-03-08 NOTE — CONSULTS
GENERAL SURGERY  CONSULT NOTE  3/8/2023    Physician Consulted: Dr. Feliciano Dillon  Reason for Consult: abdominal pain  Referring Physician: Dr. Baron Iram VARGAS  Quan Pulido is a 77 y.o. female who presents for evaluation of nominal pain since yesterday. The pain is in her suprapubic area. She is passing gas and having bowel movements. She thinks that her pain is related to taking a Z-Sanchez, steroids, and another pill yesterday for presumed COVID. She currently feels much better and is tolerating a clear liquid diet. No record of prior colonoscopy. History of breast lumpectomy, hysterectomy, and hemorrhoidectomy. Patient smokes tobacco every day. No record of alcohol or other drug use. CT AP reviewed. Some dilated small bowel, but grossly unremarkable. She has some soft pressures and a leukocytosis of 16. Past Medical History:   Diagnosis Date    CAD (coronary artery disease)     Degenerative disorder of bone     Hard of hearing     Hypertension     MI (myocardial infarction) ([facility] [address] 2020    Psoriasis     Ruptured ear drum 2022    Sciatica of left side        Past Surgical History:   Procedure Laterality Date    BREAST LUMPECTOMY Left     CARDIAC CATHETERIZATION  2022    Dr Bonnie Neff EF 70%     SECTION      CORONARY ANGIOPLASTY WITH STENT PLACEMENT  2020    3.0 x 33mm Domenico Ruiz to Prox LAD, EF35%, Dr. Natalia Gavin (CERVIX STATUS UNKNOWN)      JOINT REPLACEMENT      bilateral knees    TONSILLECTOMY         Medications Prior to Admission:    Prior to Admission medications    Medication Sig Start Date End Date Taking?  Authorizing Provider   hydrOXYzine HCl (ATARAX) 25 MG tablet take 1 tablet by mouth at bedtime 23   Historical Provider, MD   ibuprofen (ADVIL;MOTRIN) 600 MG tablet Take 1 tablet by mouth every 6 hours as needed for Pain 22   Tamia Freeman DO   pregabalin (LYRICA) 75 MG capsule Take 1 capsule by mouth in the morning and 1 capsule at noon and 1 capsule before bedtime. Do all this for 30 doses. 8/2/22 3/7/23  Geetha Spivey, DO   metoprolol succinate (TOPROL XL) 50 MG extended release tablet Take 1 tablet by mouth in the morning. 8/3/22   Geetha Cola, DO   atorvastatin (LIPITOR) 80 MG tablet Take 1 tablet by mouth nightly 8/2/22   Geetha Cola, DO   lisinopril (PRINIVIL;ZESTRIL) 20 MG tablet Take 20 mg by mouth daily    Historical Provider, MD   amLODIPine (NORVASC) 10 MG tablet Take 10 mg by mouth daily    Historical Provider, MD   nystatin (MYCOSTATIN) 898658 UNIT/GM ointment Apply topically 2 times daily.  5/25/21   Avinash Shahid DO   escitalopram (LEXAPRO) 20 MG tablet Take 0.5 tablets by mouth daily 5/25/21 8/5/22  Avinash Shahid DO   QUEtiapine (SEROQUEL) 200 MG tablet Take 200 mg by mouth at bedtime Takes at night    Historical Provider, MD   folic acid (FOLVITE) 1 MG tablet Take 1 tablet by mouth daily 12/18/20   Avinash Shahid DO   pantoprazole (PROTONIX) 40 MG tablet Take 1 tablet by mouth every morning (before breakfast) 10/23/20   Avinash Shahid DO   aspirin 81 MG chewable tablet Take 1 tablet by mouth daily 8/26/20   Avinash Shahid DO       Allergies   Allergen Reactions    Baclofen Hallucinations    Nitroglycerin Other (See Comments)     Severe headache    Tramadol      seizures       Family History   Problem Relation Age of Onset    Lung Cancer Mother     Diabetes Father        Social History     Tobacco Use    Smoking status: Every Day     Packs/day: 0.25     Years: 47.00     Pack years: 11.75     Types: Cigarettes     Start date: 8/2/1973    Smokeless tobacco: Never    Tobacco comments:     2 cigs daily   Vaping Use    Vaping Use: Every day    Last attempt to quit: 1/1/2018    Substances: Always, THC, delta 8   Substance Use Topics    Alcohol use: Not Currently    Drug use: Never         Review of Systems   General ROS: negative for - chills or fever  Hematological and Lymphatic ROS: negative for - bleeding problems or blood clots  Respiratory ROS: no cough, shortness of breath, or wheezing  Cardiovascular ROS: no chest pain or dyspnea on exertion  Gastrointestinal ROS: positive for abdominal pain  Genito-Urinary ROS: no dysuria, trouble voiding, or hematuria  Musculoskeletal ROS: negative for - muscle pain or muscular weakness      PHYSICAL EXAM:    Vitals:    03/08/23 0345   BP: 129/65   Pulse: 63   Resp: 22   Temp: 98 °F (36.7 °C)   SpO2: 94%       General Appearance:  awake, alert, oriented, in no acute distress  Skin:  Skin color, texture, turgor normal. No rashes or lesions. Head/face:  NCAT  Eyes:  No gross abnormalities. Lungs: Normal work of breathing on room air. Heart: Giller rate, normotensive  Abdomen: Soft, suprapubic tenderness, nondistended. No peritoneal signs. Extremities: Extremities warm to touch, pink, with no edema. LABS:    CBC  Recent Labs     03/08/23  0340   WBC 16.0*   HGB 10.9*   HCT 34.5        BMP  Recent Labs     03/07/23  1413      K 4.2      CO2 24   BUN 21   CREATININE 1.3*   CALCIUM 8.2*     Liver Function  Recent Labs     03/07/23  1413   LIPASE 28     No results for input(s): LACTATE in the last 72 hours. No results for input(s): INR, PTT in the last 72 hours. Invalid input(s): PT    RADIOLOGY    CT HEAD WO CONTRAST    Result Date: 3/7/2023  EXAMINATION: CT OF THE HEAD WITHOUT CONTRAST  3/7/2023 3:10 pm TECHNIQUE: CT of the head was performed without the administration of intravenous contrast. Automated exposure control, iterative reconstruction, and/or weight based adjustment of the mA/kV was utilized to reduce the radiation dose to as low as reasonably achievable. COMPARISON: None. HISTORY: ORDERING SYSTEM PROVIDED HISTORY: altered mental status TECHNOLOGIST PROVIDED HISTORY: Has a \"code stroke\" or \"stroke alert\" been called? ->No Reason for exam:->altered mental status Decision Support Exception - unselect if not a suspected or confirmed emergency medical condition->Emergency Medical Condition (MA) FINDINGS: BRAIN/VENTRICLES: There is no acute intracranial hemorrhage, mass effect or midline shift. No abnormal extra-axial fluid collection. The gray-white differentiation is maintained without evidence of an acute infarct. There is no evidence of hydrocephalus. ORBITS: The visualized portion of the orbits demonstrate no acute abnormality. SINUSES: The visualized paranasal sinuses and mastoid air cells demonstrate no acute abnormality. SOFT TISSUES/SKULL:  No acute abnormality of the visualized skull or soft tissues. No acute intracranial abnormality. CT ABDOMEN PELVIS W IV CONTRAST Additional Contrast? None    Result Date: 3/7/2023  EXAMINATION: CT OF THE ABDOMEN AND PELVIS WITH CONTRAST 3/7/2023 3:10 pm TECHNIQUE: CT of the abdomen and pelvis was performed with the administration of intravenous contrast. Multiplanar reformatted images are provided for review. Automated exposure control, iterative reconstruction, and/or weight based adjustment of the mA/kV was utilized to reduce the radiation dose to as low as reasonably achievable. COMPARISON: May 22, 2021 HISTORY: ORDERING SYSTEM PROVIDED HISTORY: abd pain TECHNOLOGIST PROVIDED HISTORY: Reason for exam:->abd pain Additional Contrast?->None Decision Support Exception - unselect if not a suspected or confirmed emergency medical condition->Emergency Medical Condition (MA) FINDINGS: Lower Chest: Mild pulmonary vascular congestive changes. No pleural effusion. Organs: No focal liver lesions. Gallbladder is present with no calcified stones. Spleen is not enlarged. Pancreas and adrenal glands appear unremarkable. There is symmetric enhancement of the kidneys with no hydronephrosis or perinephric infiltration. Small left renal cyst noted. GI/Bowel: No evidence of obstruction. Large amount of retained stool in the colon.   Appendix is normal.  There are mildly thickened loops of small bowel in the lower abdomen. Pelvis: Bladder is decompressed. The uterus is surgically absent. There is no significant free fluid. Peritoneum/Retroperitoneum: No free air or significant adenopathy. Bones/Soft Tissues: Demineralized bones. There are severe degenerative changes and grade 1 anterolisthesis at L4-L5. Chronic compression deformity of T10 vertebral body. No evidence of acute intra-abdominal or pelvic injury. Mild thickening of mid and distal small bowel, suspicious for early enteritis. XR CHEST PORTABLE    Result Date: 3/7/2023  EXAMINATION: ONE XRAY VIEW OF THE CHEST 3/7/2023 3:04 pm COMPARISON: 08/27/2022 HISTORY: ORDERING SYSTEM PROVIDED HISTORY: cough TECHNOLOGIST PROVIDED HISTORY: Reason for exam:->cough FINDINGS: The lungs are without acute focal process. There is no effusion or pneumothorax. The cardiomediastinal silhouette is without acute process. The osseous structures are without acute process. No acute process. ASSESSMENT:  77 y.o. female with abdominal pain of unknown origin. PLAN:  Okay for diet as tolerated from surgery POV  Pain/nausea control as needed  Patient tolerates diet, okay for DC from surgery POV. Electronically signed by Sherwin Shaw MD on 3/8/23 at 7:11 AM EST    The patient was seen and examined and the chart was reviewed. Overall, the patient has shown improvement in her clinical picture. The patient has no recorded bowel movements. The patient's white blood cell count has decreased to 16.0.

## 2023-03-08 NOTE — CARE COORDINATION
3/8/2023 Social Work Discharge Planning:  SW left a voicemail with [place] with [employer] inquiring if Pt has medicaid eligibility. Waiting for a return  call.  Electronically signed by JENNIFER Medley on 3/8/2023 at 11:07 AM

## 2023-03-09 VITALS
DIASTOLIC BLOOD PRESSURE: 75 MMHG | SYSTOLIC BLOOD PRESSURE: 165 MMHG | BODY MASS INDEX: 36.02 KG/M2 | HEIGHT: 65 IN | OXYGEN SATURATION: 96 % | WEIGHT: 216.2 LBS | TEMPERATURE: 98.3 F | HEART RATE: 66 BPM | RESPIRATION RATE: 18 BRPM

## 2023-03-09 LAB
BASOPHILS ABSOLUTE: 0.03 E9/L (ref 0–0.2)
BASOPHILS RELATIVE PERCENT: 0.4 % (ref 0–2)
EOSINOPHILS ABSOLUTE: 0.17 E9/L (ref 0.05–0.5)
EOSINOPHILS RELATIVE PERCENT: 2.4 % (ref 0–6)
HCT VFR BLD CALC: 32.9 % (ref 34–48)
HEMOGLOBIN: 10.5 G/DL (ref 11.5–15.5)
IMMATURE GRANULOCYTES #: 0.02 E9/L
IMMATURE GRANULOCYTES %: 0.3 % (ref 0–5)
LYMPHOCYTES ABSOLUTE: 1.3 E9/L (ref 1.5–4)
LYMPHOCYTES RELATIVE PERCENT: 18.3 % (ref 20–42)
MCH RBC QN AUTO: 30 PG (ref 26–35)
MCHC RBC AUTO-ENTMCNC: 31.9 % (ref 32–34.5)
MCV RBC AUTO: 94 FL (ref 80–99.9)
MONOCYTES ABSOLUTE: 0.46 E9/L (ref 0.1–0.95)
MONOCYTES RELATIVE PERCENT: 6.5 % (ref 2–12)
NEUTROPHILS ABSOLUTE: 5.14 E9/L (ref 1.8–7.3)
NEUTROPHILS RELATIVE PERCENT: 72.1 % (ref 43–80)
PDW BLD-RTO: 14.4 FL (ref 11.5–15)
PLATELET # BLD: 202 E9/L (ref 130–450)
PMV BLD AUTO: 10.2 FL (ref 7–12)
RBC # BLD: 3.5 E12/L (ref 3.5–5.5)
WBC # BLD: 7.1 E9/L (ref 4.5–11.5)

## 2023-03-09 PROCEDURE — 36415 COLL VENOUS BLD VENIPUNCTURE: CPT

## 2023-03-09 PROCEDURE — 96361 HYDRATE IV INFUSION ADD-ON: CPT

## 2023-03-09 PROCEDURE — G0378 HOSPITAL OBSERVATION PER HR: HCPCS

## 2023-03-09 PROCEDURE — 85025 COMPLETE CBC W/AUTO DIFF WBC: CPT

## 2023-03-09 PROCEDURE — 6370000000 HC RX 637 (ALT 250 FOR IP): Performed by: GENERAL PRACTICE

## 2023-03-09 RX ORDER — SENNA PLUS 8.6 MG/1
1 TABLET ORAL NIGHTLY
Status: DISCONTINUED | OUTPATIENT
Start: 2023-03-09 | End: 2023-03-09 | Stop reason: HOSPADM

## 2023-03-09 RX ORDER — ESCITALOPRAM OXALATE 20 MG/1
10 TABLET ORAL DAILY
Qty: 30 TABLET | Refills: 3 | Status: SHIPPED | OUTPATIENT
Start: 2023-03-09 | End: 2023-03-09 | Stop reason: SDUPTHER

## 2023-03-09 RX ORDER — POLYETHYLENE GLYCOL 3350 17 G/17G
17 POWDER, FOR SOLUTION ORAL DAILY
Qty: 527 G | Refills: 1 | Status: SHIPPED | OUTPATIENT
Start: 2023-03-09 | End: 2023-03-09 | Stop reason: SDUPTHER

## 2023-03-09 RX ORDER — ESCITALOPRAM OXALATE 20 MG/1
10 TABLET ORAL DAILY
Qty: 30 TABLET | Refills: 3 | Status: SHIPPED | OUTPATIENT
Start: 2023-03-09

## 2023-03-09 RX ORDER — BENZONATATE 100 MG/1
100 CAPSULE ORAL 3 TIMES DAILY PRN
Qty: 30 CAPSULE | Refills: 0 | Status: SHIPPED | OUTPATIENT
Start: 2023-03-09 | End: 2023-03-16

## 2023-03-09 RX ORDER — SENNA PLUS 8.6 MG/1
1 TABLET ORAL NIGHTLY
Qty: 30 TABLET | Refills: 0 | Status: SHIPPED | OUTPATIENT
Start: 2023-03-09 | End: 2023-03-09 | Stop reason: SDUPTHER

## 2023-03-09 RX ORDER — POLYETHYLENE GLYCOL 3350 17 G/17G
17 POWDER, FOR SOLUTION ORAL DAILY
Qty: 527 G | Refills: 1 | Status: SHIPPED | OUTPATIENT
Start: 2023-03-09 | End: 2023-04-08

## 2023-03-09 RX ORDER — SENNA PLUS 8.6 MG/1
1 TABLET ORAL NIGHTLY
Qty: 30 TABLET | Refills: 0 | Status: SHIPPED | OUTPATIENT
Start: 2023-03-09 | End: 2023-04-08

## 2023-03-09 RX ORDER — POLYETHYLENE GLYCOL 3350 17 G/17G
17 POWDER, FOR SOLUTION ORAL DAILY
Status: DISCONTINUED | OUTPATIENT
Start: 2023-03-09 | End: 2023-03-09 | Stop reason: HOSPADM

## 2023-03-09 RX ORDER — BENZONATATE 100 MG/1
100 CAPSULE ORAL 3 TIMES DAILY PRN
Qty: 30 CAPSULE | Refills: 0 | Status: SHIPPED | OUTPATIENT
Start: 2023-03-09 | End: 2023-03-09 | Stop reason: SDUPTHER

## 2023-03-09 RX ADMIN — PREGABALIN 75 MG: 75 CAPSULE ORAL at 10:26

## 2023-03-09 RX ADMIN — ASPIRIN 81 MG CHEWABLE TABLET 81 MG: 81 TABLET CHEWABLE at 10:26

## 2023-03-09 RX ADMIN — METOPROLOL SUCCINATE 50 MG: 50 TABLET, EXTENDED RELEASE ORAL at 10:26

## 2023-03-09 RX ADMIN — PANTOPRAZOLE SODIUM 40 MG: 40 TABLET, DELAYED RELEASE ORAL at 05:19

## 2023-03-09 RX ADMIN — BENZONATATE 100 MG: 100 CAPSULE ORAL at 10:26

## 2023-03-09 RX ADMIN — AMLODIPINE BESYLATE 10 MG: 10 TABLET ORAL at 10:26

## 2023-03-09 RX ADMIN — FOLIC ACID 1 MG: 1 TABLET ORAL at 10:26

## 2023-03-09 RX ADMIN — LISINOPRIL 20 MG: 20 TABLET ORAL at 10:26

## 2023-03-09 ASSESSMENT — PAIN DESCRIPTION - ORIENTATION: ORIENTATION: RIGHT;LEFT

## 2023-03-09 ASSESSMENT — PAIN DESCRIPTION - LOCATION: LOCATION: ABDOMEN

## 2023-03-09 ASSESSMENT — PAIN SCALES - GENERAL: PAINLEVEL_OUTOF10: 5

## 2023-03-09 ASSESSMENT — PAIN DESCRIPTION - PAIN TYPE: TYPE: ACUTE PAIN

## 2023-03-09 ASSESSMENT — PAIN DESCRIPTION - DESCRIPTORS: DESCRIPTORS: SORE;DISCOMFORT

## 2023-03-09 ASSESSMENT — PAIN - FUNCTIONAL ASSESSMENT: PAIN_FUNCTIONAL_ASSESSMENT: ACTIVITIES ARE NOT PREVENTED

## 2023-03-09 NOTE — PROGRESS NOTES
GENERAL SURGERY  DAILY PROGRESS NOTE  3/9/2023    Subjective:  No events overnight. Patient states that abdominal pain has been improving since having a bowel movement. She is tolerating diet and had meatloaf for dinner yesterday without any nausea or vomiting. No other complaints. Objective:  BP (!) 140/77   Pulse 79   Temp 98 °F (36.7 °C) (Oral)   Resp 18   Ht 5' 5\" (1.651 m)   Wt 216 lb 3.2 oz (98.1 kg)   SpO2 96%   BMI 35.98 kg/m²     General Appearance:  awake, alert, oriented, in no acute distress  Skin:  Skin color, texture, turgor normal. No rashes or lesions. Head/face:  NCAT  Eyes:  No gross abnormalities. Lungs: Normal work of breathing on room air. Heart: Regular rate, normotensive  Abdomen: Soft, non-tender, nondistended. No peritoneal signs. Extremities: Extremities warm to touch, pink, with no edema. Assessment/Plan:  77 y.o. female with abdominal pain of unknown origin.     Continue diet as tolerated  Pain/nausea control as needed  Patient will need bowel regimen at home to ensure bowel habits remain regular  Ok for DC from surgery POV    Electronically signed by Yamileth Boyle MD on 3/9/2023 at 7:10 AM   Electronically signed by Wade Gordon MD on 3/9/2023 at 10:55 AM

## 2023-03-09 NOTE — H&P
[address] [address]                              HISTORY AND PHYSICAL    PATIENT NAME: Destinee Rosa                   :        1955  MED REC NO:   [id number]                            ROOM:       [id number]  ACCOUNT NO:   [de-identified]                           ADMIT DATE: 2023  PROVIDER:     Arnie Frank DO    HISTORY OF PRESENT ILLNESS:  This is a 78-year-old white female who was  seen in the office recently for cough, congestion, wheezing, tightness  across her chest.  She was checked for COVID and results were still  pending, but we gave her a Z-Sanchez and some steroids and Robitussin and an  inhaler. She states that since taking that medication, she developed  abdominal pain. It is primarily in both lower quadrants and suprapubic. She noticed that she had rather significant intense pain while she was  moving her bowels and it prompted her son to bring her into the hospital  for evaluation. She had a colonoscopy and it demonstrated what was  thought to be like an enteritis type of picture and also had an  elevation of her white count. She did not have any fever; however,  white count was up to 23,000, so she was subsequently admitted for  surgical evaluation. RECENT AND CURRENT MEDICATIONS:  Included Atarax 25 at bedtime, Motrin  p.r.n., Lyrica 75 b.i.d., metoprolol 50 daily, Lipitor 80 daily,  Prinivil 20 daily, amlodipine 10 daily, Tylenol as needed, Lexapro 20  daily, Seroquel 216 at night, folic acid daily, and pantoprazole 40  daily. PAST MEDICAL HISTORY:  Significant for coronary artery disease,  degenerative disk disorder of the lumbar spine, degenerative bone  disorder, degenerative joint disease, hypertension, myocardial  infarction, psoriasis, sciatica.     PAST SURGICAL HISTORY:  Consistent with breast lumpectomy, heart  catheterization,  section, coronary angioplasty with stent,  hemorrhoid surgery, hysterectomy, joint replacement, and tonsillectomy. SOCIAL HISTORY:  She is a previous smoker, continues to smoke. Denies  use of narcotic drugs or alcoholic beverages. She also vapes. REVIEW OF SYSTEM:  Negative for vertigo, cephalgia, or ringing in the  ears. She has hearing loss. No difficulty swallowing, hoarseness in  her voice, or bloody noses. She has intermittent type of chest pain and  tightness. No orthopnea or paroxysmal nocturnal dyspnea or arrhythmias. She has cough, wheeze, or shortness of breath. No hemoptysis. Some  pleuritic pain. There is some nausea. No vomiting. Abdominal pain is  present. No constipation, diarrhea, or change in her bowels. No blood  in her stool. No urgency, frequency, dysuria, or hematuria. The  endocrine system negative for diabetes or thyroid disorder. Musculoskeletal system positive for generalized joint pain and back  pain. Skin is without rash, eruptions, urticaria, or pruritus and legs  are without swelling. Neurologic system negative for CVA, seizures,  tremors, tics, or TIAs. PHYSICAL EXAMINATION:  GENERAL:  Shows this to be a 68-year-old white female in moderate  distress with the above complaints. HEAD, EYES, EARS, NOSE, AND THROAT:  Examination shows the head to be  normocephalic and atraumatic. Pupils are equal and reactive to light  and accommodation. Extraocular eye muscles are intact. Tympanic  membranes are clear. Ear canals are patent. Oral mucosa pink and  moist.  NECK:  Neck veins are flat. Nondistended. No carotid bruits. CHEST:  Symmetrical.  HEART:  Had a regular rate and rhythm without murmur, gallops, or  friction rubs. LUNGS:  Showed diminished breath sounds. Scattered rhonchi and rales. Few wheezes. End-expiratory phase of respiration increased. ABDOMEN:  Soft. Slightly distended. Bowel sounds are present in all  four quadrants.   Generalized tenderness, both lower quadrants and  suprapubic region. Negative for flank pain. EXTERNAL GENITALIA:  Intact. EXTREMITIES:  Peripheral pulses are okay. Legs are without edema. SPINE:  Shows physiologic curve. IMPRESSION:  Initial impression at this time is abdominal pain with  leukocytosis and evidence of enteritis on CAT scan. The patient will be  admitted for surgical consultation. We will hold her meals just in case  they decide to do something and we will keep a good close eye on her. Further orders will be written for her as her clinical course dictates. At the time of admission, her condition is fair. Prognosis is guarded.         Keshia Zaldivar DO    D: 03/08/2023 16:09:29       T: 03/08/2023 16:12:25     [email or web address]  Job#: [id number]     Doc#: [id number]    CC:

## 2023-03-09 NOTE — PROGRESS NOTES
[facility] Quality Flow/Interdisciplinary Rounds Progress Note        Quality Flow Rounds held on March 9, 2023    Disciplines Attending:  Bedside Nurse, , , and Nursing Unit Leadership    Mary Jo Canada was admitted on 3/7/2023  1:52 PM    Anticipated Discharge Date:  Expected Discharge Date: 03/09/23    Disposition: home    Junior Score:  Junior Scale Score: 23    Readmission Risk              Risk of Unplanned Readmission:  0           Discussed patient goal for the day, patient clinical progression, and barriers to discharge. The following Goal(s) of the Day/Commitment(s) have been identified:   discharge?       Fito Day RN  March 9, 2023

## 2023-03-09 NOTE — PLAN OF CARE
Problem: ABCDS Injury Assessment  Goal: Absence of physical injury  3/9/2023 1313 by Maria Eugenia Araiza RN  Outcome: Adequate for Discharge     Problem: Discharge Planning  Goal: Discharge to home or other facility with appropriate resources  3/9/2023 1313 by Maria Eugenia Araiza RN  Outcome: Adequate for Discharge     Problem: Pain  Goal: Verbalizes/displays adequate comfort level or baseline comfort level  3/9/2023 1313 by Maria Eugenia Araiza RN  Outcome: Adequate for Discharge     Problem: Safety - Adult  Goal: Free from fall injury  Outcome: Completed

## 2023-03-09 NOTE — CARE COORDINATION
Transition of Care-Discharge order noted, disposition is home-no needs.  [facility] delivered walker per patient request.    Beck BUTLERN, RN  [facility]

## 2023-03-12 LAB
BLOOD CULTURE, ROUTINE: NORMAL
CULTURE, BLOOD 2: NORMAL

## 2023-07-25 ENCOUNTER — HOSPITAL ENCOUNTER (OUTPATIENT)
Age: 68
Discharge: HOME | End: 2023-07-25
Payer: MEDICARE

## 2023-07-25 ENCOUNTER — HOSPITAL ENCOUNTER (OUTPATIENT)
Age: 68
Discharge: HOME | End: 2023-07-27
Payer: MEDICARE

## 2023-07-25 ENCOUNTER — HOSPITAL ENCOUNTER (OUTPATIENT)
Dept: GENERAL RADIOLOGY | Age: 68
Discharge: HOME | End: 2023-07-27
Payer: MEDICARE

## 2023-07-25 DIAGNOSIS — R05.1 ACUTE COUGH: ICD-10-CM

## 2023-07-25 LAB
ALBUMIN SERPL-MCNC: 4.1 G/DL (ref 3.5–5.2)
ALP SERPL-CCNC: 100 U/L (ref 35–104)
ALT SERPL-CCNC: 12 U/L (ref 0–32)
ANION GAP SERPL CALCULATED.3IONS-SCNC: 9 MMOL/L (ref 7–16)
AST SERPL-CCNC: 16 U/L (ref 0–31)
BILIRUB SERPL-MCNC: 0.4 MG/DL (ref 0–1.2)
BUN SERPL-MCNC: 12 MG/DL (ref 6–23)
CALCIUM SERPL-MCNC: 8.6 MG/DL (ref 8.6–10.2)
CHLORIDE SERPL-SCNC: 105 MMOL/L (ref 98–107)
CHOLEST SERPL-MCNC: 185 MG/DL
CO2 SERPL-SCNC: 28 MMOL/L (ref 22–29)
CREAT SERPL-MCNC: 1.2 MG/DL (ref 0.5–1)
ERYTHROCYTE [DISTWIDTH] IN BLOOD BY AUTOMATED COUNT: 14.6 % (ref 11.5–15)
GFR SERPL CREATININE-BSD FRML MDRD: 50 ML/MIN/1.73M2
GLUCOSE SERPL-MCNC: 102 MG/DL (ref 74–99)
HCT VFR BLD AUTO: 35.9 % (ref 34–48)
HDLC SERPL-MCNC: 48 MG/DL
HGB BLD-MCNC: 11.7 G/DL (ref 11.5–15.5)
LDLC SERPL CALC-MCNC: 99 MG/DL
MCH RBC QN AUTO: 29.8 PG (ref 26–35)
MCHC RBC AUTO-ENTMCNC: 32.6 G/DL (ref 32–34.5)
MCV RBC AUTO: 91.3 FL (ref 80–99.9)
PLATELET # BLD AUTO: 176 K/UL (ref 130–450)
PMV BLD AUTO: 10.7 FL (ref 7–12)
POTASSIUM SERPL-SCNC: 5.1 MMOL/L (ref 3.5–5)
PROT SERPL-MCNC: 6.5 G/DL (ref 6.4–8.3)
RBC # BLD AUTO: 3.93 M/UL (ref 3.5–5.5)
SODIUM SERPL-SCNC: 142 MMOL/L (ref 132–146)
TRIGL SERPL-MCNC: 190 MG/DL
VLDLC SERPL CALC-MCNC: 38 MG/DL
WBC OTHER # BLD: 7 K/UL (ref 4.5–11.5)

## 2023-07-25 PROCEDURE — 71046 X-RAY EXAM CHEST 2 VIEWS: CPT

## 2023-07-25 PROCEDURE — 80061 LIPID PANEL: CPT

## 2023-07-25 PROCEDURE — 80053 COMPREHEN METABOLIC PANEL: CPT

## 2023-07-25 PROCEDURE — 85027 COMPLETE CBC AUTOMATED: CPT

## 2023-07-25 PROCEDURE — 36415 COLL VENOUS BLD VENIPUNCTURE: CPT

## 2024-03-14 ENCOUNTER — APPOINTMENT (OUTPATIENT)
Dept: CT IMAGING | Age: 69
End: 2024-03-14
Payer: MEDICARE

## 2024-03-14 ENCOUNTER — HOSPITAL ENCOUNTER (OUTPATIENT)
Age: 69
Setting detail: OBSERVATION
Discharge: HOME | End: 2024-03-15
Attending: STUDENT IN AN ORGANIZED HEALTH CARE EDUCATION/TRAINING PROGRAM | Admitting: INTERNAL MEDICINE
Payer: MEDICARE

## 2024-03-14 DIAGNOSIS — T78.3XXA ANGIOEDEMA, INITIAL ENCOUNTER: Primary | ICD-10-CM

## 2024-03-14 PROBLEM — R22.0 MOUTH SWELLING: Status: ACTIVE | Noted: 2024-03-14

## 2024-03-14 LAB
ABO + RH BLD: NORMAL
ANION GAP SERPL CALCULATED.3IONS-SCNC: 11 MMOL/L (ref 7–16)
ARM BAND NUMBER: NORMAL
BASOPHILS # BLD: 0.04 K/UL (ref 0–0.2)
BASOPHILS NFR BLD: 0 % (ref 0–2)
BLOOD BANK SAMPLE EXPIRATION: NORMAL
BLOOD GROUP ANTIBODIES SERPL: NEGATIVE
BUN SERPL-MCNC: 15 MG/DL (ref 6–23)
CALCIUM SERPL-MCNC: 8.9 MG/DL (ref 8.6–10.2)
CHLORIDE SERPL-SCNC: 105 MMOL/L (ref 98–107)
CO2 SERPL-SCNC: 25 MMOL/L (ref 22–29)
CREAT SERPL-MCNC: 1 MG/DL (ref 0.5–1)
EOSINOPHIL # BLD: 0.27 K/UL (ref 0.05–0.5)
EOSINOPHILS RELATIVE PERCENT: 3 % (ref 0–6)
ERYTHROCYTE [DISTWIDTH] IN BLOOD BY AUTOMATED COUNT: 13.5 % (ref 11.5–15)
GFR SERPL CREATININE-BSD FRML MDRD: >60 ML/MIN/1.73M2
GLUCOSE SERPL-MCNC: 165 MG/DL (ref 74–99)
HCT VFR BLD AUTO: 38.7 % (ref 34–48)
HGB BLD-MCNC: 12.7 G/DL (ref 11.5–15.5)
IMM GRANULOCYTES # BLD AUTO: <0.03 K/UL (ref 0–0.58)
IMM GRANULOCYTES NFR BLD: 0 % (ref 0–5)
LYMPHOCYTES NFR BLD: 2.3 K/UL (ref 1.5–4)
LYMPHOCYTES RELATIVE PERCENT: 24 % (ref 20–42)
MCH RBC QN AUTO: 30.7 PG (ref 26–35)
MCHC RBC AUTO-ENTMCNC: 32.8 G/DL (ref 32–34.5)
MCV RBC AUTO: 93.5 FL (ref 80–99.9)
MONOCYTES NFR BLD: 0.45 K/UL (ref 0.1–0.95)
MONOCYTES NFR BLD: 5 % (ref 2–12)
NEUTROPHILS NFR BLD: 68 % (ref 43–80)
NEUTS SEG NFR BLD: 6.57 K/UL (ref 1.8–7.3)
PLATELET # BLD AUTO: 254 K/UL (ref 130–450)
PMV BLD AUTO: 10.4 FL (ref 7–12)
POTASSIUM SERPL-SCNC: 3.9 MMOL/L (ref 3.5–5)
RBC # BLD AUTO: 4.14 M/UL (ref 3.5–5.5)
SODIUM SERPL-SCNC: 141 MMOL/L (ref 132–146)
WBC OTHER # BLD: 9.7 K/UL (ref 4.5–11.5)

## 2024-03-14 PROCEDURE — 6360000002 HC RX W HCPCS: Performed by: STUDENT IN AN ORGANIZED HEALTH CARE EDUCATION/TRAINING PROGRAM

## 2024-03-14 PROCEDURE — 85025 COMPLETE CBC W/AUTO DIFF WBC: CPT

## 2024-03-14 PROCEDURE — 86901 BLOOD TYPING SEROLOGIC RH(D): CPT

## 2024-03-14 PROCEDURE — 80048 BASIC METABOLIC PNL TOTAL CA: CPT

## 2024-03-14 PROCEDURE — 6360000004 HC RX CONTRAST MEDICATION: Performed by: RADIOLOGY

## 2024-03-14 PROCEDURE — 86850 RBC ANTIBODY SCREEN: CPT

## 2024-03-14 PROCEDURE — 96374 THER/PROPH/DIAG INJ IV PUSH: CPT

## 2024-03-14 PROCEDURE — 2500000003 HC RX 250 WO HCPCS: Performed by: STUDENT IN AN ORGANIZED HEALTH CARE EDUCATION/TRAINING PROGRAM

## 2024-03-14 PROCEDURE — 99285 EMERGENCY DEPT VISIT HI MDM: CPT

## 2024-03-14 PROCEDURE — 96376 TX/PRO/DX INJ SAME DRUG ADON: CPT

## 2024-03-14 PROCEDURE — G0378 HOSPITAL OBSERVATION PER HR: HCPCS

## 2024-03-14 PROCEDURE — 2580000003 HC RX 258: Performed by: STUDENT IN AN ORGANIZED HEALTH CARE EDUCATION/TRAINING PROGRAM

## 2024-03-14 PROCEDURE — 6370000000 HC RX 637 (ALT 250 FOR IP): Performed by: STUDENT IN AN ORGANIZED HEALTH CARE EDUCATION/TRAINING PROGRAM

## 2024-03-14 PROCEDURE — 6370000000 HC RX 637 (ALT 250 FOR IP): Performed by: INTERNAL MEDICINE

## 2024-03-14 PROCEDURE — P9017 PLASMA 1 DONOR FRZ W/IN 8 HR: HCPCS

## 2024-03-14 PROCEDURE — 86900 BLOOD TYPING SEROLOGIC ABO: CPT

## 2024-03-14 PROCEDURE — 36430 TRANSFUSION BLD/BLD COMPNT: CPT

## 2024-03-14 PROCEDURE — 96375 TX/PRO/DX INJ NEW DRUG ADDON: CPT

## 2024-03-14 PROCEDURE — A4216 STERILE WATER/SALINE, 10 ML: HCPCS | Performed by: STUDENT IN AN ORGANIZED HEALTH CARE EDUCATION/TRAINING PROGRAM

## 2024-03-14 PROCEDURE — 86927 PLASMA FRESH FROZEN: CPT

## 2024-03-14 PROCEDURE — 70491 CT SOFT TISSUE NECK W/DYE: CPT

## 2024-03-14 PROCEDURE — 2580000003 HC RX 258: Performed by: INTERNAL MEDICINE

## 2024-03-14 RX ORDER — HYDROCODONE BITARTRATE AND ACETAMINOPHEN 5; 325 MG/1; MG/1
1 TABLET ORAL ONCE
Status: COMPLETED | OUTPATIENT
Start: 2024-03-14 | End: 2024-03-14

## 2024-03-14 RX ORDER — HYDROXYZINE HYDROCHLORIDE 10 MG/1
25 TABLET, FILM COATED ORAL NIGHTLY
Status: DISCONTINUED | OUTPATIENT
Start: 2024-03-14 | End: 2024-03-14 | Stop reason: SDUPTHER

## 2024-03-14 RX ORDER — HYDRALAZINE HYDROCHLORIDE 20 MG/ML
10 INJECTION INTRAMUSCULAR; INTRAVENOUS EVERY 6 HOURS PRN
Status: DISCONTINUED | OUTPATIENT
Start: 2024-03-14 | End: 2024-03-15 | Stop reason: HOSPADM

## 2024-03-14 RX ORDER — HYDROCODONE BITARTRATE AND ACETAMINOPHEN 5; 325 MG/1; MG/1
1 TABLET ORAL EVERY 6 HOURS PRN
Status: DISCONTINUED | OUTPATIENT
Start: 2024-03-14 | End: 2024-03-15

## 2024-03-14 RX ORDER — POTASSIUM CHLORIDE 7.45 MG/ML
10 INJECTION INTRAVENOUS PRN
Status: DISCONTINUED | OUTPATIENT
Start: 2024-03-14 | End: 2024-03-15 | Stop reason: HOSPADM

## 2024-03-14 RX ORDER — ONDANSETRON 4 MG/1
4 TABLET, ORALLY DISINTEGRATING ORAL EVERY 8 HOURS PRN
Status: DISCONTINUED | OUTPATIENT
Start: 2024-03-14 | End: 2024-03-15 | Stop reason: HOSPADM

## 2024-03-14 RX ORDER — POLYETHYLENE GLYCOL 3350 17 G/17G
17 POWDER, FOR SOLUTION ORAL DAILY PRN
Status: DISCONTINUED | OUTPATIENT
Start: 2024-03-14 | End: 2024-03-15 | Stop reason: HOSPADM

## 2024-03-14 RX ORDER — PANTOPRAZOLE SODIUM 40 MG/1
40 TABLET, DELAYED RELEASE ORAL
Status: DISCONTINUED | OUTPATIENT
Start: 2024-03-15 | End: 2024-03-15 | Stop reason: HOSPADM

## 2024-03-14 RX ORDER — METOPROLOL SUCCINATE 50 MG/1
50 TABLET, EXTENDED RELEASE ORAL DAILY
Status: DISCONTINUED | OUTPATIENT
Start: 2024-03-15 | End: 2024-03-15 | Stop reason: HOSPADM

## 2024-03-14 RX ORDER — DIPHENHYDRAMINE HYDROCHLORIDE 50 MG/ML
50 INJECTION INTRAMUSCULAR; INTRAVENOUS EVERY 8 HOURS
Status: DISCONTINUED | OUTPATIENT
Start: 2024-03-14 | End: 2024-03-14

## 2024-03-14 RX ORDER — MAGNESIUM SULFATE IN WATER 40 MG/ML
2000 INJECTION, SOLUTION INTRAVENOUS PRN
Status: DISCONTINUED | OUTPATIENT
Start: 2024-03-14 | End: 2024-03-15 | Stop reason: HOSPADM

## 2024-03-14 RX ORDER — ACETAMINOPHEN 650 MG/1
650 SUPPOSITORY RECTAL EVERY 6 HOURS PRN
Status: DISCONTINUED | OUTPATIENT
Start: 2024-03-14 | End: 2024-03-15 | Stop reason: HOSPADM

## 2024-03-14 RX ORDER — QUETIAPINE FUMARATE 200 MG/1
200 TABLET, FILM COATED ORAL NIGHTLY
Status: DISCONTINUED | OUTPATIENT
Start: 2024-03-14 | End: 2024-03-15 | Stop reason: HOSPADM

## 2024-03-14 RX ORDER — FOLIC ACID 1 MG/1
1 TABLET ORAL DAILY
Status: DISCONTINUED | OUTPATIENT
Start: 2024-03-15 | End: 2024-03-15 | Stop reason: HOSPADM

## 2024-03-14 RX ORDER — ATORVASTATIN CALCIUM 40 MG/1
80 TABLET, FILM COATED ORAL NIGHTLY
Status: DISCONTINUED | OUTPATIENT
Start: 2024-03-15 | End: 2024-03-15 | Stop reason: HOSPADM

## 2024-03-14 RX ORDER — ASPIRIN 81 MG/1
81 TABLET, CHEWABLE ORAL DAILY
Status: DISCONTINUED | OUTPATIENT
Start: 2024-03-15 | End: 2024-03-15 | Stop reason: HOSPADM

## 2024-03-14 RX ORDER — NYSTATIN 100000 U/G
OINTMENT TOPICAL 2 TIMES DAILY PRN
Status: DISCONTINUED | OUTPATIENT
Start: 2024-03-14 | End: 2024-03-14 | Stop reason: CLARIF

## 2024-03-14 RX ORDER — PREGABALIN 75 MG/1
75 CAPSULE ORAL 3 TIMES DAILY
Status: DISCONTINUED | OUTPATIENT
Start: 2024-03-14 | End: 2024-03-15 | Stop reason: HOSPADM

## 2024-03-14 RX ORDER — BENZONATATE 100 MG/1
100 CAPSULE ORAL 3 TIMES DAILY PRN
Status: DISCONTINUED | OUTPATIENT
Start: 2024-03-14 | End: 2024-03-15 | Stop reason: HOSPADM

## 2024-03-14 RX ORDER — DIPHENHYDRAMINE HYDROCHLORIDE 50 MG/ML
50 INJECTION INTRAMUSCULAR; INTRAVENOUS ONCE
Status: COMPLETED | OUTPATIENT
Start: 2024-03-14 | End: 2024-03-14

## 2024-03-14 RX ORDER — ONDANSETRON 2 MG/ML
4 INJECTION INTRAMUSCULAR; INTRAVENOUS EVERY 6 HOURS PRN
Status: DISCONTINUED | OUTPATIENT
Start: 2024-03-14 | End: 2024-03-15 | Stop reason: HOSPADM

## 2024-03-14 RX ORDER — POTASSIUM CHLORIDE 20 MEQ/1
40 TABLET, EXTENDED RELEASE ORAL PRN
Status: DISCONTINUED | OUTPATIENT
Start: 2024-03-14 | End: 2024-03-15 | Stop reason: HOSPADM

## 2024-03-14 RX ORDER — SODIUM CHLORIDE 9 MG/ML
INJECTION, SOLUTION INTRAVENOUS PRN
Status: DISCONTINUED | OUTPATIENT
Start: 2024-03-14 | End: 2024-03-15 | Stop reason: HOSPADM

## 2024-03-14 RX ORDER — AMLODIPINE BESYLATE 10 MG/1
10 TABLET ORAL DAILY
Status: DISCONTINUED | OUTPATIENT
Start: 2024-03-15 | End: 2024-03-15 | Stop reason: HOSPADM

## 2024-03-14 RX ORDER — TRANEXAMIC ACID 100 MG/ML
1000 INJECTION, SOLUTION INTRAVENOUS ONCE
Status: COMPLETED | OUTPATIENT
Start: 2024-03-14 | End: 2024-03-14

## 2024-03-14 RX ORDER — ESCITALOPRAM OXALATE 10 MG/1
10 TABLET ORAL DAILY
Status: DISCONTINUED | OUTPATIENT
Start: 2024-03-15 | End: 2024-03-15 | Stop reason: HOSPADM

## 2024-03-14 RX ORDER — ACETAMINOPHEN 325 MG/1
650 TABLET ORAL EVERY 6 HOURS PRN
Status: DISCONTINUED | OUTPATIENT
Start: 2024-03-14 | End: 2024-03-15 | Stop reason: HOSPADM

## 2024-03-14 RX ORDER — LANOLIN ALCOHOL/MO/W.PET/CERES
3 CREAM (GRAM) TOPICAL NIGHTLY PRN
Status: DISCONTINUED | OUTPATIENT
Start: 2024-03-15 | End: 2024-03-15 | Stop reason: HOSPADM

## 2024-03-14 RX ORDER — EPINEPHRINE 1 MG/ML
0.5 INJECTION, SOLUTION, CONCENTRATE INTRAVENOUS ONCE
Status: DISCONTINUED | OUTPATIENT
Start: 2024-03-14 | End: 2024-03-15 | Stop reason: HOSPADM

## 2024-03-14 RX ORDER — SODIUM CHLORIDE 0.9 % (FLUSH) 0.9 %
5-40 SYRINGE (ML) INJECTION EVERY 12 HOURS SCHEDULED
Status: DISCONTINUED | OUTPATIENT
Start: 2024-03-14 | End: 2024-03-15 | Stop reason: HOSPADM

## 2024-03-14 RX ORDER — SODIUM CHLORIDE 0.9 % (FLUSH) 0.9 %
5-40 SYRINGE (ML) INJECTION PRN
Status: DISCONTINUED | OUTPATIENT
Start: 2024-03-14 | End: 2024-03-15 | Stop reason: HOSPADM

## 2024-03-14 RX ORDER — ENOXAPARIN SODIUM 100 MG/ML
40 INJECTION SUBCUTANEOUS DAILY
Status: DISCONTINUED | OUTPATIENT
Start: 2024-03-15 | End: 2024-03-15 | Stop reason: HOSPADM

## 2024-03-14 RX ORDER — CALCIUM CARBONATE 500 MG/1
500 TABLET, CHEWABLE ORAL 3 TIMES DAILY PRN
Status: DISCONTINUED | OUTPATIENT
Start: 2024-03-14 | End: 2024-03-15 | Stop reason: HOSPADM

## 2024-03-14 RX ORDER — SODIUM CHLORIDE 9 MG/ML
INJECTION, SOLUTION INTRAVENOUS
Status: DISPENSED
Start: 2024-03-14 | End: 2024-03-15

## 2024-03-14 RX ORDER — HYDROXYZINE PAMOATE 25 MG/1
25 CAPSULE ORAL NIGHTLY
Status: DISCONTINUED | OUTPATIENT
Start: 2024-03-14 | End: 2024-03-15 | Stop reason: HOSPADM

## 2024-03-14 RX ORDER — DEXAMETHASONE SODIUM PHOSPHATE 10 MG/ML
10 INJECTION INTRAMUSCULAR; INTRAVENOUS EVERY 8 HOURS
Status: COMPLETED | OUTPATIENT
Start: 2024-03-14 | End: 2024-03-15

## 2024-03-14 RX ADMIN — HYDROXYZINE PAMOATE 25 MG: 25 CAPSULE ORAL at 22:23

## 2024-03-14 RX ADMIN — DEXAMETHASONE SODIUM PHOSPHATE 10 MG: 10 INJECTION INTRAMUSCULAR; INTRAVENOUS at 23:58

## 2024-03-14 RX ADMIN — IOPAMIDOL 75 ML: 755 INJECTION, SOLUTION INTRAVENOUS at 18:54

## 2024-03-14 RX ADMIN — SODIUM CHLORIDE, PRESERVATIVE FREE 10 ML: 5 INJECTION INTRAVENOUS at 22:00

## 2024-03-14 RX ADMIN — TRANEXAMIC ACID 1000 MG: 100 INJECTION, SOLUTION INTRAVENOUS at 17:06

## 2024-03-14 RX ADMIN — PREGABALIN 75 MG: 75 CAPSULE ORAL at 22:19

## 2024-03-14 RX ADMIN — HYDROCODONE BITARTRATE AND ACETAMINOPHEN 1 TABLET: 5; 325 TABLET ORAL at 19:50

## 2024-03-14 RX ADMIN — DEXAMETHASONE SODIUM PHOSPHATE 10 MG: 10 INJECTION INTRAMUSCULAR; INTRAVENOUS at 17:04

## 2024-03-14 RX ADMIN — FAMOTIDINE 20 MG: 10 INJECTION, SOLUTION INTRAVENOUS at 16:23

## 2024-03-14 RX ADMIN — FAMOTIDINE 20 MG: 10 INJECTION, SOLUTION INTRAVENOUS at 22:20

## 2024-03-14 RX ADMIN — METHYLPREDNISOLONE SODIUM SUCCINATE 125 MG: 125 INJECTION INTRAMUSCULAR; INTRAVENOUS at 16:20

## 2024-03-14 RX ADMIN — DIPHENHYDRAMINE HYDROCHLORIDE 50 MG: 50 INJECTION INTRAMUSCULAR; INTRAVENOUS at 16:21

## 2024-03-14 RX ADMIN — QUETIAPINE FUMARATE 200 MG: 200 TABLET ORAL at 23:05

## 2024-03-14 ASSESSMENT — ENCOUNTER SYMPTOMS
TROUBLE SWALLOWING: 0
WHEEZING: 0
VOICE CHANGE: 0
STRIDOR: 0
SHORTNESS OF BREATH: 1

## 2024-03-14 ASSESSMENT — LIFESTYLE VARIABLES
HOW OFTEN DO YOU HAVE A DRINK CONTAINING ALCOHOL: NEVER
HOW MANY STANDARD DRINKS CONTAINING ALCOHOL DO YOU HAVE ON A TYPICAL DAY: PATIENT DOES NOT DRINK

## 2024-03-14 ASSESSMENT — PAIN SCALES - GENERAL
PAINLEVEL_OUTOF10: 5
PAINLEVEL_OUTOF10: 6

## 2024-03-14 ASSESSMENT — PAIN - FUNCTIONAL ASSESSMENT: PAIN_FUNCTIONAL_ASSESSMENT: 0-10

## 2024-03-14 ASSESSMENT — PAIN DESCRIPTION - LOCATION: LOCATION: JAW

## 2024-03-14 NOTE — CONSULTS
OTOLARYNGOLOGY  CONSULT NOTE  3/14/2024    Physician Consulted: Dr. Condon  Reason for Consult: Throat swelling  Referring Physician: Dr. Larsen    SAM Eckert is a 69 y.o. female who ENT was consulted for evaluation of throat swelling. Patient underwent bilateral myringotomy with tube placement earlier this morning with Dr. Wetzel for which she was not intubated. She was discharged home without complication. She took her lisinopril and amlodipine upon arriving at home. Around 2-3 pm she started to notice some lower throat swelling with associated difficulty breathing. Denies trouble swallowing or changes. No fevers/chills.     Review of Systems   Constitutional:  Negative for chills and fever.   HENT:  Negative for trouble swallowing and voice change.    Respiratory:  Positive for shortness of breath. Negative for wheezing and stridor.    Musculoskeletal:  Positive for neck pain.   All other systems reviewed and are negative.      Past Medical History:   Diagnosis Date    CAD (coronary artery disease)     Degenerative disorder of bone     Hard of hearing     Hypertension     MI (myocardial infarction) ([facility]) 2020    Psoriasis     Ruptured ear drum 2022    Sciatica of left side        Past Surgical History:   Procedure Laterality Date    BREAST LUMPECTOMY Left     CARDIAC CATHETERIZATION  2022    Dr Castro EF 70%     SECTION      CORONARY ANGIOPLASTY WITH STENT PLACEMENT  2020    3.0 x 33mm Xience Sara to Prox LAD, EF35%, Dr. Anderson    HEMORRHOID SURGERY      HYSTERECTOMY (CERVIX STATUS UNKNOWN)      JOINT REPLACEMENT      bilateral knees    TONSILLECTOMY         Medications Prior to Admission:    Prior to Admission medications    Medication Sig Start Date End Date Taking? Authorizing Provider   escitalopram (LEXAPRO) 20 MG tablet Take 0.5 tablets by mouth daily 3/9/23   Juma Haider DO   hydrOXYzine HCl (ATARAX) 25 MG tablet take 1 tablet by mouth at bedtime  lisinopril, this could be secondary to medication induced angioedema, however she does not have any tongue/lip/floor of mouth or laryngeal involvement to suggest this.     PLAN:  Scoped at the bedside with findings noted above - airway is widely patent and amenable to intubation with standard 6.5-7.5 ETT  Will obtain CT neck to further evaluate her right lateral pharyngeal wall edema  Recommend Decadron 10 mg Q8H x 3 doses  Recommend Benadryl 50 mg  +/- Pepcid  If CT neck is negative, she is clear for DC from ENT standpoint. Will follow up on imaging once resulted.   Continue Ciprodex drops per post op DC instructions    Patient seen and examined by resident. Will discuss with attending on call.     Electronically signed by Kodi Michel DO on 3/14/24 at 4:26 PM EDT

## 2024-03-14 NOTE — PROGRESS NOTES
Pt undressed and placed on cardiac heart monitor.  Pt belongings bagged labeled and placed with pt

## 2024-03-14 NOTE — PROGRESS NOTES
CT neck reviewed - small amount of watery edema isolated to right lateral pharyngeal wall which is non-obstructive. Unclear if this is secondary to an allergic reaction vs medication induced angioedema process. Airway is widely patent as also seen on recent scope exam. There is no mass/lesion/discrete abscess which would require any surgical intervention from ENT standpoint. Defer admission decision for observation to primary team. Would recommend home going steroids if discharging from ED.     Electronically signed by Kodi Michel DO on 3/14/2024 at 7:42 PM

## 2024-03-14 NOTE — ED TRIAGE NOTES
Department of Emergency Medicine    FIRST PROVIDER TRIAGE NOTE             Independent MLP           3/14/24  3:37 PM EDT    Date of Encounter: 3/14/24   MRN: [id number]    Vitals:    03/14/24 1535 03/14/24 1539   BP:  (!) 160/87   Pulse: 91    Resp:  18   Temp: 97.8 °F (36.6 °C)    TempSrc: Oral    SpO2: 97%    Weight:  88.5 kg (195 lb)      HPI: Gail Eckert is a 69 y.o. female who presents to the ED for Oral Swelling (Had surgery today with Dr Wetzel, now neck and mouth area having swelling, says its getting harder to swallow and breathe )     ENT: Dr. Wetzel     ROS: Negative for fever.    Physical Exam:   Gen Appearance/Constitutional: alert, airway patent at this exam  CV: regular rate     Initial Plan of Care: All treatment areas with department are currently occupied.     Plan to order/Initiate the following while awaiting opening in ED: Triage evaluation.    Initial Plan of Care: Initiate Treatment-Testing, Proceed toTreatment Area When Bed Available for ED Attending/MLP to Continue Care    Electronically signed by Angelina Arriaga PA-C   DD: 3/14/24

## 2024-03-15 VITALS
RESPIRATION RATE: 18 BRPM | TEMPERATURE: 97.6 F | OXYGEN SATURATION: 96 % | DIASTOLIC BLOOD PRESSURE: 81 MMHG | WEIGHT: 195 LBS | SYSTOLIC BLOOD PRESSURE: 167 MMHG | BODY MASS INDEX: 32.49 KG/M2 | HEIGHT: 65 IN | HEART RATE: 74 BPM

## 2024-03-15 PROBLEM — T78.3XXA ANGIO-EDEMA: Status: ACTIVE | Noted: 2024-03-15

## 2024-03-15 LAB
ALBUMIN SERPL-MCNC: 4 G/DL (ref 3.5–5.2)
ALP SERPL-CCNC: 97 U/L (ref 35–104)
ALT SERPL-CCNC: 9 U/L (ref 0–32)
ANION GAP SERPL CALCULATED.3IONS-SCNC: 14 MMOL/L (ref 7–16)
ARM BAND NUMBER: NORMAL
AST SERPL-CCNC: 10 U/L (ref 0–31)
BILIRUB SERPL-MCNC: 0.2 MG/DL (ref 0–1.2)
BLOOD BANK BLOOD PRODUCT EXPIRATION DATE: NORMAL
BLOOD BANK DISPENSE STATUS: NORMAL
BLOOD BANK ISBT PRODUCT BLOOD TYPE: 8400
BLOOD BANK PRODUCT CODE: NORMAL
BLOOD BANK UNIT TYPE AND RH: NORMAL
BPU ID: NORMAL
BUN SERPL-MCNC: 15 MG/DL (ref 6–23)
CALCIUM SERPL-MCNC: 9.5 MG/DL (ref 8.6–10.2)
CHLORIDE SERPL-SCNC: 102 MMOL/L (ref 98–107)
CHOLEST SERPL-MCNC: 243 MG/DL
CO2 SERPL-SCNC: 21 MMOL/L (ref 22–29)
COMPONENT: NORMAL
CREAT SERPL-MCNC: 0.9 MG/DL (ref 0.5–1)
ERYTHROCYTE [DISTWIDTH] IN BLOOD BY AUTOMATED COUNT: 13.3 % (ref 11.5–15)
GFR SERPL CREATININE-BSD FRML MDRD: >60 ML/MIN/1.73M2
GLUCOSE SERPL-MCNC: 171 MG/DL (ref 74–99)
HBA1C MFR BLD: 5.6 % (ref 4–5.6)
HCT VFR BLD AUTO: 38.8 % (ref 34–48)
HDLC SERPL-MCNC: 72 MG/DL
HGB BLD-MCNC: 12.7 G/DL (ref 11.5–15.5)
LDLC SERPL CALC-MCNC: 143 MG/DL
MAGNESIUM SERPL-MCNC: 1.7 MG/DL (ref 1.6–2.6)
MCH RBC QN AUTO: 30.5 PG (ref 26–35)
MCHC RBC AUTO-ENTMCNC: 32.7 G/DL (ref 32–34.5)
MCV RBC AUTO: 93 FL (ref 80–99.9)
PHOSPHATE SERPL-MCNC: 3.5 MG/DL (ref 2.5–4.5)
PLATELET # BLD AUTO: 255 K/UL (ref 130–450)
PMV BLD AUTO: 10.7 FL (ref 7–12)
POTASSIUM SERPL-SCNC: 4.1 MMOL/L (ref 3.5–5)
PROT SERPL-MCNC: 6.5 G/DL (ref 6.4–8.3)
RBC # BLD AUTO: 4.17 M/UL (ref 3.5–5.5)
SODIUM SERPL-SCNC: 137 MMOL/L (ref 132–146)
TRANSFUSION STATUS: NORMAL
TRIGL SERPL-MCNC: 139 MG/DL
TSH SERPL DL<=0.05 MIU/L-ACNC: 0.46 UIU/ML (ref 0.27–4.2)
UNIT DIVISION: 0
UNIT ISSUE DATE/TIME: NORMAL
VLDLC SERPL CALC-MCNC: 28 MG/DL
WBC OTHER # BLD: 9.5 K/UL (ref 4.5–11.5)

## 2024-03-15 PROCEDURE — 92511 NASOPHARYNGOSCOPY: CPT | Performed by: OTOLARYNGOLOGY

## 2024-03-15 PROCEDURE — G0378 HOSPITAL OBSERVATION PER HR: HCPCS

## 2024-03-15 PROCEDURE — 99221 1ST HOSP IP/OBS SF/LOW 40: CPT | Performed by: OTOLARYNGOLOGY

## 2024-03-15 PROCEDURE — A4216 STERILE WATER/SALINE, 10 ML: HCPCS | Performed by: STUDENT IN AN ORGANIZED HEALTH CARE EDUCATION/TRAINING PROGRAM

## 2024-03-15 PROCEDURE — 84100 ASSAY OF PHOSPHORUS: CPT

## 2024-03-15 PROCEDURE — 83036 HEMOGLOBIN GLYCOSYLATED A1C: CPT

## 2024-03-15 PROCEDURE — 6370000000 HC RX 637 (ALT 250 FOR IP): Performed by: INTERNAL MEDICINE

## 2024-03-15 PROCEDURE — 83735 ASSAY OF MAGNESIUM: CPT

## 2024-03-15 PROCEDURE — 80061 LIPID PANEL: CPT

## 2024-03-15 PROCEDURE — 36415 COLL VENOUS BLD VENIPUNCTURE: CPT

## 2024-03-15 PROCEDURE — 6370000000 HC RX 637 (ALT 250 FOR IP): Performed by: OTOLARYNGOLOGY

## 2024-03-15 PROCEDURE — 99238 HOSP IP/OBS DSCHRG MGMT 30/<: CPT | Performed by: INTERNAL MEDICINE

## 2024-03-15 PROCEDURE — 85027 COMPLETE CBC AUTOMATED: CPT

## 2024-03-15 PROCEDURE — 96376 TX/PRO/DX INJ SAME DRUG ADON: CPT

## 2024-03-15 PROCEDURE — 96372 THER/PROPH/DIAG INJ SC/IM: CPT

## 2024-03-15 PROCEDURE — 2500000003 HC RX 250 WO HCPCS: Performed by: STUDENT IN AN ORGANIZED HEALTH CARE EDUCATION/TRAINING PROGRAM

## 2024-03-15 PROCEDURE — 2580000003 HC RX 258: Performed by: INTERNAL MEDICINE

## 2024-03-15 PROCEDURE — 2580000003 HC RX 258: Performed by: STUDENT IN AN ORGANIZED HEALTH CARE EDUCATION/TRAINING PROGRAM

## 2024-03-15 PROCEDURE — 6360000002 HC RX W HCPCS: Performed by: INTERNAL MEDICINE

## 2024-03-15 PROCEDURE — 84443 ASSAY THYROID STIM HORMONE: CPT

## 2024-03-15 PROCEDURE — 6360000002 HC RX W HCPCS: Performed by: STUDENT IN AN ORGANIZED HEALTH CARE EDUCATION/TRAINING PROGRAM

## 2024-03-15 PROCEDURE — 80053 COMPREHEN METABOLIC PANEL: CPT

## 2024-03-15 RX ORDER — ONDANSETRON 4 MG/1
4 TABLET, FILM COATED ORAL ONCE
Status: DISCONTINUED | OUTPATIENT
Start: 2024-03-15 | End: 2024-03-15 | Stop reason: HOSPADM

## 2024-03-15 RX ORDER — DEXAMETHASONE 4 MG/1
4 TABLET ORAL
Qty: 5 TABLET | Refills: 0 | Status: SHIPPED | OUTPATIENT
Start: 2024-03-15 | End: 2024-03-20

## 2024-03-15 RX ORDER — IBUPROFEN 400 MG/1
400 TABLET ORAL EVERY 6 HOURS PRN
Status: DISCONTINUED | OUTPATIENT
Start: 2024-03-15 | End: 2024-03-15 | Stop reason: HOSPADM

## 2024-03-15 RX ORDER — BUTALBITAL, ACETAMINOPHEN AND CAFFEINE 50; 325; 40 MG/1; MG/1; MG/1
1 TABLET ORAL EVERY 4 HOURS PRN
Status: DISCONTINUED | OUTPATIENT
Start: 2024-03-15 | End: 2024-03-15 | Stop reason: HOSPADM

## 2024-03-15 RX ORDER — HYDROCODONE BITARTRATE AND ACETAMINOPHEN 5; 325 MG/1; MG/1
1 TABLET ORAL EVERY 6 HOURS PRN
Status: DISCONTINUED | OUTPATIENT
Start: 2024-03-15 | End: 2024-03-15 | Stop reason: HOSPADM

## 2024-03-15 RX ADMIN — BUTALBITAL, ACETAMINOPHEN, AND CAFFEINE 1 TABLET: 50; 325; 40 TABLET ORAL at 12:55

## 2024-03-15 RX ADMIN — ASPIRIN 81 MG: 81 TABLET, CHEWABLE ORAL at 08:24

## 2024-03-15 RX ADMIN — ESCITALOPRAM OXALATE 10 MG: 10 TABLET ORAL at 08:24

## 2024-03-15 RX ADMIN — BENZOCAINE AND MENTHOL 1 LOZENGE: 15; 3.6 LOZENGE ORAL at 01:06

## 2024-03-15 RX ADMIN — PANTOPRAZOLE SODIUM 40 MG: 40 TABLET, DELAYED RELEASE ORAL at 05:46

## 2024-03-15 RX ADMIN — FAMOTIDINE 20 MG: 10 INJECTION, SOLUTION INTRAVENOUS at 08:25

## 2024-03-15 RX ADMIN — METOPROLOL SUCCINATE 50 MG: 50 TABLET, EXTENDED RELEASE ORAL at 08:24

## 2024-03-15 RX ADMIN — HYDROCODONE BITARTRATE AND ACETAMINOPHEN 1 TABLET: 5; 325 TABLET ORAL at 02:50

## 2024-03-15 RX ADMIN — AMLODIPINE BESYLATE 10 MG: 10 TABLET ORAL at 08:24

## 2024-03-15 RX ADMIN — DEXAMETHASONE SODIUM PHOSPHATE 10 MG: 10 INJECTION INTRAMUSCULAR; INTRAVENOUS at 08:25

## 2024-03-15 RX ADMIN — PREGABALIN 75 MG: 75 CAPSULE ORAL at 08:24

## 2024-03-15 RX ADMIN — HYDROCODONE BITARTRATE AND ACETAMINOPHEN 1 TABLET: 5; 325 TABLET ORAL at 09:06

## 2024-03-15 RX ADMIN — FOLIC ACID 1 MG: 1 TABLET ORAL at 08:24

## 2024-03-15 RX ADMIN — SODIUM CHLORIDE, PRESERVATIVE FREE 10 ML: 5 INJECTION INTRAVENOUS at 09:07

## 2024-03-15 RX ADMIN — ENOXAPARIN SODIUM 40 MG: 100 INJECTION SUBCUTANEOUS at 08:25

## 2024-03-15 ASSESSMENT — ENCOUNTER SYMPTOMS
FACIAL SWELLING: 1
COUGH: 0
DIARRHEA: 0
WHEEZING: 0
VOMITING: 0
SORE THROAT: 1
COLOR CHANGE: 0
STRIDOR: 0
VOICE CHANGE: 0
ABDOMINAL PAIN: 0
TROUBLE SWALLOWING: 1
CONSTIPATION: 0
SHORTNESS OF BREATH: 1
NAUSEA: 0

## 2024-03-15 ASSESSMENT — PAIN DESCRIPTION - LOCATION
LOCATION: JAW
LOCATION: JAW;THROAT

## 2024-03-15 ASSESSMENT — PAIN DESCRIPTION - DESCRIPTORS
DESCRIPTORS: ACHING;DISCOMFORT;DULL;THROBBING
DESCRIPTORS: ACHING;DISCOMFORT;DULL;THROBBING

## 2024-03-15 ASSESSMENT — PAIN SCALES - GENERAL
PAINLEVEL_OUTOF10: 10
PAINLEVEL_OUTOF10: 8
PAINLEVEL_OUTOF10: 8

## 2024-03-15 NOTE — ED PROVIDER NOTES
[facility]  EMERGENCY DEPARTMENT ENCOUNTER        Pt Name: Gail Eckert  MRN: [id number]  Birthdate 1955  Date of evaluation: 3/14/2024  Provider: Damaris Larsen DO  PCP: Juma Haider DO  Note Started: 9:23 PM EDT 3/14/24    CHIEF COMPLAINT       Chief Complaint   Patient presents with    Oral Swelling     Had surgery today with Dr Wetezl, now neck and mouth area having swelling, says its getting harder to swallow and breathe        HISTORY OF PRESENT ILLNESS: 1 or more Elements     Limitations to history : None    Gail Eckert is a 69-year-old female who presents the ED for evaluation of neck swelling.  She has a history of hypertension, is prescribed lisinopril but has not taken it in \"a long time\" because she did not feel that she needed it in addition to her metoprolol.  However today she did decide to take the lisinopril and within about an hour started having neck swelling, sore throat, difficulty swallowing.  Also feels slightly short of breath and anxious.  No chest pain.  Additionally, the patient states that she had bilateral tympanostomy tubes placed today and was feeling fine when she got home, her symptoms did not start until after she had taken the lisinopril.  She denies any history of anaphylaxis or angioedema in the past.  She has not had any recent illnesses, fevers or chills, neck pain or stiffness, abdominal pain, nausea vomiting or diarrhea or abnormal urinary symptoms.  She denies any rashes anywhere or wheezing.       Nursing Notes were all reviewed and agreed with or any disagreements were addressed in the HPI.      REVIEW OF EXTERNAL NOTE :       Reviewed radiology service progress note on 9/27/2023.    Chart Review/External Note Review    Last Echo reviewed by Me:  Lab Results   Component Value Date    LVEF 68 07/30/2022           Controlled Substance Monitoring:    Acute and Chronic Pain Monitoring:   RX Monitoring Periodic Controlled Substance Monitoring  External ear normal.      Left Ear: External ear normal.      Nose: Nose normal. No rhinorrhea.      Mouth/Throat:      Mouth: Mucous membranes are moist.      Pharynx: Oropharynx is clear. No oropharyngeal exudate or posterior oropharyngeal erythema.   Eyes:      Extraocular Movements: Extraocular movements intact.      Conjunctiva/sclera: Conjunctivae normal.      Pupils: Pupils are equal, round, and reactive to light.   Neck:      Comments: Anterior neck seems to have slight fullness, vs possibly body habitus-related; no redness warmth or tenderness; no findings to suggest Jd's angina.  No tenderness or swelling to the floor of her mouth.  Tongue seems tracely edematous, very subtle.  Oropharynx appears grossly clear and patent.  No drooling stridor or trismus.  No hot potato voice.  Cardiovascular:      Rate and Rhythm: Normal rate and regular rhythm.      Pulses: Normal pulses.      Heart sounds: Normal heart sounds.   Pulmonary:      Effort: Pulmonary effort is normal. No respiratory distress.      Breath sounds: Normal breath sounds. No stridor. No wheezing or rales.   Abdominal:      Palpations: Abdomen is soft.      Tenderness: There is no abdominal tenderness. There is no guarding.   Musculoskeletal:         General: Normal range of motion.      Cervical back: Normal range of motion and neck supple.      Right lower leg: No edema.      Left lower leg: No edema.   Skin:     General: Skin is warm and dry.      Capillary Refill: Capillary refill takes less than 2 seconds.      Coloration: Skin is not jaundiced or pale.      Findings: No bruising, erythema or rash.   Neurological:      General: No focal deficit present.      Mental Status: She is alert and oriented to person, place, and time.      Sensory: No sensory deficit.      Motor: No weakness.   Psychiatric:         Mood and Affect: Mood normal.         Behavior: Behavior normal.          ED Triage Vitals   BP Temp Temp Source Pulse Respirations

## 2024-03-15 NOTE — CARE COORDINATION
03/15/24 Discharge order noted Chart review and info from IDR indicates that pt is independent and will have no needs for discharge daughter is at bedside to transport pt home Electronically signed by Harjit Meraz RN CM on 3/15/2024 at 1:07 PM

## 2024-03-15 NOTE — H&P
Inpatient H&P      PCP:  Juma Haider DO  Admitting Physician:  Sylvia Lemus DO  Consultants:  ENT  Chief Complaint:    Chief Complaint   Patient presents with    Oral Swelling     Had surgery today with Dr Wetzel, now neck and mouth area having swelling, says its getting harder to swallow and breathe        History of Present Illness  Gail Eckert is a 69 y.o. female who presents to [facility] ER complaining of oral swelling.    Gail Eckert has a past medical history that includes hypertension, hyperlipidemia, CAD    Gail presents to the ER with complaint of oral swelling.  She underwent bilateral urine myringotomy with tube placement earlier this morning with Dr. Rendon.  She was discharged home without complication.  She reports she did have high blood pressure upon discharge.  She got home and took lisinopril and amlodipine.  She states that she then started having some lower throat swelling associated with difficulty breathing.  She states that she stopped the lisinopril herself about a month ago.  Today was the first day that she restarted the lisinopril.  ENT was consulted from the ER.  She underwent flexible nasopharyngolaryngoscopy in ER. There was concern for possible angioedema however there was no tongue/lip/floor of mouth swelling to suggest this.  CT of the neck showed small amount of fluid in the left neck located between the left common carotid artery and left internal jugular vein at the base of the neck.  This is nonspecific and may be related to previous inflammation.  Admitted for observation.  She no longer complains of difficulty breathing.        Discussed patient's case with ED physician.    ER Course  Upon presentation to the ER, routine labwork was performed which revealed glucose 165.  Imaging results are as outlined below in the Imaging section of this note.    Upon arrival to the ER, patient was 160/87.  The patient received benadryl, pepcid, norco, solumedrol in the  PM   Result Value Ref Range    Sodium 141 132 - 146 mmol/L    Potassium 3.9 3.5 - 5.0 mmol/L    Chloride 105 98 - 107 mmol/L    CO2 25 22 - 29 mmol/L    Anion Gap 11 7 - 16 mmol/L    Glucose 165 (H) 74 - 99 mg/dL    BUN 15 6 - 23 mg/dL    Creatinine 1.0 0.50 - 1.00 mg/dL    Est, Glom Filt Rate >60 >60 mL/min/1.73m2    Calcium 8.9 8.6 - 10.2 mg/dL   TYPE AND SCREEN    Collection Time: 03/14/24  4:58 PM   Result Value Ref Range    Blood Bank Sample Expiration 03/17/2024,2359     Arm Band Number [id number]     ABO/Rh B NEGATIVE     Antibody Screen NEGATIVE        Imaging  CT SOFT TISSUE NECK W CONTRAST    Result Date: 3/14/2024  EXAMINATION: CT OF THE NECK SOFT TISSUE WITH CONTRAST  3/14/2024 TECHNIQUE: CT of the neck was performed with the administration of intravenous contrast. Multiplanar reformatted images are provided for review. Automated exposure control, iterative reconstruction, and/or weight based adjustment of the mA/kV was utilized to reduce the radiation dose to as low as reasonably achievable. COMPARISON: None. HISTORY: ORDERING SYSTEM PROVIDED HISTORY: throat swelling concern for possible angioedema vs mass TECHNOLOGIST PROVIDED HISTORY: Reason for exam:->throat swelling concern for possible angioedema vs mass Additional Contrast?->Radiologist Recommendation What reading provider will be dictating this exam?->CRC FINDINGS: PHARYNX/LARYNX:  There is no sign of any laryngeal or pharyngeal soft tissue swelling. The palatine tonsils are normal in appearance.  The tongue is normal in appearance.  The valleculae, epiglottis, aryepiglottic folds and pyriform sinuses appear unremarkable.  The true and false vocal cords are normal in appearance.  No mass or abscess is seen. There is a small amount of fluid in the left neck located between the left common carotid artery and the left internal jugular vein at the base of the neck.  This is nonspecific and may be related to previous inflammation. SALIVARY

## 2024-03-15 NOTE — PLAN OF CARE
Problem: Pain  Goal: Verbalizes/displays adequate comfort level or baseline comfort level  Outcome: Adequate for Discharge     Problem: ABCDS Injury Assessment  Goal: Absence of physical injury  Outcome: Adequate for Discharge     Problem: Safety - Adult  Goal: Free from fall injury  Outcome: Adequate for Discharge

## 2024-03-15 NOTE — PROGRESS NOTES
Discharge instructions given to patient. All questions and concerns answered to patient's satisfaction. Belongings packed and sent with patient.

## 2024-03-15 NOTE — PROGRESS NOTES
4 Eyes Skin Assessment     NAME:  Gail Eckert  YOB: 1955  MEDICAL RECORD NUMBER:  [id number]    The patient is being assessed for  Admission    I agree that at least one RN has performed a thorough Head to Toe Skin Assessment on the patient. ALL assessment sites listed below have been assessed.      Areas assessed by both nurses:    Head, Face, Ears, Shoulders, Back, Chest, Arms, Elbows, Hands, Sacrum. Buttock, Coccyx, Ischium, and Legs. Feet and Heels        Does the Patient have a Wound? No noted wound(s)       Junior Prevention initiated by RN: Yes  Wound Care Orders initiated by RN: No    Pressure Injury (Stage 3,4, Unstageable, DTI, NWPT, and Complex wounds) if present, place Wound referral order by RN under : No    New Ostomies, if present place, Ostomy referral order under : No     Nurse 1 eSignature: Electronically signed by Bambi Castellano RN on 3/14/24 at 11:01 PM EDT    **SHARE this note so that the co-signing nurse can place an eSignature**    Nurse 2 eSignature: Electronically signed by Evonne Salazar RN on 3/14/24 at 11:02 PM EDT

## 2024-03-15 NOTE — PROGRESS NOTES
CLINICAL PHARMACY NOTE: MEDS TO BEDS    Total # of Prescriptions Filled: 1   The following medications were delivered to the patient:  Dexamethasone 4mg    Additional Documentation:  Geovanna delivered to patient 3-15-24

## 2024-03-15 NOTE — PROGRESS NOTES
OTOLARYNGOLOGY  DAILY PROGRESS NOTE  3/15/2024    Subjective:     Patient reports improvement overnight, denies any shortness of breath, dysphagia, sore throat. Feels much improved since yesterday       Objective:     BP (!) 138/95   Pulse 79   Temp 97.7 °F (36.5 °C) (Temporal)   Resp 18   Ht 1.651 m (5' 5\")   Wt 88.5 kg (195 lb)   SpO2 95%   BMI 32.45 kg/m²   PULSE OXIMETRY RANGE: SpO2  Av.3 %  Min: 93 %  Max: 97 %  I/O last 3 completed shifts:  In: 193.8 [Blood:193.8]  Out: -     GENERAL:  Awake, alert, cooperative, no apparent distress  HENT: Normocephalic, atraumatic. Oral cavity clear, uvula midline, floor of mouth soft, tongue midline, no evidence of swelling in the oral cavity or tongue, neck soft nontender non swollen, trachea midline, external ears normal   EYES: No sclera icterus, pupils equal  LUNGS:  No increased work of breathing, no stridor  CARDIOVASCULAR:  RR      Assessment/Plan:     69 y.o. female with with history of tympanostomy tube placement yesterday who now presents with subjective complaint of throat swelling. Given recent dose of lisinopril, this could be secondary to medication induced angioedema, however she does not have any tongue/lip/floor of mouth or laryngeal involvement to suggest this.        Ct neck clear   Ok for diet   Pt ok for d/c from ent standpoint ok prednisone taper  Discussed to not take ACEI/ARB in the future, will defer to medicine for changing her BP med regiment   Follow up with Dr Wetzel as previously schedule     Patient  discussed with Attending.       Electronically signed by Gamal Rios DO on 3/15/2024 at 8:50 AM

## 2024-03-18 ENCOUNTER — PREP FOR PROCEDURE (OUTPATIENT)
Dept: SURGERY | Age: 69
End: 2024-03-18

## 2024-03-18 RX ORDER — SODIUM CHLORIDE 0.9 % (FLUSH) 0.9 %
5-40 SYRINGE (ML) INJECTION EVERY 12 HOURS SCHEDULED
Status: CANCELLED | OUTPATIENT
Start: 2024-03-18

## 2024-03-18 RX ORDER — SODIUM CHLORIDE 0.9 % (FLUSH) 0.9 %
5-40 SYRINGE (ML) INJECTION PRN
Status: CANCELLED | OUTPATIENT
Start: 2024-03-18

## 2024-03-18 RX ORDER — SODIUM CHLORIDE, SODIUM LACTATE, POTASSIUM CHLORIDE, CALCIUM CHLORIDE 600; 310; 30; 20 MG/100ML; MG/100ML; MG/100ML; MG/100ML
INJECTION, SOLUTION INTRAVENOUS CONTINUOUS
Status: CANCELLED | OUTPATIENT
Start: 2024-03-18

## 2024-05-01 RX ORDER — SODIUM CHLORIDE 0.9 % (FLUSH) 0.9 %
5-40 SYRINGE (ML) INJECTION PRN
Status: CANCELLED | OUTPATIENT
Start: 2024-05-01

## 2024-05-01 RX ORDER — SODIUM CHLORIDE, SODIUM LACTATE, POTASSIUM CHLORIDE, CALCIUM CHLORIDE 600; 310; 30; 20 MG/100ML; MG/100ML; MG/100ML; MG/100ML
INJECTION, SOLUTION INTRAVENOUS CONTINUOUS
Status: CANCELLED | OUTPATIENT
Start: 2024-05-01

## 2024-05-01 RX ORDER — SODIUM CHLORIDE 0.9 % (FLUSH) 0.9 %
5-40 SYRINGE (ML) INJECTION EVERY 12 HOURS SCHEDULED
Status: CANCELLED | OUTPATIENT
Start: 2024-05-01

## 2024-05-01 NOTE — H&P
Chart - [id number]  User  LR         Summary  Viewing date range:  05/16/22 - 05/03/24  Renal insufficiency  Kake (hard of hearing)  Hernia  History of seizure  GERD (gastroesophageal reflux disease)  Depression  Anxiety  Hypertension  History of heart artery stent  ~2021  H/O hemorrhoidectomy  History of colonoscopy  History of arthroplasty of knee  03/14/24 08/01/22 03/14/24 04/09/24 03/14/24 03/18/24 02/12/24 03/14/24 03/14/24 03/14/24 03/14/24 03/14/24 03/27/24 03/14/24 02/13/24 02/13/24 02/13/24  registration packet  01/30/24 03/14/24 03/14/24 03/14/24 03/14/24 04/09/24 03/14/24 04/09/24 04/09/24 04/09/24 03/14/24 02/06/24  Primary Language  English  Preferred Language For Discussing Healthcare  English   Required  Hx MRSA  Hx Vancomycin-Resistant Enterococci  Clinical Trial Participation  Clinical Trial Designation  02/13/24 03/14/24 03/14/24 03/14/24 03/14/24 04/09/24 04/09/24 04/09/24 04/09/24  Employment  RE  Portal  Preferred Phone  [phone]  Address  [address] [place] [address]  Next of Kin  Person to Notify  Primary Insurance  [employer] Gold HMO  General Surgery Visit/H&P (Signed) - 01/30/24 11:57  Incarcerated ventral hernia  The patient will undergo a CT of the abdomen and pelvis to further delineate the anatomy.  The patient will undergo a robotic assisted laparoscopic repair of the incarcerated ventral hernia. I have informed her of the risks, benefits and complications of the procedure and she is willing to proceed.  Moderate complexity  I performed an updated history, physical examination, assessment and plan.  DATE  LOCATION/  PROVIDER  REASON FOR VISIT  05/03/24  07:00  Antonio De La Cruz MD  starr ventral/exc back mass  DATE  LOCATION/  PROVIDER  PROBLEM  04/11/24  Antonio Stein MD  04/09/24  [facility]  Antonio Stein MD  04/09/24  [facility] [address]  Antonio Stein 
obstruction, without gangrene  24  [facility]  Antonio Stein MD  Other and unspecified ventral hernia with obstruction, without gangrene  24  [facility] [address] [facility] [address]  Francisco Wetzel Jr, MD  Discuss surgery  24  [facility] [address] [facility] [address]  Francisco Wetzel Jr, MD  Discuss surgery  24  [facility] [address]  Antonio Stein MD  Incarcerated ventral hernia  24  [facility] [address]  Antonio Stein MD  HERNIA  22  [facility] [address]  Francisco Wetzel Jr, MD  Office Visit  Bmt  24  08:10  No Data to Display  PROTOCOLS  No Protocols to Display  OVERDUE ITEMS  LAST DONE  NEXT DUE  No Overdue Items to Display  No Data to Display  Gail Eckert  Amb  69, F03/1955  MRN#   [id number]  Booked  [id number]     Visit Date: 24  Search Patient's Chart     24  09:10  24  09:15                       No Data to Display  No Data to Display  24  No Data to Display  Colorectal Health  Gail Eckert M  69  F  1955        Allergy/Adv: baclofen, tramadol  [facility]  [address]  [place] [address]    General Surgery Visit/H&P   Signed    Patient: Gail Eckert  MR#: [id number]  : 1955  Acct:[id number]  Age/Sex: 68 / F  ADM Date: 24  Loc: S[facility].[id number]            Provider Location:   cc: Dr. Juma Haider Jr.~        Intake  Vital Signs      24  11:57  Height   5 ft 5 in  Weight   196 lb  BMI   32.5  BP   147/72 H  Mean Arterial Pressure   97  Blood Pressure Location   Right Upper Arm  Position   Sitting  Respiration   16  Pulse Rate   73    Intake  Visit Reasons: Hernia  Current Pain: Yes Right Upper Abdomen Pain Scale (1-10): 3  Date of Last Colonoscopy: 19 (Cologuard done in [place])  Work/School/Activity Note Needed: No  Allergies    baclofen Allergy (Severe, Verified 24 12:03)  Confusion  Tramadal Allergy (Severe, Uncoded 24

## 2024-05-01 NOTE — PROGRESS NOTES
[facility] PRE-ADMISSION TESTING INSTRUCTIONS    The Preadmission Testing patient is instructed accordingly using the following criteria (check applicable):    ARRIVAL INSTRUCTIONS:  [x] Parking the day of Surgery is located in the Main Entrance lot.  Upon entering the door, make an immediate right to the surgery reception desk    [x] Bring photo ID and insurance card    [] Bring in a copy of Living will or Durable Power of  papers.    [x] Please be sure to arrange for responsible adult to provide transportation to and from the hospital    [x] Please arrange for responsible adult to be with you for the 24 hour period post procedure due to having anesthesia    [x] If you awake am of surgery not feeling well or have temperature >100 please call [phone]    GENERAL INSTRUCTIONS:    [x] NPO AFTER MIDNIGHT       No gum, candy or mints.    [x] You may brush your teeth, but do not swallow any water    [x] Take medications as instructed with 1-2 oz of water    [x] Stop herbal supplements and vitamins 5 days prior to procedure    [x] Follow preop dosing of blood thinners per physician instructions    [] Take 1/2 dose of evening insulin, but no insulin after midnight    [] No oral diabetic medications after midnight    [] If diabetic and have low blood sugar or feel symptomatic, take 1-2oz apple juice only    [] Bring inhalers day of surgery    [] Bring C-PAP/ Bi-Pap day of surgery    [] Bring urine specimen day of surgery    [x] Shower or bath with soap, lather and rinse well, AM of Surgery, no lotion, powders or creams to surgical site    [] Follow bowel prep as instructed per surgeon    [x] No tobacco products within 24 hours of surgery     [x] No alcohol or illegal drug use within 24 hours of surgery.    [x] Jewelry, body piercing's, eyeglasses, contact lenses and dentures are not permitted into surgery (bring cases)      [] Please do not wear any nail polish, make up or hair

## 2024-05-02 ENCOUNTER — HOSPITAL ENCOUNTER (OUTPATIENT)
Dept: PREADMISSION TESTING | Age: 69
Discharge: HOME | End: 2024-05-02
Payer: MEDICARE

## 2024-05-02 ENCOUNTER — ANESTHESIA EVENT (OUTPATIENT)
Dept: OPERATING ROOM | Age: 69
End: 2024-05-02
Payer: MEDICARE

## 2024-05-02 LAB
ANION GAP SERPL CALCULATED.3IONS-SCNC: 7 MMOL/L (ref 7–16)
BUN SERPL-MCNC: 17 MG/DL (ref 6–23)
CALCIUM SERPL-MCNC: 9.2 MG/DL (ref 8.6–10.2)
CHLORIDE SERPL-SCNC: 105 MMOL/L (ref 98–107)
CO2 SERPL-SCNC: 27 MMOL/L (ref 22–29)
CREAT SERPL-MCNC: 0.9 MG/DL (ref 0.5–1)
EKG ATRIAL RATE: 54 BPM
EKG P AXIS: 37 DEGREES
EKG P-R INTERVAL: 186 MS
EKG Q-T INTERVAL: 438 MS
EKG QRS DURATION: 90 MS
EKG QTC CALCULATION (BAZETT): 415 MS
EKG R AXIS: -9 DEGREES
EKG T AXIS: 39 DEGREES
EKG VENTRICULAR RATE: 54 BPM
ERYTHROCYTE [DISTWIDTH] IN BLOOD BY AUTOMATED COUNT: 13.4 % (ref 11.5–15)
GFR, ESTIMATED: 67 ML/MIN/1.73M2
GLUCOSE SERPL-MCNC: 106 MG/DL (ref 74–99)
HCT VFR BLD AUTO: 40.8 % (ref 34–48)
HGB BLD-MCNC: 12.8 G/DL (ref 11.5–15.5)
MCH RBC QN AUTO: 30.3 PG (ref 26–35)
MCHC RBC AUTO-ENTMCNC: 31.4 G/DL (ref 32–34.5)
MCV RBC AUTO: 96.7 FL (ref 80–99.9)
PLATELET # BLD AUTO: 207 K/UL (ref 130–450)
PMV BLD AUTO: 10.6 FL (ref 7–12)
POTASSIUM SERPL-SCNC: 4.5 MMOL/L (ref 3.5–5)
RBC # BLD AUTO: 4.22 M/UL (ref 3.5–5.5)
SODIUM SERPL-SCNC: 139 MMOL/L (ref 132–146)
WBC OTHER # BLD: 6.7 K/UL (ref 4.5–11.5)

## 2024-05-02 PROCEDURE — 36415 COLL VENOUS BLD VENIPUNCTURE: CPT

## 2024-05-02 PROCEDURE — 80048 BASIC METABOLIC PNL TOTAL CA: CPT

## 2024-05-02 PROCEDURE — 85027 COMPLETE CBC AUTOMATED: CPT

## 2024-05-02 RX ORDER — DIAZEPAM 5 MG/1
5 TABLET ORAL 2 TIMES DAILY
COMMUNITY
Start: 2024-04-24

## 2024-05-02 RX ORDER — CARISOPRODOL 350 MG/1
350 TABLET ORAL 2 TIMES DAILY
COMMUNITY
Start: 2024-04-24

## 2024-05-02 NOTE — ANESTHESIA PRE PROCEDURE
Department of Anesthesiology  Preprocedure Note       Name:  Gail Eckert   Age:  69 y.o.  :  1955                                          MRN:  [id number]         Date:  2024      Surgeon: Surgeon(s):  Antonio Stein MD Evan, Stephen J, MD    Procedure: Procedure(s):  LAPAROSCOPIC ROBOTIC XI ASSISTED VENTRAL HERNIA REPAIR WITH MESH POSSIBLE OPEN  EXCISION BACK LESION    Medications prior to admission:   Prior to Admission medications    Medication Sig Start Date End Date Taking? Authorizing Provider   diazePAM (VALIUM) 5 MG tablet Take 1 tablet by mouth 2 times daily. Max Daily Amount: 10 mg 24   Elvira Main MD   carisoprodol (SOMA) 350 MG tablet Take 1 tablet by mouth 2 times daily. Max Daily Amount: 700 mg 24   Elvira Main MD   escitalopram (LEXAPRO) 20 MG tablet Take 0.5 tablets by mouth daily 3/9/23   Juma Haider DO   hydrOXYzine HCl (ATARAX) 25 MG tablet take 1 tablet by mouth at bedtime 23   Elvira Main MD   pregabalin (LYRICA) 75 MG capsule Take 1 capsule by mouth in the morning and 1 capsule at noon and 1 capsule before bedtime. Do all this for 30 doses. 22  Timoteo Castro DO   metoprolol succinate (TOPROL XL) 50 MG extended release tablet Take 1 tablet by mouth in the morning. 8/3/22   Timoteo Castro DO   atorvastatin (LIPITOR) 80 MG tablet Take 1 tablet by mouth nightly 22   Timoteo Castro DO   amLODIPine (NORVASC) 10 MG tablet Take 10 mg by mouth daily    Elvira Main MD   nystatin (MYCOSTATIN) 131259 UNIT/GM ointment Apply topically 2 times daily. 21   Juma Haider DO   QUEtiapine (SEROQUEL) 200 MG tablet Take 200 mg by mouth at bedtime Takes at night    Elvira Main MD   folic acid (FOLVITE) 1 MG tablet Take 1 tablet by mouth daily 20   Juma Haider DO   pantoprazole (PROTONIX) 40 MG tablet Take 1 tablet by mouth every morning (before breakfast) 10/23/20   Juma Haider DO

## 2024-05-02 NOTE — ANESTHESIA PRE PROCEDURE
Department of Anesthesiology  Preprocedure Note       Name:  Gail Eckert   Age:  69 y.o.  :  1955                                          MRN:  [id number]         Date:  2024      Surgeon: Surgeon(s):  Antonio Stein MD Evan, Stephen J, MD    Procedure: Procedure(s):  LAPAROSCOPIC ROBOTIC XI ASSISTED VENTRAL HERNIA REPAIR WITH MESH POSSIBLE OPEN  EXCISION BACK LESION    Medications prior to admission:   Prior to Admission medications    Medication Sig Start Date End Date Taking? Authorizing Provider   diazePAM (VALIUM) 5 MG tablet Take 1 tablet by mouth 2 times daily. Max Daily Amount: 10 mg 24   Elvira Main MD   carisoprodol (SOMA) 350 MG tablet Take 1 tablet by mouth 2 times daily. Max Daily Amount: 700 mg 24   Elvira Main MD   escitalopram (LEXAPRO) 20 MG tablet Take 0.5 tablets by mouth daily 3/9/23   Juma Haider DO   hydrOXYzine HCl (ATARAX) 25 MG tablet take 1 tablet by mouth at bedtime 23   Elvira Main MD   pregabalin (LYRICA) 75 MG capsule Take 1 capsule by mouth in the morning and 1 capsule at noon and 1 capsule before bedtime. Do all this for 30 doses. 22  Timoteo Castro DO   metoprolol succinate (TOPROL XL) 50 MG extended release tablet Take 1 tablet by mouth in the morning. 8/3/22   Timoteo Castro DO   atorvastatin (LIPITOR) 80 MG tablet Take 1 tablet by mouth nightly 22   Timoteo Castro DO   amLODIPine (NORVASC) 10 MG tablet Take 10 mg by mouth daily    Elivra Main MD   nystatin (MYCOSTATIN) 564514 UNIT/GM ointment Apply topically 2 times daily. 21   Juma Haider DO   QUEtiapine (SEROQUEL) 200 MG tablet Take 200 mg by mouth at bedtime Takes at night    Elvira Main MD   folic acid (FOLVITE) 1 MG tablet Take 1 tablet by mouth daily 20   Juma Haider DO   pantoprazole (PROTONIX) 40 MG tablet Take 1 tablet by mouth every morning (before breakfast) 10/23/20   Juma Haider DO

## 2024-05-03 ENCOUNTER — ANESTHESIA (OUTPATIENT)
Dept: OPERATING ROOM | Age: 69
End: 2024-05-03
Payer: MEDICARE

## 2024-05-03 ENCOUNTER — HOSPITAL ENCOUNTER (OUTPATIENT)
Age: 69
Setting detail: OUTPATIENT SURGERY
Discharge: HOME | End: 2024-05-03
Attending: SURGERY | Admitting: SURGERY
Payer: MEDICARE

## 2024-05-03 VITALS
WEIGHT: 200 LBS | HEART RATE: 72 BPM | HEIGHT: 65 IN | SYSTOLIC BLOOD PRESSURE: 175 MMHG | OXYGEN SATURATION: 94 % | DIASTOLIC BLOOD PRESSURE: 87 MMHG | RESPIRATION RATE: 18 BRPM | TEMPERATURE: 97.8 F | BODY MASS INDEX: 33.32 KG/M2

## 2024-05-03 DIAGNOSIS — D48.117 DESMOID TUMOR OF BACK: ICD-10-CM

## 2024-05-03 DIAGNOSIS — K43.6 VENTRAL HERNIA WITH OBSTRUCTION BUT NO GANGRENE: ICD-10-CM

## 2024-05-03 DIAGNOSIS — G89.18 POST-OPERATIVE PAIN: Primary | ICD-10-CM

## 2024-05-03 PROCEDURE — 3600000009 HC SURGERY ROBOT BASE: Performed by: SURGERY

## 2024-05-03 PROCEDURE — 7100000010 HC PHASE II RECOVERY - FIRST 15 MIN: Performed by: SURGERY

## 2024-05-03 PROCEDURE — 2500000003 HC RX 250 WO HCPCS

## 2024-05-03 PROCEDURE — 3700000001 HC ADD 15 MINUTES (ANESTHESIA): Performed by: SURGERY

## 2024-05-03 PROCEDURE — 7100000011 HC PHASE II RECOVERY - ADDTL 15 MIN: Performed by: SURGERY

## 2024-05-03 PROCEDURE — 6360000002 HC RX W HCPCS: Performed by: ANESTHESIOLOGY

## 2024-05-03 PROCEDURE — C1781 MESH (IMPLANTABLE): HCPCS | Performed by: SURGERY

## 2024-05-03 PROCEDURE — 3700000000 HC ANESTHESIA ATTENDED CARE: Performed by: SURGERY

## 2024-05-03 PROCEDURE — 88304 TISSUE EXAM BY PATHOLOGIST: CPT

## 2024-05-03 PROCEDURE — 2709999900 HC NON-CHARGEABLE SUPPLY: Performed by: SURGERY

## 2024-05-03 PROCEDURE — 7100000001 HC PACU RECOVERY - ADDTL 15 MIN: Performed by: SURGERY

## 2024-05-03 PROCEDURE — 6360000002 HC RX W HCPCS: Performed by: NURSE ANESTHETIST, CERTIFIED REGISTERED

## 2024-05-03 PROCEDURE — S2900 ROBOTIC SURGICAL SYSTEM: HCPCS | Performed by: SURGERY

## 2024-05-03 PROCEDURE — 2580000003 HC RX 258: Performed by: SURGERY

## 2024-05-03 PROCEDURE — 2500000003 HC RX 250 WO HCPCS: Performed by: NURSE ANESTHETIST, CERTIFIED REGISTERED

## 2024-05-03 PROCEDURE — 6370000000 HC RX 637 (ALT 250 FOR IP): Performed by: SURGERY

## 2024-05-03 PROCEDURE — 6360000002 HC RX W HCPCS

## 2024-05-03 PROCEDURE — 3600000019 HC SURGERY ROBOT ADDTL 15MIN: Performed by: SURGERY

## 2024-05-03 PROCEDURE — 6360000002 HC RX W HCPCS: Performed by: SURGERY

## 2024-05-03 PROCEDURE — 7100000000 HC PACU RECOVERY - FIRST 15 MIN: Performed by: SURGERY

## 2024-05-03 DEVICE — GORE SYNECOR INTRAPERITONEAL 12CM CIRCULAR BIOMATERIAL
Type: IMPLANTABLE DEVICE | Site: ABDOMEN | Status: FUNCTIONAL
Brand: GORE SYNECOR BIOMATERIAL

## 2024-05-03 RX ORDER — HYDROCODONE BITARTRATE AND ACETAMINOPHEN 5; 325 MG/1; MG/1
1 TABLET ORAL ONCE
Status: COMPLETED | OUTPATIENT
Start: 2024-05-03 | End: 2024-05-03

## 2024-05-03 RX ORDER — FENTANYL CITRATE 50 UG/ML
INJECTION, SOLUTION INTRAMUSCULAR; INTRAVENOUS PRN
Status: DISCONTINUED | OUTPATIENT
Start: 2024-05-03 | End: 2024-05-03 | Stop reason: SDUPTHER

## 2024-05-03 RX ORDER — NALOXONE HYDROCHLORIDE 0.4 MG/ML
INJECTION, SOLUTION INTRAMUSCULAR; INTRAVENOUS; SUBCUTANEOUS PRN
Status: DISCONTINUED | OUTPATIENT
Start: 2024-05-03 | End: 2024-05-03 | Stop reason: HOSPADM

## 2024-05-03 RX ORDER — SODIUM CHLORIDE 0.9 % (FLUSH) 0.9 %
5-40 SYRINGE (ML) INJECTION PRN
Status: DISCONTINUED | OUTPATIENT
Start: 2024-05-03 | End: 2024-05-03 | Stop reason: HOSPADM

## 2024-05-03 RX ORDER — LIDOCAINE HYDROCHLORIDE 20 MG/ML
INJECTION, SOLUTION EPIDURAL; INFILTRATION; INTRACAUDAL; PERINEURAL PRN
Status: DISCONTINUED | OUTPATIENT
Start: 2024-05-03 | End: 2024-05-03 | Stop reason: SDUPTHER

## 2024-05-03 RX ORDER — SODIUM CHLORIDE 0.9 % (FLUSH) 0.9 %
5-40 SYRINGE (ML) INJECTION EVERY 12 HOURS SCHEDULED
Status: DISCONTINUED | OUTPATIENT
Start: 2024-05-03 | End: 2024-05-03 | Stop reason: HOSPADM

## 2024-05-03 RX ORDER — FENTANYL CITRATE 50 UG/ML
50 INJECTION, SOLUTION INTRAMUSCULAR; INTRAVENOUS EVERY 5 MIN PRN
Status: COMPLETED | OUTPATIENT
Start: 2024-05-03 | End: 2024-05-03

## 2024-05-03 RX ORDER — MIDAZOLAM HYDROCHLORIDE 1 MG/ML
INJECTION INTRAMUSCULAR; INTRAVENOUS PRN
Status: DISCONTINUED | OUTPATIENT
Start: 2024-05-03 | End: 2024-05-03 | Stop reason: SDUPTHER

## 2024-05-03 RX ORDER — PROPOFOL 10 MG/ML
INJECTION, EMULSION INTRAVENOUS PRN
Status: DISCONTINUED | OUTPATIENT
Start: 2024-05-03 | End: 2024-05-03 | Stop reason: SDUPTHER

## 2024-05-03 RX ORDER — ONDANSETRON 2 MG/ML
INJECTION INTRAMUSCULAR; INTRAVENOUS PRN
Status: DISCONTINUED | OUTPATIENT
Start: 2024-05-03 | End: 2024-05-03 | Stop reason: SDUPTHER

## 2024-05-03 RX ORDER — ONDANSETRON 2 MG/ML
4 INJECTION INTRAMUSCULAR; INTRAVENOUS
Status: DISCONTINUED | OUTPATIENT
Start: 2024-05-03 | End: 2024-05-03 | Stop reason: HOSPADM

## 2024-05-03 RX ORDER — FENTANYL CITRATE 50 UG/ML
25 INJECTION, SOLUTION INTRAMUSCULAR; INTRAVENOUS EVERY 5 MIN PRN
Status: DISCONTINUED | OUTPATIENT
Start: 2024-05-03 | End: 2024-05-03 | Stop reason: HOSPADM

## 2024-05-03 RX ORDER — HYDROCODONE BITARTRATE AND ACETAMINOPHEN 5; 325 MG/1; MG/1
1 TABLET ORAL EVERY 6 HOURS PRN
Qty: 12 TABLET | Refills: 0 | Status: SHIPPED | OUTPATIENT
Start: 2024-05-03 | End: 2024-05-06

## 2024-05-03 RX ORDER — DEXAMETHASONE SODIUM PHOSPHATE 4 MG/ML
INJECTION, SOLUTION INTRA-ARTICULAR; INTRALESIONAL; INTRAMUSCULAR; INTRAVENOUS; SOFT TISSUE PRN
Status: DISCONTINUED | OUTPATIENT
Start: 2024-05-03 | End: 2024-05-03 | Stop reason: SDUPTHER

## 2024-05-03 RX ORDER — SODIUM CHLORIDE 9 MG/ML
INJECTION, SOLUTION INTRAVENOUS PRN
Status: DISCONTINUED | OUTPATIENT
Start: 2024-05-03 | End: 2024-05-03 | Stop reason: HOSPADM

## 2024-05-03 RX ORDER — ROCURONIUM BROMIDE 10 MG/ML
INJECTION, SOLUTION INTRAVENOUS PRN
Status: DISCONTINUED | OUTPATIENT
Start: 2024-05-03 | End: 2024-05-03 | Stop reason: SDUPTHER

## 2024-05-03 RX ORDER — SODIUM CHLORIDE, SODIUM LACTATE, POTASSIUM CHLORIDE, CALCIUM CHLORIDE 600; 310; 30; 20 MG/100ML; MG/100ML; MG/100ML; MG/100ML
INJECTION, SOLUTION INTRAVENOUS CONTINUOUS
Status: DISCONTINUED | OUTPATIENT
Start: 2024-05-03 | End: 2024-05-03 | Stop reason: HOSPADM

## 2024-05-03 RX ADMIN — FENTANYL CITRATE 25 MCG: 50 INJECTION INTRAMUSCULAR; INTRAVENOUS at 09:18

## 2024-05-03 RX ADMIN — SODIUM CHLORIDE, POTASSIUM CHLORIDE, SODIUM LACTATE AND CALCIUM CHLORIDE: 600; 310; 30; 20 INJECTION, SOLUTION INTRAVENOUS at 06:55

## 2024-05-03 RX ADMIN — FENTANYL CITRATE 25 MCG: 50 INJECTION INTRAMUSCULAR; INTRAVENOUS at 09:39

## 2024-05-03 RX ADMIN — ONDANSETRON 4 MG: 2 INJECTION INTRAMUSCULAR; INTRAVENOUS at 07:37

## 2024-05-03 RX ADMIN — MIDAZOLAM 2 MG: 1 INJECTION INTRAMUSCULAR; INTRAVENOUS at 06:55

## 2024-05-03 RX ADMIN — HYDROCODONE BITARTRATE AND ACETAMINOPHEN 1 TABLET: 5; 325 TABLET ORAL at 10:24

## 2024-05-03 RX ADMIN — FENTANYL CITRATE 100 MCG: 50 INJECTION, SOLUTION INTRAMUSCULAR; INTRAVENOUS at 08:06

## 2024-05-03 RX ADMIN — WATER 2000 MG: 1 INJECTION INTRAMUSCULAR; INTRAVENOUS; SUBCUTANEOUS at 07:10

## 2024-05-03 RX ADMIN — DEXAMETHASONE SODIUM PHOSPHATE 10 MG: 4 INJECTION, SOLUTION INTRAMUSCULAR; INTRAVENOUS at 07:03

## 2024-05-03 RX ADMIN — PROPOFOL 15 MG: 10 INJECTION, EMULSION INTRAVENOUS at 07:03

## 2024-05-03 RX ADMIN — ROCURONIUM BROMIDE 50 MG: 10 INJECTION, SOLUTION INTRAVENOUS at 07:03

## 2024-05-03 RX ADMIN — FENTANYL CITRATE 50 MCG: 50 INJECTION INTRAMUSCULAR; INTRAVENOUS at 08:47

## 2024-05-03 RX ADMIN — LIDOCAINE HYDROCHLORIDE 100 MG: 20 INJECTION, SOLUTION EPIDURAL; INFILTRATION; INTRACAUDAL; PERINEURAL at 07:03

## 2024-05-03 RX ADMIN — FENTANYL CITRATE 50 MCG: 50 INJECTION INTRAMUSCULAR; INTRAVENOUS at 09:01

## 2024-05-03 RX ADMIN — FENTANYL CITRATE 100 MCG: 50 INJECTION, SOLUTION INTRAMUSCULAR; INTRAVENOUS at 07:16

## 2024-05-03 RX ADMIN — FENTANYL CITRATE 50 MCG: 50 INJECTION, SOLUTION INTRAMUSCULAR; INTRAVENOUS at 07:03

## 2024-05-03 RX ADMIN — SUGAMMADEX 363 MG: 100 INJECTION, SOLUTION INTRAVENOUS at 08:20

## 2024-05-03 ASSESSMENT — PAIN DESCRIPTION - PAIN TYPE
TYPE: SURGICAL PAIN

## 2024-05-03 ASSESSMENT — PAIN DESCRIPTION - ORIENTATION
ORIENTATION: INNER;LEFT
ORIENTATION: MID;LOWER
ORIENTATION: MID;LOWER
ORIENTATION: INNER;LEFT
ORIENTATION: INNER;LEFT
ORIENTATION: LEFT;INNER
ORIENTATION: INNER;LEFT
ORIENTATION: MID;LOWER

## 2024-05-03 ASSESSMENT — PAIN DESCRIPTION - FREQUENCY: FREQUENCY: CONTINUOUS

## 2024-05-03 ASSESSMENT — PAIN DESCRIPTION - LOCATION
LOCATION: ABDOMEN

## 2024-05-03 ASSESSMENT — PAIN - FUNCTIONAL ASSESSMENT
PAIN_FUNCTIONAL_ASSESSMENT: 0-10

## 2024-05-03 ASSESSMENT — PAIN DESCRIPTION - DESCRIPTORS
DESCRIPTORS: DISCOMFORT
DESCRIPTORS: ACHING;DISCOMFORT;SORE
DESCRIPTORS: DISCOMFORT
DESCRIPTORS: ACHING
DESCRIPTORS: DISCOMFORT
DESCRIPTORS: ACHING;DISCOMFORT;SORE
DESCRIPTORS: ACHING;SORE
DESCRIPTORS: DISCOMFORT
DESCRIPTORS: ACHING;DISCOMFORT;SORE
DESCRIPTORS: DISCOMFORT

## 2024-05-03 ASSESSMENT — PAIN SCALES - GENERAL
PAINLEVEL_OUTOF10: 10
PAINLEVEL_OUTOF10: 9
PAINLEVEL_OUTOF10: 5
PAINLEVEL_OUTOF10: 5
PAINLEVEL_OUTOF10: 10
PAINLEVEL_OUTOF10: 9
PAINLEVEL_OUTOF10: 8
PAINLEVEL_OUTOF10: 10

## 2024-05-03 NOTE — OP NOTE
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]    Post-Op Diagnosis:     Incarcerated 3.2 cm ventral hernia  Greater than 4 cm connective tissue neoplasm involving the left lower back       Procedure:  Robotic assisted laparoscopic repair of 3.2 cm incarcerated ventral hernia with 8 cm circular mesh  Excision of greater than 4 cm connective tissue neoplasm involving the left back  Intermediate layered closure of a 4 cm incision    Surgeon(s):  Antonio Stein MD Evan, Stephen J, MD    Assistant:   * No surgical staff found *    Anesthesia: General    Estimated Blood Loss (mL): Minimal    Complications: None    Specimens:   ID Type Source Tests Collected by Time Destination   A : BACK LESION Tissue Tissue SURGICAL PATHOLOGY Antonio Stein MD 5/3/2024 0810        Implants:  Implant Name Type Inv. Item Serial No.  Lot No. LRB No. Used Action   MESH WILDA [id number] CIR BIOMATERIAL PTFE KNIT DURABLE SGL STG - [id number]  MESH WILDA [id number] CIR BIOMATERIAL PTFE KNIT DURABLE SGL STG [id number]  [employer]WD  N/A 1 Implanted         Drains: * No LDAs found *    Findings:  Infection Present At Time Of Surgery (PATOS) (choose all levels that have infection present):  No infection present  Other Findings: As above  This procedure was not performed to treat primary cutaneous melanoma through wide local excision    Detailed Description of Procedure:   The patient was brought to the operative suite and placed on the table in the supine position.  The patient received anesthesia per the department of anesthesiology.  Both arms were tucked.  The abdominal wall was prepared and draped in a sterile fashion.  A insufflation needle was placed at Ward's point in the left upper quadrant.  The abdominal cavity was

## 2024-05-03 NOTE — DISCHARGE INSTRUCTIONS
Okay to shower tomorrow  Ice to the area  No lifting more than 15 or 20 pounds  No repetitive motions  Hold the abdomen with coughing and sneezing  Hold aspirin for 1 week  Contact the office with worsening pain, nausea and vomiting  Stool softener and/or MiraLAX if constipation develops on the narcotic

## 2024-05-03 NOTE — ANESTHESIA POSTPROCEDURE EVALUATION
Department of Anesthesiology  Postprocedure Note    Patient: Gail Eckert  MRN: [id number]  YOB: 1955  Date of evaluation: 5/3/2024    Procedure Summary       Date: 05/03/24 Room / Location: [address]    Anesthesia Start: 0655 Anesthesia Stop: 0834    Procedures:       LAPAROSCOPIC ROBOTIC XI ASSISTED VENTRAL HERNIA REPAIR WITH MESH (Abdomen)      EXCISION BACK LESION (Back) Diagnosis:       Ventral hernia with obstruction but no gangrene      Desmoid tumor of back      (Ventral hernia with obstruction but no gangrene [K43.6])      (Desmoid tumor of back [D48.117])    Surgeons: Antonio Stein MD Responsible Provider: Josr Castro MD    Anesthesia Type: general ASA Status: 3            Anesthesia Type: No value filed.    Lauri Phase I: Lauri Score: 10    Lauri Phase II:      Anesthesia Post Evaluation    Patient location during evaluation: PACU  Patient participation: complete - patient participated  Level of consciousness: awake and alert  Pain score: 2  Airway patency: patent  Nausea & Vomiting: no nausea and no vomiting  Cardiovascular status: blood pressure returned to baseline  Respiratory status: acceptable  Hydration status: euvolemic  Pain management: adequate    No notable events documented.

## 2024-05-03 NOTE — H&P
History and Physical    Patient's Name/Date of Birth: Gail Eckert / 1955    Date: May 3, 2024     PCP: Juma Haider DO       Chief Complaint:    Lipoma back and possible ventral hernia    History of Present Illness:    The patient 69-year-old white female who presents with 2 issues 1 is a lipoma involving the left lower back and the second is a ventral hernia above the umbilicus.      Past Medical History:   Diagnosis Date    CAD (coronary artery disease)     Degenerative disorder of bone     Hard of hearing     Hypertension     MI (myocardial infarction) (HCC) 2020    Psoriasis     Ruptured ear drum 2022    Sciatica of left side       Past Surgical History:   Procedure Laterality Date    BREAST LUMPECTOMY Left     CARDIAC CATHETERIZATION  2022    Dr Castro EF 70%     SECTION      CORONARY ANGIOPLASTY WITH STENT PLACEMENT  2020    3.0 x 33mm Xience Sara to Prox LAD, EF35%, Dr. Anderson    HEMORRHOID SURGERY      HYSTERECTOMY (CERVIX STATUS UNKNOWN)      JOINT REPLACEMENT      bilateral knees    MYRINGOTOMY W/ TUBES  2024    TONSILLECTOMY        Allergies: Baclofen, Lisinopril, Nitroglycerin, and Tramadol     Current Facility-Administered Medications   Medication Dose Route Frequency Provider Last Rate Last Admin    sodium chloride flush 0.9 % injection 5-40 mL  5-40 mL IntraVENous 2 times per day Antonio Stein MD        sodium chloride flush 0.9 % injection 5-40 mL  5-40 mL IntraVENous PRN Antonio Stein MD        lactated ringers IV soln infusion   IntraVENous Continuous Antonio Stein MD        ceFAZolin (ANCEF) 2,000 mg in sterile water 20 mL IV syringe  2,000 mg IntraVENous On Call to OR Antonio Stein MD           Social History     Tobacco Use    Smoking status: Every Day     Current packs/day: 0.25     Average packs/day: 0.3 packs/day for 50.8 years (12.7 ttl pk-yrs)     Types: Cigarettes     Start date: 1973    Smokeless tobacco: Never    Tobacco

## 2024-05-08 LAB — SURGICAL PATHOLOGY REPORT: NORMAL

## 2024-07-14 ENCOUNTER — HOSPITAL ENCOUNTER (EMERGENCY)
Age: 69
Discharge: HOME | End: 2024-07-14
Attending: EMERGENCY MEDICINE
Payer: MEDICARE

## 2024-07-14 VITALS
OXYGEN SATURATION: 98 % | HEIGHT: 65 IN | RESPIRATION RATE: 18 BRPM | WEIGHT: 180 LBS | DIASTOLIC BLOOD PRESSURE: 72 MMHG | SYSTOLIC BLOOD PRESSURE: 137 MMHG | TEMPERATURE: 98 F | BODY MASS INDEX: 29.99 KG/M2 | HEART RATE: 102 BPM

## 2024-07-14 DIAGNOSIS — S81.811A LACERATION OF RIGHT LOWER EXTREMITY, INITIAL ENCOUNTER: Primary | ICD-10-CM

## 2024-07-14 PROCEDURE — 96372 THER/PROPH/DIAG INJ SC/IM: CPT

## 2024-07-14 PROCEDURE — 90715 TDAP VACCINE 7 YRS/> IM: CPT | Performed by: EMERGENCY MEDICINE

## 2024-07-14 PROCEDURE — 12002 RPR S/N/AX/GEN/TRNK2.6-7.5CM: CPT

## 2024-07-14 PROCEDURE — 2500000003 HC RX 250 WO HCPCS: Performed by: EMERGENCY MEDICINE

## 2024-07-14 PROCEDURE — 6360000002 HC RX W HCPCS: Performed by: EMERGENCY MEDICINE

## 2024-07-14 PROCEDURE — 90471 IMMUNIZATION ADMIN: CPT | Performed by: EMERGENCY MEDICINE

## 2024-07-14 PROCEDURE — 6370000000 HC RX 637 (ALT 250 FOR IP): Performed by: EMERGENCY MEDICINE

## 2024-07-14 PROCEDURE — 99284 EMERGENCY DEPT VISIT MOD MDM: CPT

## 2024-07-14 RX ORDER — LIDOCAINE HYDROCHLORIDE AND EPINEPHRINE 10; 10 MG/ML; UG/ML
20 INJECTION, SOLUTION INFILTRATION; PERINEURAL ONCE
Status: DISCONTINUED | OUTPATIENT
Start: 2024-07-14 | End: 2024-07-14

## 2024-07-14 RX ORDER — CEPHALEXIN 500 MG/1
500 CAPSULE ORAL 3 TIMES DAILY
Qty: 21 CAPSULE | Refills: 0 | Status: SHIPPED | OUTPATIENT
Start: 2024-07-14 | End: 2024-07-21

## 2024-07-14 RX ORDER — BACITRACIN ZINC 500 [USP'U]/G
OINTMENT TOPICAL ONCE
Status: COMPLETED | OUTPATIENT
Start: 2024-07-14 | End: 2024-07-14

## 2024-07-14 RX ORDER — LIDOCAINE HYDROCHLORIDE AND EPINEPHRINE 10; 10 MG/ML; UG/ML
20 INJECTION, SOLUTION INFILTRATION; PERINEURAL ONCE
Status: COMPLETED | OUTPATIENT
Start: 2024-07-14 | End: 2024-07-14

## 2024-07-14 RX ADMIN — LIDOCAINE HYDROCHLORIDE AND EPINEPHRINE 20 ML: 10; 10 INJECTION, SOLUTION INFILTRATION; PERINEURAL at 01:12

## 2024-07-14 RX ADMIN — TETANUS TOXOID, REDUCED DIPHTHERIA TOXOID AND ACELLULAR PERTUSSIS VACCINE, ADSORBED 0.5 ML: 5; 2.5; 8; 8; 2.5 SUSPENSION INTRAMUSCULAR at 01:08

## 2024-07-14 RX ADMIN — BACITRACIN ZINC: 500 OINTMENT TOPICAL at 01:40

## 2024-07-14 ASSESSMENT — LIFESTYLE VARIABLES
HOW MANY STANDARD DRINKS CONTAINING ALCOHOL DO YOU HAVE ON A TYPICAL DAY: PATIENT DOES NOT DRINK
HOW OFTEN DO YOU HAVE A DRINK CONTAINING ALCOHOL: NEVER

## 2024-07-14 NOTE — ED PROVIDER NOTES
HPI:  24,   Time: 1:04 AM EDT         Gail Eckert is a 69 y.o. female presenting to the ED for right leg anterior surface to the, beginning prior to her right to arrive ago.  The complaint has been persistent, moderate in severity, and worsened by nothing.  She is able to walk on it she is cut her self on a metal frame she is already on antibiotics but she is not sure which one I wrote her prescription for Keflex will she is we will update her tetanus and irrigate the wound and suture it and told to watch closely for signs of infection pus redness or swelling she is able to walk tendons are intact neurovascularly intact    ROS:   Pertinent positives and negatives are stated within HPI, all other systems reviewed and are negative.  --------------------------------------------- PAST HISTORY ---------------------------------------------  Past Medical History:  has a past medical history of CAD (coronary artery disease), Degenerative disorder of bone, Hard of hearing, Hypertension, MI (myocardial infarction) (HCC), Psoriasis, Ruptured ear drum, and Sciatica of left side.    Past Surgical History:  has a past surgical history that includes  section; Tonsillectomy; Hysterectomy; Hemorrhoid surgery; Breast lumpectomy (Left); Coronary angioplasty with stent (2020); joint replacement; Cardiac catheterization (2022); Myringotomy w/ tubes (2024); ventral hernia repair (N/A, 5/3/2024); and back surgery (N/A, 5/3/2024).    Social History:  reports that she has been smoking cigarettes. She started smoking about 50 years ago. She has a 12.7 pack-year smoking history. She has never used smokeless tobacco. She reports that she does not currently use alcohol. She reports that she does not use drugs.    Family History: family history includes Diabetes in her father; Lung Cancer in her mother.     The patient’s home medications have been reviewed.    Allergies: Baclofen, Lisinopril, Nitroglycerin, and

## 2024-09-25 NOTE — PROGRESS NOTES
Patient seen feels better. Await morning labs for jun. [Normal] : affect was normal and insight and judgment were intact [de-identified] : neg straight leg testing

## 2024-11-17 ENCOUNTER — APPOINTMENT (OUTPATIENT)
Dept: GENERAL RADIOLOGY | Age: 69
End: 2024-11-17
Payer: MEDICARE

## 2024-11-17 ENCOUNTER — APPOINTMENT (OUTPATIENT)
Dept: CT IMAGING | Age: 69
End: 2024-11-17
Attending: STUDENT IN AN ORGANIZED HEALTH CARE EDUCATION/TRAINING PROGRAM
Payer: MEDICARE

## 2024-11-17 ENCOUNTER — HOSPITAL ENCOUNTER (EMERGENCY)
Age: 69
Discharge: HOME | End: 2024-11-17
Attending: STUDENT IN AN ORGANIZED HEALTH CARE EDUCATION/TRAINING PROGRAM
Payer: MEDICARE

## 2024-11-17 VITALS
HEART RATE: 90 BPM | DIASTOLIC BLOOD PRESSURE: 105 MMHG | BODY MASS INDEX: 31.62 KG/M2 | RESPIRATION RATE: 18 BRPM | TEMPERATURE: 98.5 F | WEIGHT: 190 LBS | OXYGEN SATURATION: 98 % | SYSTOLIC BLOOD PRESSURE: 156 MMHG

## 2024-11-17 DIAGNOSIS — R40.4 TRANSIENT ALTERATION OF AWARENESS: ICD-10-CM

## 2024-11-17 DIAGNOSIS — R07.9 CHEST PAIN, UNSPECIFIED TYPE: Primary | ICD-10-CM

## 2024-11-17 LAB
ALBUMIN SERPL-MCNC: 4.5 G/DL (ref 3.5–5.2)
ALP SERPL-CCNC: 135 U/L (ref 35–104)
ALT SERPL-CCNC: 7 U/L (ref 0–32)
AMMONIA PLAS-SCNC: 10 UMOL/L (ref 11–51)
ANION GAP SERPL CALCULATED.3IONS-SCNC: 9 MMOL/L (ref 7–16)
AST SERPL-CCNC: 11 U/L (ref 0–31)
BASOPHILS # BLD: 0.05 K/UL (ref 0–0.2)
BASOPHILS NFR BLD: 1 % (ref 0–2)
BILIRUB SERPL-MCNC: 0.3 MG/DL (ref 0–1.2)
BILIRUB UR QL STRIP: NEGATIVE
BNP SERPL-MCNC: <36 PG/ML (ref 0–125)
BUN SERPL-MCNC: 13 MG/DL (ref 6–23)
CALCIUM SERPL-MCNC: 9.7 MG/DL (ref 8.6–10.2)
CHLORIDE SERPL-SCNC: 99 MMOL/L (ref 98–107)
CLARITY UR: CLEAR
CO2 SERPL-SCNC: 27 MMOL/L (ref 22–29)
COLOR UR: YELLOW
CREAT SERPL-MCNC: 0.9 MG/DL (ref 0.5–1)
EOSINOPHIL # BLD: 0.06 K/UL (ref 0.05–0.5)
EOSINOPHILS RELATIVE PERCENT: 1 % (ref 0–6)
ERYTHROCYTE [DISTWIDTH] IN BLOOD BY AUTOMATED COUNT: 13.3 % (ref 11.5–15)
FLUAV RNA RESP QL NAA+PROBE: NOT DETECTED
FLUBV RNA RESP QL NAA+PROBE: NOT DETECTED
GFR, ESTIMATED: 70 ML/MIN/1.73M2
GLUCOSE SERPL-MCNC: 112 MG/DL (ref 74–99)
GLUCOSE UR STRIP-MCNC: NEGATIVE MG/DL
HCT VFR BLD AUTO: 41.1 % (ref 34–48)
HGB BLD-MCNC: 13.3 G/DL (ref 11.5–15.5)
HGB UR QL STRIP.AUTO: NEGATIVE
IMM GRANULOCYTES # BLD AUTO: 0.04 K/UL (ref 0–0.58)
IMM GRANULOCYTES NFR BLD: 0 % (ref 0–5)
KETONES UR STRIP-MCNC: NEGATIVE MG/DL
LEUKOCYTE ESTERASE UR QL STRIP: NEGATIVE
LYMPHOCYTES NFR BLD: 1.53 K/UL (ref 1.5–4)
LYMPHOCYTES RELATIVE PERCENT: 14 % (ref 20–42)
MCH RBC QN AUTO: 30.4 PG (ref 26–35)
MCHC RBC AUTO-ENTMCNC: 32.4 G/DL (ref 32–34.5)
MCV RBC AUTO: 93.8 FL (ref 80–99.9)
MONOCYTES NFR BLD: 0.57 K/UL (ref 0.1–0.95)
MONOCYTES NFR BLD: 5 % (ref 2–12)
NEUTROPHILS NFR BLD: 79 % (ref 43–80)
NEUTS SEG NFR BLD: 8.63 K/UL (ref 1.8–7.3)
NITRITE UR QL STRIP: NEGATIVE
PH UR STRIP: 6 [PH] (ref 5–9)
PLATELET # BLD AUTO: 266 K/UL (ref 130–450)
PMV BLD AUTO: 10.3 FL (ref 7–12)
POTASSIUM SERPL-SCNC: 4.3 MMOL/L (ref 3.5–5)
PROT SERPL-MCNC: 7.3 G/DL (ref 6.4–8.3)
PROT UR STRIP-MCNC: NEGATIVE MG/DL
RBC # BLD AUTO: 4.38 M/UL (ref 3.5–5.5)
RBC #/AREA URNS HPF: NORMAL /HPF
SARS-COV-2 RNA RESP QL NAA+PROBE: NOT DETECTED
SODIUM SERPL-SCNC: 135 MMOL/L (ref 132–146)
SOURCE: NORMAL
SP GR UR STRIP: 1.01 (ref 1–1.03)
SPECIMEN DESCRIPTION: NORMAL
TROPONIN I SERPL HS-MCNC: 6 NG/L (ref 0–9)
TROPONIN I SERPL HS-MCNC: 8 NG/L (ref 0–9)
UROBILINOGEN UR STRIP-ACNC: 0.2 EU/DL (ref 0–1)
WBC #/AREA URNS HPF: NORMAL /HPF
WBC OTHER # BLD: 10.9 K/UL (ref 4.5–11.5)

## 2024-11-17 PROCEDURE — 99285 EMERGENCY DEPT VISIT HI MDM: CPT

## 2024-11-17 PROCEDURE — 84484 ASSAY OF TROPONIN QUANT: CPT

## 2024-11-17 PROCEDURE — 82140 ASSAY OF AMMONIA: CPT

## 2024-11-17 PROCEDURE — 93005 ELECTROCARDIOGRAM TRACING: CPT | Performed by: STUDENT IN AN ORGANIZED HEALTH CARE EDUCATION/TRAINING PROGRAM

## 2024-11-17 PROCEDURE — 85025 COMPLETE CBC W/AUTO DIFF WBC: CPT

## 2024-11-17 PROCEDURE — 71045 X-RAY EXAM CHEST 1 VIEW: CPT

## 2024-11-17 PROCEDURE — 80053 COMPREHEN METABOLIC PANEL: CPT

## 2024-11-17 PROCEDURE — 87636 SARSCOV2 & INF A&B AMP PRB: CPT

## 2024-11-17 PROCEDURE — 83880 ASSAY OF NATRIURETIC PEPTIDE: CPT

## 2024-11-17 PROCEDURE — 81001 URINALYSIS AUTO W/SCOPE: CPT

## 2024-11-17 PROCEDURE — 70450 CT HEAD/BRAIN W/O DYE: CPT

## 2024-11-17 RX ORDER — AMOXICILLIN 500 MG/1
500 CAPSULE ORAL 3 TIMES DAILY
COMMUNITY
Start: 2024-11-05

## 2024-11-17 RX ORDER — CLINDAMYCIN HYDROCHLORIDE 300 MG/1
CAPSULE ORAL
COMMUNITY
Start: 2024-10-10

## 2024-11-17 ASSESSMENT — PAIN - FUNCTIONAL ASSESSMENT: PAIN_FUNCTIONAL_ASSESSMENT: NONE - DENIES PAIN

## 2024-11-17 NOTE — DISCHARGE INSTRUCTIONS
Follow-up with primary care doctor  If you notice any new worrisome symptom please return to the emergency department for evaluation

## 2024-11-18 LAB
EKG ATRIAL RATE: 93 BPM
EKG P AXIS: 56 DEGREES
EKG P-R INTERVAL: 156 MS
EKG Q-T INTERVAL: 358 MS
EKG QRS DURATION: 86 MS
EKG QTC CALCULATION (BAZETT): 445 MS
EKG R AXIS: -27 DEGREES
EKG T AXIS: 70 DEGREES
EKG VENTRICULAR RATE: 93 BPM

## 2024-11-20 NOTE — ED PROVIDER NOTES
[facility] EMERGENCY DEPARTMENT  EMERGENCY DEPARTMENT ENCOUNTER      Pt Name: Gail Eckert  MRN: [id number]  Birthdate 1955  Date of evaluation: 11/17/2024  Provider: Luis Park DO  PCP: Juma Haider DO    CHIEF COMPLAINT       Chief Complaint   Patient presents with    Chest Pain     Dx with bronchitis 1 week ago, taking steroids and antibiotics, c/o bilateral ear pain    Urinary Frequency     Denies pain and dysuria     Altered Mental Status     Starting today        HISTORY OF PRESENT ILLNESS: 1 or more Elements   History From: Patient  Limitations to history : None    Gail Eckert is a 69 y.o. female Past medical history of CAD, seizures as well as hypertension.  Patient presents with chief complaint of cough as well as some shortness of breath and chest pain.  Patient notes that she has had a cough for the last week or so has been productive of some whitish sputum.  Patient also endorses some shortness of breath as well as chest pain associated with the cough.  Patient also endorses some urinary frequency.  Symptoms have been moderate in severity constant onset patient's symptoms are worsened with coughing she denies any relieving factors.  Patient denies any fevers, chills, nausea, vomit, abdominal pain, constipation or diarrhea    Nursing Notes were all reviewed and agreed with or any disagreements were addressed in the HPI.    REVIEW OF SYSTEMS :    Positives and Pertinent negatives as per HPI.     PAST MEDICAL HISTORY/Chronic Conditions Affecting Care    has a past medical history of CAD (coronary artery disease), Degenerative disorder of bone, Hard of hearing, Hypertension, MI (myocardial infarction) (HCC) (08/22/2020), Psoriasis, Ruptured ear drum (05/17/2022), and Sciatica of left side.     SURGICAL HISTORY     Past Surgical History:   Procedure Laterality Date    BACK SURGERY N/A 5/3/2024    EXCISION BACK LESION performed by Antonio Stein MD

## 2025-08-28 ENCOUNTER — HOSPITAL ENCOUNTER (OUTPATIENT)
Dept: LAB | Age: 70
Discharge: HOME | End: 2025-08-28
Payer: MEDICARE

## 2025-08-28 LAB
ALBUMIN SERPL-MCNC: 4.4 G/DL (ref 3.5–5.2)
ALP SERPL-CCNC: 105 U/L (ref 35–104)
ALT SERPL-CCNC: 18 U/L (ref 0–35)
ANION GAP SERPL CALCULATED.3IONS-SCNC: 12 MMOL/L (ref 7–16)
AST SERPL-CCNC: 19 U/L (ref 0–35)
BASOPHILS # BLD: 0.06 K/UL (ref 0–0.2)
BASOPHILS NFR BLD: 1 % (ref 0–2)
BILIRUB SERPL-MCNC: 0.3 MG/DL (ref 0–1.2)
BUN SERPL-MCNC: 11 MG/DL (ref 8–23)
CALCIUM SERPL-MCNC: 9.7 MG/DL (ref 8.8–10.2)
CHLORIDE SERPL-SCNC: 106 MMOL/L (ref 98–107)
CHOLEST SERPL-MCNC: 144 MG/DL
CO2 SERPL-SCNC: 23 MMOL/L (ref 22–29)
CREAT SERPL-MCNC: 1 MG/DL (ref 0.5–1)
CREAT UR-MCNC: 128 MG/DL (ref 29–226)
EOSINOPHIL # BLD: 0.34 K/UL (ref 0.05–0.5)
EOSINOPHILS RELATIVE PERCENT: 4 % (ref 0–6)
ERYTHROCYTE [DISTWIDTH] IN BLOOD BY AUTOMATED COUNT: 13.4 % (ref 11.5–15)
GFR, ESTIMATED: 61 ML/MIN/1.73M2
GLUCOSE SERPL-MCNC: 108 MG/DL (ref 74–99)
HCT VFR BLD AUTO: 39.4 % (ref 34–48)
HDLC SERPL-MCNC: 54 MG/DL
HGB BLD-MCNC: 12.9 G/DL (ref 11.5–15.5)
IMM GRANULOCYTES # BLD AUTO: <0.03 K/UL (ref 0–0.58)
IMM GRANULOCYTES NFR BLD: 0 % (ref 0–5)
LDLC SERPL CALC-MCNC: 66 MG/DL
LYMPHOCYTES NFR BLD: 2.33 K/UL (ref 1.5–4)
LYMPHOCYTES RELATIVE PERCENT: 29 % (ref 20–42)
MCH RBC QN AUTO: 30.5 PG (ref 26–35)
MCHC RBC AUTO-ENTMCNC: 32.7 G/DL (ref 32–34.5)
MCV RBC AUTO: 93.1 FL (ref 80–99.9)
MICROALBUMIN UR-MCNC: <12 MG/L (ref 0–20)
MICROALBUMIN/CREAT UR-RTO: <9 MCG/MG CREAT (ref 0–30)
MONOCYTES NFR BLD: 0.63 K/UL (ref 0.1–0.95)
MONOCYTES NFR BLD: 8 % (ref 2–12)
NEUTROPHILS NFR BLD: 57 % (ref 43–80)
NEUTS SEG NFR BLD: 4.56 K/UL (ref 1.8–7.3)
PLATELET # BLD AUTO: 253 K/UL (ref 130–450)
PMV BLD AUTO: 10.8 FL (ref 7–12)
POTASSIUM SERPL-SCNC: 4.8 MMOL/L (ref 3.5–5.1)
PROT SERPL-MCNC: 6.9 G/DL (ref 6.4–8.3)
RBC # BLD AUTO: 4.23 M/UL (ref 3.5–5.5)
SODIUM SERPL-SCNC: 141 MMOL/L (ref 136–145)
TRIGL SERPL-MCNC: 123 MG/DL
VLDLC SERPL CALC-MCNC: 25 MG/DL
WBC OTHER # BLD: 7.9 K/UL (ref 4.5–11.5)

## 2025-08-28 PROCEDURE — 82570 ASSAY OF URINE CREATININE: CPT

## 2025-08-28 PROCEDURE — 82043 UR ALBUMIN QUANTITATIVE: CPT

## 2025-08-28 PROCEDURE — 80053 COMPREHEN METABOLIC PANEL: CPT

## 2025-08-28 PROCEDURE — 85025 COMPLETE CBC W/AUTO DIFF WBC: CPT

## 2025-08-28 PROCEDURE — 80061 LIPID PANEL: CPT

## 2025-08-28 PROCEDURE — 36415 COLL VENOUS BLD VENIPUNCTURE: CPT

## 2025-08-29 ENCOUNTER — TRANSCRIBE ORDERS (OUTPATIENT)
Dept: ADMINISTRATIVE | Age: 70
End: 2025-08-29

## 2025-08-29 DIAGNOSIS — Z12.31 ENCOUNTER FOR SCREENING MAMMOGRAM FOR MALIGNANT NEOPLASM OF BREAST: Primary | ICD-10-CM

## 2025-09-03 ENCOUNTER — TRANSCRIBE ORDERS (OUTPATIENT)
Dept: ADMINISTRATIVE | Age: 70
End: 2025-09-03

## 2025-09-03 DIAGNOSIS — Z78.0 POSTMENOPAUSAL STATE: Primary | ICD-10-CM

## (undated) DEVICE — MEGA SUTURECUT ND: Brand: ENDOWRIST

## (undated) DEVICE — TIP COVER ACCESSORY

## (undated) DEVICE — ARM DRAPE

## (undated) DEVICE — INSUFFLATION NEEDLE TO ESTABLISH PNEUMOPERITONEUM.: Brand: INSUFFLATION NEEDLE

## (undated) DEVICE — LIQUIBAND RAPID ADHESIVE 36/CS 0.8ML: Brand: MEDLINE

## (undated) DEVICE — COLUMN DRAPE

## (undated) DEVICE — SOLUTION IRRIG 1000ML 0.9% SOD CHL USP POUR PLAS BTL

## (undated) DEVICE — 4-PORT MANIFOLD: Brand: NEPTUNE 2

## (undated) DEVICE — DRAPE,LAP,CHOLE,W/TROUGHS,STERILE: Brand: MEDLINE

## (undated) DEVICE — TOWEL,OR,DSP,ST,BLUE,STD,6/PK,12PK/CS: Brand: MEDLINE

## (undated) DEVICE — SEAL

## (undated) DEVICE — DOUBLE BASIN SET: Brand: MEDLINE INDUSTRIES, INC.

## (undated) DEVICE — SYRINGE 20ML LL S/C 50

## (undated) DEVICE — ELECTRODE PT RET AD L9FT HI MOIST COND ADH HYDRGEL CORDED

## (undated) DEVICE — INSUFFLATION TUBING SET WITH FILTER, FUNNEL CONNECTOR AND LUER LOCK: Brand: JOSNOE MEDICAL INC

## (undated) DEVICE — SPONGE,LAP,4"X18",XR,ST,5/PK,40PK/CS: Brand: MEDLINE INDUSTRIES, INC.

## (undated) DEVICE — BLADELESS OBTURATOR: Brand: WECK VISTA

## (undated) DEVICE — DRAPE,LAPAROTOMY,PCH,STERILE: Brand: MEDLINE

## (undated) DEVICE — SOLUTION SURG PREP 26 CC PURPREP

## (undated) DEVICE — NEEDLE HYPO 25GA L1.5IN BLU POLYPR HUB S STL REG BVL STR

## (undated) DEVICE — KIT,ANTI FOG,W/SPONGE & FLUID,SOFT PACK: Brand: MEDLINE

## (undated) DEVICE — PACK PROCEDURE SURG GEN CUST

## (undated) DEVICE — GOWN,SIRUS,FABRNF,XL,20/CS: Brand: MEDLINE

## (undated) DEVICE — WARMER SCP 2 ANTIFOG LAP DISP

## (undated) DEVICE — GLOVE ORANGE PI 8   MSG9080

## (undated) DEVICE — PROGRASP FORCEPS: Brand: ENDOWRIST

## (undated) DEVICE — BLADE,STAINLESS-STEEL,11,STRL,DISPOSABLE: Brand: MEDLINE

## (undated) DEVICE — MONOPOLAR CURVED SCISSORS: Brand: ENDOWRIST